# Patient Record
Sex: FEMALE | Race: WHITE | Employment: PART TIME | URBAN - NONMETROPOLITAN AREA
[De-identification: names, ages, dates, MRNs, and addresses within clinical notes are randomized per-mention and may not be internally consistent; named-entity substitution may affect disease eponyms.]

---

## 2017-01-17 ENCOUNTER — OFFICE VISIT (OUTPATIENT)
Dept: ONCOLOGY | Age: 62
End: 2017-01-17

## 2017-01-17 VITALS
BODY MASS INDEX: 32.32 KG/M2 | DIASTOLIC BLOOD PRESSURE: 93 MMHG | TEMPERATURE: 96.7 F | RESPIRATION RATE: 18 BRPM | HEART RATE: 74 BPM | OXYGEN SATURATION: 97 % | WEIGHT: 194 LBS | HEIGHT: 65 IN | SYSTOLIC BLOOD PRESSURE: 178 MMHG

## 2017-01-17 DIAGNOSIS — D72.828 OTHER ELEVATED WHITE BLOOD CELL (WBC) COUNT: Primary | ICD-10-CM

## 2017-01-17 PROCEDURE — 99214 OFFICE O/P EST MOD 30 MIN: CPT | Performed by: INTERNAL MEDICINE

## 2017-03-28 ENCOUNTER — OFFICE VISIT (OUTPATIENT)
Dept: FAMILY MEDICINE CLINIC | Age: 62
End: 2017-03-28

## 2017-03-28 VITALS
BODY MASS INDEX: 31.86 KG/M2 | SYSTOLIC BLOOD PRESSURE: 148 MMHG | DIASTOLIC BLOOD PRESSURE: 110 MMHG | HEIGHT: 65 IN | HEART RATE: 64 BPM | WEIGHT: 191.2 LBS | RESPIRATION RATE: 12 BRPM

## 2017-03-28 DIAGNOSIS — I10 BENIGN ESSENTIAL HTN: Primary | ICD-10-CM

## 2017-03-28 DIAGNOSIS — M79.89 LEFT LEG SWELLING: ICD-10-CM

## 2017-03-28 PROCEDURE — 99213 OFFICE O/P EST LOW 20 MIN: CPT | Performed by: FAMILY MEDICINE

## 2017-03-28 RX ORDER — CLONIDINE HYDROCHLORIDE 0.1 MG/1
0.1 TABLET ORAL 2 TIMES DAILY
Qty: 60 TABLET | Refills: 3 | Status: SHIPPED | OUTPATIENT
Start: 2017-03-28 | End: 2017-06-05 | Stop reason: SDUPTHER

## 2017-03-28 ASSESSMENT — ENCOUNTER SYMPTOMS
SORE THROAT: 0
CHEST TIGHTNESS: 0
COUGH: 0
VOMITING: 0
DIARRHEA: 0
CONSTIPATION: 0
WHEEZING: 0
RHINORRHEA: 0
BLOOD IN STOOL: 0
BACK PAIN: 0
NAUSEA: 0
EYE PAIN: 0
ABDOMINAL PAIN: 0
SHORTNESS OF BREATH: 0

## 2017-04-04 ENCOUNTER — OFFICE VISIT (OUTPATIENT)
Dept: ONCOLOGY | Age: 62
End: 2017-04-04

## 2017-04-04 VITALS
TEMPERATURE: 97.5 F | BODY MASS INDEX: 32.12 KG/M2 | OXYGEN SATURATION: 99 % | HEIGHT: 65 IN | RESPIRATION RATE: 18 BRPM | DIASTOLIC BLOOD PRESSURE: 108 MMHG | SYSTOLIC BLOOD PRESSURE: 168 MMHG | WEIGHT: 192.8 LBS | HEART RATE: 62 BPM

## 2017-04-04 DIAGNOSIS — D75.1 POLYCYTHEMIA: Primary | ICD-10-CM

## 2017-04-04 PROCEDURE — 99215 OFFICE O/P EST HI 40 MIN: CPT | Performed by: INTERNAL MEDICINE

## 2017-04-24 ENCOUNTER — TELEPHONE (OUTPATIENT)
Dept: FAMILY MEDICINE CLINIC | Age: 62
End: 2017-04-24

## 2017-05-12 DIAGNOSIS — I10 ESSENTIAL HYPERTENSION: ICD-10-CM

## 2017-05-12 RX ORDER — ENALAPRIL MALEATE 20 MG/1
TABLET ORAL
Qty: 180 TABLET | Refills: 0 | Status: SHIPPED | OUTPATIENT
Start: 2017-05-12 | End: 2017-06-05 | Stop reason: SDUPTHER

## 2017-05-23 ENCOUNTER — OFFICE VISIT (OUTPATIENT)
Dept: ONCOLOGY | Age: 62
End: 2017-05-23

## 2017-05-23 VITALS
HEART RATE: 71 BPM | HEIGHT: 64 IN | TEMPERATURE: 95.6 F | WEIGHT: 189.6 LBS | DIASTOLIC BLOOD PRESSURE: 88 MMHG | OXYGEN SATURATION: 99 % | BODY MASS INDEX: 32.37 KG/M2 | RESPIRATION RATE: 18 BRPM | SYSTOLIC BLOOD PRESSURE: 167 MMHG

## 2017-05-23 DIAGNOSIS — D75.1 POLYCYTHEMIA: Primary | ICD-10-CM

## 2017-05-23 PROCEDURE — 99215 OFFICE O/P EST HI 40 MIN: CPT | Performed by: INTERNAL MEDICINE

## 2017-05-30 DIAGNOSIS — R79.83 HOMOCYSTEINEMIA: ICD-10-CM

## 2017-05-31 RX ORDER — RIVAROXABAN 20 MG/1
TABLET, FILM COATED ORAL
Qty: 30 TABLET | Refills: 10 | OUTPATIENT
Start: 2017-05-31

## 2017-06-05 ENCOUNTER — OFFICE VISIT (OUTPATIENT)
Dept: FAMILY MEDICINE CLINIC | Age: 62
End: 2017-06-05

## 2017-06-05 VITALS
HEART RATE: 72 BPM | SYSTOLIC BLOOD PRESSURE: 136 MMHG | DIASTOLIC BLOOD PRESSURE: 86 MMHG | WEIGHT: 188.6 LBS | RESPIRATION RATE: 12 BRPM | HEIGHT: 65 IN | BODY MASS INDEX: 31.42 KG/M2

## 2017-06-05 DIAGNOSIS — I10 ESSENTIAL HYPERTENSION: ICD-10-CM

## 2017-06-05 DIAGNOSIS — I10 BENIGN ESSENTIAL HTN: ICD-10-CM

## 2017-06-05 DIAGNOSIS — R79.83 HOMOCYSTEINEMIA: ICD-10-CM

## 2017-06-05 DIAGNOSIS — Z00.00 WELL ADULT EXAM: Primary | ICD-10-CM

## 2017-06-05 PROCEDURE — 99396 PREV VISIT EST AGE 40-64: CPT | Performed by: FAMILY MEDICINE

## 2017-06-05 RX ORDER — CLONIDINE HYDROCHLORIDE 0.1 MG/1
0.1 TABLET ORAL 2 TIMES DAILY
Qty: 180 TABLET | Refills: 3 | Status: SHIPPED | OUTPATIENT
Start: 2017-06-05 | End: 2018-06-18 | Stop reason: DRUGHIGH

## 2017-06-05 RX ORDER — ENALAPRIL MALEATE 20 MG/1
TABLET ORAL
Qty: 180 TABLET | Refills: 3 | Status: SHIPPED | OUTPATIENT
Start: 2017-06-05 | End: 2018-06-18 | Stop reason: SDUPTHER

## 2017-06-05 ASSESSMENT — ENCOUNTER SYMPTOMS
BLOOD IN STOOL: 0
CHEST TIGHTNESS: 0
EYE PAIN: 0
VOMITING: 0
DIARRHEA: 0
SHORTNESS OF BREATH: 0
ABDOMINAL PAIN: 0
CONSTIPATION: 0
NAUSEA: 0
SORE THROAT: 0
COUGH: 0
BACK PAIN: 0
RHINORRHEA: 0
WHEEZING: 0

## 2017-06-05 ASSESSMENT — PATIENT HEALTH QUESTIONNAIRE - PHQ9
1. LITTLE INTEREST OR PLEASURE IN DOING THINGS: 0
SUM OF ALL RESPONSES TO PHQ9 QUESTIONS 1 & 2: 0
2. FEELING DOWN, DEPRESSED OR HOPELESS: 0
SUM OF ALL RESPONSES TO PHQ QUESTIONS 1-9: 0

## 2017-06-12 RX ORDER — FOLIC ACID 1 MG/1
TABLET ORAL
Qty: 90 TABLET | Refills: 1 | Status: SHIPPED | OUTPATIENT
Start: 2017-06-12 | End: 2017-12-13 | Stop reason: SDUPTHER

## 2017-07-18 ENCOUNTER — OFFICE VISIT (OUTPATIENT)
Dept: ONCOLOGY | Age: 62
End: 2017-07-18
Payer: COMMERCIAL

## 2017-07-18 ENCOUNTER — HOSPITAL ENCOUNTER (OUTPATIENT)
Dept: INFUSION THERAPY | Age: 62
Discharge: HOME | End: 2017-07-18
Payer: COMMERCIAL

## 2017-07-18 VITALS
HEIGHT: 65 IN | BODY MASS INDEX: 31.02 KG/M2 | SYSTOLIC BLOOD PRESSURE: 168 MMHG | HEART RATE: 72 BPM | OXYGEN SATURATION: 99 % | WEIGHT: 186.2 LBS | DIASTOLIC BLOOD PRESSURE: 98 MMHG | RESPIRATION RATE: 18 BRPM | TEMPERATURE: 97.3 F

## 2017-07-18 DIAGNOSIS — D75.1 POLYCYTHEMIA: Primary | ICD-10-CM

## 2017-07-18 DIAGNOSIS — D75.1 POLYCYTHEMIA: ICD-10-CM

## 2017-07-18 LAB
ANISOCYTOSIS: ABNORMAL
BASOPHILIA: SLIGHT
BASOPHILS # BLD: 2 %
BASOPHILS ABSOLUTE: 0.3 THOU/MM3 (ref 0–0.1)
DIFFERENTIAL, MANUAL: NORMAL
EOSINOPHIL # BLD: 6 %
EOSINOPHILS ABSOLUTE: 1 THOU/MM3 (ref 0–0.4)
HCT VFR BLD CALC: 52.4 % (ref 37–47)
HEMOGLOBIN: 15.4 GM/DL (ref 12–16)
HYPOCHROMIA: ABNORMAL
LYMPHOCYTES # BLD: 6 %
LYMPHOCYTES ABSOLUTE: 1 THOU/MM3 (ref 1–4.8)
MCH RBC QN AUTO: 18.8 PG (ref 27–31)
MCHC RBC AUTO-ENTMCNC: 29.4 GM/DL (ref 33–37)
MCV RBC AUTO: 63.9 FL (ref 81–99)
MICROCYTES: ABNORMAL
MONOCYTES # BLD: 2 %
MONOCYTES ABSOLUTE: 0.3 THOU/MM3 (ref 0.4–1.3)
NUCLEATED RED BLOOD CELLS: 0 /100 WBC
PDW BLD-RTO: 22 % (ref 11.5–14.5)
PLATELET # BLD: 283 THOU/MM3 (ref 130–400)
PLATELET ESTIMATE: ADEQUATE
PMV BLD AUTO: 8.8 MCM (ref 7.4–10.4)
RBC # BLD: 8.2 MILL/MM3 (ref 4.2–5.4)
RBC # BLD: ABNORMAL 10*6/UL
SCAN OF BLOOD SMEAR: NORMAL
SEG NEUTROPHILS: 84 %
SEGMENTED NEUTROPHILS ABSOLUTE COUNT: 14.5 THOU/MM3 (ref 1.8–7.7)
SPHEROCYTES: ABNORMAL
TARGET CELLS: ABNORMAL
WBC # BLD: 17.3 THOU/MM3 (ref 4.8–10.8)

## 2017-07-18 PROCEDURE — 36415 COLL VENOUS BLD VENIPUNCTURE: CPT

## 2017-07-18 PROCEDURE — 85025 COMPLETE CBC W/AUTO DIFF WBC: CPT

## 2017-07-18 PROCEDURE — 99215 OFFICE O/P EST HI 40 MIN: CPT | Performed by: INTERNAL MEDICINE

## 2017-07-18 PROCEDURE — 99211 OFF/OP EST MAY X REQ PHY/QHP: CPT

## 2017-07-27 ENCOUNTER — HOSPITAL ENCOUNTER (OUTPATIENT)
Dept: INFUSION THERAPY | Age: 62
End: 2017-07-27
Payer: COMMERCIAL

## 2017-08-02 RX ORDER — 0.9 % SODIUM CHLORIDE 0.9 %
250 INTRAVENOUS SOLUTION INTRAVENOUS ONCE
Status: CANCELLED | OUTPATIENT
Start: 2017-08-03 | End: 2017-08-03

## 2017-08-02 RX ORDER — 0.9 % SODIUM CHLORIDE 0.9 %
500 INTRAVENOUS SOLUTION INTRAVENOUS ONCE
Status: CANCELLED | OUTPATIENT
Start: 2017-08-03 | End: 2017-08-03

## 2017-08-03 ENCOUNTER — HOSPITAL ENCOUNTER (OUTPATIENT)
Dept: INFUSION THERAPY | Age: 62
Discharge: HOME | End: 2017-08-03
Payer: COMMERCIAL

## 2017-08-03 VITALS
OXYGEN SATURATION: 98 % | SYSTOLIC BLOOD PRESSURE: 149 MMHG | HEIGHT: 65 IN | HEART RATE: 62 BPM | WEIGHT: 186 LBS | BODY MASS INDEX: 30.99 KG/M2 | TEMPERATURE: 98 F | RESPIRATION RATE: 18 BRPM | DIASTOLIC BLOOD PRESSURE: 77 MMHG

## 2017-08-03 DIAGNOSIS — D75.1 POLYCYTHEMIA: ICD-10-CM

## 2017-08-03 LAB — HEMOGLOBIN: 15.3 GM/DL (ref 12–16)

## 2017-08-03 PROCEDURE — 99195 PHLEBOTOMY: CPT

## 2017-08-03 RX ORDER — 0.9 % SODIUM CHLORIDE 0.9 %
250 INTRAVENOUS SOLUTION INTRAVENOUS ONCE
Status: CANCELLED | OUTPATIENT
Start: 2017-08-03 | End: 2017-08-03

## 2017-08-03 RX ORDER — 0.9 % SODIUM CHLORIDE 0.9 %
500 INTRAVENOUS SOLUTION INTRAVENOUS ONCE
Status: CANCELLED | OUTPATIENT
Start: 2017-08-03 | End: 2017-08-03

## 2017-08-03 NOTE — IP AVS SNAPSHOT
After Visit Summary  (Discharge Instructions)    Medication List for Home    Based on the information you provided to us as well as any changes during this visit, the following is your updated medication list.  Compare this with your prescription bottles at home. If you have any questions or concerns, contact your primary care physician's office. Daily Medication List (This medication list can be shared with any healthcare provider who is helping you manage your medications)      ASK your doctor about these medications if you have questions        Last Dose    Next Dose Due AM NOON PM NIGHT    B-12 500 MCG Tabs   Take 500 mg by mouth daily. cloNIDine 0.1 MG tablet   Commonly known as:  CATAPRES   Take 1 tablet by mouth 2 times daily                                         enalapril 20 MG tablet   Commonly known as:  VASOTEC   TAKE 1 TABLET TWICE A DAY                                         folic acid 1 MG tablet   Commonly known as:  FOLVITE   TAKE 1 TABLET DAILY                                         metroNIDAZOLE 0.75 % cream   Commonly known as:  METROCREAM   Apply topically 2 times daily Apply topically 2 times daily to face. rivaroxaban 20 MG Tabs tablet   Commonly known as:  XARELTO   TAKE 1 TABLET BY MOUTH ONE TIME A DAY                                         triamcinolone 0.1 % ointment   Commonly known as:  KENALOG   Apply topically 2 times daily Apply topically 2 times daily for bilateral legs. Allergies as of 8/3/2017        Reactions    Pcn [Penicillins] Anaphylaxis    Keflex [Cephalexin] Itching, Rash      Immunizations as of 8/3/2017     No immunizations on file. Last Vitals          Most Recent Value    Temp  98 °F (36.7 °C)    Pulse  65    Resp  18    BP  (!)  141/82         After Visit Summary    This summary was created for you. Thank you for entrusting your care to us. The following information includes details about your hospital/visit stay along with steps you should take to help with your recovery once you leave the hospital.  In this packet, you will find information about the topics listed below:    · Instructions about your medications including a list of your home medications  · A summary of your hospital visit  · Follow-up appointments once you have left the hospital  · Your care plan at home      You may receive a survey regarding the care you received during your stay. Your input is valuable to us. We encourage you to complete and return your survey in the envelope provided. We hope you will choose us in the future for your healthcare needs. Patient Information     Patient Name NASIM Nix 1955      Care Provided at:     Name Address Phone       [address] [phone]            Your Visit    Here you will find information about your visit, including the reason for your visit. Please take this sheet with you when you visit your doctor or other health care provider in the future. It will help determine the best possible medical care for you at that time. If you have any questions once you leave the hospital, please call the department phone number listed below. Why you were here     Your primary diagnosis was:  Not on File      Visit Information     Date & Time Department Dept. Phone    8/3/2017 [facility] [phone]       Follow-up Appointments    Below is a list of your follow-up and future appointments. This may not be a complete list as you may have made appointments directly with providers that we are not aware of or your providers may have made some for you. Please call your providers to confirm appointments. It is important to keep your appointments.  Please bring your current insurance card, photo ID, co-pay, and all medication bottles to your appointment. If self-pay, payment is expected at the time of service. Future Appointments     8/10/2017 8:00 AM     Appointment with [facility]J RM 05 at [facility] ([phone])   [address] [facility], [address]  [facility]       8/17/2017 8:00 AM     Appointment with STR OUT PT ONC INJ RM 02 at [address] [facility] ([phone])   [address] [place] [address], [address]  [facility]       8/24/2017 8:30 AM     Appointment with Jimmy Kirby MD at [facility] [address] [place] [address]. [facility] ([phone])   Please arrive 15 minutes prior to appointment, bring photo ID and insurance card. Please arrive 15 minutes prior to appointment, bring photo ID and insurance card. [address] [facility]         Preventive Care        Date Due    Hepatitis C screening is recommended for all adults regardless of risk factors born between [facility] at least once (lifetime) who have never been tested. 1955    HIV screening is recommended for all people regardless of risk factors  aged 15-65 years at least once (lifetime) who have never been HIV tested. 12/17/1968    Tetanus Combination Vaccine (1 - Tdap) 12/17/1972    Diabetes Screening 12/17/1993    Yearly Flu Vaccine (1) 8/1/2017    Mammograms are recommended every 2 years for low/average risk patients aged 48 - 69, and every year for high risk patients per updated national guidelines. However these guidelines can be individualized by your provider. 10/28/2017    Pap Smear 1/1/2018    Cholesterol Screening 8/11/2020    Colonoscopy 4/11/2024                 Care Plan Once You Return Home    This section includes instructions you will need to follow once you leave the hospital.  Your care team will discuss these with you, so you and those caring for you know how to best care for your health needs at home. This section may also include educational information about certain health topics that may be of help to you. Discharge Instructions       THERAPEUTIC PHLEBOTOMY DISCHARGE INSTRUCTION SHEET    DIET:  Diet as tolerated-drink plenty of fluids    ACTIVITY: Up as tolerated, be cautious-may be unsteady or dizzy. No heavy lifting   with arm blood was drawn from. CARE OF PHLEBOTOMY SITE: Watch puncture site for redness, swelling or drainage. OTHER: Don't smoke for several hours. Return to doctor for follow-up instructions. If any problems, return to ER or to your doctor's office. Your next appointment is on: 8/10  At: 8:00 am     Call [phone] if you need to change your appointment. Important information for a smoker       SMOKING: QUIT SMOKING. THIS IS THE MOST IMPORTANT ACTION YOU CAN TAKE TO IMPROVE YOUR CURRENT AND FUTURE HEALTH. Call the [address] [place] [address] at [facility] ([phone])    Smoking harms nonsmokers. When nonsmokers are around people who smoke, they absorb nicotine, carbon monoxide, and other ingredients of tobacco smoke. DO NOT SMOKE AROUND CHILDREN     Children exposed to secondhand smoke are at an increased risk of:  Sudden Infant Death Syndrome (SIDS), acute respiratory infections, inflammation of the middle ear, and severe asthma. Over a longer time, it causes heart disease and lung cancer. There is no safe level of exposure to secondhand smoke. [facility] Signup     [facility] allows you to send messages to your doctor, view your test results, renew your prescriptions, schedule appointments, view visit notes, and more. How Do I Sign Up? 1. In your Internet browser, go to [email or web address]  2. Click on the Sign Up Now link in the Sign In box. You will see the New Member Sign Up page. 3. Enter your [facility] Access Code exactly as it appears below.  You will not need to use this code after youve completed the sign-up process. If you do not sign up before the expiration date, you must request a new code. [employer] Access Code: [email or web address]  Expires: 8/4/2017 10:11 AM    4. Enter your Social Security Number ([id number]) and Date of Birth (mm/dd/yyyy) as indicated and click Submit. You will be taken to the next sign-up page. 5. Create a [employer] ID. This will be your [employer] login ID and cannot be changed, so think of one that is secure and easy to remember. 6. Create a [employer] password. You can change your password at any time. 7. Enter your Password Reset Question and Answer. This can be used at a later time if you forget your password. 8. Enter your e-mail address. You will receive e-mail notification when new information is available in [address] 9. Click Sign Up. You can now view your medical record. Additional Information  If you have questions, please contact the physician practice where you receive care. Remember, [employer] is NOT to be used for urgent needs. For medical emergencies, dial 911. For questions regarding your [employer] account call [phone]. If you have a clinical question, please call your doctor's office. View your information online  ? Review your current list of  medications, immunization, and allergies. ? Review your future test results online . ? Review your discharge instructions provided by your caregivers at discharge    Certain functionality such as prescription refills, scheduling appointments or sending messages to your provider are not activated if your provider does not use [employer] in his/her office    For questions regarding your [employer] account call [phone]. If you have a clinical question, please call your doctor's office.          The information on all pages of the After Visit Summary has been reviewed with me, the patient and/or responsible adult, by my health care provider(s). I had the opportunity to ask questions regarding this information. I understand I should dispose of my armband safely at home to protect my health information. A complete copy of the After Visit Summary has been given to me, the patient and/or responsible adult.            Patient Signature/Responsible Adult:____________________    Clinician Signature:_____________________    Date:_____________________    Time:_____________________

## 2017-08-03 NOTE — IP AVS SNAPSHOT
Patient Information     Patient Name NASIM Tomlinson 1955         This is your updated medication list to keep with you all times      ASK your doctor about these medications     B-12 500 MCG Tabs       cloNIDine 0.1 MG tablet   Commonly known as:  CATAPRES   Take 1 tablet by mouth 2 times daily       enalapril 20 MG tablet   Commonly known as:  VASOTEC   TAKE 1 TABLET TWICE A DAY       folic acid 1 MG tablet   Commonly known as:  FOLVITE   TAKE 1 TABLET DAILY       metroNIDAZOLE 0.75 % cream   Commonly known as:  METROCREAM       rivaroxaban 20 MG Tabs tablet   Commonly known as:  XARELTO   TAKE 1 TABLET BY MOUTH ONE TIME A DAY       triamcinolone 0.1 % ointment   Commonly known as:  KENALOG

## 2017-08-03 NOTE — PROGRESS NOTES
THERAPEUTIC PHLEBOTOMY    Most recent Hemoglobin result: 15.3Gm/dl. Date obtained:   08 /03 /2017    Pre-phlebotomy vital signs:see Doc flow sheet    Start [id number]    Empty donor bag placed on TARE scale. Tourniquet placed on patient's arm and arm palpated for venous access. Area of venous stick cleansed with alcohol. Hemostat applied to tubing of donor bag. Sheath removed from the needle and needle inserted into the antecubital Vein in the  left Arm. Hemostat released. Blood flowing well down the tubing into the bag. No adjustment of needle needed to maintain adequate blood flow. Scale monitoring amount of blood in bag. Stop Time:0902  Needle removed from patient's arm. Pressure applied to patient's arm until bleeding stopped. Dry sterile dressing applied over puncture site and taped down well and secured with coban wrap. Final amount of blood removed:500ml  Post Vital Signs:see Doc flow sheet  Patient drank water and observed for 20 minutes. Patient tolerated procedure well. Discharged ambulatory with belongings.

## 2017-08-03 NOTE — PLAN OF CARE
Problem: Discharge Planning  Intervention: Interaction with patient/family and care team  Review discharge instructions    Goal: Knowledge of discharge instructions  Knowledge of discharge instructions   Outcome: Met This Shift  Verbalized understanding of discharge instructions, follow-up appointments, and when to call the physician. Problem: Intellectual/Education/Knowledge Deficit  Intervention: Verbal/written education provided  Patient instructed on therapeutic phlebotomy procedure and potential complications. Consent signed. Aware to call MD if complications occur. Goal: Teaching initiated upon admission  Patient instructed on therapeutic phlebotomy procedure and potential complications. Consent signed. Aware to call MD if complications occur. Outcome: Met This Shift  Patient verbalized understanding to therapeutic phlebotomy procedure and possible complications. Comments:   Care plan reviewed with patient . Patient  verbalize understanding of the plan of care and contribute to goal setting.

## 2017-08-10 ENCOUNTER — HOSPITAL ENCOUNTER (OUTPATIENT)
Dept: INFUSION THERAPY | Age: 62
Discharge: HOME | End: 2017-08-10
Payer: COMMERCIAL

## 2017-08-10 VITALS
TEMPERATURE: 97.8 F | BODY MASS INDEX: 31.09 KG/M2 | DIASTOLIC BLOOD PRESSURE: 71 MMHG | WEIGHT: 186.6 LBS | RESPIRATION RATE: 16 BRPM | HEIGHT: 65 IN | SYSTOLIC BLOOD PRESSURE: 138 MMHG | HEART RATE: 67 BPM | OXYGEN SATURATION: 98 %

## 2017-08-10 DIAGNOSIS — D75.1 POLYCYTHEMIA: ICD-10-CM

## 2017-08-10 LAB — HEMOGLOBIN: 14.5 GM/DL (ref 12–16)

## 2017-08-10 PROCEDURE — 99195 PHLEBOTOMY: CPT

## 2017-08-10 RX ORDER — 0.9 % SODIUM CHLORIDE 0.9 %
500 INTRAVENOUS SOLUTION INTRAVENOUS ONCE
Status: CANCELLED | OUTPATIENT
Start: 2017-08-10 | End: 2017-08-10

## 2017-08-10 RX ORDER — 0.9 % SODIUM CHLORIDE 0.9 %
250 INTRAVENOUS SOLUTION INTRAVENOUS ONCE
Status: CANCELLED | OUTPATIENT
Start: 2017-08-10 | End: 2017-08-10

## 2017-08-10 NOTE — IP AVS SNAPSHOT
Patient Information     Patient Name NASIM Ames 1955         This is your updated medication list to keep with you all times      ASK your doctor about these medications     B-12 500 MCG Tabs       cloNIDine 0.1 MG tablet   Commonly known as:  CATAPRES   Take 1 tablet by mouth 2 times daily       enalapril 20 MG tablet   Commonly known as:  VASOTEC   TAKE 1 TABLET TWICE A DAY       folic acid 1 MG tablet   Commonly known as:  FOLVITE   TAKE 1 TABLET DAILY       metroNIDAZOLE 0.75 % cream   Commonly known as:  METROCREAM       rivaroxaban 20 MG Tabs tablet   Commonly known as:  XARELTO   TAKE 1 TABLET BY MOUTH ONE TIME A DAY       triamcinolone 0.1 % ointment   Commonly known as:  KENALOG

## 2017-08-10 NOTE — PLAN OF CARE
Problem: Discharge Planning  Intervention: Interaction with patient/family and care team  Review home discharge instructions     Goal: Knowledge of discharge instructions  Knowledge of discharge instructions   Outcome: Met This Shift  Patient understands home discharge instructions sheet. Problem: Intellectual/Education/Knowledge Deficit  Intervention: Verbal/written education provided  Patient instructed on therapeutic phlebotomy procedure and potential complications. Consent signed. Aware to call MD if complications occur. Goal: Teaching initiated upon admission  Patient instructed on therapeutic phlebotomy procedure and potential complications. Consent signed. Aware to call MD if complications occur. Outcome: Met This Shift  Patient verbalized understanding to therapeutic phlebotomy procedure and possible complications. Comments:   Care plan reviewed with patient . Patient  verbalize understanding of the plan of care and contribute to goal setting.

## 2017-08-10 NOTE — IP AVS SNAPSHOT
After Visit Summary  (Discharge Instructions)    Medication List for Home    Based on the information you provided to us as well as any changes during this visit, the following is your updated medication list.  Compare this with your prescription bottles at home. If you have any questions or concerns, contact your primary care physician's office. Daily Medication List (This medication list can be shared with any healthcare provider who is helping you manage your medications)      ASK your doctor about these medications if you have questions        Last Dose    Next Dose Due AM NOON PM NIGHT    B-12 500 MCG Tabs   Take 500 mg by mouth daily. cloNIDine 0.1 MG tablet   Commonly known as:  CATAPRES   Take 1 tablet by mouth 2 times daily                                         enalapril 20 MG tablet   Commonly known as:  VASOTEC   TAKE 1 TABLET TWICE A DAY                                         folic acid 1 MG tablet   Commonly known as:  FOLVITE   TAKE 1 TABLET DAILY                                         metroNIDAZOLE 0.75 % cream   Commonly known as:  METROCREAM   Apply topically 2 times daily Apply topically 2 times daily to face. rivaroxaban 20 MG Tabs tablet   Commonly known as:  XARELTO   TAKE 1 TABLET BY MOUTH ONE TIME A DAY                                         triamcinolone 0.1 % ointment   Commonly known as:  KENALOG   Apply topically 2 times daily Apply topically 2 times daily for bilateral legs. Allergies as of 8/10/2017        Reactions    Pcn [Penicillins] Anaphylaxis    Keflex [Cephalexin] Itching, Rash      Immunizations as of 8/10/2017     No immunizations on file. Last Vitals          Most Recent Value    Temp  97.9 °F (36.6 °C)    Pulse  68    Resp  18    BP  (!)  149/77         After Visit Summary    This summary was created for you. Thank you for entrusting your care to us. The following information includes details about your hospital/visit stay along with steps you should take to help with your recovery once you leave the hospital.  In this packet, you will find information about the topics listed below:    · Instructions about your medications including a list of your home medications  · A summary of your hospital visit  · Follow-up appointments once you have left the hospital  · Your care plan at home      You may receive a survey regarding the care you received during your stay. Your input is valuable to us. We encourage you to complete and return your survey in the envelope provided. We hope you will choose us in the future for your healthcare needs. Patient Information     Patient Name NASIM Stewart 1955      Care Provided at:     Name Address Phone       [address] [phone]            Your Visit    Here you will find information about your visit, including the reason for your visit. Please take this sheet with you when you visit your doctor or other health care provider in the future. It will help determine the best possible medical care for you at that time. If you have any questions once you leave the hospital, please call the department phone number listed below. Why you were here     Your primary diagnosis was:  Not on File      Visit Information     Date & Time Department Dept. Phone    8/10/2017 [facility] [phone]       Follow-up Appointments    Below is a list of your follow-up and future appointments. This may not be a complete list as you may have made appointments directly with providers that we are not aware of or your providers may have made some for you. Please call your providers to confirm appointments. It is important to keep your appointments.  Please bring your current insurance card, photo ID, co-pay, and all medication bottles to your appointment. If self-pay, payment is expected at the time of service. Future Appointments     8/17/2017  8:00 AM     Appointment with STR OUT PT ONC INJ RM 02 at [facility] ([phone])   [address] [facility], [address]  [facility]       8/24/2017 8:30 AM     Appointment with Shaun Tristan MD at [facility] [address] [place] [address]. [facility] ([phone])   Please arrive 15 minutes prior to appointment, bring photo ID and insurance card. Please arrive 15 minutes prior to appointment, bring photo ID and insurance card. [facility]         Preventive Care        Date Due    Hepatitis C screening is recommended for all adults regardless of risk factors born between [facility] at least once (lifetime) who have never been tested. 1955    HIV screening is recommended for all people regardless of risk factors  aged 15-65 years at least once (lifetime) who have never been HIV tested. 12/17/1968    Tetanus Combination Vaccine (1 - Tdap) 12/17/1972    Diabetes Screening 12/17/1993    Yearly Flu Vaccine (1) 8/1/2017    Mammograms are recommended every 2 years for low/average risk patients aged 48 - 69, and every year for high risk patients per updated national guidelines. However these guidelines can be individualized by your provider. 10/28/2017    Pap Smear 1/1/2018    Cholesterol Screening 8/11/2020    Colonoscopy 4/11/2024                 Care Plan Once You Return Home    This section includes instructions you will need to follow once you leave the hospital.  Your care team will discuss these with you, so you and those caring for you know how to best care for your health needs at home. This section may also include educational information about certain health topics that may be of help to you.           Discharge Instructions       THERAPEUTIC PHLEBOTOMY DISCHARGE INSTRUCTION SHEET DIET:  Diet as tolerated-drink plenty of fluids    ACTIVITY: Up as tolerated, be cautious-may be unsteady or dizzy. No heavy lifting   with arm blood was drawn from. CARE OF PHLEBOTOMY SITE: Watch puncture site for redness, swelling or drainage. OTHER: Don't smoke for several hours. Return to doctor for follow-up instructions. If any problems, return to ER or to your doctor's office. Your next appointment is on:  8/17 At: 8:00 am    Call [phone] if you need to change your appointment. Important information for a smoker       SMOKING: QUIT SMOKING. THIS IS THE MOST IMPORTANT ACTION YOU CAN TAKE TO IMPROVE YOUR CURRENT AND FUTURE HEALTH. Call the [address] [place] [address] at Telephone NOW ([phone])    Smoking harms nonsmokers. When nonsmokers are around people who smoke, they absorb nicotine, carbon monoxide, and other ingredients of tobacco smoke. DO NOT SMOKE AROUND CHILDREN     Children exposed to secondhand smoke are at an increased risk of:  Sudden Infant Death Syndrome (SIDS), acute respiratory infections, inflammation of the middle ear, and severe asthma. Over a longer time, it causes heart disease and lung cancer. There is no safe level of exposure to secondhand smoke. [email or web address] â€™til you Shop Signup     SocMetrics allows you to send messages to your doctor, view your test results, renew your prescriptions, schedule appointments, view visit notes, and more. How Do I Sign Up? 1. In your Internet browser, go to [email or web address]  2. Click on the Sign Up Now link in the Sign In box. You will see the New Member Sign Up page. 3. Enter your SocMetrics Access Code exactly as it appears below. You will not need to use this code after youve completed the sign-up process. If you do not sign up before the expiration date, you must request a new code.   Soc[employer] Access Code: [email or web address]  Expires: 10/9/2017  8:52 AM 4. Enter your Social Security Number ([id number]) and Date of Birth (mm/dd/yyyy) as indicated and click Submit. You will be taken to the next sign-up page. 5. Create a Filmakat ID. This will be your Filmakat login ID and cannot be changed, so think of one that is secure and easy to remember. 6. Create a Filmakat password. You can change your password at any time. 7. Enter your Password Reset Question and Answer. This can be used at a later time if you forget your password. 8. Enter your e-mail address. You will receive e-mail notification when new information is available in [address] 9. Click Sign Up. You can now view your medical record. Additional Information  If you have questions, please contact the physician practice where you receive care. Remember, Filmakat is NOT to be used for urgent needs. For medical emergencies, dial 911. For questions regarding your Filmakat account call [phone]. If you have a clinical question, please call your doctor's office. View your information online  ? Review your current list of  medications, immunization, and allergies. ? Review your future test results online . ? Review your discharge instructions provided by your caregivers at discharge    Certain functionality such as prescription refills, scheduling appointments or sending messages to your provider are not activated if your provider does not use MemberPlanet in his/her office    For questions regarding your FireStar Softwarehart account call [phone]. If you have a clinical question, please call your doctor's office. The information on all pages of the After Visit Summary has been reviewed with me, the patient and/or responsible adult, by my health care provider(s). I had the opportunity to ask questions regarding this information. I understand I should dispose of my armband safely at home to protect my health information.  A complete copy of the After Visit Summary has been given to me, the patient and/or responsible adult.            Patient Signature/Responsible Adult:____________________    Clinician Signature:_____________________    Date:_____________________    Time:_____________________

## 2017-08-10 NOTE — PROGRESS NOTES
THERAPEUTIC PHLEBOTOMY    Most recent Hemoglobin result: 14.5Gm/dl. Date obtained:   08 /10 /2017    Pre-phlebotomy vital signs:see Doc flow sheet    Start EVFJ:8660    Empty donor bag placed on TARE scale. Tourniquet placed on patient's arm and arm palpated for venous access. Area of venous stick cleansed with alcohol. Hemostat applied to tubing of donor bag. Sheath removed from the needle and needle inserted into the antecubital Vein in the  right Arm. Hemostat released. Blood flowing well down the tubing into the bag. No adjustment of needle needed to maintain adequate blood flow. Scale monitoring amount of blood in bag. Stop UPKI:3471  Needle removed from patient's arm. Pressure applied to patient's arm until bleeding stopped. Dry sterile dressing applied over puncture site and taped down well and secured with coban wrap. Final amount of blood removed:500ml  Post Vital Signs:see Doc flow sheet  Patient drank water and observed for 20 minutes. Patient tolerated procedure well. Discharged ambulatory with belongings.

## 2017-08-17 ENCOUNTER — HOSPITAL ENCOUNTER (OUTPATIENT)
Dept: INFUSION THERAPY | Age: 62
Discharge: HOME | End: 2017-08-17
Payer: COMMERCIAL

## 2017-08-17 VITALS
HEART RATE: 65 BPM | DIASTOLIC BLOOD PRESSURE: 76 MMHG | SYSTOLIC BLOOD PRESSURE: 142 MMHG | OXYGEN SATURATION: 95 % | TEMPERATURE: 98.5 F | RESPIRATION RATE: 16 BRPM

## 2017-08-17 DIAGNOSIS — D75.1 POLYCYTHEMIA: ICD-10-CM

## 2017-08-17 LAB — HEMOGLOBIN: 13.6 GM/DL (ref 12–16)

## 2017-08-17 PROCEDURE — 36415 COLL VENOUS BLD VENIPUNCTURE: CPT

## 2017-08-17 PROCEDURE — 99195 PHLEBOTOMY: CPT

## 2017-08-17 PROCEDURE — 85018 HEMOGLOBIN: CPT

## 2017-08-17 RX ORDER — 0.9 % SODIUM CHLORIDE 0.9 %
250 INTRAVENOUS SOLUTION INTRAVENOUS ONCE
Status: CANCELLED | OUTPATIENT
Start: 2017-08-17 | End: 2017-08-17

## 2017-08-17 RX ORDER — 0.9 % SODIUM CHLORIDE 0.9 %
500 INTRAVENOUS SOLUTION INTRAVENOUS ONCE
Status: CANCELLED | OUTPATIENT
Start: 2017-08-17 | End: 2017-08-17

## 2017-08-17 NOTE — PROGRESS NOTES
THERAPEUTIC PHLEBOTOMY    Most recent Hemoglobin result: 13.6Gm/dl. Date obtained:   08/17 /2017    Pre-phlebotomy vital signs:see Doc flow sheet    Start time:0850    Empty donor bag placed on TARE scale. Tourniquet placed on patient's arm and arm palpated for venous access. Area of venous stick cleansed with alcohol. Hemostat applied to tubing of donor bag. Sheath removed from the needle and needle inserted into the antecubital Vein in the  left Arm. Hemostat released. Blood flowing well down the tubing into the bag. No adjustment of needle needed to maintain adequate blood flow. Scale monitoring amount of blood in bag. Stop Time:0901  Needle removed from patient's arm. Pressure applied to patient's arm until bleeding stopped. Dry sterile dressing applied over puncture site and taped down well and secured with coban wrap. Final amount of blood removed:500ml  Post Vital Signs:see Doc flow sheet  Patient drank water and observed for 20 minutes. Patient tolerated procedure well. Discharged ambulatory with belongings.

## 2017-08-17 NOTE — IP AVS SNAPSHOT
After Visit Summary  (Discharge Instructions)    Medication List for Home    Based on the information you provided to us as well as any changes during this visit, the following is your updated medication list.  Compare this with your prescription bottles at home. If you have any questions or concerns, contact your primary care physician's office. Daily Medication List (This medication list can be shared with any healthcare provider who is helping you manage your medications)      ASK your doctor about these medications if you have questions        Last Dose    Next Dose Due AM NOON PM NIGHT    B-12 500 MCG Tabs   Take 500 mg by mouth daily. cloNIDine 0.1 MG tablet   Commonly known as:  CATAPRES   Take 1 tablet by mouth 2 times daily                                         enalapril 20 MG tablet   Commonly known as:  VASOTEC   TAKE 1 TABLET TWICE A DAY                                         folic acid 1 MG tablet   Commonly known as:  FOLVITE   TAKE 1 TABLET DAILY                                         metroNIDAZOLE 0.75 % cream   Commonly known as:  METROCREAM   Apply topically 2 times daily Apply topically 2 times daily to face. rivaroxaban 20 MG Tabs tablet   Commonly known as:  XARELTO   TAKE 1 TABLET BY MOUTH ONE TIME A DAY                                         triamcinolone 0.1 % ointment   Commonly known as:  KENALOG   Apply topically 2 times daily Apply topically 2 times daily for bilateral legs. Allergies as of 8/17/2017        Reactions    Pcn [Penicillins] Anaphylaxis    Keflex [Cephalexin] Itching, Rash      Immunizations as of 8/17/2017     No immunizations on file. Last Vitals          Most Recent Value    Temp  98.9 °F (37.2 °C)    Pulse  65    Resp  16    BP  126/69         After Visit Summary    This summary was created for you. Thank you for entrusting your care to us. The following information includes details about your hospital/visit stay along with steps you should take to help with your recovery once you leave the hospital.  In this packet, you will find information about the topics listed below:    · Instructions about your medications including a list of your home medications  · A summary of your hospital visit  · Follow-up appointments once you have left the hospital  · Your care plan at home      You may receive a survey regarding the care you received during your stay. Your input is valuable to us. We encourage you to complete and return your survey in the envelope provided. We hope you will choose us in the future for your healthcare needs. Patient Information     Patient Name NASIM Singh 1955      Care Provided at:     Name Address Phone       [address] [phone]            Your Visit    Here you will find information about your visit, including the reason for your visit. Please take this sheet with you when you visit your doctor or other health care provider in the future. It will help determine the best possible medical care for you at that time. If you have any questions once you leave the hospital, please call the department phone number listed below. Why you were here     Your primary diagnosis was:  Not on File      Visit Information     Date & Time Department Dept. Phone    2017 [facility] [phone]       Follow-up Appointments    Below is a list of your follow-up and future appointments. This may not be a complete list as you may have made appointments directly with providers that we are not aware of or your providers may have made some for you. Please call your providers to confirm appointments. It is important to keep your appointments.  Please bring your current insurance card, photo ID, co-pay, and all medication bottles to your appointment. If self-pay, payment is expected at the time of service. Future Appointments     8/24/2017 8:30 AM     Appointment with Divya Everett MD at [facility]. [facility] ([phone])   Please arrive 15 minutes prior to appointment, bring photo ID and insurance card. Please arrive 15 minutes prior to appointment, bring photo ID and insurance card. [facility]         Preventive Care        Date Due    Hepatitis C screening is recommended for all adults regardless of risk factors born between [facility] at least once (lifetime) who have never been tested. 1955    HIV screening is recommended for all people regardless of risk factors  aged 15-65 years at least once (lifetime) who have never been HIV tested. 12/17/1968    Tetanus Combination Vaccine (1 - Tdap) 12/17/1972    Diabetes Screening 12/17/1993    Yearly Flu Vaccine (1) 8/1/2017    Mammograms are recommended every 2 years for low/average risk patients aged 48 - 69, and every year for high risk patients per updated national guidelines. However these guidelines can be individualized by your provider. 10/28/2017    Pap Smear 1/1/2018    Cholesterol Screening 8/11/2020    Colonoscopy 4/11/2024                 Care Plan Once You Return Home    This section includes instructions you will need to follow once you leave the hospital.  Your care team will discuss these with you, so you and those caring for you know how to best care for your health needs at home. This section may also include educational information about certain health topics that may be of help to you. Discharge Instructions       THERAPEUTIC PHLEBOTOMY DISCHARGE INSTRUCTION SHEET    DIET:  Diet as tolerated-drink plenty of fluids    ACTIVITY: Up as tolerated, be cautious-may be unsteady or dizzy. No heavy lifting   with arm blood was drawn from. CARE OF PHLEBOTOMY SITE: Watch puncture site for redness, swelling or drainage. OTHER: Don't smoke for several hours. Return to doctor for follow-up instructions. If any problems, return to ER or to your doctor's office. Your next appointment is on: At: keep follow up Dr. Ann Corrales  Call [phone] if you need to change your appointment. Important information for a smoker       SMOKING: QUIT SMOKING. THIS IS THE MOST IMPORTANT ACTION YOU CAN TAKE TO IMPROVE YOUR CURRENT AND FUTURE HEALTH. Call the [facility] ([phone])    Smoking harms nonsmokers. When nonsmokers are around people who smoke, they absorb nicotine, carbon monoxide, and other ingredients of tobacco smoke. DO NOT SMOKE AROUND CHILDREN     Children exposed to secondhand smoke are at an increased risk of:  Sudden Infant Death Syndrome (SIDS), acute respiratory infections, inflammation of the middle ear, and severe asthma. Over a longer time, it causes heart disease and lung cancer. There is no safe level of exposure to secondhand smoke. SourceTour Signup     SourceTour allows you to send messages to your doctor, view your test results, renew your prescriptions, schedule appointments, view visit notes, and more. How Do I Sign Up? 1. In your Internet browser, go to [email or web address]  2. Click on the Sign Up Now link in the Sign In box. You will see the New Member Sign Up page. 3. Enter your SourceTour Access Code exactly as it appears below. You will not need to use this code after youve completed the sign-up process. If you do not sign up before the expiration date, you must request a new code. SourceTour Access Code: [email or web address]  Expires: 10/9/2017  8:52 AM    4. Enter your Social Security Number ([id number]) and Date of Birth (mm/dd/yyyy) as indicated and click Submit. You will be taken to the next sign-up page. 5. Create a Exam18t ID. This will be your Nopsec login ID and cannot be changed, so think of one that is secure and easy to remember. 6. Create a Exam18t password. You can change your password at any time. 7. Enter your Password Reset Question and Answer. This can be used at a later time if you forget your password. 8. Enter your e-mail address. You will receive e-mail notification when new information is available in [address] 9. Click Sign Up. You can now view your medical record. Additional Information  If you have questions, please contact the physician practice where you receive care. Remember, Nopsec is NOT to be used for urgent needs. For medical emergencies, dial 911. For questions regarding your Exam18t account call [phone]. If you have a clinical question, please call your doctor's office. View your information online  ? Review your current list of  medications, immunization, and allergies. ? Review your future test results online . ? Review your discharge instructions provided by your caregivers at discharge    Certain functionality such as prescription refills, scheduling appointments or sending messages to your provider are not activated if your provider does not use PicksPal in his/her office    For questions regarding your Exam18t account call [phone]. If you have a clinical question, please call your doctor's office. The information on all pages of the After Visit Summary has been reviewed with me, the patient and/or responsible adult, by my health care provider(s). I had the opportunity to ask questions regarding this information. I understand I should dispose of my armband safely at home to protect my health information. A complete copy of the After Visit Summary has been given to me, the patient and/or responsible adult.            Patient Signature/Responsible Adult:____________________    Clinician Signature:_____________________ Date:_____________________    Time:_____________________

## 2017-08-17 NOTE — IP AVS SNAPSHOT
Patient Information     Patient Name NASIM Diana 1955         This is your updated medication list to keep with you all times      ASK your doctor about these medications     B-12 500 MCG Tabs       cloNIDine 0.1 MG tablet   Commonly known as:  CATAPRES   Take 1 tablet by mouth 2 times daily       enalapril 20 MG tablet   Commonly known as:  VASOTEC   TAKE 1 TABLET TWICE A DAY       folic acid 1 MG tablet   Commonly known as:  FOLVITE   TAKE 1 TABLET DAILY       metroNIDAZOLE 0.75 % cream   Commonly known as:  METROCREAM       rivaroxaban 20 MG Tabs tablet   Commonly known as:  XARELTO   TAKE 1 TABLET BY MOUTH ONE TIME A DAY       triamcinolone 0.1 % ointment   Commonly known as:  KENALOG

## 2017-08-24 ENCOUNTER — OFFICE VISIT (OUTPATIENT)
Dept: ONCOLOGY | Age: 62
End: 2017-08-24
Payer: COMMERCIAL

## 2017-08-24 ENCOUNTER — HOSPITAL ENCOUNTER (OUTPATIENT)
Dept: INFUSION THERAPY | Age: 62
Discharge: HOME | End: 2017-08-24
Payer: COMMERCIAL

## 2017-08-24 VITALS
HEART RATE: 72 BPM | BODY MASS INDEX: 30.99 KG/M2 | SYSTOLIC BLOOD PRESSURE: 126 MMHG | OXYGEN SATURATION: 100 % | WEIGHT: 186 LBS | TEMPERATURE: 97 F | DIASTOLIC BLOOD PRESSURE: 80 MMHG | HEIGHT: 65 IN | RESPIRATION RATE: 18 BRPM

## 2017-08-24 DIAGNOSIS — D75.1 POLYCYTHEMIA: Primary | ICD-10-CM

## 2017-08-24 DIAGNOSIS — D75.1 POLYCYTHEMIA: ICD-10-CM

## 2017-08-24 LAB
ALBUMIN SERPL-MCNC: 4.1 G/DL (ref 3.5–5.1)
ALP BLD-CCNC: 130 U/L (ref 38–126)
ALT SERPL-CCNC: 47 U/L (ref 11–66)
ANISOCYTOSIS: ABNORMAL
AST SERPL-CCNC: 46 U/L (ref 5–40)
BANDED NEUTROPHILS ABSOLUTE COUNT: 0.4 THOU/MM3
BANDS PRESENT: 2 %
BASOPHILIA: SLIGHT
BASOPHILS # BLD: 3 %
BASOPHILS ABSOLUTE: 0.5 THOU/MM3 (ref 0–0.1)
BILIRUB SERPL-MCNC: 0.3 MG/DL (ref 0.3–1.2)
BILIRUBIN DIRECT: < 0.2 MG/DL (ref 0–0.3)
BUN, WHOLE BLOOD: 24 MG/DL (ref 8–26)
CHLORIDE, WHOLE BLOOD: 107 MEQ/L (ref 98–109)
CREATININE, WHOLE BLOOD: 1.2 MG/DL (ref 0.5–1.2)
DIFFERENTIAL, MANUAL: NORMAL
EOSINOPHIL # BLD: 7 %
EOSINOPHILS ABSOLUTE: 1.3 THOU/MM3 (ref 0–0.4)
GFR, ESTIMATED: 48 ML/MIN/1.73M2
GLUCOSE, WHOLE BLOOD: 89 MG/DL (ref 70–108)
HCT VFR BLD CALC: 41.9 % (ref 37–47)
HEMOGLOBIN: 12.4 GM/DL (ref 12–16)
HYPOCHROMIA: ABNORMAL
IONIZED CALCIUM, WHOLE BLOOD: 1.34 MMOL/L (ref 1.12–1.32)
LD: 281 U/L (ref 100–190)
LYMPHOCYTES # BLD: 6 %
LYMPHOCYTES ABSOLUTE: 1.1 THOU/MM3 (ref 1–4.8)
MCH RBC QN AUTO: 18.9 PG (ref 27–31)
MCHC RBC AUTO-ENTMCNC: 29.5 GM/DL (ref 33–37)
MCV RBC AUTO: 64 FL (ref 81–99)
MICROCYTES: ABNORMAL
MONOCYTES # BLD: 4 %
MONOCYTES ABSOLUTE: 0.7 THOU/MM3 (ref 0.4–1.3)
NUCLEATED RED BLOOD CELLS: 0 /100 WBC
OVALOCYTES: ABNORMAL
PATHOLOGIST REVIEW: ABNORMAL
PDW BLD-RTO: 22 % (ref 11.5–14.5)
PLATELET # BLD: 560 THOU/MM3 (ref 130–400)
PLATELET ESTIMATE: ABNORMAL
PMV BLD AUTO: 9.7 MCM (ref 7.4–10.4)
POIKILOCYTES: ABNORMAL
POTASSIUM, WHOLE BLOOD: 4.7 MEQ/L (ref 3.5–4.9)
RBC # BLD: 6.54 MILL/MM3 (ref 4.2–5.4)
RBC # BLD: ABNORMAL 10*6/UL
SEG NEUTROPHILS: 78 %
SEGMENTED NEUTROPHILS ABSOLUTE COUNT: 14 THOU/MM3 (ref 1.8–7.7)
SODIUM, WHOLE BLOOD: 142 MEQ/L (ref 138–146)
TARGET CELLS: ABNORMAL
TOTAL CO2, WHOLE BLOOD: 26 MEQ/L (ref 23–33)
TOTAL PROTEIN: 6.8 G/DL (ref 6.1–8)
WBC # BLD: 17.9 THOU/MM3 (ref 4.8–10.8)

## 2017-08-24 PROCEDURE — 36415 COLL VENOUS BLD VENIPUNCTURE: CPT

## 2017-08-24 PROCEDURE — 80076 HEPATIC FUNCTION PANEL: CPT

## 2017-08-24 PROCEDURE — 99213 OFFICE O/P EST LOW 20 MIN: CPT | Performed by: INTERNAL MEDICINE

## 2017-08-24 PROCEDURE — 83615 LACTATE (LD) (LDH) ENZYME: CPT

## 2017-08-24 PROCEDURE — 99211 OFF/OP EST MAY X REQ PHY/QHP: CPT

## 2017-08-24 PROCEDURE — 80047 BASIC METABLC PNL IONIZED CA: CPT

## 2017-08-24 PROCEDURE — 85025 COMPLETE CBC W/AUTO DIFF WBC: CPT

## 2017-10-31 ENCOUNTER — OFFICE VISIT (OUTPATIENT)
Dept: ONCOLOGY | Age: 62
End: 2017-10-31
Payer: COMMERCIAL

## 2017-10-31 ENCOUNTER — HOSPITAL ENCOUNTER (OUTPATIENT)
Dept: INFUSION THERAPY | Age: 62
Discharge: HOME | End: 2017-10-31
Payer: COMMERCIAL

## 2017-10-31 VITALS
SYSTOLIC BLOOD PRESSURE: 169 MMHG | DIASTOLIC BLOOD PRESSURE: 84 MMHG | BODY MASS INDEX: 30.49 KG/M2 | HEIGHT: 65 IN | HEART RATE: 65 BPM | RESPIRATION RATE: 18 BRPM | WEIGHT: 183 LBS | OXYGEN SATURATION: 99 % | TEMPERATURE: 97.1 F

## 2017-10-31 DIAGNOSIS — D75.1 POLYCYTHEMIA: ICD-10-CM

## 2017-10-31 DIAGNOSIS — D75.1 POLYCYTHEMIA: Primary | ICD-10-CM

## 2017-10-31 LAB
ANISOCYTOSIS: ABNORMAL
BASOPHILIA: SLIGHT
BASOPHILS # BLD: 0 %
BASOPHILS ABSOLUTE: 0 THOU/MM3 (ref 0–0.1)
DIFFERENTIAL, MANUAL: NORMAL
ELLIPTOCYTES: ABNORMAL
EOSINOPHIL # BLD: 3 %
EOSINOPHILS ABSOLUTE: 0.6 THOU/MM3 (ref 0–0.4)
HCT VFR BLD CALC: 45.6 % (ref 37–47)
HEMOGLOBIN: 13.7 GM/DL (ref 12–16)
HYPOCHROMIA: ABNORMAL
LYMPHOCYTES # BLD: 11 %
LYMPHOCYTES ABSOLUTE: 2.1 THOU/MM3 (ref 1–4.8)
MCH RBC QN AUTO: 18.4 PG (ref 27–31)
MCHC RBC AUTO-ENTMCNC: 30 GM/DL (ref 33–37)
MCV RBC AUTO: 61.6 FL (ref 81–99)
MICROCYTES: ABNORMAL
MONOCYTES # BLD: 3 %
MONOCYTES ABSOLUTE: 0.6 THOU/MM3 (ref 0.4–1.3)
NUCLEATED RED BLOOD CELLS: 1 /100 WBC
PDW BLD-RTO: 21.2 % (ref 11.5–14.5)
PLATELET # BLD: 343 THOU/MM3 (ref 130–400)
PLATELET ESTIMATE: ADEQUATE
PMV BLD AUTO: 9.2 MCM (ref 7.4–10.4)
RBC # BLD: 7.4 MILL/MM3 (ref 4.2–5.4)
SEG NEUTROPHILS: 83 %
SEGMENTED NEUTROPHILS ABSOLUTE COUNT: 16 THOU/MM3 (ref 1.8–7.7)
TARGET CELLS: ABNORMAL
WBC # BLD: 19.3 THOU/MM3 (ref 4.8–10.8)

## 2017-10-31 PROCEDURE — 99214 OFFICE O/P EST MOD 30 MIN: CPT | Performed by: INTERNAL MEDICINE

## 2017-10-31 PROCEDURE — 85025 COMPLETE CBC W/AUTO DIFF WBC: CPT

## 2017-10-31 PROCEDURE — 36415 COLL VENOUS BLD VENIPUNCTURE: CPT

## 2017-10-31 PROCEDURE — 99211 OFF/OP EST MAY X REQ PHY/QHP: CPT

## 2017-12-13 RX ORDER — FOLIC ACID 1 MG/1
TABLET ORAL
Qty: 90 TABLET | Refills: 1 | Status: SHIPPED | OUTPATIENT
Start: 2017-12-13 | End: 2018-06-11 | Stop reason: SDUPTHER

## 2017-12-13 NOTE — TELEPHONE ENCOUNTER
Flo Dorsey is requesting a refill of the following medication(s);   Requested Prescriptions     Pending Prescriptions Disp Refills    folic acid (FOLVITE) 1 MG tablet 90 tablet 1       Last filled;  6/12/17

## 2018-01-29 ENCOUNTER — TELEPHONE (OUTPATIENT)
Dept: FAMILY MEDICINE CLINIC | Age: 63
End: 2018-01-29

## 2018-01-29 NOTE — LETTER
[facility]  [address] [address]  Phone: [phone]  Fax: [phone]    Jorge Lin MD        2018    Keyanna Townsend  : 1955    To whom it may concern:    Our office recently received a denial on GMI. The patient does have a diagnosis of pulmonary emboli so please reconsider covering this medication for the patient.       Sincerely,        Jorge Lin MD

## 2018-01-30 ENCOUNTER — OFFICE VISIT (OUTPATIENT)
Dept: ONCOLOGY | Age: 63
End: 2018-01-30
Payer: COMMERCIAL

## 2018-01-30 ENCOUNTER — HOSPITAL ENCOUNTER (OUTPATIENT)
Dept: INFUSION THERAPY | Age: 63
Discharge: HOME | End: 2018-01-30
Payer: COMMERCIAL

## 2018-01-30 VITALS
SYSTOLIC BLOOD PRESSURE: 154 MMHG | BODY MASS INDEX: 30.69 KG/M2 | RESPIRATION RATE: 16 BRPM | HEART RATE: 72 BPM | HEIGHT: 65 IN | WEIGHT: 184.2 LBS | OXYGEN SATURATION: 97 % | TEMPERATURE: 96.8 F | DIASTOLIC BLOOD PRESSURE: 82 MMHG

## 2018-01-30 DIAGNOSIS — D72.828 OTHER ELEVATED WHITE BLOOD CELL (WBC) COUNT: ICD-10-CM

## 2018-01-30 DIAGNOSIS — I82.4Y9 DEEP VEIN THROMBOSIS (DVT) OF PROXIMAL LOWER EXTREMITY, UNSPECIFIED CHRONICITY, UNSPECIFIED LATERALITY (HCC): Primary | ICD-10-CM

## 2018-01-30 DIAGNOSIS — Z79.01 CHRONIC ANTICOAGULATION: ICD-10-CM

## 2018-01-30 DIAGNOSIS — D75.1 POLYCYTHEMIA: ICD-10-CM

## 2018-01-30 LAB
ALBUMIN SERPL-MCNC: 4 G/DL (ref 3.5–5.1)
ALP BLD-CCNC: 102 U/L (ref 38–126)
ALT SERPL-CCNC: 22 U/L (ref 11–66)
AST SERPL-CCNC: 26 U/L (ref 5–40)
BILIRUB SERPL-MCNC: 0.4 MG/DL (ref 0.3–1.2)
BILIRUBIN DIRECT: < 0.2 MG/DL (ref 0–0.3)
BUN, WHOLE BLOOD: 26 MG/DL (ref 8–26)
CHLORIDE, WHOLE BLOOD: 105 MEQ/L (ref 98–109)
CREATININE, WHOLE BLOOD: 1.1 MG/DL (ref 0.5–1.2)
GFR, ESTIMATED: 53 ML/MIN/1.73M2
GLUCOSE, WHOLE BLOOD: 134 MG/DL (ref 70–108)
IONIZED CALCIUM, WHOLE BLOOD: 1.31 MMOL/L (ref 1.12–1.32)
POTASSIUM, WHOLE BLOOD: 4.2 MEQ/L (ref 3.5–4.9)
SCAN OF BLOOD SMEAR: NORMAL
SODIUM, WHOLE BLOOD: 141 MEQ/L (ref 138–146)
TOTAL CO2, WHOLE BLOOD: 25 MEQ/L (ref 23–33)
TOTAL PROTEIN: 6.8 G/DL (ref 6.1–8)

## 2018-01-30 PROCEDURE — 80076 HEPATIC FUNCTION PANEL: CPT

## 2018-01-30 PROCEDURE — 85025 COMPLETE CBC W/AUTO DIFF WBC: CPT

## 2018-01-30 PROCEDURE — 99211 OFF/OP EST MAY X REQ PHY/QHP: CPT

## 2018-01-30 PROCEDURE — 80047 BASIC METABLC PNL IONIZED CA: CPT

## 2018-01-30 PROCEDURE — 99214 OFFICE O/P EST MOD 30 MIN: CPT | Performed by: INTERNAL MEDICINE

## 2018-01-30 PROCEDURE — 36415 COLL VENOUS BLD VENIPUNCTURE: CPT

## 2018-01-31 LAB
ANISOCYTOSIS: ABNORMAL
ATYPICAL LYMPHOCYTES: ABNORMAL %
BASOPHILIA: ABNORMAL
BASOPHILS # BLD: 0.5 %
BASOPHILS ABSOLUTE: 0.1 THOU/MM3 (ref 0–0.1)
ELLIPTOCYTES: ABNORMAL
EOSINOPHIL # BLD: 5 %
EOSINOPHILS ABSOLUTE: 1 THOU/MM3 (ref 0–0.4)
HCT VFR BLD CALC: 48.2 % (ref 37–47)
HEMOGLOBIN: 14.2 GM/DL (ref 12–16)
HYPOCHROMIA: ABNORMAL
LYMPHOCYTES # BLD: 9.5 %
LYMPHOCYTES ABSOLUTE: 2 THOU/MM3 (ref 1–4.8)
MCH RBC QN AUTO: 18.2 PG (ref 27–31)
MCHC RBC AUTO-ENTMCNC: 29.5 GM/DL (ref 33–37)
MCV RBC AUTO: 61.9 FL (ref 81–99)
MICROCYTES: ABNORMAL
MONOCYTES # BLD: 2.9 %
MONOCYTES ABSOLUTE: 0.6 THOU/MM3 (ref 0.4–1.3)
NUCLEATED RED BLOOD CELLS: 0 /100 WBC
PATHOLOGIST REVIEW: ABNORMAL
PDW BLD-RTO: 23 % (ref 11.5–14.5)
PLATELET # BLD: 256 THOU/MM3 (ref 130–400)
PLATELET ESTIMATE: ADEQUATE
PMV BLD AUTO: 9.5 MCM (ref 7.4–10.4)
RBC # BLD: 7.78 MILL/MM3 (ref 4.2–5.4)
SEG NEUTROPHILS: 82.1 %
SEGMENTED NEUTROPHILS ABSOLUTE COUNT: 16.9 THOU/MM3 (ref 1.8–7.7)
SPHEROCYTES: ABNORMAL
TARGET CELLS: ABNORMAL
WBC # BLD: 20.6 THOU/MM3 (ref 4.8–10.8)

## 2018-02-02 ENCOUNTER — TELEPHONE (OUTPATIENT)
Dept: FAMILY MEDICINE CLINIC | Age: 63
End: 2018-02-02

## 2018-02-02 DIAGNOSIS — R79.83 HOMOCYSTEINEMIA: ICD-10-CM

## 2018-02-02 NOTE — TELEPHONE ENCOUNTER
Patient is not able to get her Xarelto due to insurance requiring prior auth and now it is under appeal.  She is down to her last pill and is not sure what she needs to do. Is there something else that she can take instead or what do you recommend?   Please call her back at [phone]

## 2018-02-02 NOTE — TELEPHONE ENCOUNTER
Spoke with Xarelto savings card program and state to have the pharmacy change the occ code to a 3 and see that happens. [phone]. Spoke with Leo and states they changed the occ and the pt already had a free 30 day trial the last time she got a p/a so they are unable to use that code again. 7 tablets will cost [phone], 30 tablets will cost 481.50. Pt notified of this information - she will pay out of pocket for the 7 tablets. RAR send message back to me so I can continue to work on next week. Rx pending.

## 2018-02-06 ENCOUNTER — TELEPHONE (OUTPATIENT)
Dept: FAMILY MEDICINE CLINIC | Age: 63
End: 2018-02-06

## 2018-02-09 NOTE — TELEPHONE ENCOUNTER
Pt calling stating she hasn't heard anything from insurance and neither have we -- will need rx until Tues when the appeals dept should have an outcome. Pt was informed that she will need to start coumadin if the xarelto is denied. Rx pending #5/0.

## 2018-02-14 DIAGNOSIS — R79.83 HOMOCYSTEINEMIA: ICD-10-CM

## 2018-02-15 ENCOUNTER — TELEPHONE (OUTPATIENT)
Dept: ONCOLOGY | Age: 63
End: 2018-02-15

## 2018-02-15 NOTE — TELEPHONE ENCOUNTER
Patient is calling in because Dr. Charmayne Bannister has patient on Xarelto and now the patient is having issues with her insurance not covering it, Archie Farris is wondering if there is anything else she can take. Please advise.

## 2018-03-22 ENCOUNTER — HOSPITAL ENCOUNTER (OUTPATIENT)
Age: 63
Setting detail: OBSERVATION
Discharge: HOME | End: 2018-03-25
Attending: INTERNAL MEDICINE | Admitting: INTERNAL MEDICINE
Payer: COMMERCIAL

## 2018-03-22 ENCOUNTER — APPOINTMENT (OUTPATIENT)
Dept: CT IMAGING | Age: 63
End: 2018-03-22
Payer: COMMERCIAL

## 2018-03-22 DIAGNOSIS — R10.9 ABDOMINAL PAIN, UNSPECIFIED ABDOMINAL LOCATION: ICD-10-CM

## 2018-03-22 DIAGNOSIS — R07.9 CHEST PAIN, UNSPECIFIED TYPE: Primary | ICD-10-CM

## 2018-03-22 PROBLEM — D72.829 LEUCOCYTOSIS: Status: ACTIVE | Noted: 2018-03-22

## 2018-03-22 PROBLEM — I24.9 ACS (ACUTE CORONARY SYNDROME) (HCC): Status: ACTIVE | Noted: 2018-03-22

## 2018-03-22 PROBLEM — D75.1 POLYCYTHEMIA: Status: ACTIVE | Noted: 2018-03-22

## 2018-03-22 LAB
ALBUMIN SERPL-MCNC: 4 G/DL (ref 3.5–5.1)
ALP BLD-CCNC: 108 U/L (ref 38–126)
ALT SERPL-CCNC: 21 U/L (ref 11–66)
ANION GAP SERPL CALCULATED.3IONS-SCNC: 12 MEQ/L (ref 8–16)
ANISOCYTOSIS: ABNORMAL
AST SERPL-CCNC: 28 U/L (ref 5–40)
BACTERIA: ABNORMAL /HPF
BASOPHILIA: ABNORMAL
BASOPHILS # BLD: 1.7 %
BASOPHILS ABSOLUTE: 0.4 THOU/MM3 (ref 0–0.1)
BILIRUB SERPL-MCNC: 0.4 MG/DL (ref 0.3–1.2)
BILIRUBIN DIRECT: < 0.2 MG/DL (ref 0–0.3)
BILIRUBIN URINE: NEGATIVE
BLOOD, URINE: NEGATIVE
BUN BLDV-MCNC: 24 MG/DL (ref 7–22)
CALCIUM SERPL-MCNC: 10.3 MG/DL (ref 8.5–10.5)
CASTS 2: ABNORMAL /LPF
CASTS UA: ABNORMAL /LPF
CHARACTER, URINE: CLEAR
CHLORIDE BLD-SCNC: 103 MEQ/L (ref 98–111)
CO2: 24 MEQ/L (ref 23–33)
COLOR: YELLOW
CREAT SERPL-MCNC: 0.9 MG/DL (ref 0.4–1.2)
CRYSTALS, UA: ABNORMAL
EKG ATRIAL RATE: 61 BPM
EKG P AXIS: 41 DEGREES
EKG P-R INTERVAL: 160 MS
EKG Q-T INTERVAL: 428 MS
EKG QRS DURATION: 86 MS
EKG QTC CALCULATION (BAZETT): 430 MS
EKG R AXIS: -23 DEGREES
EKG T AXIS: 67 DEGREES
EKG VENTRICULAR RATE: 61 BPM
EOSINOPHIL # BLD: 8.2 %
EOSINOPHILS ABSOLUTE: 1.7 THOU/MM3 (ref 0–0.4)
EPITHELIAL CELLS, UA: ABNORMAL /HPF
GFR SERPL CREATININE-BSD FRML MDRD: 63 ML/MIN/1.73M2
GLUCOSE BLD-MCNC: 116 MG/DL (ref 70–108)
GLUCOSE URINE: NEGATIVE MG/DL
HCT VFR BLD CALC: 49.2 % (ref 37–47)
HEMOGLOBIN: 14.8 GM/DL (ref 12–16)
HYPOCHROMIA: ABNORMAL
KETONES, URINE: NEGATIVE
LEUKOCYTE ESTERASE, URINE: NEGATIVE
LIPASE: 41.3 U/L (ref 5.6–51.3)
LV EF: 58 %
LVEF MODALITY: NORMAL
LYMPHOCYTES # BLD: 9.2 %
LYMPHOCYTES ABSOLUTE: 1.9 THOU/MM3 (ref 1–4.8)
MCH RBC QN AUTO: 18.7 PG (ref 27–31)
MCHC RBC AUTO-ENTMCNC: 30.1 GM/DL (ref 33–37)
MCV RBC AUTO: 62 FL (ref 81–99)
MICROCYTES: ABNORMAL
MISCELLANEOUS 2: ABNORMAL
MONOCYTES # BLD: 3.8 %
MONOCYTES ABSOLUTE: 0.8 THOU/MM3 (ref 0.4–1.3)
NITRITE, URINE: NEGATIVE
NUCLEATED RED BLOOD CELLS: 0 /100 WBC
OSMOLALITY CALCULATION: 282.6 MOSMOL/KG (ref 275–300)
PDW BLD-RTO: 22.5 % (ref 11.5–14.5)
PH UA: 5.5
PLATELET # BLD: 308 THOU/MM3 (ref 130–400)
PLATELET ESTIMATE: ADEQUATE
PMV BLD AUTO: 8.8 FL (ref 7.4–10.4)
POIKILOCYTES: ABNORMAL
POTASSIUM SERPL-SCNC: 4.9 MEQ/L (ref 3.5–5.2)
PROTEIN UA: ABNORMAL
RBC # BLD: 7.94 MILL/MM3 (ref 4.2–5.4)
RBC URINE: ABNORMAL /HPF
RENAL EPITHELIAL, UA: ABNORMAL
SCAN OF BLOOD SMEAR: NORMAL
SEG NEUTROPHILS: 77.1 %
SEGMENTED NEUTROPHILS ABSOLUTE COUNT: 16.1 THOU/MM3 (ref 1.8–7.7)
SODIUM BLD-SCNC: 139 MEQ/L (ref 135–145)
SPECIFIC GRAVITY, URINE: 1.01 (ref 1–1.03)
SPHEROCYTES: ABNORMAL
TARGET CELLS: ABNORMAL
TOTAL PROTEIN: 6.8 G/DL (ref 6.1–8)
TROPONIN T: 0.02 NG/ML
TSH SERPL DL<=0.05 MIU/L-ACNC: 2.4 UIU/ML (ref 0.4–4.2)
UROBILINOGEN, URINE: 0.2 EU/DL
WBC # BLD: 20.9 THOU/MM3 (ref 4.8–10.8)
WBC UA: ABNORMAL /HPF
YEAST: ABNORMAL

## 2018-03-22 PROCEDURE — 2580000003 HC RX 258: Performed by: INTERNAL MEDICINE

## 2018-03-22 PROCEDURE — 96365 THER/PROPH/DIAG IV INF INIT: CPT

## 2018-03-22 PROCEDURE — 81001 URINALYSIS AUTO W/SCOPE: CPT

## 2018-03-22 PROCEDURE — 6370000000 HC RX 637 (ALT 250 FOR IP): Performed by: INTERNAL MEDICINE

## 2018-03-22 PROCEDURE — 93010 ELECTROCARDIOGRAM REPORT: CPT | Performed by: NUCLEAR MEDICINE

## 2018-03-22 PROCEDURE — G0378 HOSPITAL OBSERVATION PER HR: HCPCS

## 2018-03-22 PROCEDURE — 83690 ASSAY OF LIPASE: CPT

## 2018-03-22 PROCEDURE — 82248 BILIRUBIN DIRECT: CPT

## 2018-03-22 PROCEDURE — 99285 EMERGENCY DEPT VISIT HI MDM: CPT

## 2018-03-22 PROCEDURE — 96366 THER/PROPH/DIAG IV INF ADDON: CPT

## 2018-03-22 PROCEDURE — 93306 TTE W/DOPPLER COMPLETE: CPT

## 2018-03-22 PROCEDURE — 36415 COLL VENOUS BLD VENIPUNCTURE: CPT

## 2018-03-22 PROCEDURE — 80053 COMPREHEN METABOLIC PANEL: CPT

## 2018-03-22 PROCEDURE — 93005 ELECTROCARDIOGRAM TRACING: CPT | Performed by: PHYSICIAN ASSISTANT

## 2018-03-22 PROCEDURE — 74174 CTA ABD&PLVS W/CONTRAST: CPT

## 2018-03-22 PROCEDURE — 6360000004 HC RX CONTRAST MEDICATION: Performed by: PHYSICIAN ASSISTANT

## 2018-03-22 PROCEDURE — 84443 ASSAY THYROID STIM HORMONE: CPT

## 2018-03-22 PROCEDURE — 71275 CT ANGIOGRAPHY CHEST: CPT

## 2018-03-22 PROCEDURE — 6370000000 HC RX 637 (ALT 250 FOR IP): Performed by: PHYSICIAN ASSISTANT

## 2018-03-22 PROCEDURE — 83090 ASSAY OF HOMOCYSTEINE: CPT

## 2018-03-22 PROCEDURE — 85025 COMPLETE CBC W/AUTO DIFF WBC: CPT

## 2018-03-22 PROCEDURE — 2500000003 HC RX 250 WO HCPCS: Performed by: PHYSICIAN ASSISTANT

## 2018-03-22 PROCEDURE — 84484 ASSAY OF TROPONIN QUANT: CPT

## 2018-03-22 RX ORDER — ASPIRIN 81 MG/1
324 TABLET, CHEWABLE ORAL ONCE
Status: COMPLETED | OUTPATIENT
Start: 2018-03-22 | End: 2018-03-22

## 2018-03-22 RX ORDER — ACETAMINOPHEN 325 MG/1
650 TABLET ORAL EVERY 4 HOURS PRN
Status: DISCONTINUED | OUTPATIENT
Start: 2018-03-22 | End: 2018-03-25 | Stop reason: HOSPADM

## 2018-03-22 RX ORDER — CLONIDINE HYDROCHLORIDE 0.1 MG/1
0.1 TABLET ORAL 2 TIMES DAILY
Status: DISCONTINUED | OUTPATIENT
Start: 2018-03-22 | End: 2018-03-25 | Stop reason: HOSPADM

## 2018-03-22 RX ORDER — SODIUM CHLORIDE 0.9 % (FLUSH) 0.9 %
10 SYRINGE (ML) INJECTION EVERY 12 HOURS SCHEDULED
Status: DISCONTINUED | OUTPATIENT
Start: 2018-03-22 | End: 2018-03-25 | Stop reason: HOSPADM

## 2018-03-22 RX ORDER — FOLIC ACID 1 MG/1
1 TABLET ORAL DAILY
Status: DISCONTINUED | OUTPATIENT
Start: 2018-03-23 | End: 2018-03-25 | Stop reason: HOSPADM

## 2018-03-22 RX ORDER — MORPHINE SULFATE 2 MG/ML
2 INJECTION, SOLUTION INTRAMUSCULAR; INTRAVENOUS
Status: ACTIVE | OUTPATIENT
Start: 2018-03-22 | End: 2018-03-23

## 2018-03-22 RX ORDER — ONDANSETRON 2 MG/ML
4 INJECTION INTRAMUSCULAR; INTRAVENOUS EVERY 6 HOURS PRN
Status: DISCONTINUED | OUTPATIENT
Start: 2018-03-22 | End: 2018-03-22 | Stop reason: RX

## 2018-03-22 RX ORDER — NITROGLYCERIN 20 MG/100ML
5 INJECTION INTRAVENOUS CONTINUOUS
Status: DISCONTINUED | OUTPATIENT
Start: 2018-03-22 | End: 2018-03-22

## 2018-03-22 RX ORDER — ENALAPRIL MALEATE 10 MG/1
20 TABLET ORAL 2 TIMES DAILY
Status: DISCONTINUED | OUTPATIENT
Start: 2018-03-22 | End: 2018-03-25 | Stop reason: HOSPADM

## 2018-03-22 RX ORDER — SODIUM CHLORIDE 0.9 % (FLUSH) 0.9 %
10 SYRINGE (ML) INJECTION PRN
Status: DISCONTINUED | OUTPATIENT
Start: 2018-03-22 | End: 2018-03-25 | Stop reason: HOSPADM

## 2018-03-22 RX ORDER — ASPIRIN 325 MG
325 TABLET ORAL DAILY
Status: DISCONTINUED | OUTPATIENT
Start: 2018-03-23 | End: 2018-03-25 | Stop reason: HOSPADM

## 2018-03-22 RX ADMIN — IOPAMIDOL 80 ML: 755 INJECTION, SOLUTION INTRAVENOUS at 11:40

## 2018-03-22 RX ADMIN — METOPROLOL TARTRATE 25 MG: 25 TABLET ORAL at 21:15

## 2018-03-22 RX ADMIN — RIVAROXABAN 20 MG: 20 TABLET, FILM COATED ORAL at 18:35

## 2018-03-22 RX ADMIN — NITROGLYCERIN 5 MCG/MIN: 20 INJECTION INTRAVENOUS at 14:06

## 2018-03-22 RX ADMIN — ENALAPRIL MALEATE 20 MG: 10 TABLET ORAL at 21:08

## 2018-03-22 RX ADMIN — NITROGLYCERIN 1 INCH: 20 OINTMENT TOPICAL at 18:16

## 2018-03-22 RX ADMIN — Medication 10 ML: at 21:10

## 2018-03-22 RX ADMIN — ASPIRIN 81 MG 324 MG: 81 TABLET ORAL at 13:57

## 2018-03-22 RX ADMIN — CLONIDINE HYDROCHLORIDE 0.1 MG: 0.1 TABLET ORAL at 21:07

## 2018-03-22 ASSESSMENT — ENCOUNTER SYMPTOMS
BACK PAIN: 0
SHORTNESS OF BREATH: 0
RHINORRHEA: 0
HEMOPTYSIS: 0
ABDOMINAL PAIN: 0
SORE THROAT: 0
EYE PAIN: 0
WHEEZING: 0
DIARRHEA: 0
EYE DISCHARGE: 0
NAUSEA: 0
COUGH: 1
VOMITING: 0
COUGH: 0
SHORTNESS OF BREATH: 1

## 2018-03-22 ASSESSMENT — PAIN SCALES - GENERAL: PAINLEVEL_OUTOF10: 0

## 2018-03-22 NOTE — ED PROVIDER NOTES
[facility]  eMERGENCY dEPARTMENT eNCOUnter          [address] [facility]       Chief Complaint   Patient presents with    Flank Pain       Nurses Notes reviewed and I agree except as noted in the HPI. HISTORY OF PRESENT ILLNESS    Carlos Arnold is a 57 y.o. female with a past medical history of DVT and HTN who presents to the ED via EMS for evaluation of flank pain. The patient states that approximately 44 minutes prior to arrival in the ED she developed an abrupt onset of right flank pain. She reports that the flank pain was an intense achy sensation that radiates up to her chest. The pain had gradually intensified over 20 minutes and has started to subside. She reports being mildly nauseated, lightheaded, and short of breath with onset of the pain, however, states that these symptoms are resolved. She denies any emesis, diarrhea, constipation, fevers, or chills. No cough or cold like symptom. The patient denies a past history of cardiac or lung disease. She does have a history of DVTs. No known leg trauma or recent long travel. REVIEW OF SYSTEMS     Review of Systems   Constitutional: Negative for appetite change, chills, fatigue and fever. HENT: Negative for congestion, ear pain, rhinorrhea and sore throat. Eyes: Negative for pain, discharge and visual disturbance. Respiratory: Negative for cough, shortness of breath and wheezing. Cardiovascular: Positive for chest pain. Negative for palpitations and leg swelling. Gastrointestinal: Negative for abdominal pain, diarrhea, nausea and vomiting. Genitourinary: Positive for flank pain. Negative for difficulty urinating, dysuria and vaginal discharge. Musculoskeletal: Negative for arthralgias, back pain, joint swelling and neck pain. Skin: Negative for pallor and rash. Neurological: Negative for dizziness, syncope, weakness, light-headedness and headaches. Hematological: Negative for adenopathy. radiologistAnahi Aguirre   Final Result   1. No aneurysm or dissection   2. No PE   3. Hepatosplenomegaly. 4. Moderate bilateral perinephric stranding without other evidence of pyelonephritis   5. Uncomplicated diverticulosis. No acute process identified. **This report has been created using voice recognition software. It may contain minor errors which are inherent in voice recognition technology. **      Final report electronically signed by Dr. Abbi Navas on 3/22/2018 12:07 PM      CTA ABDOMEN PELVIS W WO CONTRAST   Final Result   1. No aneurysm or dissection   2. No PE   3. Hepatosplenomegaly. 4. Moderate bilateral perinephric stranding without other evidence of pyelonephritis   5. Uncomplicated diverticulosis. No acute process identified. **This report has been created using voice recognition software. It may contain minor errors which are inherent in voice recognition technology. **      Final report electronically signed by Dr. Abbi Navas on 3/22/2018 12:07 PM           LABS:     Labs Reviewed   CBC WITH AUTO DIFFERENTIAL - Abnormal; Notable for the following:        Result Value    WBC 20.9 (*)     RBC 7.94 (*)     Hematocrit 49.2 (*)     MCV 62.0 (*)     MCH 18.7 (*)     MCHC 30.1 (*)     RDW 22.5 (*)     Segs Absolute 16.1 (*)     Eosinophils # 1.7 (*)     Basophils # 0.4 (*)     All other components within normal limits   BASIC METABOLIC PANEL - Abnormal; Notable for the following:     Glucose 116 (*)     BUN 24 (*)     All other components within normal limits   TROPONIN - Abnormal; Notable for the following:     Troponin T 0.018 (*)     All other components within normal limits   GLOMERULAR FILTRATION RATE, ESTIMATED - Abnormal; Notable for the following:     Est, Glom Filt Rate 63 (*)     All other components within normal limits   URINE WITH REFLEXED MICRO - Abnormal; Notable for the following:     Protein, UA TRACE (*)     All other components within normal

## 2018-03-22 NOTE — ED PROVIDER NOTES
DAILY, Disp-90 tablet, R-1Normal      enalapril (VASOTEC) 20 MG tablet TAKE 1 TABLET TWICE A DAY, Disp-180 tablet, R-3Normal      cloNIDine (CATAPRES) 0.1 MG tablet Take 1 tablet by mouth 2 times daily, Disp-180 tablet, R-3Normal      metroNIDAZOLE (METROCREAM) 0.75 % cream Apply topically daily as needed Apply topically 2 times daily to face. , Topical, DAILY PRN, Historical Med      triamcinolone (KENALOG) 0.1 % ointment Apply topically 2 times daily Apply topically 2 times daily for bilateral legs. , Topical, 2 TIMES DAILY, Until Discontinued, Historical Med      Cyanocobalamin (B-12) 500 MCG TABS Take 500 mg by mouth daily. ALLERGIES     is allergic to pcn [penicillins] and keflex [cephalexin]. FAMILY HISTORY     indicated that her mother is . She indicated that her father is . She indicated that her sister is . family history includes Arthritis in her mother; Breast Cancer in her mother and sister; Heart Disease in her mother and sister; High Blood Pressure in her mother; Liver Cancer in her father. SOCIAL HISTORY      reports that she quit smoking about 9 years ago. She has never used smokeless tobacco. She reports that she drinks about 2.4 oz of alcohol per week . She reports that she does not use drugs. PHYSICAL EXAM     INITIAL VITALS:  height is 5' 5\" (1.651 m) and weight is 175 lb 3.2 oz (79.5 kg). Her oral temperature is 97.8 °F (36.6 °C). Her blood pressure is 141/76 (abnormal) and her pulse is 60. Her respiration is 16 and oxygen saturation is 96%. Physical Exam   Constitutional: She is oriented to person, place, and time. Appears ill but nontoxic   HENT:   Head: Normocephalic and atraumatic. Eyes: Pupils are equal, round, and reactive to light. Neck: Normal range of motion. Neck supple. Cardiovascular:   Tachycardic rate normal rhythm without murmurs gallops or rubs   Pulmonary/Chest: Effort normal. No respiratory distress.    +3 edema in

## 2018-03-22 NOTE — H&P
MPV 8.8 7.4 - 10.4 fL    Seg Neutrophils 77.1 %    Lymphocytes 9.2 %    Monocytes 3.8 %    Eosinophils 8.2 %    Basophils 1.7 %    nRBC 0 /100 wbc    Platelet Estimate ADEQUATE Adequate    Microcytes 3+ Absent    Anisocytosis 2+ Absent    Poikilocytes 1+ Absent    Hypochromia 3+ Absent    BASOPHILIA 1+ Absent    Target Cells OCC. Absent    Spherocytes OCC.  Absent    Segs Absolute 16.1 (H) 1.8 - 7.7 thou/mm3    Lymphocytes # 1.9 1.0 - 4.8 thou/mm3    Monocytes # 0.8 0.4 - 1.3 thou/mm3    Eosinophils # 1.7 (H) 0.0 - 0.4 thou/mm3    Basophils # 0.4 (H) 0.0 - 0.1 thou/mm3   Basic Metabolic Panel    Collection Time: 03/22/18 10:01 AM   Result Value Ref Range    Sodium 139 135 - 145 meq/L    Potassium 4.9 3.5 - 5.2 meq/L    Chloride 103 98 - 111 meq/L    CO2 24 23 - 33 meq/L    Glucose 116 (H) 70 - 108 mg/dL    BUN 24 (H) 7 - 22 mg/dL    CREATININE 0.9 0.4 - 1.2 mg/dL    Calcium 10.3 8.5 - 10.5 mg/dL   Hepatic function panel    Collection Time: 03/22/18 10:01 AM   Result Value Ref Range    Alb 4.0 3.5 - 5.1 g/dL    Total Bilirubin 0.4 0.3 - 1.2 mg/dL    Bilirubin, Direct <0.2 0.0 - 0.3 mg/dL    Alkaline Phosphatase 108 38 - 126 U/L    AST 28 5 - 40 U/L    ALT 21 11 - 66 U/L    Total Protein 6.8 6.1 - 8.0 g/dL   Lipase    Collection Time: 03/22/18 10:01 AM   Result Value Ref Range    Lipase 41.3 5.6 - 51.3 U/L   Troponin    Collection Time: 03/22/18 10:01 AM   Result Value Ref Range    Troponin T 0.018 (A) ng/ml   TSH with Reflex    Collection Time: 03/22/18 10:01 AM   Result Value Ref Range    TSH 2.400 0.400 - 4.20 uIU/mL   Anion Gap    Collection Time: 03/22/18 10:01 AM   Result Value Ref Range    Anion Gap 12.0 8.0 - 16.0 meq/L   Glomerular Filtration Rate, Estimated    Collection Time: 03/22/18 10:01 AM   Result Value Ref Range    Est, Glom Filt Rate 63 (A) ml/min/1.73m2   Osmolality    Collection Time: 03/22/18 10:01 AM   Result Value Ref Range    Osmolality Calc 282.6 275.0 - 300 mOsmol/kg   Scan of Blood Smear pulmonary embolism are identified. No mediastinal or hilar adenopathy. No infiltrates effusions or lung masses. Minimal dependent atelectasis. Abdomen pelvis The spleen is enlarged. Liver is mildly enlarged. Gallbladder is unremarkable Adrenals are normal. Kidneys enhance symmetrically. Again noted marked perinephric stranding bilaterally somewhat greater on the right but no enhancement abnormality of the kidneys. Colonic diverticulosis without evidence of diverticulitis. Normal appendix. Uterus is present. Bladder is unremarkable. No abnormal fluid collections. No suspicious bone lesions. No pericardial effusion. 1. No aneurysm or dissection 2. No PE 3. Hepatosplenomegaly. 4. Moderate bilateral perinephric stranding without other evidence of pyelonephritis 5. Uncomplicated diverticulosis. No acute process identified. **This report has been created using voice recognition software. It may contain minor errors which are inherent in voice recognition technology. ** Final report electronically signed by Dr. Laura Andrade on 3/22/2018 12:07 PM    Cta Abdomen Pelvis W Wo Contrast    Result Date: 3/22/2018  PROCEDURE: CTA CHEST W WO CONTRAST, CTA ABDOMEN PELVIS W WO CONTRAST CLINICAL INFORMATION: Right side chest pain . COMPARISON: 11/28/2016 and 1/6/2011 TECHNIQUE: Gated post Isovue-370 IV contrast images of the chest abdomen and pelvis with 3-D multiplanar reconstructions. Noncontrast images from the upper chest to the lower abdomen. All CT scans at this facility use dose modulation, iterative reconstruction, and/or weight-based dosing when appropriate to reduce radiation dose to as low as reasonably achievable. FINDINGS: Noncontrast images demonstrate no renal calculi. No gallstones no pancreatic calcifications. Small focal plaque in the aortic arch. Otherwise no coronary arterial after sclerotic calcifications or vascular calcifications Postcontrast gated images show no evidence of aneurysm or dissection.  There is

## 2018-03-22 NOTE — PROGRESS NOTES
Pt arrived in 4K 4 via cart/stretcher. Complaints: None.     IV infusing niroglicerin at 5 mcg/min to left [facility]

## 2018-03-22 NOTE — ED NOTES
Patient is resting in bed. Updated on plan of care. Denies any needs. Call light in reach.       Markell Mcknight RN  03/22/18 7613

## 2018-03-23 PROBLEM — Z86.711 PERSONAL HISTORY OF PULMONARY EMBOLISM: Status: ACTIVE | Noted: 2018-03-23

## 2018-03-23 LAB
ALBUMIN SERPL-MCNC: 3.5 G/DL (ref 3.5–5.1)
ALP BLD-CCNC: 97 U/L (ref 38–126)
ALT SERPL-CCNC: 17 U/L (ref 11–66)
ANION GAP SERPL CALCULATED.3IONS-SCNC: 14 MEQ/L (ref 8–16)
AST SERPL-CCNC: 20 U/L (ref 5–40)
BILIRUB SERPL-MCNC: 0.5 MG/DL (ref 0.3–1.2)
BUN BLDV-MCNC: 22 MG/DL (ref 7–22)
CALCIUM SERPL-MCNC: 10 MG/DL (ref 8.5–10.5)
CHLORIDE BLD-SCNC: 103 MEQ/L (ref 98–111)
CHOLESTEROL, TOTAL: 154 MG/DL (ref 100–199)
CO2: 23 MEQ/L (ref 23–33)
CREAT SERPL-MCNC: 1 MG/DL (ref 0.4–1.2)
GFR SERPL CREATININE-BSD FRML MDRD: 56 ML/MIN/1.73M2
GLUCOSE BLD-MCNC: 113 MG/DL (ref 70–108)
HDLC SERPL-MCNC: 49 MG/DL
LDL CHOLESTEROL CALCULATED: 83 MG/DL
POTASSIUM REFLEX MAGNESIUM: 4.3 MEQ/L (ref 3.5–5.2)
SODIUM BLD-SCNC: 140 MEQ/L (ref 135–145)
TOTAL PROTEIN: 6.2 G/DL (ref 6.1–8)
TRIGL SERPL-MCNC: 111 MG/DL (ref 0–199)

## 2018-03-23 PROCEDURE — G0378 HOSPITAL OBSERVATION PER HR: HCPCS

## 2018-03-23 PROCEDURE — 2580000003 HC RX 258: Performed by: INTERNAL MEDICINE

## 2018-03-23 PROCEDURE — 36415 COLL VENOUS BLD VENIPUNCTURE: CPT

## 2018-03-23 PROCEDURE — 80061 LIPID PANEL: CPT

## 2018-03-23 PROCEDURE — 6360000002 HC RX W HCPCS: Performed by: INTERNAL MEDICINE

## 2018-03-23 PROCEDURE — 80053 COMPREHEN METABOLIC PANEL: CPT

## 2018-03-23 PROCEDURE — 99204 OFFICE O/P NEW MOD 45 MIN: CPT | Performed by: INTERNAL MEDICINE

## 2018-03-23 PROCEDURE — 96372 THER/PROPH/DIAG INJ SC/IM: CPT

## 2018-03-23 PROCEDURE — 6370000000 HC RX 637 (ALT 250 FOR IP): Performed by: INTERNAL MEDICINE

## 2018-03-23 RX ADMIN — CLONIDINE HYDROCHLORIDE 0.1 MG: 0.1 TABLET ORAL at 20:31

## 2018-03-23 RX ADMIN — METOPROLOL TARTRATE 25 MG: 25 TABLET ORAL at 20:30

## 2018-03-23 RX ADMIN — ASPIRIN 325 MG: 325 TABLET, COATED ORAL at 09:51

## 2018-03-23 RX ADMIN — Medication 10 ML: at 20:32

## 2018-03-23 RX ADMIN — CLONIDINE HYDROCHLORIDE 0.1 MG: 0.1 TABLET ORAL at 08:00

## 2018-03-23 RX ADMIN — NITROGLYCERIN 1 INCH: 20 OINTMENT TOPICAL at 17:38

## 2018-03-23 RX ADMIN — NITROGLYCERIN 1 INCH: 20 OINTMENT TOPICAL at 07:58

## 2018-03-23 RX ADMIN — ENOXAPARIN SODIUM 80 MG: 80 INJECTION SUBCUTANEOUS at 17:36

## 2018-03-23 RX ADMIN — ACETAMINOPHEN 650 MG: 325 TABLET ORAL at 00:51

## 2018-03-23 RX ADMIN — NITROGLYCERIN 1 INCH: 20 OINTMENT TOPICAL at 00:52

## 2018-03-23 RX ADMIN — ENALAPRIL MALEATE 20 MG: 10 TABLET ORAL at 20:31

## 2018-03-23 RX ADMIN — METOPROLOL TARTRATE 25 MG: 25 TABLET ORAL at 07:57

## 2018-03-23 RX ADMIN — NITROGLYCERIN 1 INCH: 20 OINTMENT TOPICAL at 11:16

## 2018-03-23 RX ADMIN — ACETAMINOPHEN 650 MG: 325 TABLET ORAL at 15:10

## 2018-03-23 RX ADMIN — Medication 10 ML: at 08:03

## 2018-03-23 RX ADMIN — FOLIC ACID 1 MG: 1 TABLET ORAL at 08:01

## 2018-03-23 RX ADMIN — ENALAPRIL MALEATE 20 MG: 10 TABLET ORAL at 08:00

## 2018-03-23 RX ADMIN — ACETAMINOPHEN 650 MG: 325 TABLET ORAL at 07:56

## 2018-03-23 ASSESSMENT — PAIN DESCRIPTION - LOCATION
LOCATION: HEAD
LOCATION: HEAD

## 2018-03-23 ASSESSMENT — PAIN SCALES - GENERAL
PAINLEVEL_OUTOF10: 4
PAINLEVEL_OUTOF10: 0
PAINLEVEL_OUTOF10: 3
PAINLEVEL_OUTOF10: 1
PAINLEVEL_OUTOF10: 0
PAINLEVEL_OUTOF10: 3

## 2018-03-23 ASSESSMENT — PAIN DESCRIPTION - DESCRIPTORS
DESCRIPTORS: HEADACHE
DESCRIPTORS: HEADACHE

## 2018-03-23 ASSESSMENT — PAIN DESCRIPTION - ONSET: ONSET: GRADUAL

## 2018-03-23 ASSESSMENT — PAIN DESCRIPTION - PAIN TYPE
TYPE: ACUTE PAIN
TYPE: ACUTE PAIN

## 2018-03-23 ASSESSMENT — PAIN DESCRIPTION - FREQUENCY: FREQUENCY: INTERMITTENT

## 2018-03-23 NOTE — PROGRESS NOTES
atraumatic without obvious deformity. Pupils equal, round, and reactive to light. Extra ocular muscles intact. Conjunctivae/corneas clear. Beefy red cheeks  Neck: Supple, with full range of motion. No jugular venous distention. Trachea midline. Respiratory:  Normal respiratory effort. Clear to auscultation, bilaterally without Rales/Wheezes/Rhonchi. Cardiovascular:  Regular rhythm with normal S1/S2 without murmurs, rubs or gallops. Abdomen: Soft, non-tender, non-distended with normal bowel sounds. Musculoskeletal:  No clubbing, cyanosis or edema bilaterally. Full range of motion without deformity. Skin: Skin color, texture, turgor normal.  No rashes or lesions. Neurologic: Alert and oriented, Neurovascularly intact without any focal motor deficits. Psychiatric:   Thought content appropriate, normal insight     Review of Labs and Diagnostic Testing:    Recent Results (from the past 24 hour(s))   EKG Abd Pain    Collection Time: 03/22/18  9:36 AM   Result Value Ref Range    Ventricular Rate 61 BPM    Atrial Rate 61 BPM    P-R Interval 160 ms    QRS Duration 86 ms    Q-T Interval 428 ms    QTc Calculation (Bazett) 430 ms    P Axis 41 degrees    R Axis -23 degrees    T Axis 67 degrees   CBC auto differential    Collection Time: 03/22/18 10:01 AM   Result Value Ref Range    WBC 20.9 (H) 4.8 - 10.8 thou/mm3    RBC 7.94 (H) 4.20 - 5.40 mill/mm3    Hemoglobin 14.8 12.0 - 16.0 gm/dl    Hematocrit 49.2 (H) 37.0 - 47.0 %    MCV 62.0 (L) 81.0 - 99.0 fL    MCH 18.7 (L) 27.0 - 31.0 pg    MCHC 30.1 (L) 33.0 - 37.0 gm/dl    RDW 22.5 (H) 11.5 - 14.5 %    Platelets 428 929 - 833 thou/mm3    MPV 8.8 7.4 - 10.4 fL    Seg Neutrophils 77.1 %    Lymphocytes 9.2 %    Monocytes 3.8 %    Eosinophils 8.2 %    Basophils 1.7 %    nRBC 0 /100 wbc    Platelet Estimate ADEQUATE Adequate    Microcytes 3+ Absent    Anisocytosis 2+ Absent    Poikilocytes 1+ Absent    Hypochromia 3+ Absent    BASOPHILIA 1+ Absent    Target Cells OCC.  Absent Spherocytes OCC.  Absent    Segs Absolute 16.1 (H) 1.8 - 7.7 thou/mm3    Lymphocytes # 1.9 1.0 - 4.8 thou/mm3    Monocytes # 0.8 0.4 - 1.3 thou/mm3    Eosinophils # 1.7 (H) 0.0 - 0.4 thou/mm3    Basophils # 0.4 (H) 0.0 - 0.1 thou/mm3   Basic Metabolic Panel    Collection Time: 03/22/18 10:01 AM   Result Value Ref Range    Sodium 139 135 - 145 meq/L    Potassium 4.9 3.5 - 5.2 meq/L    Chloride 103 98 - 111 meq/L    CO2 24 23 - 33 meq/L    Glucose 116 (H) 70 - 108 mg/dL    BUN 24 (H) 7 - 22 mg/dL    CREATININE 0.9 0.4 - 1.2 mg/dL    Calcium 10.3 8.5 - 10.5 mg/dL   Hepatic function panel    Collection Time: 03/22/18 10:01 AM   Result Value Ref Range    Alb 4.0 3.5 - 5.1 g/dL    Total Bilirubin 0.4 0.3 - 1.2 mg/dL    Bilirubin, Direct <0.2 0.0 - 0.3 mg/dL    Alkaline Phosphatase 108 38 - 126 U/L    AST 28 5 - 40 U/L    ALT 21 11 - 66 U/L    Total Protein 6.8 6.1 - 8.0 g/dL   Lipase    Collection Time: 03/22/18 10:01 AM   Result Value Ref Range    Lipase 41.3 5.6 - 51.3 U/L   Troponin    Collection Time: 03/22/18 10:01 AM   Result Value Ref Range    Troponin T 0.018 (A) ng/ml   TSH with Reflex    Collection Time: 03/22/18 10:01 AM   Result Value Ref Range    TSH 2.400 0.400 - 4.20 uIU/mL   Anion Gap    Collection Time: 03/22/18 10:01 AM   Result Value Ref Range    Anion Gap 12.0 8.0 - 16.0 meq/L   Glomerular Filtration Rate, Estimated    Collection Time: 03/22/18 10:01 AM   Result Value Ref Range    Est, Glom Filt Rate 63 (A) ml/min/1.73m2   Osmolality    Collection Time: 03/22/18 10:01 AM   Result Value Ref Range    Osmolality Calc 282.6 275.0 - 300 mOsmol/kg   Scan of Blood Smear    Collection Time: 03/22/18 10:01 AM   Result Value Ref Range    SCAN OF BLOOD SMEAR see below    Urine with Reflexed Micro    Collection Time: 03/22/18 10:40 AM   Result Value Ref Range    Glucose, Ur NEGATIVE NEGATIVE mg/dl    Bilirubin Urine NEGATIVE NEGATIVE    Ketones, Urine NEGATIVE NEGATIVE    Specific Gravity, Urine 1.014 1.002 - WO CONTRAST, CTA ABDOMEN PELVIS W WO CONTRAST CLINICAL INFORMATION: Right side chest pain . COMPARISON: 11/28/2016 and 1/6/2011 TECHNIQUE: Gated post Isovue-370 IV contrast images of the chest abdomen and pelvis with 3-D multiplanar reconstructions. Noncontrast images from the upper chest to the lower abdomen. All CT scans at this facility use dose modulation, iterative reconstruction, and/or weight-based dosing when appropriate to reduce radiation dose to as low as reasonably achievable. FINDINGS: Noncontrast images demonstrate no renal calculi. No gallstones no pancreatic calcifications. Small focal plaque in the aortic arch. Otherwise no coronary arterial after sclerotic calcifications or vascular calcifications Postcontrast gated images show no evidence of aneurysm or dissection. There is good opacification of the pulmonary arteries and no filling defects to suggest pulmonary embolism are identified. No mediastinal or hilar adenopathy. No infiltrates effusions or lung masses. Minimal dependent atelectasis. Abdomen pelvis The spleen is enlarged. Liver is mildly enlarged. Gallbladder is unremarkable Adrenals are normal. Kidneys enhance symmetrically. Again noted marked perinephric stranding bilaterally somewhat greater on the right but no enhancement abnormality of the kidneys. Colonic diverticulosis without evidence of diverticulitis. Normal appendix. Uterus is present. Bladder is unremarkable. No abnormal fluid collections. No suspicious bone lesions. No pericardial effusion. 1. No aneurysm or dissection 2. No PE 3. Hepatosplenomegaly. 4. Moderate bilateral perinephric stranding without other evidence of pyelonephritis 5. Uncomplicated diverticulosis. No acute process identified. **This report has been created using voice recognition software. It may contain minor errors which are inherent in voice recognition technology. ** Final report electronically signed by Dr. Jono Rayo on 3/22/2018 12:07 PM    Cta

## 2018-03-23 NOTE — CARE COORDINATION
Walking Rounds    Day #:1    Expected LOS:1  Commitment:Ernestina RN will check with Cardiology re: plan of care    Plan for D/C:plans home with spouse    Evaluation of commitment: plans stress test in am (patient had diet)      Primary nurse discussed plan of care and needs to be addressed in regards to discharge plan with patient.

## 2018-03-24 LAB — HOMOCYSTEINE, TOTAL: 10 UMOL/L

## 2018-03-24 PROCEDURE — 96372 THER/PROPH/DIAG INJ SC/IM: CPT

## 2018-03-24 PROCEDURE — G0378 HOSPITAL OBSERVATION PER HR: HCPCS

## 2018-03-24 PROCEDURE — 2580000003 HC RX 258: Performed by: INTERNAL MEDICINE

## 2018-03-24 PROCEDURE — 6370000000 HC RX 637 (ALT 250 FOR IP): Performed by: INTERNAL MEDICINE

## 2018-03-24 PROCEDURE — 99212 OFFICE O/P EST SF 10 MIN: CPT | Performed by: NURSE PRACTITIONER

## 2018-03-24 PROCEDURE — 96375 TX/PRO/DX INJ NEW DRUG ADDON: CPT

## 2018-03-24 PROCEDURE — 3430000000 HC RX DIAGNOSTIC RADIOPHARMACEUTICAL: Performed by: INTERNAL MEDICINE

## 2018-03-24 PROCEDURE — 93017 CV STRESS TEST TRACING ONLY: CPT

## 2018-03-24 PROCEDURE — 78452 HT MUSCLE IMAGE SPECT MULT: CPT

## 2018-03-24 PROCEDURE — A9500 TC99M SESTAMIBI: HCPCS | Performed by: INTERNAL MEDICINE

## 2018-03-24 PROCEDURE — 6360000002 HC RX W HCPCS

## 2018-03-24 PROCEDURE — 6360000002 HC RX W HCPCS: Performed by: INTERNAL MEDICINE

## 2018-03-24 PROCEDURE — 6370000000 HC RX 637 (ALT 250 FOR IP): Performed by: NURSE PRACTITIONER

## 2018-03-24 RX ORDER — HYDRALAZINE HYDROCHLORIDE 20 MG/ML
20 INJECTION INTRAMUSCULAR; INTRAVENOUS ONCE
Status: COMPLETED | OUTPATIENT
Start: 2018-03-24 | End: 2018-03-24

## 2018-03-24 RX ORDER — ONDANSETRON 2 MG/ML
4 INJECTION INTRAMUSCULAR; INTRAVENOUS EVERY 6 HOURS PRN
Status: DISCONTINUED | OUTPATIENT
Start: 2018-03-24 | End: 2018-03-24 | Stop reason: CLARIF

## 2018-03-24 RX ORDER — AMLODIPINE BESYLATE 5 MG/1
5 TABLET ORAL DAILY
Status: DISCONTINUED | OUTPATIENT
Start: 2018-03-24 | End: 2018-03-25 | Stop reason: HOSPADM

## 2018-03-24 RX ORDER — ONDANSETRON 4 MG/1
4 TABLET, ORALLY DISINTEGRATING ORAL EVERY 6 HOURS PRN
Status: DISCONTINUED | OUTPATIENT
Start: 2018-03-24 | End: 2018-03-25 | Stop reason: HOSPADM

## 2018-03-24 RX ADMIN — METOPROLOL TARTRATE 25 MG: 25 TABLET ORAL at 07:24

## 2018-03-24 RX ADMIN — METOPROLOL TARTRATE 25 MG: 25 TABLET ORAL at 14:17

## 2018-03-24 RX ADMIN — METOPROLOL TARTRATE 25 MG: 25 TABLET ORAL at 20:03

## 2018-03-24 RX ADMIN — HYDRALAZINE HYDROCHLORIDE 20 MG: 20 INJECTION INTRAMUSCULAR; INTRAVENOUS at 05:06

## 2018-03-24 RX ADMIN — NITROGLYCERIN 1 INCH: 20 OINTMENT TOPICAL at 05:25

## 2018-03-24 RX ADMIN — ENOXAPARIN SODIUM 80 MG: 80 INJECTION SUBCUTANEOUS at 20:02

## 2018-03-24 RX ADMIN — CLONIDINE HYDROCHLORIDE 0.1 MG: 0.1 TABLET ORAL at 20:04

## 2018-03-24 RX ADMIN — Medication 10 ML: at 20:03

## 2018-03-24 RX ADMIN — CLONIDINE HYDROCHLORIDE 0.1 MG: 0.1 TABLET ORAL at 07:26

## 2018-03-24 RX ADMIN — Medication 10 ML: at 07:25

## 2018-03-24 RX ADMIN — ACETAMINOPHEN 650 MG: 325 TABLET ORAL at 15:44

## 2018-03-24 RX ADMIN — AMLODIPINE BESYLATE 5 MG: 5 TABLET ORAL at 15:15

## 2018-03-24 RX ADMIN — ASPIRIN 325 MG: 325 TABLET, COATED ORAL at 07:24

## 2018-03-24 RX ADMIN — Medication 34 MILLICURIE: at 08:55

## 2018-03-24 RX ADMIN — NITROGLYCERIN 1 INCH: 20 OINTMENT TOPICAL at 00:56

## 2018-03-24 RX ADMIN — FOLIC ACID 1 MG: 1 TABLET ORAL at 07:27

## 2018-03-24 RX ADMIN — ENALAPRIL MALEATE 20 MG: 10 TABLET ORAL at 20:04

## 2018-03-24 RX ADMIN — ACETAMINOPHEN 650 MG: 325 TABLET ORAL at 01:47

## 2018-03-24 RX ADMIN — ONDANSETRON 4 MG: 2 INJECTION INTRAMUSCULAR; INTRAVENOUS at 08:33

## 2018-03-24 RX ADMIN — ENALAPRIL MALEATE 20 MG: 10 TABLET ORAL at 07:27

## 2018-03-24 RX ADMIN — ACETAMINOPHEN 650 MG: 325 TABLET ORAL at 11:20

## 2018-03-24 RX ADMIN — ENOXAPARIN SODIUM 80 MG: 80 INJECTION SUBCUTANEOUS at 07:29

## 2018-03-24 RX ADMIN — Medication 10.7 MILLICURIE: at 07:55

## 2018-03-24 RX ADMIN — ACETAMINOPHEN 650 MG: 325 TABLET ORAL at 20:06

## 2018-03-24 ASSESSMENT — PAIN DESCRIPTION - PAIN TYPE
TYPE: ACUTE PAIN

## 2018-03-24 ASSESSMENT — PAIN SCALES - GENERAL
PAINLEVEL_OUTOF10: 4
PAINLEVEL_OUTOF10: 3
PAINLEVEL_OUTOF10: 0
PAINLEVEL_OUTOF10: 2
PAINLEVEL_OUTOF10: 3
PAINLEVEL_OUTOF10: 3
PAINLEVEL_OUTOF10: 0

## 2018-03-24 ASSESSMENT — PAIN DESCRIPTION - DESCRIPTORS
DESCRIPTORS: HEADACHE

## 2018-03-24 ASSESSMENT — PAIN DESCRIPTION - LOCATION
LOCATION: HEAD

## 2018-03-24 ASSESSMENT — PAIN DESCRIPTION - FREQUENCY
FREQUENCY: INTERMITTENT
FREQUENCY: INTERMITTENT

## 2018-03-24 NOTE — FLOWSHEET NOTE
03/24/18 1345   Provider Notification   Reason for Communication Evaluate; Review case   Provider Name [facility]   Provider Notification Nurse Practitioner   Method of Communication Secure Message   Response See orders   Notification Time [phone] with [facility] regarding high blood pressure, trigeminy, nausea, and headache. Feels that this is probably from blood pressure. See new orders for medication changes.

## 2018-03-24 NOTE — PLAN OF CARE
Problem: Pain Control  Goal: Maintain pain level at or below patient's acceptable level (or 5 if patient is unable to determine acceptable level)  Outcome: Ongoing  Patient has denied pain this shift and will continue to monitor. Problem: Cardiovascular  Goal: Hemodynamic stability  Outcome: Ongoing  Patient's BP running high and given medication per mar. Problem: Skin Integrity/Risk  Goal: No skin breakdown during hospitalization  Outcome: Ongoing  No new skin breakdown this shift. Patient is up at [facility]. Problem: Pain:  Goal: Pain level will decrease  Pain level will decrease   Outcome: Ongoing  Patient denies pain this shift. Problem: Discharge Planning:  Goal: Participates in care planning  Participates in care planning   Outcome: Ongoing  Patient is able to participate in care planning. Problem: Mobility - Impaired:  Goal: Mobility will improve to maximum level  Mobility will improve to maximum level   Outcome: Ongoing  Patient up as tolerated tis shift. Problem: Tissue Perfusion - Cardiopulmonary, Altered:  Goal: Absence of angina  Absence of angina   Outcome: Ongoing  Patient has denied angina this shift. Comments: Care plan reviewed with patient and . Patient and  verbalize understanding of the plan of care and contribute to goal setting.

## 2018-03-25 ENCOUNTER — TELEPHONE (OUTPATIENT)
Dept: FAMILY MEDICINE CLINIC | Age: 63
End: 2018-03-25

## 2018-03-25 PROCEDURE — 96372 THER/PROPH/DIAG INJ SC/IM: CPT

## 2018-03-25 PROCEDURE — G0378 HOSPITAL OBSERVATION PER HR: HCPCS

## 2018-03-25 PROCEDURE — 6370000000 HC RX 637 (ALT 250 FOR IP): Performed by: NURSE PRACTITIONER

## 2018-03-25 RX ORDER — ASPIRIN 325 MG
325 TABLET ORAL DAILY
Qty: 30 TABLET | Refills: 3 | Status: SHIPPED | OUTPATIENT
Start: 2018-03-25 | End: 2018-03-28 | Stop reason: DRUGHIGH

## 2018-03-25 RX ORDER — AMLODIPINE BESYLATE 5 MG/1
5 TABLET ORAL DAILY
Qty: 30 TABLET | Refills: 3 | Status: SHIPPED | OUTPATIENT
Start: 2018-03-25 | End: 2018-05-10

## 2018-03-25 RX ADMIN — ENALAPRIL MALEATE 20 MG: 10 TABLET ORAL at 07:53

## 2018-03-25 RX ADMIN — METOPROLOL TARTRATE 25 MG: 25 TABLET ORAL at 07:55

## 2018-03-25 RX ADMIN — METOPROLOL TARTRATE 25 MG: 25 TABLET ORAL at 02:48

## 2018-03-25 RX ADMIN — CLONIDINE HYDROCHLORIDE 0.1 MG: 0.1 TABLET ORAL at 07:53

## 2018-03-25 RX ADMIN — FOLIC ACID 1 MG: 1 TABLET ORAL at 07:54

## 2018-03-25 ASSESSMENT — PAIN SCALES - GENERAL: PAINLEVEL_OUTOF10: 0

## 2018-03-25 NOTE — DISCHARGE SUMMARY
Chest pain  Active Problems:    Leucocytosis    Polycythemia    ACS (acute coronary syndrome) (HCC)    Personal history of pulmonary embolism  Resolved Problems:    * No resolved hospital problems.  *        Consultants:  Cardiology    Discharge Medications:       Medication List      START taking these medications    amLODIPine 5 MG tablet  Commonly known as:  NORVASC  Take 1 tablet by mouth daily     aspirin 325 MG tablet  Take 1 tablet by mouth daily     metoprolol tartrate 25 MG tablet  Commonly known as:  LOPRESSOR  Take 1 tablet by mouth every 6 hours        CONTINUE taking these medications    B-12 500 MCG Tabs     cloNIDine 0.1 MG tablet  Commonly known as:  CATAPRES  Take 1 tablet by mouth 2 times daily     enalapril 20 MG tablet  Commonly known as:  VASOTEC  TAKE 1 TABLET TWICE A DAY     folic acid 1 MG tablet  Commonly known as:  FOLVITE  TAKE 1 TABLET DAILY     metroNIDAZOLE 0.75 % cream  Commonly known as:  METROCREAM     rivaroxaban 20 MG Tabs tablet  Commonly known as:  XARELTO  TAKE 1 TABLET BY MOUTH ONE TIME A DAY     triamcinolone 0.1 % ointment  Commonly known as:  KENALOG           Where to Get Your Medications      These medications were sent to [id number] - [place], [place] - [address] [phone] [address], [facility] [address]    Phone:  [phone]   · amLODIPine 5 MG tablet  · aspirin 325 MG tablet  · metoprolol tartrate 25 MG tablet           Physical Exam:    Vitals:  Vitals:    03/25/18 0230 03/25/18 0750 03/25/18 1139 03/25/18 1139   BP: 136/71 128/61 (!) 141/76    Pulse: 61 58 60 60   Resp: 16 18 16    Temp: 98.4 °F (36.9 °C) 98.6 °F (37 °C) 97.8 °F (36.6 °C)    TempSrc: Oral Oral Oral    SpO2:  96% 97% 96%   Weight: 175 lb 3.2 oz (79.5 kg)      Height:         Weight: Weight: 175 lb 3.2 oz (79.5 kg)     24 hour intake/output:    Intake/Output Summary (Last 24 hours) at 03/25/18 1240  Last data filed at 03/25/18 0925   Gross per 24 hour   Intake The spleen is enlarged. Liver is mildly enlarged. Gallbladder is unremarkable Adrenals are normal. Kidneys enhance symmetrically. Again noted marked perinephric stranding bilaterally somewhat greater on the right but no enhancement abnormality of the kidneys. Colonic diverticulosis without evidence of diverticulitis. Normal appendix. Uterus is present. Bladder is unremarkable. No abnormal fluid collections. No suspicious bone lesions. No pericardial effusion. 1. No aneurysm or dissection 2. No PE 3. Hepatosplenomegaly. 4. Moderate bilateral perinephric stranding without other evidence of pyelonephritis 5. Uncomplicated diverticulosis. No acute process identified. **This report has been created using voice recognition software. It may contain minor errors which are inherent in voice recognition technology. ** Final report electronically signed by Dr. Katheryn Snyder on 3/22/2018 12:07 PM       Results for orders placed or performed during the hospital encounter of 03/22/18   CBC auto differential   Result Value Ref Range    WBC 20.9 (H) 4.8 - 10.8 thou/mm3    RBC 7.94 (H) 4.20 - 5.40 mill/mm3    Hemoglobin 14.8 12.0 - 16.0 gm/dl    Hematocrit 49.2 (H) 37.0 - 47.0 %    MCV 62.0 (L) 81.0 - 99.0 fL    MCH 18.7 (L) 27.0 - 31.0 pg    MCHC 30.1 (L) 33.0 - 37.0 gm/dl    RDW 22.5 (H) 11.5 - 14.5 %    Platelets 698 839 - 087 thou/mm3    MPV 8.8 7.4 - 10.4 fL    Seg Neutrophils 77.1 %    Lymphocytes 9.2 %    Monocytes 3.8 %    Eosinophils 8.2 %    Basophils 1.7 %    nRBC 0 /100 wbc    Platelet Estimate ADEQUATE Adequate    Microcytes 3+ Absent    Anisocytosis 2+ Absent    Poikilocytes 1+ Absent    Hypochromia 3+ Absent    BASOPHILIA 1+ Absent    Target Cells OCC. Absent    Spherocytes OCC.  Absent    Segs Absolute 16.1 (H) 1.8 - 7.7 thou/mm3    Lymphocytes # 1.9 1.0 - 4.8 thou/mm3    Monocytes # 0.8 0.4 - 1.3 thou/mm3    Eosinophils # 1.7 (H) 0.0 - 0.4 thou/mm3    Basophils # 0.4 (H) 0.0 - 0.1 thou/mm3   Basic Metabolic Panel

## 2018-03-25 NOTE — PROGRESS NOTES
Spoke with Pedro Christiansen CNP. Patient reports feeling better. OK for discharge per cardiology. Follow up in office with Dr Kasia Ellison.

## 2018-03-26 NOTE — TELEPHONE ENCOUNTER
Have the medications prescribed at discharge been filled? yes  Does the patient understand what the medications are for? yes  Has the patient experienced any new symptoms or have previous symptoms worsened? no  Is the patient experiencing any pain? no If yes, is the pain well controlled? We want to be sure the patient has the best recovery possible. Does the patient understand all the discharge instructions? yes        [facility] Transitions Initial Follow Up Call    Call within 2 business days of discharge: Yes    Patient: Curt Clark Patient : 1955   MRN: [id number]  Reason for Admission: There are no discharge diagnoses documented for the most recent discharge. Discharge Date: 3/25/18 RARS: [facility] Risk Score: 3.25     Spoke with: Pe - [address]: [unfilled]    Non-face-to-face services provided:  Obtained and reviewed discharge summary and/or continuity of care documents    Follow Up  Future Appointments  Date Time Provider Shahana Jacinto   3/28/2018 9:45 AM Evelyn Galvin MD [facility] [place]   2018 11:15 AM Monica Chaudhry MD Oncology [facility] [place]   5/10/2018 8:45 AM Magan Alcala MD [facility] - Alvaro Ferguson LPN       Nitro patches gave her h/a's.

## 2018-03-26 NOTE — CARE COORDINATION
Late Entry  3/26/18, 7:07 AM    Discharge plan discussed by  and . Discharge plan reviewed with patient/ family. Patient/ family verbalize understanding of discharge plan and are in agreement with plan. Understanding was demonstrated using the teach back method.

## 2018-03-28 ENCOUNTER — OFFICE VISIT (OUTPATIENT)
Dept: FAMILY MEDICINE CLINIC | Age: 63
End: 2018-03-28
Payer: COMMERCIAL

## 2018-03-28 VITALS
RESPIRATION RATE: 12 BRPM | SYSTOLIC BLOOD PRESSURE: 100 MMHG | WEIGHT: 176.8 LBS | DIASTOLIC BLOOD PRESSURE: 54 MMHG | BODY MASS INDEX: 29.42 KG/M2 | HEART RATE: 56 BPM

## 2018-03-28 DIAGNOSIS — I10 ESSENTIAL HYPERTENSION: ICD-10-CM

## 2018-03-28 DIAGNOSIS — R79.83 HOMOCYSTEINEMIA: ICD-10-CM

## 2018-03-28 DIAGNOSIS — R07.9 CHEST PAIN, UNSPECIFIED TYPE: Primary | ICD-10-CM

## 2018-03-28 PROCEDURE — 99214 OFFICE O/P EST MOD 30 MIN: CPT | Performed by: FAMILY MEDICINE

## 2018-03-28 PROCEDURE — 1111F DSCHRG MED/CURRENT MED MERGE: CPT | Performed by: FAMILY MEDICINE

## 2018-03-28 RX ORDER — ASPIRIN 81 MG/1
81 TABLET, CHEWABLE ORAL DAILY
Qty: 30 TABLET | Refills: 3 | COMMUNITY
Start: 2018-03-28 | End: 2018-05-10

## 2018-03-28 ASSESSMENT — ENCOUNTER SYMPTOMS
SHORTNESS OF BREATH: 0
BLOOD IN STOOL: 0
DIARRHEA: 0
COUGH: 0
CONSTIPATION: 0
VOMITING: 0
CHEST TIGHTNESS: 0
BACK PAIN: 0
SORE THROAT: 0
ABDOMINAL PAIN: 0
NAUSEA: 0
RHINORRHEA: 0
WHEEZING: 0
EYE PAIN: 0

## 2018-03-28 NOTE — PATIENT INSTRUCTIONS
Patient Education        High Blood Pressure: Care Instructions  Your Care Instructions    If your blood pressure is usually above 140/90, you have high blood pressure, or hypertension. That means the top number is 140 or higher or the bottom number is 90 or higher, or both. Despite what a lot of people think, high blood pressure usually doesn't cause headaches or make you feel dizzy or lightheaded. It usually has no symptoms. But it does increase your risk for heart attack, stroke, and kidney or eye damage. The higher your blood pressure, the more your risk increases. Your doctor will give you a goal for your blood pressure. Your goal will be based on your health and your age. An example of a goal is to keep your blood pressure below 140/90. Lifestyle changes, such as eating healthy and being active, are always important to help lower blood pressure. You might also take medicine to reach your blood pressure goal.  Follow-up care is a key part of your treatment and safety. Be sure to make and go to all appointments, and call your doctor if you are having problems. It's also a good idea to know your test results and keep a list of the medicines you take. How can you care for yourself at home? Medical treatment  · If you stop taking your medicine, your blood pressure will go back up. You may take one or more types of medicine to lower your blood pressure. Be safe with medicines. Take your medicine exactly as prescribed. Call your doctor if you think you are having a problem with your medicine. · Talk to your doctor before you start taking aspirin every day. Aspirin can help certain people lower their risk of a heart attack or stroke. But taking aspirin isn't right for everyone, because it can cause serious bleeding. · See your doctor regularly. You may need to see the doctor more often at first or until your blood pressure comes down.   · If you are taking blood pressure medicine, talk to your doctor before you take decongestants or anti-inflammatory medicine, such as ibuprofen. Some of these medicines can raise blood pressure. · Learn how to check your blood pressure at home. Lifestyle changes  · Stay at a healthy weight. This is especially important if you put on weight around the waist. Losing even 10 pounds can help you lower your blood pressure. · If your doctor recommends it, get more exercise. Walking is a good choice. Bit by bit, increase the amount you walk every day. Try for at least 30 minutes on most days of the week. You also may want to swim, bike, or do other activities. · Avoid or limit alcohol. Talk to your doctor about whether you can drink any alcohol. · Try to limit how much sodium you eat to less than 2,300 milligrams (mg) a day. Your doctor may ask you to try to eat less than 1,500 mg a day. · Eat plenty of fruits (such as bananas and oranges), vegetables, legumes, whole grains, and low-fat dairy products. · Lower the amount of saturated fat in your diet. Saturated fat is found in animal products such as milk, cheese, and meat. Limiting these foods may help you lose weight and also lower your risk for heart disease. · Do not smoke. Smoking increases your risk for heart attack and stroke. If you need help quitting, talk to your doctor about stop-smoking programs and medicines. These can increase your chances of quitting for good. When should you call for help? Call 911 anytime you think you may need emergency care. This may mean having symptoms that suggest that your blood pressure is causing a serious heart or blood vessel problem. Your blood pressure may be over 180/110. ? For example, call 911 if:  ? · You have symptoms of a heart attack. These may include:  ¨ Chest pain or pressure, or a strange feeling in the chest.  ¨ Sweating. ¨ Shortness of breath. ¨ Nausea or vomiting.   ¨ Pain, pressure, or a strange feeling in the back, neck, jaw, or upper belly or in one or both shoulders or arms.  ¨ Lightheadedness or sudden weakness. ¨ A fast or irregular heartbeat. ? · You have symptoms of a stroke. These may include:  ¨ Sudden numbness, tingling, weakness, or loss of movement in your face, arm, or leg, especially on only one side of your body. ¨ Sudden vision changes. ¨ Sudden trouble speaking. ¨ Sudden confusion or trouble understanding simple statements. ¨ Sudden problems with walking or balance. ¨ A sudden, severe headache that is different from past headaches. ? · You have severe back or belly pain. ?Do not wait until your blood pressure comes down on its own. Get help right away. ?Call your doctor now or seek immediate care if:  ? · Your blood pressure is much higher than normal (such as 180/110 or higher), but you don't have symptoms. ? · You think high blood pressure is causing symptoms, such as:  ¨ Severe headache. ¨ Blurry vision. ? Watch closely for changes in your health, and be sure to contact your doctor if:  ? · Your blood pressure measures 140/90 or higher at least 2 times. That means the top number is 140 or higher or the bottom number is 90 or higher, or both. ? · You think you may be having side effects from your blood pressure medicine. ? · Your blood pressure is usually normal, but it goes above normal at least 2 times. Where can you learn more? Go to https://NationBuilderpeDaixe.AirPair. org and sign in to your [employer] account. Enter X257 in the [facility] box to learn more about \"High Blood Pressure: Care Instructions. \"     If you do not have an account, please click on the \"Sign Up Now\" link. Current as of: Nydia 10, 2017  Content Version: 11.5  © 7252-4937 [employer]. Care instructions adapted under license by [facility] ([facility]).  If you have questions about a medical condition or this instruction, always ask your healthcare professional. Darshana Thompson any warranty or liability for your use of this to chew 1 adult-strength or 2 to 4 low-dose aspirin. Wait for an ambulance. Do not try to drive yourself. ?Call your doctor today if:  ? · You have any trouble breathing. ? · Your chest pain gets worse. ? · You are dizzy or lightheaded, or you feel like you may faint. ? · You are not getting better as expected. ? · You are having new or different chest pain. Where can you learn more? Go to https://sifonr.Acquaintable. org and sign in to your Adpeps account. Enter A120 in the [employer] box to learn more about \"Chest Pain: Care Instructions. \"     If you do not have an account, please click on the \"Sign Up Now\" link. Current as of: March 20, 2017  Content Version: 11.5  © 9733-4536 [employer]. Care instructions adapted under license by [facility] ([facility]). If you have questions about a medical condition or this instruction, always ask your healthcare professional. [place] [address] any warranty or liability for your use of this information. Patient Education        DASH Diet: Care Instructions  Your Care Instructions    The DASH diet is an eating plan that can help lower your blood pressure. DASH stands for Dietary Approaches to Stop Hypertension. Hypertension is high blood pressure. The DASH diet focuses on eating foods that are high in calcium, potassium, and magnesium. These nutrients can lower blood pressure. The foods that are highest in these nutrients are fruits, vegetables, low-fat dairy products, nuts, seeds, and legumes. But taking calcium, potassium, and magnesium supplements instead of eating foods that are high in those nutrients does not have the same effect. The DASH diet also includes whole grains, fish, and poultry. The DASH diet is one of several lifestyle changes your doctor may recommend to lower your high blood pressure. Your doctor may also want you to decrease the amount of sodium in your diet.  Lowering sodium while following the DASH diet can lower blood pressure even further than just the DASH diet alone. Follow-up care is a key part of your treatment and safety. Be sure to make and go to all appointments, and call your doctor if you are having problems. It's also a good idea to know your test results and keep a list of the medicines you take. How can you care for yourself at home? Following the DASH diet  · Eat 4 to 5 servings of fruit each day. A serving is 1 medium-sized piece of fruit, ½ cup chopped or canned fruit, 1/4 cup dried fruit, or 4 ounces (½ cup) of fruit juice. Choose fruit more often than fruit juice. · Eat 4 to 5 servings of vegetables each day. A serving is 1 cup of lettuce or raw leafy vegetables, ½ cup of chopped or cooked vegetables, or 4 ounces (½ cup) of vegetable juice. Choose vegetables more often than vegetable juice. · Get 2 to 3 servings of low-fat and fat-free dairy each day. A serving is 8 ounces of milk, 1 cup of yogurt, or 1 ½ ounces of cheese. · Eat 6 to 8 servings of grains each day. A serving is 1 slice of bread, 1 ounce of dry cereal, or ½ cup of cooked rice, pasta, or cooked cereal. Try to choose whole-grain products as much as possible. · Limit lean meat, poultry, and fish to 2 servings each day. A serving is 3 ounces, about the size of a deck of cards. · Eat 4 to 5 servings of nuts, seeds, and legumes (cooked dried beans, lentils, and split peas) each week. A serving is 1/3 cup of nuts, 2 tablespoons of seeds, or ½ cup of cooked beans or peas. · Limit fats and oils to 2 to 3 servings each day. A serving is 1 teaspoon of vegetable oil or 2 tablespoons of salad dressing. · Limit sweets and added sugars to 5 servings or less a week. A serving is 1 tablespoon jelly or jam, ½ cup sorbet, or 1 cup of lemonade. · Eat less than 2,300 milligrams (mg) of sodium a day. If you limit your sodium to 1,500 mg a day, you can lower your blood pressure even more. Tips for success  · Start small.  Do not try to make dramatic changes to your diet all at once. You might feel that you are missing out on your favorite foods and then be more likely to not follow the plan. Make small changes, and stick with them. Once those changes become habit, add a few more changes. · Try some of the following:  ¨ Make it a goal to eat a fruit or vegetable at every meal and at snacks. This will make it easy to get the recommended amount of fruits and vegetables each day. ¨ Try yogurt topped with fruit and nuts for a snack or healthy dessert. ¨ Add lettuce, tomato, cucumber, and onion to sandwiches. ¨ Combine a ready-made pizza crust with low-fat mozzarella cheese and lots of vegetable toppings. Try using tomatoes, squash, spinach, broccoli, carrots, cauliflower, and onions. ¨ Have a variety of cut-up vegetables with a low-fat dip as an appetizer instead of chips and dip. ¨ Sprinkle sunflower seeds or chopped almonds over salads. Or try adding chopped walnuts or almonds to cooked vegetables. ¨ Try some vegetarian meals using beans and peas. Add garbanzo or kidney beans to salads. Make burritos and tacos with mashed lawton beans or black beans. Where can you learn more? Go to https://American BioCaredestiny.healthGeoLearning. org and sign in to your OpenQ account. Enter O325 in the [employer] box to learn more about \"DASH Diet: Care Instructions. \"     If you do not have an account, please click on the \"Sign Up Now\" link. Current as of: September 21, 2016  Content Version: 11.5  © 5737-1334 [employer]. Care instructions adapted under license by [facility] ([facility]). If you have questions about a medical condition or this instruction, always ask your healthcare professional. [address] any warranty or liability for your use of this information.

## 2018-03-28 NOTE — PROGRESS NOTES
Post-Discharge Transitional Care Management Services      Viona Moritz   YOB: 1955    Date of Office Visit:  3/28/2018  Date of Hospital Admission: 3/22/18  Date of Hospital Discharge: 3/25/18  [facility] Risk Score [risk of hospital readmission >=10  medium risk (chance of readmission ~ 12%) >14  high risk (chance of readmission ~18%)]:Risk Score: 3.25    Care management risk score Rising risk (score 2-5) and Complex Care (Scores >=6): 4       Patient Active Problem List   Diagnosis    HTN (hypertension)    Obesity (BMI 30.0-34. 9)    Pulmonary emboli (HCC)    Polycythemia    Chronic anticoagulation    DVT (deep venous thrombosis) (HCC)    Leukocytosis    Chest pain    Leucocytosis    Polycythemia    ACS (acute coronary syndrome) (HCC)    Personal history of pulmonary embolism       Allergies   Allergen Reactions    Pcn [Penicillins] Anaphylaxis    Keflex [Cephalexin] Itching and Rash       Medications listed as ordered at the time of discharge from hospital   [facility]   Home Medication Instructions GLENNA:    Printed on:03/28/18 1015   Medication Information                      amLODIPine (NORVASC) 5 MG tablet  Take 1 tablet by mouth daily             aspirin (ASPIRIN CHILDRENS) 81 MG chewable tablet  Take 1 tablet by mouth daily             cloNIDine (CATAPRES) 0.1 MG tablet  Take 1 tablet by mouth 2 times daily             Cyanocobalamin (B-12) 500 MCG TABS  Take 500 mg by mouth daily. enalapril (VASOTEC) 20 MG tablet  TAKE 1 TABLET TWICE A DAY             folic acid (FOLVITE) 1 MG tablet  TAKE 1 TABLET DAILY             metoprolol tartrate (LOPRESSOR) 25 MG tablet  Take 1 tablet by mouth 2 times daily             metroNIDAZOLE (METROCREAM) 0.75 % cream  Apply topically daily as needed Apply topically 2 times daily to face.               rivaroxaban (XARELTO) 20 MG TABS tablet  TAKE 1 TABLET BY MOUTH ONE TIME A DAY             triamcinolone (KENALOG)

## 2018-03-30 VITALS
DIASTOLIC BLOOD PRESSURE: 76 MMHG | TEMPERATURE: 97.8 F | OXYGEN SATURATION: 96 % | WEIGHT: 175.2 LBS | SYSTOLIC BLOOD PRESSURE: 141 MMHG | RESPIRATION RATE: 16 BRPM | HEART RATE: 60 BPM | BODY MASS INDEX: 29.19 KG/M2 | HEIGHT: 65 IN

## 2018-05-07 DIAGNOSIS — D75.1 POLYCYTHEMIA: Primary | ICD-10-CM

## 2018-05-08 ENCOUNTER — HOSPITAL ENCOUNTER (OUTPATIENT)
Dept: INFUSION THERAPY | Age: 63
Discharge: HOME | End: 2018-05-08
Payer: COMMERCIAL

## 2018-05-08 ENCOUNTER — OFFICE VISIT (OUTPATIENT)
Dept: ONCOLOGY | Age: 63
End: 2018-05-08
Payer: COMMERCIAL

## 2018-05-08 VITALS
TEMPERATURE: 98 F | RESPIRATION RATE: 18 BRPM | OXYGEN SATURATION: 96 % | SYSTOLIC BLOOD PRESSURE: 151 MMHG | BODY MASS INDEX: 30.26 KG/M2 | HEART RATE: 64 BPM | WEIGHT: 181.6 LBS | DIASTOLIC BLOOD PRESSURE: 92 MMHG | HEIGHT: 65 IN

## 2018-05-08 DIAGNOSIS — D75.1 POLYCYTHEMIA: ICD-10-CM

## 2018-05-08 DIAGNOSIS — D75.1 POLYCYTHEMIA: Primary | ICD-10-CM

## 2018-05-08 LAB
ALBUMIN SERPL-MCNC: 4.2 G/DL (ref 3.5–5.1)
ALP BLD-CCNC: 118 U/L (ref 38–126)
ALT SERPL-CCNC: 24 U/L (ref 11–66)
ANISOCYTOSIS: ABNORMAL
AST SERPL-CCNC: 24 U/L (ref 5–40)
BASOPHILIA: ABNORMAL
BASOPHILIC STIPPLING: ABNORMAL
BASOPHILS # BLD: 0.7 %
BASOPHILS ABSOLUTE: 0.1 THOU/MM3 (ref 0–0.1)
BILIRUB SERPL-MCNC: 0.5 MG/DL (ref 0.3–1.2)
BILIRUBIN DIRECT: < 0.2 MG/DL (ref 0–0.3)
BUN, WHOLE BLOOD: 27 MG/DL (ref 8–26)
CHLORIDE, WHOLE BLOOD: 103 MEQ/L (ref 98–109)
CREATININE, WHOLE BLOOD: 1.1 MG/DL (ref 0.5–1.2)
CRENATED RBC'S: ABNORMAL
EOSINOPHIL # BLD: 5.9 %
EOSINOPHILS ABSOLUTE: 1.1 THOU/MM3 (ref 0–0.4)
GFR, ESTIMATED: 53 ML/MIN/1.73M2
GLUCOSE, WHOLE BLOOD: 130 MG/DL (ref 70–108)
HCT VFR BLD CALC: 49.8 % (ref 37–47)
HEMOGLOBIN: 14.8 GM/DL (ref 12–16)
HYPOCHROMIA: ABNORMAL
IONIZED CALCIUM, WHOLE BLOOD: 1.37 MMOL/L (ref 1.12–1.32)
LYMPHOCYTES # BLD: 9.3 %
LYMPHOCYTES ABSOLUTE: 1.7 THOU/MM3 (ref 1–4.8)
MCH RBC QN AUTO: 18.7 PG (ref 27–31)
MCHC RBC AUTO-ENTMCNC: 29.7 GM/DL (ref 33–37)
MCV RBC AUTO: 62.9 FL (ref 81–99)
MICROCYTES: ABNORMAL
MONOCYTES # BLD: 3.1 %
MONOCYTES ABSOLUTE: 0.6 THOU/MM3 (ref 0.4–1.3)
NUCLEATED RED BLOOD CELLS: 0 /100 WBC
OVALOCYTES: ABNORMAL
PATHOLOGIST REVIEW: ABNORMAL
PDW BLD-RTO: 22.5 % (ref 11.5–14.5)
PLATELET # BLD: 408 THOU/MM3 (ref 130–400)
PLATELET ESTIMATE: ABNORMAL
PMV BLD AUTO: 9.1 FL (ref 7.4–10.4)
POIKILOCYTES: ABNORMAL
POTASSIUM, WHOLE BLOOD: 4.3 MEQ/L (ref 3.5–4.9)
RBC # BLD: 7.92 MILL/MM3 (ref 4.2–5.4)
ROULEAUX: SLIGHT
SCAN OF BLOOD SMEAR: NORMAL
SEG NEUTROPHILS: 81 %
SEGMENTED NEUTROPHILS ABSOLUTE COUNT: 14.9 THOU/MM3 (ref 1.8–7.7)
SODIUM, WHOLE BLOOD: 142 MEQ/L (ref 138–146)
STOMATOCYTES: ABNORMAL
TARGET CELLS: ABNORMAL
TOTAL CO2, WHOLE BLOOD: 26 MEQ/L (ref 23–33)
TOTAL PROTEIN: 7 G/DL (ref 6.1–8)
WBC # BLD: 18.4 THOU/MM3 (ref 4.8–10.8)

## 2018-05-08 PROCEDURE — 85025 COMPLETE CBC W/AUTO DIFF WBC: CPT

## 2018-05-08 PROCEDURE — 99214 OFFICE O/P EST MOD 30 MIN: CPT | Performed by: INTERNAL MEDICINE

## 2018-05-08 PROCEDURE — 99211 OFF/OP EST MAY X REQ PHY/QHP: CPT

## 2018-05-08 PROCEDURE — 80076 HEPATIC FUNCTION PANEL: CPT

## 2018-05-08 PROCEDURE — 80047 BASIC METABLC PNL IONIZED CA: CPT

## 2018-05-08 PROCEDURE — 36415 COLL VENOUS BLD VENIPUNCTURE: CPT

## 2018-05-10 ENCOUNTER — OFFICE VISIT (OUTPATIENT)
Dept: CARDIOLOGY CLINIC | Age: 63
End: 2018-05-10
Payer: COMMERCIAL

## 2018-05-10 VITALS
BODY MASS INDEX: 28.82 KG/M2 | HEART RATE: 80 BPM | WEIGHT: 173 LBS | HEIGHT: 65 IN | SYSTOLIC BLOOD PRESSURE: 152 MMHG | DIASTOLIC BLOOD PRESSURE: 94 MMHG

## 2018-05-10 DIAGNOSIS — I25.10 ASCVD (ARTERIOSCLEROTIC CARDIOVASCULAR DISEASE): Primary | ICD-10-CM

## 2018-05-10 PROCEDURE — 99213 OFFICE O/P EST LOW 20 MIN: CPT | Performed by: INTERNAL MEDICINE

## 2018-05-10 ASSESSMENT — ENCOUNTER SYMPTOMS
BLOATING: 0
DOUBLE VISION: 0
DIARRHEA: 0
SHORTNESS OF BREATH: 0
COUGH: 0
HEMOPTYSIS: 0
ORTHOPNEA: 0
BLURRED VISION: 0
ABDOMINAL PAIN: 0
BACK PAIN: 0
CONSTIPATION: 0
HOARSE VOICE: 0
EYE PAIN: 0
EYE DISCHARGE: 0
BOWEL INCONTINENCE: 0
CHANGE IN BOWEL HABIT: 0
SPUTUM PRODUCTION: 0

## 2018-05-15 RX ORDER — 0.9 % SODIUM CHLORIDE 0.9 %
500 INTRAVENOUS SOLUTION INTRAVENOUS ONCE
Status: CANCELLED | OUTPATIENT
Start: 2018-05-16 | End: 2018-05-16

## 2018-05-15 RX ORDER — 0.9 % SODIUM CHLORIDE 0.9 %
250 INTRAVENOUS SOLUTION INTRAVENOUS ONCE
Status: CANCELLED | OUTPATIENT
Start: 2018-05-16 | End: 2018-05-16

## 2018-05-16 ENCOUNTER — HOSPITAL ENCOUNTER (OUTPATIENT)
Dept: INFUSION THERAPY | Age: 63
Discharge: HOME | End: 2018-05-16
Payer: COMMERCIAL

## 2018-05-16 VITALS
HEART RATE: 62 BPM | SYSTOLIC BLOOD PRESSURE: 157 MMHG | HEIGHT: 65 IN | RESPIRATION RATE: 18 BRPM | OXYGEN SATURATION: 97 % | WEIGHT: 180.6 LBS | TEMPERATURE: 98 F | DIASTOLIC BLOOD PRESSURE: 82 MMHG | BODY MASS INDEX: 30.09 KG/M2

## 2018-05-16 DIAGNOSIS — D75.1 POLYCYTHEMIA: ICD-10-CM

## 2018-05-16 LAB — HEMOGLOBIN: 14.9 GM/DL (ref 12–16)

## 2018-05-16 PROCEDURE — 36415 COLL VENOUS BLD VENIPUNCTURE: CPT

## 2018-05-16 PROCEDURE — 99195 PHLEBOTOMY: CPT

## 2018-05-16 PROCEDURE — 85018 HEMOGLOBIN: CPT

## 2018-05-16 RX ORDER — 0.9 % SODIUM CHLORIDE 0.9 %
250 INTRAVENOUS SOLUTION INTRAVENOUS ONCE
Status: CANCELLED | OUTPATIENT
Start: 2018-05-16 | End: 2018-05-16

## 2018-05-16 RX ORDER — 0.9 % SODIUM CHLORIDE 0.9 %
500 INTRAVENOUS SOLUTION INTRAVENOUS ONCE
Status: CANCELLED | OUTPATIENT
Start: 2018-05-16 | End: 2018-05-16

## 2018-05-16 NOTE — PROGRESS NOTES
THERAPEUTIC PHLEBOTOMY    Most recent Hemoglobin result: 14.9Gm/dl. Date obtained:   05 /16 /2018    Pre-phlebotomy vital signs:see Doc flow sheet    Start [facility]:2821    Empty donor bag placed on TARE scale. Tourniquet placed on patient's arm and arm palpated for venous access. Area of venous stick cleansed with alcohol. Hemostat applied to tubing of donor bag. Sheath removed from the needle and needle inserted into the antecubital Vein in the  left Arm. Hemostat released. Blood flowing well down the tubing into the bag. No adjustment of needle needed to maintain adequate blood flow. Scale monitoring amount of blood in bag. Stop CYIN:2506  Needle removed from patient's arm. Pressure applied to patient's arm until bleeding stopped. Dry sterile dressing applied over puncture site and taped down well and secured with coban wrap. Final amount of blood removed:500ml  Post Vital Signs:see Doc flow sheet  Patient drank water and observed for 20 minutes. Patient tolerated procedure well. Discharged ambulatory with belongings.

## 2018-05-16 NOTE — PLAN OF CARE
Problem: Discharge Planning  Intervention: Interaction with patient/family and care team  Review home discharge instructions    Goal: Knowledge of discharge instructions  Knowledge of discharge instructions     Outcome: Met This Shift  Patient understands home discharge instructions sheet. Problem: Intellectual/Education/Knowledge Deficit  Intervention: Verbal/written education provided  Patient instructed on therapeutic phlebotomy procedure and potential complications. Consent signed. Aware to call MD if complications occur. Goal: Teaching initiated upon admission  Patient instructed on therapeutic phlebotomy procedure and potential complications. Consent signed. Aware to call MD if complications occur. Outcome: Met This Shift  Patient verbalized understanding to therapeutic phlebotomy procedure and possible complications. Problem: Musculor/Skeletal Functional Status  Intervention: Fall precautions  Verbalized understanding of fall prevention to ask for assistance with ambulation. Call light within reach. Goal: Absence of falls  Outcome: Met This Shift  No falls occurred during this visit    Comments: Care plan reviewed with patient . Patient  verbalize understanding of the plan of care and contribute to goal setting.

## 2018-05-21 ENCOUNTER — OFFICE VISIT (OUTPATIENT)
Dept: FAMILY MEDICINE CLINIC | Age: 63
End: 2018-05-21
Payer: COMMERCIAL

## 2018-05-21 VITALS
HEART RATE: 72 BPM | DIASTOLIC BLOOD PRESSURE: 84 MMHG | WEIGHT: 181 LBS | BODY MASS INDEX: 30.12 KG/M2 | SYSTOLIC BLOOD PRESSURE: 136 MMHG | RESPIRATION RATE: 14 BRPM

## 2018-05-21 DIAGNOSIS — D17.1 LIPOMA OF BACK: Primary | ICD-10-CM

## 2018-05-21 DIAGNOSIS — R79.83 HOMOCYSTEINEMIA: ICD-10-CM

## 2018-05-21 PROCEDURE — 99213 OFFICE O/P EST LOW 20 MIN: CPT | Performed by: FAMILY MEDICINE

## 2018-05-21 ASSESSMENT — ENCOUNTER SYMPTOMS
SHORTNESS OF BREATH: 0
BACK PAIN: 0
SORE THROAT: 0
NAUSEA: 0
RHINORRHEA: 0
COUGH: 0
EYE PAIN: 0
CHEST TIGHTNESS: 0
CONSTIPATION: 0
ABDOMINAL PAIN: 0
WHEEZING: 0
DIARRHEA: 0
BLOOD IN STOOL: 0
VOMITING: 0

## 2018-05-23 ENCOUNTER — HOSPITAL ENCOUNTER (OUTPATIENT)
Dept: INFUSION THERAPY | Age: 63
Discharge: HOME | End: 2018-05-23
Payer: COMMERCIAL

## 2018-05-23 VITALS
BODY MASS INDEX: 29.52 KG/M2 | RESPIRATION RATE: 16 BRPM | WEIGHT: 177.2 LBS | SYSTOLIC BLOOD PRESSURE: 170 MMHG | DIASTOLIC BLOOD PRESSURE: 79 MMHG | OXYGEN SATURATION: 99 % | HEART RATE: 70 BPM | HEIGHT: 65 IN | TEMPERATURE: 97.5 F

## 2018-05-23 DIAGNOSIS — D75.1 POLYCYTHEMIA: ICD-10-CM

## 2018-05-23 LAB — HEMOGLOBIN: 14.7 GM/DL (ref 12–16)

## 2018-05-23 PROCEDURE — 99195 PHLEBOTOMY: CPT

## 2018-05-23 RX ORDER — 0.9 % SODIUM CHLORIDE 0.9 %
500 INTRAVENOUS SOLUTION INTRAVENOUS ONCE
Status: CANCELLED | OUTPATIENT
Start: 2018-05-23 | End: 2018-05-23

## 2018-05-23 RX ORDER — 0.9 % SODIUM CHLORIDE 0.9 %
250 INTRAVENOUS SOLUTION INTRAVENOUS ONCE
Status: CANCELLED | OUTPATIENT
Start: 2018-05-23 | End: 2018-05-23

## 2018-05-23 NOTE — PLAN OF CARE
Problem: Intellectual/Education/Knowledge Deficit  Intervention: Verbal/written education provided  Patient instructed on therapeutic phlebotomy procedure and potential complications. Consent signed. Aware to call MD if complications occur. Goal: Teaching initiated upon admission  Patient instructed on therapeutic phlebotomy procedure and potential complications. Consent signed. Aware to call MD if complications occur. Outcome: Met This Shift  Patient verbalized understanding to therapeutic phlebotomy procedure and possible complications. Problem: Discharge Planning  Intervention: Interaction with patient/family and care team  Review home discharge instructions    Goal: Knowledge of discharge instructions  Knowledge of discharge instructions     Outcome: Met This Shift  Patient understands home discharge instructions sheet. Problem: Musculor/Skeletal Functional Status  Intervention: Fall precautions  Verbalized understanding of fall prevention to ask for assistance with ambulation. Call light within reach. Goal: Absence of falls  Outcome: Met This Shift  No falls occurred during this visit    Comments: Care plan reviewed with patient . Patient  verbalize understanding of the plan of care and contribute to goal setting.

## 2018-05-23 NOTE — PROGRESS NOTES
THERAPEUTIC PHLEBOTOMY    Most recent Hemoglobin result: 14.7Gm/dl. Date obtained:   05 /23 /2018    Pre-phlebotomy vital signs:see Doc flow sheet    Start SZBN:9096    Empty donor bag placed on TARE scale. Tourniquet placed on patient's arm and arm palpated for venous access. Area of venous stick cleansed with alcohol. Hemostat applied to tubing of donor bag. Sheath removed from the needle and needle inserted into the antecubital Vein in the  left Arm. Hemostat released. Blood flowing well down the tubing into the bag. No adjustment of needle needed to maintain adequate blood flow. Scale monitoring amount of blood in bag. Stop FQHI:7097  Needle removed from patient's arm. Pressure applied to patient's arm until bleeding stopped. Dry sterile dressing applied over puncture site and taped down well and secured with coban wrap. Final amount of blood removed:500ml  Post Vital Signs:see Doc flow sheet  Patient drank water and observed for 20 minutes. Patient tolerated procedure well. Discharged ambulatory with belongings.

## 2018-05-30 ENCOUNTER — HOSPITAL ENCOUNTER (OUTPATIENT)
Dept: INFUSION THERAPY | Age: 63
Discharge: HOME | End: 2018-05-30
Payer: COMMERCIAL

## 2018-05-30 VITALS
TEMPERATURE: 97.7 F | DIASTOLIC BLOOD PRESSURE: 73 MMHG | SYSTOLIC BLOOD PRESSURE: 144 MMHG | RESPIRATION RATE: 16 BRPM | OXYGEN SATURATION: 98 % | HEART RATE: 66 BPM

## 2018-05-30 DIAGNOSIS — I10 BENIGN ESSENTIAL HTN: ICD-10-CM

## 2018-05-30 LAB — HEMOGLOBIN: 14 GM/DL (ref 12–16)

## 2018-05-30 PROCEDURE — 99211 OFF/OP EST MAY X REQ PHY/QHP: CPT

## 2018-05-30 RX ORDER — CLONIDINE HYDROCHLORIDE 0.1 MG/1
TABLET ORAL
Qty: 180 TABLET | Refills: 3 | OUTPATIENT
Start: 2018-05-30

## 2018-05-30 NOTE — PROGRESS NOTES
Patient informed therapeutic procedure on hold due to blood counts. Discharge instructions given to patient-verbalizes understanding. Ambulated off unit per self with belongings.

## 2018-06-11 RX ORDER — FOLIC ACID 1 MG/1
TABLET ORAL
Qty: 90 TABLET | Refills: 1 | Status: SHIPPED | OUTPATIENT
Start: 2018-06-11 | End: 2018-12-08 | Stop reason: SDUPTHER

## 2018-06-18 ENCOUNTER — OFFICE VISIT (OUTPATIENT)
Dept: FAMILY MEDICINE CLINIC | Age: 63
End: 2018-06-18
Payer: COMMERCIAL

## 2018-06-18 VITALS
BODY MASS INDEX: 28.99 KG/M2 | SYSTOLIC BLOOD PRESSURE: 148 MMHG | WEIGHT: 180.4 LBS | RESPIRATION RATE: 14 BRPM | HEIGHT: 66 IN | DIASTOLIC BLOOD PRESSURE: 82 MMHG | HEART RATE: 64 BPM

## 2018-06-18 DIAGNOSIS — Z00.00 WELL ADULT EXAM: Primary | ICD-10-CM

## 2018-06-18 DIAGNOSIS — I10 BENIGN ESSENTIAL HTN: ICD-10-CM

## 2018-06-18 DIAGNOSIS — I10 ESSENTIAL HYPERTENSION: ICD-10-CM

## 2018-06-18 DIAGNOSIS — R79.83 HOMOCYSTEINEMIA: ICD-10-CM

## 2018-06-18 PROCEDURE — 99396 PREV VISIT EST AGE 40-64: CPT | Performed by: FAMILY MEDICINE

## 2018-06-18 RX ORDER — ENALAPRIL MALEATE 20 MG/1
TABLET ORAL
Qty: 180 TABLET | Refills: 3 | Status: SHIPPED | OUTPATIENT
Start: 2018-06-18 | End: 2019-06-04 | Stop reason: SDUPTHER

## 2018-06-18 RX ORDER — CLONIDINE HYDROCHLORIDE 0.2 MG/1
0.2 TABLET ORAL 2 TIMES DAILY
Qty: 180 TABLET | Refills: 3 | Status: SHIPPED | OUTPATIENT
Start: 2018-06-18 | End: 2019-06-04 | Stop reason: SDUPTHER

## 2018-06-18 RX ORDER — CLONIDINE HYDROCHLORIDE 0.1 MG/1
0.1 TABLET ORAL 2 TIMES DAILY
Qty: 180 TABLET | Refills: 3 | Status: CANCELLED | OUTPATIENT
Start: 2018-06-18

## 2018-06-18 ASSESSMENT — ENCOUNTER SYMPTOMS
CONSTIPATION: 0
ABDOMINAL PAIN: 0
NAUSEA: 0
SORE THROAT: 0
VOMITING: 0
RHINORRHEA: 0
WHEEZING: 0
SHORTNESS OF BREATH: 0
BACK PAIN: 0
CHEST TIGHTNESS: 0
EYE PAIN: 0
DIARRHEA: 0
COUGH: 0
BLOOD IN STOOL: 0

## 2018-06-18 ASSESSMENT — PATIENT HEALTH QUESTIONNAIRE - PHQ9
SUM OF ALL RESPONSES TO PHQ QUESTIONS 1-9: 0
2. FEELING DOWN, DEPRESSED OR HOPELESS: 0
1. LITTLE INTEREST OR PLEASURE IN DOING THINGS: 0
SUM OF ALL RESPONSES TO PHQ9 QUESTIONS 1 & 2: 0

## 2018-08-21 ENCOUNTER — HOSPITAL ENCOUNTER (OUTPATIENT)
Dept: INFUSION THERAPY | Age: 63
Discharge: HOME | End: 2018-08-21
Payer: COMMERCIAL

## 2018-08-21 ENCOUNTER — OFFICE VISIT (OUTPATIENT)
Dept: ONCOLOGY | Age: 63
End: 2018-08-21
Payer: COMMERCIAL

## 2018-08-21 VITALS
HEIGHT: 66 IN | SYSTOLIC BLOOD PRESSURE: 156 MMHG | OXYGEN SATURATION: 99 % | DIASTOLIC BLOOD PRESSURE: 70 MMHG | BODY MASS INDEX: 29.35 KG/M2 | WEIGHT: 182.6 LBS | TEMPERATURE: 97.5 F | HEART RATE: 62 BPM | RESPIRATION RATE: 18 BRPM

## 2018-08-21 DIAGNOSIS — D72.829 LEUKOCYTOSIS, UNSPECIFIED TYPE: ICD-10-CM

## 2018-08-21 DIAGNOSIS — D75.1 POLYCYTHEMIA: ICD-10-CM

## 2018-08-21 DIAGNOSIS — D75.1 POLYCYTHEMIA: Primary | ICD-10-CM

## 2018-08-21 LAB
ALBUMIN SERPL-MCNC: 4 G/DL (ref 3.5–5.1)
ALP BLD-CCNC: 131 U/L (ref 38–126)
ALT SERPL-CCNC: 30 U/L (ref 11–66)
ANISOCYTOSIS: PRESENT
AST SERPL-CCNC: 32 U/L (ref 5–40)
BASOPHILS # BLD: 1.9 %
BASOPHILS ABSOLUTE: 0.4 THOU/MM3 (ref 0–0.1)
BILIRUB SERPL-MCNC: 0.4 MG/DL (ref 0.3–1.2)
BILIRUBIN DIRECT: < 0.2 MG/DL (ref 0–0.3)
BUN, WHOLE BLOOD: 23 MG/DL (ref 8–26)
CHLORIDE, WHOLE BLOOD: 105 MEQ/L (ref 98–109)
CREATININE, WHOLE BLOOD: 1.1 MG/DL (ref 0.5–1.2)
EOSINOPHIL # BLD: 6.6 %
EOSINOPHILS ABSOLUTE: 1.4 THOU/MM3 (ref 0–0.4)
ERYTHROCYTE [DISTWIDTH] IN BLOOD BY AUTOMATED COUNT: 24.1 % (ref 11.5–14.5)
ERYTHROCYTE [DISTWIDTH] IN BLOOD BY AUTOMATED COUNT: 49 FL (ref 35–45)
GFR, ESTIMATED: 53 ML/MIN/1.73M2
GLUCOSE, WHOLE BLOOD: 87 MG/DL (ref 70–108)
HCT VFR BLD CALC: 53.6 % (ref 37–47)
HEMOGLOBIN: 14.8 GM/DL (ref 12–16)
HYPOCHROMIA: PRESENT
IMMATURE GRANS (ABS): 0.3 THOU/MM3 (ref 0–0.07)
IMMATURE GRANULOCYTES: 1.5 %
IONIZED CALCIUM, WHOLE BLOOD: 1.3 MMOL/L (ref 1.12–1.32)
LYMPHOCYTES # BLD: 10.5 %
LYMPHOCYTES ABSOLUTE: 2.2 THOU/MM3 (ref 1–4.8)
MCH RBC QN AUTO: 18.1 PG (ref 26–33)
MCHC RBC AUTO-ENTMCNC: 27.6 GM/DL (ref 32.2–35.5)
MCV RBC AUTO: 65.7 FL (ref 81–99)
MICROCYTES: PRESENT
MONOCYTES # BLD: 3.8 %
MONOCYTES ABSOLUTE: 0.8 THOU/MM3 (ref 0.4–1.3)
NUCLEATED RED BLOOD CELLS: 0 /100 WBC
PLATELET # BLD: 386 THOU/MM3 (ref 130–400)
PLATELET ESTIMATE: ADEQUATE
POIKILOCYTES: ABNORMAL
POTASSIUM, WHOLE BLOOD: 4.6 MEQ/L (ref 3.5–4.9)
RBC # BLD: 8.16 MILL/MM3 (ref 4.2–5.4)
SCAN OF BLOOD SMEAR: NORMAL
SEG NEUTROPHILS: 75.7 %
SEGMENTED NEUTROPHILS ABSOLUTE COUNT: 15.5 THOU/MM3 (ref 1.8–7.7)
SODIUM, WHOLE BLOOD: 141 MEQ/L (ref 138–146)
SPHEROCYTES: ABNORMAL
TARGET CELLS: ABNORMAL
TOTAL CO2, WHOLE BLOOD: 26 MEQ/L (ref 23–33)
TOTAL PROTEIN: 7.1 G/DL (ref 6.1–8)
WBC # BLD: 20.5 THOU/MM3 (ref 4.8–10.8)

## 2018-08-21 PROCEDURE — 36415 COLL VENOUS BLD VENIPUNCTURE: CPT

## 2018-08-21 PROCEDURE — 80047 BASIC METABLC PNL IONIZED CA: CPT

## 2018-08-21 PROCEDURE — 99213 OFFICE O/P EST LOW 20 MIN: CPT | Performed by: INTERNAL MEDICINE

## 2018-08-21 PROCEDURE — 99211 OFF/OP EST MAY X REQ PHY/QHP: CPT

## 2018-08-21 PROCEDURE — 85025 COMPLETE CBC W/AUTO DIFF WBC: CPT

## 2018-08-21 PROCEDURE — 80076 HEPATIC FUNCTION PANEL: CPT

## 2018-08-28 NOTE — PROGRESS NOTES
[facility] PROFESSIONAL SERVICES  [facility]  [address] [facility] [address] [facility] [address]  [facility]  Dept: [phone]  Dept Fax: [phone]  Loc: [phone]    Subjective:     Chief Complaint:  Zhen Graves is a 56year old female with hematological disease. HPI:  The patient is here today for follow up evaluation. Her general condition remained stable. The patient does have a myeloproliferative disorder and has remained on phlebotomy therapy as needed to maintain adequate hemoglobin and hematocrit levels. Her white blood count is mildly elevated platelet count is normal today. The patient remains on anticoagulation without any severe complications. The patient denies shortness of breath, chest pain, a change in bowel habits or a change in bladder habits. ECOG performance status is level 0. PMH, SH, and FH:  I reviewed the PMH, SH and FH as noted on the electronic medical record. There have been no changes as noted in the previous documentation. Review of Systems   Constitutional: Negative. HENT: Negative. Eyes: Negative. Respiratory: Negative. Cardiovascular: Negative. Gastrointestinal: Negative. Genitourinary: Negative. Musculoskeletal: Negative. Skin: Negative. Neurological: Negative. Hematological: Negative. Psychiatric/Behavioral: Negative. Objective:   Physical Exam   Vitals:    01/30/18 1312   BP: (!) 154/82   Pulse: 72   Resp: 16   Temp: 96.8 °F (36 °C)   SpO2: 97%   Vitals reviewed and are stable. Constitutional: Well-developed and well-nourished. No acute distress. HENT: Normocephalic and atraumatic. Eyes: Pupils are equal and reactive. No scleral icterus. Neck: Overall appearance is symmetrical. No identifiable masses. Chest: Inspection and palpation of chest is normal.  Pulmonary: Effort normal. No respiratory distress. Cardiovascular: RRR. No edema in any of the four extremities. Abdominal: Soft. Mild splenomegaly. Musculoskeletal: Gait is normal. Muscle strength and tone good. Neurological: Alert and oriented to person, place, and time. Judgment and thought content normal.  Skin: Skin is warm and dry. No rash. Psychiatric: Mood and affect appropriate for the clinical situation. Behavior is normal.          Data Analysis:    Hematology 8/21/2018   WBC 20.5 (H)   RBC 8.16 (H)   HGB 14.8   HCT 53.6 (H)   MCV 65.7 (L)   RDW         Assessment:   1. Myeloproliferative disorder. 2.  Leukocytosis. 3.  Deep vein thrombosis. 4.  Chronic anticoagulation. 5.  Hypertension. Plan:   1. Continue phlebotomy therapy as needed. 2.  Monitor hemoglobin/hematocrit and for any signs of blood loss. 3.  Monitor total WBC count and for signs of infection/fever. 4.  Continue anticoagulation with Xarelto. 5.  Monitor blood pressure and follow up with primary care provider for further surveillance and management. Antonio Griffin M.D.   [facility] [address]  [address] FELECIAAnahi Rosalino [facility], [address] [place] [address], [address] [place] [address]

## 2018-10-23 ENCOUNTER — OFFICE VISIT (OUTPATIENT)
Dept: ONCOLOGY | Age: 63
End: 2018-10-23
Payer: COMMERCIAL

## 2018-10-23 ENCOUNTER — HOSPITAL ENCOUNTER (OUTPATIENT)
Dept: INFUSION THERAPY | Age: 63
Discharge: HOME | End: 2018-10-23
Payer: COMMERCIAL

## 2018-10-23 VITALS
RESPIRATION RATE: 16 BRPM | TEMPERATURE: 97.3 F | WEIGHT: 181.2 LBS | SYSTOLIC BLOOD PRESSURE: 138 MMHG | HEIGHT: 66 IN | OXYGEN SATURATION: 97 % | BODY MASS INDEX: 29.12 KG/M2 | HEART RATE: 65 BPM | DIASTOLIC BLOOD PRESSURE: 84 MMHG

## 2018-10-23 DIAGNOSIS — D75.1 POLYCYTHEMIA: ICD-10-CM

## 2018-10-23 DIAGNOSIS — Z86.711 PERSONAL HISTORY OF PULMONARY EMBOLISM: Primary | ICD-10-CM

## 2018-10-23 LAB
ALBUMIN SERPL-MCNC: 3.9 G/DL (ref 3.5–5.1)
ALP BLD-CCNC: 134 U/L (ref 38–126)
ALT SERPL-CCNC: 30 U/L (ref 11–66)
ANISOCYTOSIS: PRESENT
AST SERPL-CCNC: 45 U/L (ref 5–40)
BASOPHILS # BLD: 1.8 %
BASOPHILS ABSOLUTE: 0.4 THOU/MM3 (ref 0–0.1)
BILIRUB SERPL-MCNC: 0.5 MG/DL (ref 0.3–1.2)
BILIRUBIN DIRECT: < 0.2 MG/DL (ref 0–0.3)
BUN, WHOLE BLOOD: 28 MG/DL (ref 8–26)
CHLORIDE, WHOLE BLOOD: 105 MEQ/L (ref 98–109)
CREATININE, WHOLE BLOOD: 1.1 MG/DL (ref 0.5–1.2)
EOSINOPHIL # BLD: 7.6 %
EOSINOPHILS ABSOLUTE: 1.7 THOU/MM3 (ref 0–0.4)
ERYTHROCYTE [DISTWIDTH] IN BLOOD BY AUTOMATED COUNT: 25.3 % (ref 11.5–14.5)
ERYTHROCYTE [DISTWIDTH] IN BLOOD BY AUTOMATED COUNT: 52 FL (ref 35–45)
GFR, ESTIMATED: 53 ML/MIN/1.73M2
GLUCOSE, WHOLE BLOOD: 103 MG/DL (ref 70–108)
HCT VFR BLD CALC: 55.4 % (ref 37–47)
HEMOGLOBIN: 15.2 GM/DL (ref 12–16)
HYPOCHROMIA: PRESENT
IMMATURE GRANS (ABS): 0.26 THOU/MM3 (ref 0–0.07)
IMMATURE GRANULOCYTES: 1.2 %
IONIZED CALCIUM, WHOLE BLOOD: 1.33 MMOL/L (ref 1.12–1.32)
LYMPHOCYTES # BLD: 11 %
LYMPHOCYTES ABSOLUTE: 2.5 THOU/MM3 (ref 1–4.8)
MCH RBC QN AUTO: 18.3 PG (ref 26–33)
MCHC RBC AUTO-ENTMCNC: 27.4 GM/DL (ref 32.2–35.5)
MCV RBC AUTO: 66.7 FL (ref 81–99)
MICROCYTES: PRESENT
MONOCYTES # BLD: 4 %
MONOCYTES ABSOLUTE: 0.9 THOU/MM3 (ref 0.4–1.3)
NUCLEATED RED BLOOD CELLS: 0 /100 WBC
PLATELET # BLD: 465 THOU/MM3 (ref 130–400)
POIKILOCYTES: ABNORMAL
POTASSIUM, WHOLE BLOOD: 5 MEQ/L (ref 3.5–4.9)
RBC # BLD: 8.31 MILL/MM3 (ref 4.2–5.4)
SCAN OF BLOOD SMEAR: NORMAL
SEG NEUTROPHILS: 74.4 %
SEGMENTED NEUTROPHILS ABSOLUTE COUNT: 16.7 THOU/MM3 (ref 1.8–7.7)
SODIUM, WHOLE BLOOD: 141 MEQ/L (ref 138–146)
TARGET CELLS: ABNORMAL
TOTAL CO2, WHOLE BLOOD: 27 MEQ/L (ref 23–33)
TOTAL PROTEIN: 7 G/DL (ref 6.1–8)
WBC # BLD: 22.5 THOU/MM3 (ref 4.8–10.8)

## 2018-10-23 PROCEDURE — 85025 COMPLETE CBC W/AUTO DIFF WBC: CPT

## 2018-10-23 PROCEDURE — 36415 COLL VENOUS BLD VENIPUNCTURE: CPT

## 2018-10-23 PROCEDURE — 80076 HEPATIC FUNCTION PANEL: CPT

## 2018-10-23 PROCEDURE — 99213 OFFICE O/P EST LOW 20 MIN: CPT | Performed by: INTERNAL MEDICINE

## 2018-10-23 PROCEDURE — 99211 OFF/OP EST MAY X REQ PHY/QHP: CPT

## 2018-10-23 PROCEDURE — 80047 BASIC METABLC PNL IONIZED CA: CPT

## 2018-10-24 ENCOUNTER — HOSPITAL ENCOUNTER (OUTPATIENT)
Dept: INFUSION THERAPY | Age: 63
Discharge: HOME | End: 2018-10-24
Payer: COMMERCIAL

## 2018-10-24 VITALS
HEIGHT: 65 IN | RESPIRATION RATE: 16 BRPM | DIASTOLIC BLOOD PRESSURE: 71 MMHG | TEMPERATURE: 98.6 F | BODY MASS INDEX: 30.15 KG/M2 | SYSTOLIC BLOOD PRESSURE: 145 MMHG | OXYGEN SATURATION: 98 % | HEART RATE: 61 BPM

## 2018-10-24 DIAGNOSIS — D75.1 POLYCYTHEMIA: ICD-10-CM

## 2018-10-24 PROCEDURE — 2709999900 HC NON-CHARGEABLE SUPPLY

## 2018-10-24 PROCEDURE — 99195 PHLEBOTOMY: CPT

## 2018-10-24 RX ORDER — 0.9 % SODIUM CHLORIDE 0.9 %
250 INTRAVENOUS SOLUTION INTRAVENOUS ONCE
Status: CANCELLED | OUTPATIENT
Start: 2018-10-24 | End: 2018-10-24

## 2018-10-24 RX ORDER — 0.9 % SODIUM CHLORIDE 0.9 %
500 INTRAVENOUS SOLUTION INTRAVENOUS ONCE
Status: CANCELLED | OUTPATIENT
Start: 2018-10-24 | End: 2018-10-24

## 2018-10-24 ASSESSMENT — PAIN SCALES - GENERAL: PAINLEVEL_OUTOF10: 0

## 2018-10-24 NOTE — PLAN OF CARE
Problem: Intellectual/Education/Knowledge Deficit  Intervention: Verbal/written education provided  Patient instructed on therapeutic phlebotomy procedure and potential complications. Consent signed. Aware to call MD if complications occur. Goal: Teaching initiated upon admission  Patient instructed on therapeutic phlebotomy procedure and potential complications. Consent signed. Aware to call MD if complications occur. Outcome: Met This Shift  Patient verbalized understanding to therapeutic phlebotomy procedure and possible complications. Problem: Discharge Planning  Intervention: Interaction with patient/family and care team  Discuss understanding of discharge instructions,follow-up appointments, and when to call the physician. Goal: Knowledge of discharge instructions  Knowledge of discharge instructions     Outcome: Met This Shift  Verbalized understanding of discharge instructions, follow-up appointments, and when to call the physician. Problem: Musculor/Skeletal Functional Status  Intervention: Fall precautions  Verbalized understanding of fall prevention to ask for assistance with ambulation. Call light within reach. Goal: Absence of falls  Outcome: Met This Shift  No falls occurred with visit today. Comments: Care plan reviewed with patient . Patient  verbalize understanding of the plan of care and contribute to goal setting.

## 2018-10-24 NOTE — PROGRESS NOTES
THERAPEUTIC PHLEBOTOMY    Most recent Hemoglobin result: 15.2Gm/dl. Hematocrit result  55.4%  Date obtained:   10 /23 /2018    Pre-phlebotomy vital signs:see Doc flow sheet    Start [id number]    Empty donor bag placed on TARE scale. Tourniquet placed on patient's arm and arm palpated for venous access. Area of venous stick cleansed with alcohol. Hemostat applied to tubing of donor bag. Sheath removed from the needle and needle inserted into the antecubital Vein in the  left Arm. Hemostat released. Blood flowing well down the tubing into the bag. No adjustment of needle needed to maintain adequate blood flow. Scale monitoring amount of blood in bag. Stop [id number]  Needle removed from patient's arm. Pressure applied to patient's arm until bleeding stopped. Dry sterile dressing applied over puncture site and taped down well and secured with coban wrap. Final amount of blood removed:500ml  Post Vital Signs:see Doc flow sheet  Patient drank water and observed for 20 minutes. Patient tolerated procedure well. Discharged ambulatory with belongings.

## 2018-10-31 ENCOUNTER — HOSPITAL ENCOUNTER (OUTPATIENT)
Dept: INFUSION THERAPY | Age: 63
Discharge: HOME | End: 2018-10-31
Payer: COMMERCIAL

## 2018-10-31 VITALS
OXYGEN SATURATION: 97 % | HEART RATE: 66 BPM | HEIGHT: 65 IN | BODY MASS INDEX: 30.16 KG/M2 | DIASTOLIC BLOOD PRESSURE: 67 MMHG | WEIGHT: 181 LBS | SYSTOLIC BLOOD PRESSURE: 140 MMHG | RESPIRATION RATE: 16 BRPM | TEMPERATURE: 98 F

## 2018-10-31 DIAGNOSIS — D75.1 POLYCYTHEMIA: ICD-10-CM

## 2018-10-31 LAB — HEMOGLOBIN: 14.5 GM/DL (ref 12–16)

## 2018-10-31 PROCEDURE — 99195 PHLEBOTOMY: CPT

## 2018-10-31 PROCEDURE — 2709999900 HC NON-CHARGEABLE SUPPLY

## 2018-10-31 RX ORDER — 0.9 % SODIUM CHLORIDE 0.9 %
500 INTRAVENOUS SOLUTION INTRAVENOUS ONCE
Status: CANCELLED | OUTPATIENT
Start: 2018-10-31 | End: 2018-10-31

## 2018-10-31 RX ORDER — 0.9 % SODIUM CHLORIDE 0.9 %
250 INTRAVENOUS SOLUTION INTRAVENOUS ONCE
Status: CANCELLED | OUTPATIENT
Start: 2018-10-31 | End: 2018-10-31

## 2018-10-31 NOTE — PLAN OF CARE
Problem: Musculor/Skeletal Functional Status  Intervention: Fall precautions  Verbalized understanding of fall prevention to ask for assistance with ambulation. Call light within reach. Goal: Absence of falls  Outcome: Met This Shift  No falls occurred with visit today. Problem: Discharge Planning  Intervention: Interaction with patient/family and care team  Review home discharge instructions    Goal: Knowledge of discharge instructions  Knowledge of discharge instructions     Outcome: Met This Shift  Patient understands home discharge instructions sheet. Comments: Care plan reviewed with patient . Patient  verbalize understanding of the plan of care and contribute to goal setting.

## 2018-11-07 ENCOUNTER — HOSPITAL ENCOUNTER (OUTPATIENT)
Dept: INFUSION THERAPY | Age: 63
Discharge: HOME | End: 2018-11-07
Payer: COMMERCIAL

## 2018-11-07 VITALS
SYSTOLIC BLOOD PRESSURE: 152 MMHG | RESPIRATION RATE: 16 BRPM | TEMPERATURE: 97.8 F | OXYGEN SATURATION: 95 % | DIASTOLIC BLOOD PRESSURE: 70 MMHG

## 2018-11-07 DIAGNOSIS — D75.1 POLYCYTHEMIA: Primary | ICD-10-CM

## 2018-11-07 LAB — HEMOGLOBIN: 13.7 GM/DL (ref 12–16)

## 2018-11-07 PROCEDURE — 99211 OFF/OP EST MAY X REQ PHY/QHP: CPT

## 2018-11-07 NOTE — PLAN OF CARE
Problem: Discharge Planning  Intervention: Interaction with patient/family and care team  Discuss understanding of discharge instructions,follow-up appointments, and when to call the physician. Goal: Knowledge of discharge instructions  Knowledge of discharge instructions     Outcome: Met This Shift  Verbalized understanding of discharge instructions, follow-up appointments, and when to call the physician. Problem: Intellectual/Education/Knowledge Deficit  Intervention: Verbal/written education provided  Review lab results - informed no therapeutic phlebotomy procedure today. Goal: Teaching initiated upon admission  Patient instructed on therapeutic phlebotomy procedure and potential complications. Consent signed. Aware to call MD if complications occur. Outcome: Met This Shift  Understands her blood counts are good- not requiring procedure today    Comments: Care plan reviewed with patient . Patient  verbalize understanding of the plan of care and contribute to goal setting.

## 2018-11-07 NOTE — PROGRESS NOTES
Patient informed blood counts are not good- no requiring therapeutic phlebotomy today. Discharge instructions given to patient-verbalizes understanding. Ambulated off unit per self with belongings.

## 2018-12-10 RX ORDER — FOLIC ACID 1 MG/1
TABLET ORAL
Qty: 90 TABLET | Refills: 1 | Status: SHIPPED | OUTPATIENT
Start: 2018-12-10 | End: 2018-12-18 | Stop reason: SDUPTHER

## 2018-12-12 DIAGNOSIS — R79.83 HOMOCYSTEINEMIA: ICD-10-CM

## 2018-12-12 NOTE — TELEPHONE ENCOUNTER
T/C Moses Velazquez - is going on Medicare and can no longer get [facility]. Requesting 90 d supply for Xarelto 20 mg #90 to [facility]. Yearly appt was 6/18/18.

## 2018-12-18 ENCOUNTER — HOSPITAL ENCOUNTER (OUTPATIENT)
Dept: INFUSION THERAPY | Age: 63
Discharge: HOME | End: 2018-12-18
Payer: MEDICARE

## 2018-12-18 ENCOUNTER — OFFICE VISIT (OUTPATIENT)
Dept: ONCOLOGY | Age: 63
End: 2018-12-18
Payer: MEDICARE

## 2018-12-18 VITALS
OXYGEN SATURATION: 99 % | HEART RATE: 67 BPM | TEMPERATURE: 97.3 F | HEIGHT: 65 IN | BODY MASS INDEX: 30.69 KG/M2 | RESPIRATION RATE: 16 BRPM | SYSTOLIC BLOOD PRESSURE: 174 MMHG | WEIGHT: 184.2 LBS | DIASTOLIC BLOOD PRESSURE: 87 MMHG

## 2018-12-18 DIAGNOSIS — D75.1 POLYCYTHEMIA: Primary | ICD-10-CM

## 2018-12-18 DIAGNOSIS — Z86.711 PERSONAL HISTORY OF PULMONARY EMBOLISM: ICD-10-CM

## 2018-12-18 DIAGNOSIS — D75.1 POLYCYTHEMIA: ICD-10-CM

## 2018-12-18 LAB
ALBUMIN SERPL-MCNC: 4 G/DL (ref 3.5–5.1)
ALP BLD-CCNC: 152 U/L (ref 38–126)
ALT SERPL-CCNC: 35 U/L (ref 11–66)
ANISOCYTOSIS: PRESENT
AST SERPL-CCNC: 37 U/L (ref 5–40)
BASOPHILS # BLD: 2 %
BASOPHILS ABSOLUTE: 0.5 THOU/MM3 (ref 0–0.1)
BILIRUB SERPL-MCNC: 0.4 MG/DL (ref 0.3–1.2)
BILIRUBIN DIRECT: < 0.2 MG/DL (ref 0–0.3)
BUN, WHOLE BLOOD: 27 MG/DL (ref 8–26)
CHLORIDE, WHOLE BLOOD: 104 MEQ/L (ref 98–109)
CREATININE, WHOLE BLOOD: 1.1 MG/DL (ref 0.5–1.2)
ELLIPTOCYTES: ABNORMAL
EOSINOPHIL # BLD: 7.5 %
EOSINOPHILS ABSOLUTE: 1.9 THOU/MM3 (ref 0–0.4)
ERYTHROCYTE [DISTWIDTH] IN BLOOD BY AUTOMATED COUNT: 23.9 % (ref 11.5–14.5)
ERYTHROCYTE [DISTWIDTH] IN BLOOD BY AUTOMATED COUNT: 48 FL (ref 35–45)
GFR, ESTIMATED: 53 ML/MIN/1.73M2
GLUCOSE, WHOLE BLOOD: 128 MG/DL (ref 70–108)
HCT VFR BLD CALC: 50.7 % (ref 37–47)
HEMOGLOBIN: 13.8 GM/DL (ref 12–16)
HYPOCHROMIA: PRESENT
IMMATURE GRANS (ABS): 0.38 THOU/MM3 (ref 0–0.07)
IMMATURE GRANULOCYTES: 1.5 %
IONIZED CALCIUM, WHOLE BLOOD: 1.3 MMOL/L (ref 1.12–1.32)
LYMPHOCYTES # BLD: 11.1 %
LYMPHOCYTES ABSOLUTE: 2.8 THOU/MM3 (ref 1–4.8)
MCH RBC QN AUTO: 17.9 PG (ref 26–33)
MCHC RBC AUTO-ENTMCNC: 27.2 GM/DL (ref 32.2–35.5)
MCV RBC AUTO: 65.8 FL (ref 81–99)
MICROCYTES: PRESENT
MONOCYTES # BLD: 3.9 %
MONOCYTES ABSOLUTE: 1 THOU/MM3 (ref 0.4–1.3)
NUCLEATED RED BLOOD CELLS: 0 /100 WBC
PLATELET # BLD: 612 THOU/MM3 (ref 130–400)
PLATELET ESTIMATE: ABNORMAL
PMV BLD AUTO: ABNORMAL FL (ref 9.4–12.4)
POIKILOCYTES: ABNORMAL
POTASSIUM, WHOLE BLOOD: 4.3 MEQ/L (ref 3.5–4.9)
RBC # BLD: 7.71 MILL/MM3 (ref 4.2–5.4)
SCAN OF BLOOD SMEAR: NORMAL
SEG NEUTROPHILS: 74 %
SEGMENTED NEUTROPHILS ABSOLUTE COUNT: 18.6 THOU/MM3 (ref 1.8–7.7)
SODIUM, WHOLE BLOOD: 139 MEQ/L (ref 138–146)
TARGET CELLS: ABNORMAL
TOTAL CO2, WHOLE BLOOD: 24 MEQ/L (ref 23–33)
TOTAL PROTEIN: 7.1 G/DL (ref 6.1–8)
WBC # BLD: 25.2 THOU/MM3 (ref 4.8–10.8)

## 2018-12-18 PROCEDURE — G8417 CALC BMI ABV UP PARAM F/U: HCPCS | Performed by: INTERNAL MEDICINE

## 2018-12-18 PROCEDURE — 99214 OFFICE O/P EST MOD 30 MIN: CPT | Performed by: INTERNAL MEDICINE

## 2018-12-18 PROCEDURE — 80076 HEPATIC FUNCTION PANEL: CPT

## 2018-12-18 PROCEDURE — 85025 COMPLETE CBC W/AUTO DIFF WBC: CPT

## 2018-12-18 PROCEDURE — G8427 DOCREV CUR MEDS BY ELIG CLIN: HCPCS | Performed by: INTERNAL MEDICINE

## 2018-12-18 PROCEDURE — G8484 FLU IMMUNIZE NO ADMIN: HCPCS | Performed by: INTERNAL MEDICINE

## 2018-12-18 PROCEDURE — 1123F ACP DISCUSS/DSCN MKR DOCD: CPT | Performed by: INTERNAL MEDICINE

## 2018-12-18 PROCEDURE — 4040F PNEUMOC VAC/ADMIN/RCVD: CPT | Performed by: INTERNAL MEDICINE

## 2018-12-18 PROCEDURE — G8400 PT W/DXA NO RESULTS DOC: HCPCS | Performed by: INTERNAL MEDICINE

## 2018-12-18 PROCEDURE — 1090F PRES/ABSN URINE INCON ASSESS: CPT | Performed by: INTERNAL MEDICINE

## 2018-12-18 PROCEDURE — 3014F SCREEN MAMMO DOC REV: CPT | Performed by: INTERNAL MEDICINE

## 2018-12-18 PROCEDURE — G8598 ASA/ANTIPLAT THER USED: HCPCS | Performed by: INTERNAL MEDICINE

## 2018-12-18 PROCEDURE — 3017F COLORECTAL CA SCREEN DOC REV: CPT | Performed by: INTERNAL MEDICINE

## 2018-12-18 PROCEDURE — 36415 COLL VENOUS BLD VENIPUNCTURE: CPT

## 2018-12-18 PROCEDURE — 1036F TOBACCO NON-USER: CPT | Performed by: INTERNAL MEDICINE

## 2018-12-18 PROCEDURE — 1101F PT FALLS ASSESS-DOCD LE1/YR: CPT | Performed by: INTERNAL MEDICINE

## 2018-12-18 PROCEDURE — 80047 BASIC METABLC PNL IONIZED CA: CPT

## 2018-12-18 PROCEDURE — 99211 OFF/OP EST MAY X REQ PHY/QHP: CPT

## 2018-12-18 RX ORDER — FOLIC ACID 1 MG/1
TABLET ORAL
Qty: 90 TABLET | Refills: 1 | Status: SHIPPED | OUTPATIENT
Start: 2018-12-18 | End: 2019-08-02 | Stop reason: SDUPTHER

## 2018-12-28 NOTE — PROGRESS NOTES
[facility] PROFESSIONAL SERVICES  [facility] OF [facility]  [address]  [facility] [place]  Dept: [phone]  Dept Fax: [phone]  Loc: [phone]    Subjective:     Chief Complaint:  Eder Vinson is a 56year old female with hematological disease. HPI:  The patient is here today for follow up evaluation. She is here today for follow-up of her history of a myeloproliferative disorder. She also has a history of thrombosis and is on chronic anticoagulation. On today's evaluation her general condition has been stable. Her white blood cell count and white count remained elevated but are not significant changed. She denies fever or other signs of infection. The patient does not have abnormal bleeding or bruising. She has underwent previous phlebotomy therapy and on today's white wish in her hemoglobin and hematocrit have decreased. We will monitor her at this time without phlebotomy treatment. The patient states that she generally feels well. She remains active and has good performance status. The patient denies shortness of breath, chest pain, a change in bowel habits or a change in bladder habits. ECOG performance status is level 0. PMH, SH, and FH:  I reviewed the PMH, SH and FH as noted on the electronic medical record. There have been no changes as noted in the previous documentation. Review of Systems   Constitutional: Negative. HENT: Negative. Eyes: Negative. Respiratory: Negative. Cardiovascular: Negative. Gastrointestinal: Negative. Genitourinary: Negative. Musculoskeletal: Negative. Skin: Negative. Neurological: Negative. Hematological: Negative. Psychiatric/Behavioral: Negative. Objective:   Physical Exam   Vitals:    12/18/18 1203   BP: (!) 174/87   Pulse: 67   Resp: 16   Temp: 97.3 °F (36.3 °C)   SpO2: 99%   Vitals reviewed and are stable. Constitutional: Well-developed and well-nourished. No acute distress.

## 2019-02-18 ENCOUNTER — OFFICE VISIT (OUTPATIENT)
Dept: CARDIOLOGY CLINIC | Age: 64
End: 2019-02-18
Payer: MEDICARE

## 2019-02-18 VITALS
DIASTOLIC BLOOD PRESSURE: 95 MMHG | HEART RATE: 70 BPM | WEIGHT: 184 LBS | HEIGHT: 65 IN | SYSTOLIC BLOOD PRESSURE: 156 MMHG | BODY MASS INDEX: 30.66 KG/M2

## 2019-02-18 DIAGNOSIS — I15.9 SECONDARY HYPERTENSION: Primary | ICD-10-CM

## 2019-02-18 DIAGNOSIS — R06.02 SOB (SHORTNESS OF BREATH) ON EXERTION: ICD-10-CM

## 2019-02-18 PROCEDURE — G8427 DOCREV CUR MEDS BY ELIG CLIN: HCPCS | Performed by: INTERNAL MEDICINE

## 2019-02-18 PROCEDURE — 3017F COLORECTAL CA SCREEN DOC REV: CPT | Performed by: INTERNAL MEDICINE

## 2019-02-18 PROCEDURE — 93000 ELECTROCARDIOGRAM COMPLETE: CPT | Performed by: INTERNAL MEDICINE

## 2019-02-18 PROCEDURE — G8484 FLU IMMUNIZE NO ADMIN: HCPCS | Performed by: INTERNAL MEDICINE

## 2019-02-18 PROCEDURE — G8400 PT W/DXA NO RESULTS DOC: HCPCS | Performed by: INTERNAL MEDICINE

## 2019-02-18 PROCEDURE — G8598 ASA/ANTIPLAT THER USED: HCPCS | Performed by: INTERNAL MEDICINE

## 2019-02-18 PROCEDURE — 99214 OFFICE O/P EST MOD 30 MIN: CPT | Performed by: INTERNAL MEDICINE

## 2019-02-18 PROCEDURE — 1036F TOBACCO NON-USER: CPT | Performed by: INTERNAL MEDICINE

## 2019-02-18 PROCEDURE — 1123F ACP DISCUSS/DSCN MKR DOCD: CPT | Performed by: INTERNAL MEDICINE

## 2019-02-18 PROCEDURE — 4040F PNEUMOC VAC/ADMIN/RCVD: CPT | Performed by: INTERNAL MEDICINE

## 2019-02-18 PROCEDURE — G8417 CALC BMI ABV UP PARAM F/U: HCPCS | Performed by: INTERNAL MEDICINE

## 2019-02-18 PROCEDURE — 3014F SCREEN MAMMO DOC REV: CPT | Performed by: INTERNAL MEDICINE

## 2019-02-18 PROCEDURE — 1101F PT FALLS ASSESS-DOCD LE1/YR: CPT | Performed by: INTERNAL MEDICINE

## 2019-02-18 PROCEDURE — 1090F PRES/ABSN URINE INCON ASSESS: CPT | Performed by: INTERNAL MEDICINE

## 2019-02-18 RX ORDER — AMLODIPINE BESYLATE 5 MG/1
5 TABLET ORAL DAILY
Qty: 30 TABLET | Refills: 5 | Status: SHIPPED | OUTPATIENT
Start: 2019-02-18 | End: 2019-08-02 | Stop reason: SDUPTHER

## 2019-02-28 ENCOUNTER — HOSPITAL ENCOUNTER (OUTPATIENT)
Dept: INFUSION THERAPY | Age: 64
Discharge: HOME | End: 2019-02-28
Payer: MEDICARE

## 2019-02-28 ENCOUNTER — OFFICE VISIT (OUTPATIENT)
Dept: ONCOLOGY | Age: 64
End: 2019-02-28
Payer: MEDICARE

## 2019-02-28 VITALS
SYSTOLIC BLOOD PRESSURE: 163 MMHG | OXYGEN SATURATION: 99 % | HEART RATE: 68 BPM | RESPIRATION RATE: 18 BRPM | BODY MASS INDEX: 29.92 KG/M2 | HEIGHT: 65 IN | DIASTOLIC BLOOD PRESSURE: 80 MMHG | WEIGHT: 179.6 LBS | TEMPERATURE: 98 F

## 2019-02-28 DIAGNOSIS — D75.1 POLYCYTHEMIA: ICD-10-CM

## 2019-02-28 DIAGNOSIS — D75.839 THROMBOCYTOSIS: ICD-10-CM

## 2019-02-28 DIAGNOSIS — I82.90 THROMBOSIS: ICD-10-CM

## 2019-02-28 DIAGNOSIS — Z79.01 CHRONIC ANTICOAGULATION: Primary | ICD-10-CM

## 2019-02-28 DIAGNOSIS — D72.828 OTHER ELEVATED WHITE BLOOD CELL (WBC) COUNT: ICD-10-CM

## 2019-02-28 LAB
ALBUMIN SERPL-MCNC: 4.3 G/DL (ref 3.5–5.1)
ALP BLD-CCNC: 134 U/L (ref 38–126)
ALT SERPL-CCNC: 33 U/L (ref 11–66)
AST SERPL-CCNC: 37 U/L (ref 5–40)
BILIRUB SERPL-MCNC: 0.4 MG/DL (ref 0.3–1.2)
BILIRUBIN DIRECT: < 0.2 MG/DL (ref 0–0.3)
BUN, WHOLE BLOOD: 24 MG/DL (ref 8–26)
CHLORIDE, WHOLE BLOOD: 106 MEQ/L (ref 98–109)
CREATININE, WHOLE BLOOD: 1.1 MG/DL (ref 0.5–1.2)
ERYTHROCYTE [DISTWIDTH] IN BLOOD BY AUTOMATED COUNT: 25.6 % (ref 11.5–14.5)
ERYTHROCYTE [DISTWIDTH] IN BLOOD BY AUTOMATED COUNT: 52.2 FL (ref 35–45)
GFR, ESTIMATED: 53 ML/MIN/1.73M2
GLUCOSE, WHOLE BLOOD: 108 MG/DL (ref 70–108)
HCT VFR BLD CALC: 54.2 % (ref 37–47)
HEMOGLOBIN: 14.6 GM/DL (ref 12–16)
IONIZED CALCIUM, WHOLE BLOOD: 1.32 MMOL/L (ref 1.12–1.32)
MCH RBC QN AUTO: 17.7 PG (ref 26–33)
MCHC RBC AUTO-ENTMCNC: 26.9 GM/DL (ref 32.2–35.5)
MCV RBC AUTO: 65.7 FL (ref 81–99)
PLATELET # BLD: 534 THOU/MM3 (ref 130–400)
POTASSIUM, WHOLE BLOOD: 4.1 MEQ/L (ref 3.5–4.9)
RBC # BLD: 8.25 MILL/MM3 (ref 4.2–5.4)
SEGMENTED NEUTROPHILS ABSOLUTE COUNT: 19.2 THOU/MM3 (ref 1.8–7.7)
SODIUM, WHOLE BLOOD: 144 MEQ/L (ref 138–146)
TOTAL CO2, WHOLE BLOOD: 26 MEQ/L (ref 23–33)
TOTAL PROTEIN: 6.9 G/DL (ref 6.1–8)
WBC # BLD: 25.5 THOU/MM3 (ref 4.8–10.8)

## 2019-02-28 PROCEDURE — 99215 OFFICE O/P EST HI 40 MIN: CPT | Performed by: INTERNAL MEDICINE

## 2019-02-28 PROCEDURE — 3017F COLORECTAL CA SCREEN DOC REV: CPT | Performed by: INTERNAL MEDICINE

## 2019-02-28 PROCEDURE — 1090F PRES/ABSN URINE INCON ASSESS: CPT | Performed by: INTERNAL MEDICINE

## 2019-02-28 PROCEDURE — 1101F PT FALLS ASSESS-DOCD LE1/YR: CPT | Performed by: INTERNAL MEDICINE

## 2019-02-28 PROCEDURE — 3014F SCREEN MAMMO DOC REV: CPT | Performed by: INTERNAL MEDICINE

## 2019-02-28 PROCEDURE — 80047 BASIC METABLC PNL IONIZED CA: CPT

## 2019-02-28 PROCEDURE — 85027 COMPLETE CBC AUTOMATED: CPT

## 2019-02-28 PROCEDURE — 1123F ACP DISCUSS/DSCN MKR DOCD: CPT | Performed by: INTERNAL MEDICINE

## 2019-02-28 PROCEDURE — 99211 OFF/OP EST MAY X REQ PHY/QHP: CPT

## 2019-02-28 PROCEDURE — G8427 DOCREV CUR MEDS BY ELIG CLIN: HCPCS | Performed by: INTERNAL MEDICINE

## 2019-02-28 PROCEDURE — G8484 FLU IMMUNIZE NO ADMIN: HCPCS | Performed by: INTERNAL MEDICINE

## 2019-02-28 PROCEDURE — G8400 PT W/DXA NO RESULTS DOC: HCPCS | Performed by: INTERNAL MEDICINE

## 2019-02-28 PROCEDURE — 80076 HEPATIC FUNCTION PANEL: CPT

## 2019-02-28 PROCEDURE — G8417 CALC BMI ABV UP PARAM F/U: HCPCS | Performed by: INTERNAL MEDICINE

## 2019-02-28 PROCEDURE — G8598 ASA/ANTIPLAT THER USED: HCPCS | Performed by: INTERNAL MEDICINE

## 2019-02-28 PROCEDURE — 4040F PNEUMOC VAC/ADMIN/RCVD: CPT | Performed by: INTERNAL MEDICINE

## 2019-02-28 PROCEDURE — 1036F TOBACCO NON-USER: CPT | Performed by: INTERNAL MEDICINE

## 2019-02-28 PROCEDURE — 36415 COLL VENOUS BLD VENIPUNCTURE: CPT

## 2019-03-01 ENCOUNTER — HOSPITAL ENCOUNTER (OUTPATIENT)
Dept: INFUSION THERAPY | Age: 64
Discharge: HOME | End: 2019-03-01
Payer: MEDICARE

## 2019-03-01 VITALS
WEIGHT: 181 LBS | TEMPERATURE: 97.6 F | RESPIRATION RATE: 18 BRPM | SYSTOLIC BLOOD PRESSURE: 149 MMHG | HEIGHT: 65 IN | HEART RATE: 67 BPM | BODY MASS INDEX: 30.16 KG/M2 | OXYGEN SATURATION: 97 % | DIASTOLIC BLOOD PRESSURE: 81 MMHG

## 2019-03-01 DIAGNOSIS — D75.1 POLYCYTHEMIA: ICD-10-CM

## 2019-03-01 LAB — HEMOGLOBIN: 14.3 GM/DL (ref 12–16)

## 2019-03-01 PROCEDURE — 36415 COLL VENOUS BLD VENIPUNCTURE: CPT

## 2019-03-01 PROCEDURE — 99195 PHLEBOTOMY: CPT

## 2019-03-01 PROCEDURE — 85018 HEMOGLOBIN: CPT

## 2019-03-01 PROCEDURE — G0463 HOSPITAL OUTPT CLINIC VISIT: HCPCS

## 2019-03-01 PROCEDURE — 2709999900 HC NON-CHARGEABLE SUPPLY

## 2019-03-01 RX ORDER — 0.9 % SODIUM CHLORIDE 0.9 %
250 INTRAVENOUS SOLUTION INTRAVENOUS ONCE
Status: CANCELLED | OUTPATIENT
Start: 2019-03-01 | End: 2019-03-01

## 2019-03-01 RX ORDER — CLINDAMYCIN HYDROCHLORIDE 300 MG/1
300 CAPSULE ORAL 3 TIMES DAILY
Qty: 30 CAPSULE | Refills: 0 | Status: SHIPPED | OUTPATIENT
Start: 2019-03-01 | End: 2019-03-11

## 2019-03-01 RX ORDER — 0.9 % SODIUM CHLORIDE 0.9 %
500 INTRAVENOUS SOLUTION INTRAVENOUS ONCE
Status: CANCELLED | OUTPATIENT
Start: 2019-03-01 | End: 2019-03-01

## 2019-03-01 NOTE — PROGRESS NOTES
THERAPEUTIC PHLEBOTOMY    Most recent Hemoglobin result: 14.3Gm/dl. Date obtained:   03 /01 /2019    Pre-phlebotomy vital signs:see Doc flow sheet    Start [id number]    Empty donor bag placed on TARE scale. Tourniquet placed on patient's arm and arm palpated for venous access. Area of venous stick cleansed with alcohol. Hemostat applied to tubing of donor bag. Sheath removed from the needle and needle inserted into the antecubital Vein in the  right Arm. Hemostat released. Blood flowing well down the tubing into the bag. No adjustment of needle needed to maintain adequate blood flow. Scale monitoring amount of blood in bag. Stop [id number]  Needle removed from patient's arm. Pressure applied to patient's arm until bleeding stopped. Dry sterile dressing applied over puncture site and taped down well and secured with coban wrap. Final amount of blood removed:500ml  Post Vital Signs:see Doc flow sheet  Patient drank water and opted not to stay to be  observed for 20 minutes. Patient tolerated procedure well. Discharged ambulatory with belongings.

## 2019-03-01 NOTE — PLAN OF CARE
Problem: Musculor/Skeletal Functional Status  Intervention: Fall precautions  Verbalized understanding of fall prevention to ask for assistance with ambulation. Call light within reach. Goal: Absence of falls  Outcome: Met This Shift  No falls occurred with visit today. Problem: Discharge Planning  Intervention: Interaction with patient/family and care team  Reviewed home going instructions with Patient. Goal: Knowledge of discharge instructions  Knowledge of discharge instructions     Outcome: Met This Shift  Patient understands home discharge instructions sheet. Problem: Intellectual/Education/Knowledge Deficit  Intervention: Verbal/written education provided  Patient instructed on therapeutic phlebotomy procedure and potential complications. Consent signed. Aware to call MD if complications occur. Goal: Teaching initiated upon admission  Patient instructed on therapeutic phlebotomy procedure and potential complications. Consent signed. Aware to call MD if complications occur. Outcome: Met This Shift  Patient verbalized understanding to therapeutic phlebotomy procedure and possible complications. Comments: Care plan reviewed with patient . Patient  verbalize understanding of the plan of care and contribute to goal setting.

## 2019-03-08 ENCOUNTER — HOSPITAL ENCOUNTER (OUTPATIENT)
Dept: INFUSION THERAPY | Age: 64
Discharge: HOME | End: 2019-03-08
Payer: MEDICARE

## 2019-03-08 VITALS
WEIGHT: 182.4 LBS | BODY MASS INDEX: 30.39 KG/M2 | OXYGEN SATURATION: 98 % | TEMPERATURE: 97.6 F | SYSTOLIC BLOOD PRESSURE: 145 MMHG | HEIGHT: 65 IN | RESPIRATION RATE: 18 BRPM | DIASTOLIC BLOOD PRESSURE: 70 MMHG | HEART RATE: 65 BPM

## 2019-03-08 DIAGNOSIS — D75.1 POLYCYTHEMIA: Primary | ICD-10-CM

## 2019-03-08 LAB — HEMOGLOBIN: 13.3 GM/DL (ref 12–16)

## 2019-03-08 PROCEDURE — G0463 HOSPITAL OUTPT CLINIC VISIT: HCPCS

## 2019-03-08 PROCEDURE — 99195 PHLEBOTOMY: CPT

## 2019-03-08 PROCEDURE — 2709999900 HC NON-CHARGEABLE SUPPLY

## 2019-03-08 RX ORDER — 0.9 % SODIUM CHLORIDE 0.9 %
250 INTRAVENOUS SOLUTION INTRAVENOUS ONCE
Status: CANCELLED | OUTPATIENT
Start: 2019-03-08 | End: 2019-03-08

## 2019-03-08 RX ORDER — 0.9 % SODIUM CHLORIDE 0.9 %
500 INTRAVENOUS SOLUTION INTRAVENOUS ONCE
Status: CANCELLED | OUTPATIENT
Start: 2019-03-08 | End: 2019-03-08

## 2019-03-08 NOTE — PLAN OF CARE
Problem: Intellectual/Education/Knowledge Deficit  Goal: Teaching initiated upon admission  Description  Patient instructed on therapeutic phlebotomy procedure and potential complications. Consent signed. Aware to call MD if complications occur. Outcome: Met This Shift  Note:   Patient verbalized understanding to therapeutic phlebotomy procedure and possible complications. Intervention: Verbal/written education provided  Note:   Patient instructed on therapeutic phlebotomy procedure and potential complications. Consent signed. Aware to call MD if complications occur. Problem: Musculor/Skeletal Functional Status  Goal: Absence of falls  Outcome: Met This Shift  Note:   No falls occurred with visit today. Intervention: Fall precautions  Note:   Verbalized understanding of fall prevention to ask for assistance with ambulation. Call light within reach. Problem: Discharge Planning  Goal: Knowledge of discharge instructions  Description  Knowledge of discharge instructions     Outcome: Met This Shift  Note:   Patient understands home discharge instructions sheet. Intervention: Interaction with patient/family and care team  Note:   Reviewed home going instructions with Patient. Care plan reviewed with patient . Patient  verbalize understanding of the plan of care and contribute to goal setting.

## 2019-03-08 NOTE — PROGRESS NOTES
THERAPEUTIC PHLEBOTOMY    Most recent Hemoglobin result: 13.3Gm/dl. Date obtained:   03 /08 /2019    Pre-phlebotomy vital signs:see Doc flow sheet    Start [id number]    Empty donor bag placed on TARE scale. Tourniquet placed on patient's arm and arm palpated for venous access. Area of venous stick cleansed with alcohol. Hemostat applied to tubing of donor bag. Sheath removed from the needle and needle inserted into the antecubital Vein in the  left Arm. Hemostat released. Blood flowing well down the tubing into the bag. No adjustment of needle needed to maintain adequate blood flow. Scale monitoring amount of blood in bag. Stop [id number]:0586  Needle removed from patient's arm. Pressure applied to patient's arm until bleeding stopped. Dry sterile dressing applied over puncture site and taped down well and secured with coban wrap. Final amount of blood removed:500ml  Post Vital Signs:see Doc flow sheet  Patient drank water and opted to not stay  observed for 20 minutes. Patient tolerated procedure well. Discharged ambulatory with belongings.

## 2019-03-15 ENCOUNTER — HOSPITAL ENCOUNTER (OUTPATIENT)
Dept: INFUSION THERAPY | Age: 64
Discharge: HOME | End: 2019-03-15
Payer: MEDICARE

## 2019-03-15 VITALS
DIASTOLIC BLOOD PRESSURE: 64 MMHG | HEART RATE: 69 BPM | TEMPERATURE: 97.5 F | SYSTOLIC BLOOD PRESSURE: 148 MMHG | RESPIRATION RATE: 18 BRPM | OXYGEN SATURATION: 98 %

## 2019-03-15 LAB
HCT VFR BLD CALC: 41.6 % (ref 37–47)
HEMOGLOBIN: 12.5 GM/DL (ref 12–16)

## 2019-03-15 PROCEDURE — 99211 OFF/OP EST MAY X REQ PHY/QHP: CPT

## 2019-03-15 NOTE — PLAN OF CARE
Problem: Intellectual/Education/Knowledge Deficit  Goal: Teaching initiated upon admission  Description  Patient instructed on therapeutic phlebotomy procedure and potential complications. Consent signed. Aware to call MD if complications occur. Outcome: Met This Shift  Note:   Understands no procedure required due to good blood counts. Intervention: Verbal/written education provided  Note:   Review lab results and informed no procedure required today     Problem: Discharge Planning  Goal: Knowledge of discharge instructions  Description  Knowledge of discharge instructions     Outcome: Met This Shift  Note:   Verbalized understanding of discharge instructions, follow-up appointments, and when to call the physician. Intervention: Interaction with patient/family and care team  Note:   Discuss understanding of discharge instructions,follow-up appointments, and when to call the physician. Care plan reviewed with patient . Patient  verbalize understanding of the plan of care and contribute to goal setting.

## 2019-03-15 NOTE — PROGRESS NOTES
Patient informed procedure on hold due to good blood counts . Discharge instructions given to patient-verbalizes understanding. Ambulated off unit per self with belongings.

## 2019-04-04 ENCOUNTER — HOSPITAL ENCOUNTER (OUTPATIENT)
Dept: INFUSION THERAPY | Age: 64
Discharge: HOME | End: 2019-04-04
Payer: MEDICARE

## 2019-04-04 ENCOUNTER — OFFICE VISIT (OUTPATIENT)
Dept: ONCOLOGY | Age: 64
End: 2019-04-04
Payer: MEDICARE

## 2019-04-04 VITALS
WEIGHT: 182.2 LBS | HEART RATE: 75 BPM | DIASTOLIC BLOOD PRESSURE: 68 MMHG | OXYGEN SATURATION: 99 % | BODY MASS INDEX: 30.35 KG/M2 | TEMPERATURE: 98.6 F | SYSTOLIC BLOOD PRESSURE: 126 MMHG | RESPIRATION RATE: 18 BRPM | HEIGHT: 65 IN

## 2019-04-04 DIAGNOSIS — Z79.01 CHRONIC ANTICOAGULATION: ICD-10-CM

## 2019-04-04 DIAGNOSIS — D75.1 POLYCYTHEMIA: Primary | ICD-10-CM

## 2019-04-04 DIAGNOSIS — D75.1 POLYCYTHEMIA: ICD-10-CM

## 2019-04-04 LAB
ALBUMIN SERPL-MCNC: 3.8 G/DL (ref 3.5–5.1)
ALP BLD-CCNC: 151 U/L (ref 38–126)
ALT SERPL-CCNC: 23 U/L (ref 11–66)
AST SERPL-CCNC: 27 U/L (ref 5–40)
BILIRUB SERPL-MCNC: 0.4 MG/DL (ref 0.3–1.2)
BILIRUBIN DIRECT: < 0.2 MG/DL (ref 0–0.3)
BUN, WHOLE BLOOD: 24 MG/DL (ref 8–26)
CHLORIDE, WHOLE BLOOD: 107 MEQ/L (ref 98–109)
CREATININE, WHOLE BLOOD: 1 MG/DL (ref 0.5–1.2)
ERYTHROCYTE [DISTWIDTH] IN BLOOD BY AUTOMATED COUNT: 23.8 % (ref 11.5–14.5)
ERYTHROCYTE [DISTWIDTH] IN BLOOD BY AUTOMATED COUNT: 47.8 FL (ref 35–45)
GFR, ESTIMATED: 59 ML/MIN/1.73M2
GLUCOSE, WHOLE BLOOD: 110 MG/DL (ref 70–108)
HCT VFR BLD CALC: 47.5 % (ref 37–47)
HEMOGLOBIN: 12.6 GM/DL (ref 12–16)
IONIZED CALCIUM, WHOLE BLOOD: 1.36 MMOL/L (ref 1.12–1.32)
MCH RBC QN AUTO: 17.2 PG (ref 26–33)
MCHC RBC AUTO-ENTMCNC: 26.5 GM/DL (ref 32.2–35.5)
MCV RBC AUTO: 64.8 FL (ref 81–99)
PATHOLOGIST REVIEW: ABNORMAL
PLATELET # BLD: 495 THOU/MM3 (ref 130–400)
POTASSIUM, WHOLE BLOOD: 4.2 MEQ/L (ref 3.5–4.9)
RBC # BLD: 7.33 MILL/MM3 (ref 4.2–5.4)
SCAN OF BLOOD SMEAR: NORMAL
SEGMENTED NEUTROPHILS ABSOLUTE COUNT: 21.4 THOU/MM3 (ref 1.8–7.7)
SODIUM, WHOLE BLOOD: 141 MEQ/L (ref 138–146)
TOTAL CO2, WHOLE BLOOD: 25 MEQ/L (ref 23–33)
TOTAL PROTEIN: 6.6 G/DL (ref 6.1–8)
WBC # BLD: 27.7 THOU/MM3 (ref 4.8–10.8)

## 2019-04-04 PROCEDURE — G8427 DOCREV CUR MEDS BY ELIG CLIN: HCPCS | Performed by: INTERNAL MEDICINE

## 2019-04-04 PROCEDURE — 3014F SCREEN MAMMO DOC REV: CPT | Performed by: INTERNAL MEDICINE

## 2019-04-04 PROCEDURE — 80047 BASIC METABLC PNL IONIZED CA: CPT

## 2019-04-04 PROCEDURE — 1090F PRES/ABSN URINE INCON ASSESS: CPT | Performed by: INTERNAL MEDICINE

## 2019-04-04 PROCEDURE — 99215 OFFICE O/P EST HI 40 MIN: CPT | Performed by: INTERNAL MEDICINE

## 2019-04-04 PROCEDURE — 3017F COLORECTAL CA SCREEN DOC REV: CPT | Performed by: INTERNAL MEDICINE

## 2019-04-04 PROCEDURE — G8598 ASA/ANTIPLAT THER USED: HCPCS | Performed by: INTERNAL MEDICINE

## 2019-04-04 PROCEDURE — G8400 PT W/DXA NO RESULTS DOC: HCPCS | Performed by: INTERNAL MEDICINE

## 2019-04-04 PROCEDURE — 85027 COMPLETE CBC AUTOMATED: CPT

## 2019-04-04 PROCEDURE — 99211 OFF/OP EST MAY X REQ PHY/QHP: CPT

## 2019-04-04 PROCEDURE — 4040F PNEUMOC VAC/ADMIN/RCVD: CPT | Performed by: INTERNAL MEDICINE

## 2019-04-04 PROCEDURE — 80076 HEPATIC FUNCTION PANEL: CPT

## 2019-04-04 PROCEDURE — 1123F ACP DISCUSS/DSCN MKR DOCD: CPT | Performed by: INTERNAL MEDICINE

## 2019-04-04 PROCEDURE — 36415 COLL VENOUS BLD VENIPUNCTURE: CPT

## 2019-04-04 PROCEDURE — 1036F TOBACCO NON-USER: CPT | Performed by: INTERNAL MEDICINE

## 2019-04-04 PROCEDURE — G8417 CALC BMI ABV UP PARAM F/U: HCPCS | Performed by: INTERNAL MEDICINE

## 2019-04-10 ENCOUNTER — HOSPITAL ENCOUNTER (OUTPATIENT)
Dept: INFUSION THERAPY | Age: 64
Discharge: HOME | End: 2019-04-10
Payer: MEDICARE

## 2019-04-10 VITALS
BODY MASS INDEX: 30.12 KG/M2 | HEIGHT: 65 IN | DIASTOLIC BLOOD PRESSURE: 72 MMHG | WEIGHT: 180.8 LBS | OXYGEN SATURATION: 98 % | RESPIRATION RATE: 16 BRPM | TEMPERATURE: 98.2 F | SYSTOLIC BLOOD PRESSURE: 137 MMHG | HEART RATE: 68 BPM

## 2019-04-10 LAB
HCT VFR BLD CALC: 43.2 % (ref 37–47)
HEMOGLOBIN: 12.8 GM/DL (ref 12–16)

## 2019-04-10 PROCEDURE — 99211 OFF/OP EST MAY X REQ PHY/QHP: CPT

## 2019-04-10 PROCEDURE — 2709999900 HC NON-CHARGEABLE SUPPLY

## 2019-04-10 NOTE — PLAN OF CARE
Problem: Intellectual/Education/Knowledge Deficit  Goal: Teaching initiated upon admission  Description  Patient instructed on therapeutic phlebotomy procedure and potential complications. Consent signed. Aware to call MD if complications occur. Outcome: Met This Shift  Note:   Understands no procedure is needed today since blood counts are good. Intervention: Verbal/written education provided  Note:   Review lab results- no procedure required. Problem: Discharge Planning  Goal: Knowledge of discharge instructions  Description  Knowledge of discharge instructions     Outcome: Met This Shift  Note:   Verbalized understanding of discharge instructions, follow-up appointments, and when to call the physician. Intervention: Interaction with patient/family and care team  Note:   Discuss understanding of discharge instructions,follow-up appointments, and when to call the physician. Care plan reviewed with patient . Patient  verbalize understanding of the plan of care and contribute to goal setting.

## 2019-04-10 NOTE — PROGRESS NOTES
Patient understands procedure on hold today since blood counts are good. Meryle Sandman Discharge instructions given to patient-verbalizes understanding. Ambulated off unit per self with belongings.

## 2019-04-27 NOTE — PROGRESS NOTES
[facility] PROFESSIONAL SERVICES  [facility] SPECIAL[facility] OF [facility]  [address]  [address]  Dept: [phone]  Dept Fax: [phone]  Loc: [phone]    Subjective:     Chief Complaint:  Mohan James is a 56year old female with hematological disease. HPI:  The patient is here today for follow-up regarding her history of a myeloproliferative disorder. The patient has polycythemia and also has a history of pulmonary emboli. She currently is on anticoagulation therapy which she tolerates well. The patient also receives intermittent phlebotomy to maintain adequate hemoglobin and hematocrit levels. Her white blood cell count and platelet count remain elevated but are stable. She has currently been on no Hydrea therapy but has been on Hydrea therapy in the past.  Today's evaluation she states she feels well and has no specific complaints. We'll continue to monitor her hemoglobin hematocrit monthly and performing phlebotomy to maintain hematocrit less than 45%. The patient denies shortness of breath, chest pain, a change in bowel habits or a change in bladder habits. ECOG performance status is level 0. PMH, SH, and FH:  I reviewed the PMH, SH and FH as noted on the electronic medical record. There have been no changes as noted in the previous documentation. Review of Systems   Constitutional: Negative. HENT: Negative. Eyes: Negative. Respiratory: Negative. Cardiovascular: Negative. Gastrointestinal: Negative. Genitourinary: Negative. Musculoskeletal: Negative. Skin: Negative. Neurological: Negative. Hematological: Negative. Psychiatric/Behavioral: Negative. Objective:   Physical Exam   Vitals:    04/04/19 0827   BP: 126/68   Pulse: 75   Resp: 18   Temp: 98.6 °F (37 °C)   SpO2: 99%   Vitals reviewed and are stable. Constitutional: Well-developed and well-nourished. No acute distress. HENT: Normocephalic and atraumatic.    Eyes: Pupils are equal and reactive. No scleral icterus. Neck: Overall appearance is symmetrical. No identifiable masses. Chest: Inspection and palpation of chest is normal.  Pulmonary: Effort normal. No respiratory distress. Cardiovascular: RRR. No edema in any of the four extremities. Abdominal: Soft. No hepatomegaly or splenomegaly. Musculoskeletal: Gait is normal. Muscle strength and tone good. Neurological: Alert and oriented to person, place, and time. Judgment and thought content normal.  Skin: Skin is warm and dry. No rash. Psychiatric: Mood and affect appropriate for the clinical situation. Behavior is normal.      Data Analysis:    Hematology 4/4/2019 3/15/2019 3/8/2019   WBC 27.7 (H)     RBC 7.33 (H)     HGB 12.6 12.5 13.3   HCT 47.5 (H) 41.6    MCV 64.8 (L)     RDW       (H)       Assessment:   1. Myeloproliferative disorder. 2.  Leukocytosis. 3.  Thrombosis. 4.  Chronic anticoagulation. 5.  Thrombocytosis. Plan:   1.  Schedule phlebotomy monthly as needed to maintain Hematocrit below 45%  2. Monitor hemoglobin/hematocrit and for any signs of blood loss. 3.  Monitor total WBC count and for signs of infection/fever. 4.  Continue anticoagulation with Xarelto. 5.  Monitor platelet count and for any signs of abnormal bleeding/bruising. Darol Osgood M.D.                                                                          Medical Director: [address] [place] [address] Oncology  Oncology Specialist [place] [id number]  [facility]  [address] [facility], [address] [place] [address], [address] [place] [address], [address] [place] [address], [address] [place] [address] of [facility] at [facility] 1.83

## 2019-05-08 ENCOUNTER — HOSPITAL ENCOUNTER (OUTPATIENT)
Dept: INFUSION THERAPY | Age: 64
Discharge: HOME | End: 2019-05-08
Payer: MEDICARE

## 2019-05-08 VITALS
TEMPERATURE: 97.9 F | WEIGHT: 182.4 LBS | DIASTOLIC BLOOD PRESSURE: 66 MMHG | SYSTOLIC BLOOD PRESSURE: 137 MMHG | HEART RATE: 68 BPM | BODY MASS INDEX: 30.39 KG/M2 | OXYGEN SATURATION: 97 % | RESPIRATION RATE: 16 BRPM | HEIGHT: 65 IN

## 2019-05-08 LAB
HCT VFR BLD CALC: 44.3 % (ref 37–47)
HEMOGLOBIN: 13 GM/DL (ref 12–16)

## 2019-05-08 PROCEDURE — 99211 OFF/OP EST MAY X REQ PHY/QHP: CPT

## 2019-05-08 NOTE — PLAN OF CARE
Problem: Intellectual/Education/Knowledge Deficit  Goal: Teaching initiated upon admission  Description  Patient instructed on therapeutic phlebotomy procedure and potential complications. Consent signed. Aware to call MD if complications occur. Outcome: Met This Shift  Note:   Patient understands her blood counts are not requiring phlebotomy procedure today  Intervention: Verbal/written education provided  Note:   Review lab results     Problem: Discharge Planning  Goal: Knowledge of discharge instructions  Description  Knowledge of discharge instructions     Outcome: Met This Shift  Note:   Verbalized understanding of discharge instructions, follow-up appointments, and when to call the physician. Intervention: Interaction with patient/family and care team  Note:   Discuss understanding of discharge instructions,follow-up appointments, and when to call the physician. Care plan reviewed with patient . Patient  verbalize understanding of the plan of care and contribute to goal setting.

## 2019-05-28 RX ORDER — CLONIDINE HYDROCHLORIDE 0.2 MG/1
TABLET ORAL
Qty: 180 TABLET | Refills: 3 | OUTPATIENT
Start: 2019-05-28

## 2019-06-03 ASSESSMENT — LIFESTYLE VARIABLES
HOW OFTEN DURING THE LAST YEAR HAVE YOU FOUND THAT YOU WERE NOT ABLE TO STOP DRINKING ONCE YOU HAD STARTED: 0
AUDIT-C TOTAL SCORE: 3
HOW OFTEN DURING THE LAST YEAR HAVE YOU HAD A FEELING OF GUILT OR REMORSE AFTER DRINKING: 0
HAVE YOU OR SOMEONE ELSE BEEN INJURED AS A RESULT OF YOUR DRINKING: 0
HOW OFTEN DURING THE LAST YEAR HAVE YOU NEEDED AN ALCOHOLIC DRINK FIRST THING IN THE MORNING TO GET YOURSELF GOING AFTER A NIGHT OF HEAVY DRINKING: 0
HOW OFTEN DO YOU HAVE A DRINK CONTAINING ALCOHOL: 3
HOW OFTEN DURING THE LAST YEAR HAVE YOU BEEN UNABLE TO REMEMBER WHAT HAPPENED THE NIGHT BEFORE BECAUSE YOU HAD BEEN DRINKING: 0
HOW MANY STANDARD DRINKS CONTAINING ALCOHOL DO YOU HAVE ON A TYPICAL DAY: 0
HOW OFTEN DURING THE LAST YEAR HAVE YOU FAILED TO DO WHAT WAS NORMALLY EXPECTED FROM YOU BECAUSE OF DRINKING: 0
HOW OFTEN DO YOU HAVE SIX OR MORE DRINKS ON ONE OCCASION: 0

## 2019-06-04 ENCOUNTER — OFFICE VISIT (OUTPATIENT)
Dept: FAMILY MEDICINE CLINIC | Age: 64
End: 2019-06-04
Payer: MEDICARE

## 2019-06-04 VITALS
HEART RATE: 72 BPM | RESPIRATION RATE: 12 BRPM | WEIGHT: 182.6 LBS | SYSTOLIC BLOOD PRESSURE: 136 MMHG | BODY MASS INDEX: 30.42 KG/M2 | DIASTOLIC BLOOD PRESSURE: 84 MMHG | HEIGHT: 65 IN

## 2019-06-04 DIAGNOSIS — N18.30 CHRONIC KIDNEY DISEASE, STAGE III (MODERATE) (HCC): ICD-10-CM

## 2019-06-04 DIAGNOSIS — Z00.00 ROUTINE GENERAL MEDICAL EXAMINATION AT A HEALTH CARE FACILITY: Primary | ICD-10-CM

## 2019-06-04 DIAGNOSIS — Z12.39 SCREENING FOR BREAST CANCER: ICD-10-CM

## 2019-06-04 DIAGNOSIS — R79.83 HOMOCYSTEINEMIA: ICD-10-CM

## 2019-06-04 DIAGNOSIS — D45 POLYCYTHEMIA VERA (HCC): ICD-10-CM

## 2019-06-04 DIAGNOSIS — I10 ESSENTIAL HYPERTENSION: ICD-10-CM

## 2019-06-04 LAB
ALBUMIN SERPL-MCNC: 4.3 G/DL (ref 3.5–5.1)
ALP BLD-CCNC: 158 U/L (ref 38–126)
ALT SERPL-CCNC: 26 U/L (ref 11–66)
ANION GAP SERPL CALCULATED.3IONS-SCNC: 14 MEQ/L (ref 8–16)
ANISOCYTOSIS: PRESENT
AST SERPL-CCNC: 33 U/L (ref 5–40)
BASOPHILS # BLD: 2 %
BASOPHILS ABSOLUTE: 0.6 THOU/MM3 (ref 0–0.1)
BILIRUB SERPL-MCNC: 0.5 MG/DL (ref 0.3–1.2)
BUN BLDV-MCNC: 21 MG/DL (ref 7–22)
CALCIUM SERPL-MCNC: 10.8 MG/DL (ref 8.5–10.5)
CHLORIDE BLD-SCNC: 106 MEQ/L (ref 98–111)
CHOLESTEROL, TOTAL: 183 MG/DL (ref 100–199)
CO2: 24 MEQ/L (ref 23–33)
CREAT SERPL-MCNC: 1 MG/DL (ref 0.4–1.2)
EOSINOPHIL # BLD: 7.2 %
EOSINOPHILS ABSOLUTE: 2.1 THOU/MM3 (ref 0–0.4)
ERYTHROCYTE [DISTWIDTH] IN BLOOD BY AUTOMATED COUNT: 25.5 % (ref 11.5–14.5)
ERYTHROCYTE [DISTWIDTH] IN BLOOD BY AUTOMATED COUNT: 48.9 FL (ref 35–45)
GFR SERPL CREATININE-BSD FRML MDRD: 56 ML/MIN/1.73M2
GLUCOSE BLD-MCNC: 100 MG/DL (ref 70–108)
HCT VFR BLD CALC: 50.6 % (ref 37–47)
HDLC SERPL-MCNC: 50 MG/DL
HEMOGLOBIN: 13.7 GM/DL (ref 12–16)
HYPOCHROMIA: PRESENT
IMMATURE GRANS (ABS): 0.59 THOU/MM3 (ref 0–0.07)
IMMATURE GRANULOCYTES: 2 %
LDL CHOLESTEROL CALCULATED: 108 MG/DL
LYMPHOCYTES # BLD: 8.9 %
LYMPHOCYTES ABSOLUTE: 2.6 THOU/MM3 (ref 1–4.8)
MCH RBC QN AUTO: 17.1 PG (ref 26–33)
MCHC RBC AUTO-ENTMCNC: 27.1 GM/DL (ref 32.2–35.5)
MCV RBC AUTO: 63.3 FL (ref 81–99)
MICROCYTES: PRESENT
MONOCYTES # BLD: 4 %
MONOCYTES ABSOLUTE: 1.2 THOU/MM3 (ref 0.4–1.3)
NUCLEATED RED BLOOD CELLS: 0 /100 WBC
PATHOLOGIST REVIEW: ABNORMAL
PLATELET # BLD: 667 THOU/MM3 (ref 130–400)
PLATELET ESTIMATE: ABNORMAL
POIKILOCYTES: ABNORMAL
POTASSIUM SERPL-SCNC: 4.7 MEQ/L (ref 3.5–5.2)
RBC # BLD: 8 MILL/MM3 (ref 4.2–5.4)
SCAN OF BLOOD SMEAR: NORMAL
SEG NEUTROPHILS: 75.9 %
SEGMENTED NEUTROPHILS ABSOLUTE COUNT: 22.2 THOU/MM3 (ref 1.8–7.7)
SODIUM BLD-SCNC: 144 MEQ/L (ref 135–145)
SPHEROCYTES: ABNORMAL
TARGET CELLS: ABNORMAL
TOTAL PROTEIN: 7.4 G/DL (ref 6.1–8)
TRIGL SERPL-MCNC: 125 MG/DL (ref 0–199)
WBC # BLD: 29.2 THOU/MM3 (ref 4.8–10.8)

## 2019-06-04 PROCEDURE — 3017F COLORECTAL CA SCREEN DOC REV: CPT | Performed by: FAMILY MEDICINE

## 2019-06-04 PROCEDURE — 1036F TOBACCO NON-USER: CPT | Performed by: FAMILY MEDICINE

## 2019-06-04 PROCEDURE — 1123F ACP DISCUSS/DSCN MKR DOCD: CPT | Performed by: FAMILY MEDICINE

## 2019-06-04 PROCEDURE — G8598 ASA/ANTIPLAT THER USED: HCPCS | Performed by: FAMILY MEDICINE

## 2019-06-04 PROCEDURE — 4040F PNEUMOC VAC/ADMIN/RCVD: CPT | Performed by: FAMILY MEDICINE

## 2019-06-04 PROCEDURE — G8427 DOCREV CUR MEDS BY ELIG CLIN: HCPCS | Performed by: FAMILY MEDICINE

## 2019-06-04 PROCEDURE — 99214 OFFICE O/P EST MOD 30 MIN: CPT | Performed by: FAMILY MEDICINE

## 2019-06-04 PROCEDURE — G8417 CALC BMI ABV UP PARAM F/U: HCPCS | Performed by: FAMILY MEDICINE

## 2019-06-04 PROCEDURE — 36415 COLL VENOUS BLD VENIPUNCTURE: CPT | Performed by: FAMILY MEDICINE

## 2019-06-04 PROCEDURE — G8400 PT W/DXA NO RESULTS DOC: HCPCS | Performed by: FAMILY MEDICINE

## 2019-06-04 PROCEDURE — 3014F SCREEN MAMMO DOC REV: CPT | Performed by: FAMILY MEDICINE

## 2019-06-04 PROCEDURE — 1090F PRES/ABSN URINE INCON ASSESS: CPT | Performed by: FAMILY MEDICINE

## 2019-06-04 PROCEDURE — G0438 PPPS, INITIAL VISIT: HCPCS | Performed by: FAMILY MEDICINE

## 2019-06-04 RX ORDER — CLONIDINE HYDROCHLORIDE 0.2 MG/1
0.2 TABLET ORAL 2 TIMES DAILY
Qty: 180 TABLET | Refills: 3 | Status: SHIPPED | OUTPATIENT
Start: 2019-06-04 | End: 2020-06-05 | Stop reason: SDUPTHER

## 2019-06-04 RX ORDER — ENALAPRIL MALEATE 20 MG/1
TABLET ORAL
Qty: 180 TABLET | Refills: 3 | Status: SHIPPED | OUTPATIENT
Start: 2019-06-04 | End: 2020-06-05 | Stop reason: SDUPTHER

## 2019-06-04 RX ORDER — TRIAMCINOLONE ACETONIDE 1 MG/G
CREAM TOPICAL
Refills: 1 | COMMUNITY
Start: 2019-04-22

## 2019-06-04 ASSESSMENT — ENCOUNTER SYMPTOMS
SORE THROAT: 0
NAUSEA: 0
DIARRHEA: 0
ABDOMINAL PAIN: 0
SHORTNESS OF BREATH: 0
BACK PAIN: 0
CHEST TIGHTNESS: 0
WHEEZING: 0
BLOOD IN STOOL: 0
RHINORRHEA: 0
EYE PAIN: 0
VOMITING: 0
COUGH: 0
CONSTIPATION: 0

## 2019-06-04 ASSESSMENT — PATIENT HEALTH QUESTIONNAIRE - PHQ9
SUM OF ALL RESPONSES TO PHQ QUESTIONS 1-9: 0
SUM OF ALL RESPONSES TO PHQ QUESTIONS 1-9: 0

## 2019-06-04 NOTE — PATIENT INSTRUCTIONS
stay healthy. Your doctor has checked your overall health and may have suggested ways to take good care of yourself. He or she also may have recommended tests. At home, you can help prevent illness with healthy eating, regular exercise, and other steps. Follow-up care is a key part of your treatment and safety. Be sure to make and go to all appointments, and call your doctor if you are having problems. It's also a good idea to know your test results and keep a list of the medicines you take. How can you care for yourself at home? Reach and stay at a healthy weight. This will lower your risk for many problems, such as obesity, diabetes, heart disease, and high blood pressure. Get at least 30 minutes of exercise on most days of the week. Walking is a good choice. You also may want to do other activities, such as running, swimming, cycling, or playing tennis or team sports. Do not smoke. Smoking can make health problems worse. If you need help quitting, talk to your doctor about stop-smoking programs and medicines. These can increase your chances of quitting for good. Protect your skin from too much sun. When you're outdoors from 10 a.m. to 4 p.m., stay in the shade or cover up with clothing and a hat with a wide brim. Wear sunglasses that block UV rays. Even when it's cloudy, put broad-spectrum sunscreen (SPF 30 or higher) on any exposed skin. See a dentist one or two times a year for checkups and to have your teeth cleaned. Wear a seat belt in the car. Limit alcohol to 2 drinks a day for men and 1 drink a day for women. Too much alcohol can cause health problems. Follow your doctor's advice about when to have certain tests. These tests can spot problems early. For men and women  Cholesterol. Your doctor will tell you how often to have this done based on your overall health and other things that can increase your risk for heart attack and stroke. Blood pressure.  Have your blood pressure checked during a testing. Abdominal aortic aneurysm. Ask your doctor whether you should have a test to check for an aneurysm. You may need a test if you ever smoked or if your parent, brother, sister, or child has had an aneurysm. When should you call for help? Watch closely for changes in your health, and be sure to contact your doctor if you have any problems or symptoms that concern you. Where can you learn more? Go to https://PlanetTranpePulsity.Studio. org and sign in to your Artimi account. Enter T962 in the Beintoo box to learn more about \"Well Visit, Over 65: Care Instructions. \"     If you do not have an account, please click on the \"Sign Up Now\" link. Current as of: December 13, 2018  Content Version: 12.0  © 5715-5224 Healthwise, [employer]. Care instructions adapted under license by [facility] ([facility]). If you have questions about a medical condition or this instruction, always ask your healthcare professional. [place] [address] any warranty or liability for your use of this information. Patient Education        Polycythemia: Care Instructions  Your Care Instructions    Polycythemia (say \"paw-sahil-sy-THEE-alexa-uh) is an abnormal increase in red blood cells. It happens when the tissue inside your bones (bone marrow) makes too much blood. It also can occur if your blood does not have enough liquid, or plasma. This can make the number of red blood cells seem higher than normal. The extra red blood cells make your blood thicker than normal. This may raise your risk for blood clots that can cause heart attacks or strokes. Clots can form in the deep veins of the body, a condition called deep vein thrombosis. Or, a clot can travel through the blood to a lung (a pulmonary embolism). Your doctor may treat you by taking out some of your blood (phlebotomy). The process is like donating blood. Your doctor may even recommend that you donate blood.  You may take pills to stop your body from making red blood cells. You also will get treatment for any other conditions that may cause your body to make too many red blood cells. Follow-up care is a key part of your treatment and safety. Be sure to make and go to all appointments, and call your doctor if you are having problems. It's also a good idea to know your test results and keep a list of the medicines you take. How can you care for yourself at home? Be safe with medicines. Take your medicines exactly as prescribed. Call your doctor if you think you are having a problem with your medicine. Drink plenty of fluids, enough so that your urine is light yellow or clear like water, before and after you have blood removed. If you have kidney, heart, or liver disease and have to limit fluids, talk with your doctor before you increase the amount of fluids you drink. Take it easy after you have had blood removed. Do not do vigorous exercise. If your doctor recommends aspirin, take it exactly as prescribed. Call your doctor if you think you are having a problem with your medicine. Do not smoke. Smoking increases the risk of blood clots and may reduce the amount of oxygen in your blood. If you need help quitting, talk to your doctor about stop-smoking programs and medicines. These can increase your chances of quitting for good. Take an antihistamine, such as a nondrowsy one like loratadine (Claritin) or one that might make you sleepy like diphenhydramine (Benadryl), if your skin is itchy. Some people who have this condition have itching. Wear medical alert jewelry that lists your clotting problem. You can buy this at most drugstores. When should you call for help? Call 911 anytime you think you may need emergency care. For example, call if:    You have sudden chest pain and shortness of breath, or you cough up blood.     You have symptoms of a stroke.  These may include:  Sudden numbness, tingling, weakness, or loss of movement in your face, arm, or leg, especially on only one side of your body. Sudden vision changes. Sudden trouble speaking. Sudden confusion or trouble understanding simple statements. Sudden problems with walking or balance. A sudden, severe headache that is different from past headaches.     You have symptoms of a heart attack. These may include:  Chest pain or pressure, or a strange feeling in the chest.  Sweating. Shortness of breath. Nausea or vomiting. Pain, pressure, or a strange feeling in the back, neck, jaw, or upper belly or in one or both shoulders or arms. Lightheadedness or sudden weakness. A fast or irregular heartbeat. After you call 911, the  may tell you to chew 1 adult-strength or 2 to 4 low-dose aspirin. Wait for an ambulance. Do not try to drive yourself.    Call your doctor now or seek immediate medical care if:    You have signs of a blood clot, such as:  Pain in your calf, back of knee, thigh, or groin. Redness and swelling in your leg or groin.    Watch closely for changes in your health, and be sure to contact your doctor if you have any problems. Where can you learn more? Go to https://Picotek INCpeWhirlpool.ShopClues.com. org and sign in to your Sabre account. Enter U553 in the Keldelice box to learn more about \"Polycythemia: Care Instructions. \"     If you do not have an account, please click on the \"Sign Up Now\" link. Current as of: May 6, 2018  Content Version: 12.0  © 7781-9246 Healthwise, Incorporated. Care instructions adapted under license by [facility] [employer] [facility] ([facility]). If you have questions about a medical condition or this instruction, always ask your healthcare professional. [place] [address] any warranty or liability for your use of this information. Patient Education        High Blood Pressure: Care Instructions  Overview    It's normal for blood pressure to go up and down throughout the day. But if it stays up, you have high blood pressure.  Another name for high blood pressure is hypertension. Despite what a lot of people think, high blood pressure usually doesn't cause headaches or make you feel dizzy or lightheaded. It usually has no symptoms. But it does increase your risk of stroke, heart attack, and other problems. You and your doctor will talk about your risks of these problems based on your blood pressure. Your doctor will give you a goal for your blood pressure. Your goal will be based on your health and your age. Lifestyle changes, such as eating healthy and being active, are always important to help lower blood pressure. You might also take medicine to reach your blood pressure goal.  Follow-up care is a key part of your treatment and safety. Be sure to make and go to all appointments, and call your doctor if you are having problems. It's also a good idea to know your test results and keep a list of the medicines you take. How can you care for yourself at home? Medical treatment  If you stop taking your medicine, your blood pressure will go back up. You may take one or more types of medicine to lower your blood pressure. Be safe with medicines. Take your medicine exactly as prescribed. Call your doctor if you think you are having a problem with your medicine. Talk to your doctor before you start taking aspirin every day. Aspirin can help certain people lower their risk of a heart attack or stroke. But taking aspirin isn't right for everyone, because it can cause serious bleeding. See your doctor regularly. You may need to see the doctor more often at first or until your blood pressure comes down. If you are taking blood pressure medicine, talk to your doctor before you take decongestants or anti-inflammatory medicine, such as ibuprofen. Some of these medicines can raise blood pressure. Learn how to check your blood pressure at home. Lifestyle changes  Stay at a healthy weight.  This is especially important if you put on weight around the waist. Losing even 10 pounds can help you lower your blood pressure. If your doctor recommends it, get more exercise. Walking is a good choice. Bit by bit, increase the amount you walk every day. Try for at least 30 minutes on most days of the week. You also may want to swim, bike, or do other activities. Avoid or limit alcohol. Talk to your doctor about whether you can drink any alcohol. Try to limit how much sodium you eat to less than 2,300 milligrams (mg) a day. Your doctor may ask you to try to eat less than 1,500 mg a day. Eat plenty of fruits (such as bananas and oranges), vegetables, legumes, whole grains, and low-fat dairy products. Lower the amount of saturated fat in your diet. Saturated fat is found in animal products such as milk, cheese, and meat. Limiting these foods may help you lose weight and also lower your risk for heart disease. Do not smoke. Smoking increases your risk for heart attack and stroke. If you need help quitting, talk to your doctor about stop-smoking programs and medicines. These can increase your chances of quitting for good. When should you call for help? Call 911 anytime you think you may need emergency care. This may mean having symptoms that suggest that your blood pressure is causing a serious heart or blood vessel problem. Your blood pressure may be over 180/120.   For example, call 911 if:    You have symptoms of a heart attack. These may include:  Chest pain or pressure, or a strange feeling in the chest.  Sweating. Shortness of breath. Nausea or vomiting. Pain, pressure, or a strange feeling in the back, neck, jaw, or upper belly or in one or both shoulders or arms. Lightheadedness or sudden weakness. A fast or irregular heartbeat.     You have symptoms of a stroke. These may include:  Sudden numbness, tingling, weakness, or loss of movement in your face, arm, or leg, especially on only one side of your body. Sudden vision changes. Sudden trouble speaking.   Sudden confusion or trouble understanding simple statements. Sudden problems with walking or balance. A sudden, severe headache that is different from past headaches.     You have severe back or belly pain.    Do not wait until your blood pressure comes down on its own. Get help right away.   Call your doctor now or seek immediate care if:    Your blood pressure is much higher than normal (such as 180/120 or higher), but you don't have symptoms.     You think high blood pressure is causing symptoms, such as:  Severe headache. Blurry vision.    Watch closely for changes in your health, and be sure to contact your doctor if:    Your blood pressure measures higher than your doctor recommends at least 2 times. That means the top number is higher or the bottom number is higher, or both.     You think you may be having side effects from your blood pressure medicine. Where can you learn more? Go to https://Surefire Medicalrichardeb.EPIS. org and sign in to your ERN account. Enter J132 in the [employer] box to learn more about \"High Blood Pressure: Care Instructions. \"     If you do not have an account, please click on the \"Sign Up Now\" link. Current as of: July 22, 2018  Content Version: 12.0  © 5863-8084 Healthwise, [employer]. Care instructions adapted under license by [facility] ([facility]). If you have questions about a medical condition or this instruction, always ask your healthcare professional. [address] any warranty or liability for your use of this information. Patient Education        DASH Diet: Care Instructions  Your Care Instructions    The DASH diet is an eating plan that can help lower your blood pressure. DASH stands for Dietary Approaches to Stop Hypertension. Hypertension is high blood pressure. The DASH diet focuses on eating foods that are high in calcium, potassium, and magnesium. These nutrients can lower blood pressure.  The foods that are highest in these nutrients are fruits, vegetables, low-fat dairy products, nuts, seeds, and legumes. But taking calcium, potassium, and magnesium supplements instead of eating foods that are high in those nutrients does not have the same effect. The DASH diet also includes whole grains, fish, and poultry. The DASH diet is one of several lifestyle changes your doctor may recommend to lower your high blood pressure. Your doctor may also want you to decrease the amount of sodium in your diet. Lowering sodium while following the DASH diet can lower blood pressure even further than just the DASH diet alone. Follow-up care is a key part of your treatment and safety. Be sure to make and go to all appointments, and call your doctor if you are having problems. It's also a good idea to know your test results and keep a list of the medicines you take. How can you care for yourself at home? Following the DASH diet  Eat 4 to 5 servings of fruit each day. A serving is 1 medium-sized piece of fruit, ½ cup chopped or canned fruit, 1/4 cup dried fruit, or 4 ounces (½ cup) of fruit juice. Choose fruit more often than fruit juice. Eat 4 to 5 servings of vegetables each day. A serving is 1 cup of lettuce or raw leafy vegetables, ½ cup of chopped or cooked vegetables, or 4 ounces (½ cup) of vegetable juice. Choose vegetables more often than vegetable juice. Get 2 to 3 servings of low-fat and fat-free dairy each day. A serving is 8 ounces of milk, 1 cup of yogurt, or 1 ½ ounces of cheese. Eat 6 to 8 servings of grains each day. A serving is 1 slice of bread, 1 ounce of dry cereal, or ½ cup of cooked rice, pasta, or cooked cereal. Try to choose whole-grain products as much as possible. Limit lean meat, poultry, and fish to 2 servings each day. A serving is 3 ounces, about the size of a deck of cards. Eat 4 to 5 servings of nuts, seeds, and legumes (cooked dried beans, lentils, and split peas) each week.  A serving is 1/3 cup of nuts, 2 tablespoons of seeds, or ½ cup of cooked beans or peas. Limit fats and oils to 2 to 3 servings each day. A serving is 1 teaspoon of vegetable oil or 2 tablespoons of salad dressing. Limit sweets and added sugars to 5 servings or less a week. A serving is 1 tablespoon jelly or jam, ½ cup sorbet, or 1 cup of lemonade. Eat less than 2,300 milligrams (mg) of sodium a day. If you limit your sodium to 1,500 mg a day, you can lower your blood pressure even more. Tips for success  Start small. Do not try to make dramatic changes to your diet all at once. You might feel that you are missing out on your favorite foods and then be more likely to not follow the plan. Make small changes, and stick with them. Once those changes become habit, add a few more changes. Try some of the following:  Make it a goal to eat a fruit or vegetable at every meal and at snacks. This will make it easy to get the recommended amount of fruits and vegetables each day. Try yogurt topped with fruit and nuts for a snack or healthy dessert. Add lettuce, tomato, cucumber, and onion to sandwiches. Combine a ready-made pizza crust with low-fat mozzarella cheese and lots of vegetable toppings. Try using tomatoes, squash, spinach, broccoli, carrots, cauliflower, and onions. Have a variety of cut-up vegetables with a low-fat dip as an appetizer instead of chips and dip. Sprinkle sunflower seeds or chopped almonds over salads. Or try adding chopped walnuts or almonds to cooked vegetables. Try some vegetarian meals using beans and peas. Add garbanzo or kidney beans to salads. Make burritos and tacos with mashed lawton beans or black beans. Where can you learn more? Go to https://marcie.Simple Star. org and sign in to your [employer] account. Enter J378 in the [facility] box to learn more about \"DASH Diet: Care Instructions. \"     If you do not have an account, please click on the \"Sign Up Now\" link.   Current as of: July 22, 2018  Content Version: 12.0  © 0404-4123 Healthwise, Incorporated. Care instructions adapted under license by [facility] ([facility]). If you have questions about a medical condition or this instruction, always ask your healthcare professional. [address] any warranty or liability for your use of this information.

## 2019-06-04 NOTE — PROGRESS NOTES
Subjective:      Patient ID: Frederic Franco is a 70 y.o. female. HPI  Pt here for annual exam and med refills. Reviewed BMI of 30. Encouraged diet, exercise and weight loss. Reviewed health maintenance, needs mammogram, discussed pneumonia shots, declines. Denies any current problems, is feeling well. Sees Nallu and Nu. Gets phelbotomy for polycythemia vera. , former smoker, pmh reviewed. Review of Systems   Constitutional: Negative for chills, fatigue, fever and unexpected weight change. HENT: Negative for congestion, ear pain, rhinorrhea and sore throat. Eyes: Negative for pain and visual disturbance. Respiratory: Negative for cough, chest tightness, shortness of breath and wheezing. Cardiovascular: Negative for chest pain and palpitations. Gastrointestinal: Negative for abdominal pain, blood in stool, constipation, diarrhea, nausea and vomiting. Genitourinary: Negative for difficulty urinating, frequency, hematuria and urgency. Musculoskeletal: Negative for back pain, joint swelling, myalgias and neck pain. Skin: Negative for rash. Neurological: Negative for dizziness and headaches. Hematological: Negative for adenopathy. Does not bruise/bleed easily. Psychiatric/Behavioral: Negative for behavioral problems and sleep disturbance. The patient is not nervous/anxious. Objective:   Physical Exam   Constitutional: She is oriented to person, place, and time. She appears well-developed and well-nourished. HENT:   Head: Normocephalic and atraumatic. Right Ear: External ear normal.   Left Ear: External ear normal.   Nose: Nose normal.   Mouth/Throat: Oropharynx is clear and moist.   Eyes: Pupils are equal, round, and reactive to light. EOM are normal.   Neck: Neck supple. Carotid bruit is not present. No thyromegaly present. Cardiovascular: Normal rate, regular rhythm and normal heart sounds. Pulmonary/Chest: Breath sounds normal. She has no wheezes. She has no rales. Abdominal: Soft. Bowel sounds are normal. There is no tenderness. There is no rebound and no guarding. Musculoskeletal: Normal range of motion. She exhibits no edema. Lymphadenopathy:     She has no cervical adenopathy. Neurological: She is alert and oriented to person, place, and time. She has normal reflexes. No cranial nerve deficit. Skin: Skin is warm and dry. No rash noted. Psychiatric: She has a normal mood and affect. Nursing note and vitals reviewed.     .  Health Maintenance   Topic Date Due    Hepatitis C screen  1955    HIV screen  12/17/1968    DTaP/Tdap/Td vaccine (1 - Tdap) 12/17/1972    Diabetes screen  12/17/1993    Shingles Vaccine (1 of 2) 12/17/2003    Cervical cancer screen  01/01/2018    Breast cancer screen  12/05/2018    DEXA (modify frequency per FRAX score)  12/17/2018    Pneumococcal 65+ years Vaccine (1 of 2 - PCV13) 12/17/2018    Potassium monitoring  03/23/2019    Creatinine monitoring  03/23/2019    Flu vaccine (Season Ended) 09/01/2019    Lipid screen  03/23/2023    Colon cancer screen colonoscopy  04/11/2024     Lab Results   Component Value Date    CHOL 154 03/23/2018    CHOL 210 (H) 08/11/2015     Lab Results   Component Value Date    TRIG 111 03/23/2018    TRIG 148 08/11/2015     Lab Results   Component Value Date    HDL 49 03/23/2018    HDL 64 08/11/2015     Lab Results   Component Value Date    LDLCALC 83 03/23/2018     Lab Results   Component Value Date    VLDL 29 08/11/2015     No results found for: [facility]  Lab Results   Component Value Date     04/04/2019    K 4.2 04/04/2019     03/23/2018    CO2 23 03/23/2018    BUN 22 03/23/2018    CREATININE 1.0 04/04/2019    GLUCOSE 113 (H) 03/23/2018    CALCIUM 10.0 03/23/2018    PROT 6.6 04/04/2019    LABALBU 3.8 04/04/2019    BILITOT 0.4 04/04/2019    ALKPHOS 151 (H) 04/04/2019    AST 27 04/04/2019    ALT 23 04/04/2019    LABGLOM 56 (A) 03/23/2018       Lab Results Component Value Date    WBC 27.7 (H) 2019    HGB 13.0 2019    HCT 44.3 2019    MCV 64.8 (L) 2019     (H) 2019       Assessment:      Essential HTN  Polycythemia vera  homocysteinemia        Plan:      Refill meds  Fasting blood work  Encouraged diet, exercise and weight loss. Dash diet  Reviewed health maintenance  Mammogram    Shree Reno received counseling on the following healthy behaviors: nutrition, exercise and medication adherence    Patient given educational materials on Nutrition, Exercise and Hypertension    I have instructed Shree Reno to complete a self tracking handout on Blood Pressures  and Weights and instructed them to bring it with them to her next appointment. Discussed use, benefit, and side effects of prescribed medications. Barriers to medication compliance addressed. All patient questions answered. Pt voiced understanding. Delcie Moritz, MD   Medicare Annual Wellness Visit  Name: Pilar Oconnor Date: 2019   MRN: [id number] Sex: Female   Age: 70 y.o. Ethnicity: Non-/Non    : 1955 Race: Francine Sal is here for Medicare AWV (medication refills, fasting BW)    Screenings for behavioral, psychosocial and functional/safety risks, and cognitive dysfunction are all negative except as indicated below. These results, as well as other patient data from the [address] [facility] [place] by [facility] [address] form, are documented in Flowsheets linked to this Encounter. Allergies   Allergen Reactions    Pcn [Penicillins] Anaphylaxis    Nitrates, Organic      Got bad headaches and stomachache    Keflex [Cephalexin] Itching and Rash     Prior to Visit Medications    Medication Sig Taking?  Authorizing Provider   triamcinolone (KENALOG) 0.1 % cream APPLY TO LEGS TWICE DAILY AS NEEDED Yes Historical Provider, MD   amLODIPine (NORVASC) 5 MG tablet Take 1 tablet by mouth daily Yes Nash Almonte MD   folic acid (FOLVITE) 1 MG have been reviewed and are found in [address] [facility]. The following problems were reviewed today and where indicated follow up appointments were made and/or referrals ordered. Positive Risk Factor Screenings with Interventions:     General Health:  General  In general, how would you say your health is?: Good  In the past 7 days, have you experienced any of the following? New or Increased Pain, New or Increased Fatigue, Loneliness, Social Isolation, Stress or Anger?: (!) New or Increased Pain  Do you get the social and emotional support that you need?: Yes  Do you have a Living Will?: Yes  General Health Risk Interventions:  · Pain issues: most likely due to polycythemia, more pain as needs phlebotomy    Health Habits/Nutrition:  Health Habits/Nutrition  Do you exercise for at least 20 minutes 2-3 times per week?: Yes  Have you lost any weight without trying in the past 3 months?: No  Do you eat fewer than 2 meals per day?: No  Have you seen a dentist within the past year?: Yes  Body mass index is 30.29 kg/m². Health Habits/Nutrition Interventions:  · Nutritional issues:  educational materials for healthy, well-balanced diet provided    Safety:  Safety  Do you have working smoke detectors?: Yes  Have all throw rugs been removed or fastened?: (!) No  Do you have non-slip mats in all bathtubs?: Yes  Do all of your stairways have a railing or banister?: Yes  Are your doorways, halls and stairs free of clutter?: Yes  Do you always fasten your seatbelt when you are in a car?: Yes  Safety Interventions:  · Home safety tips provided    Personalized Preventive Plan   Current Health Maintenance Status    There is no immunization history on file for this patient.      Health Maintenance   Topic Date Due    Hepatitis C screen  1955    HIV screen  12/17/1968    DTaP/Tdap/Td vaccine (1 - Tdap) 12/17/1972    Diabetes screen  12/17/1993    Shingles Vaccine (1 of 2) 12/17/2003    Cervical cancer screen  01/01/2018   [facility] Breast cancer screen  12/05/2018    DEXA (modify frequency per FRAX score)  12/17/2018    Pneumococcal 65+ years Vaccine (1 of 2 - PCV13) 12/17/2018    Potassium monitoring  03/23/2019    Creatinine monitoring  03/23/2019    Flu vaccine (Season Ended) 09/01/2019    Lipid screen  03/23/2023    Colon cancer screen colonoscopy  04/11/2024     Recommendations for Preventive Services Due: see orders and patient instructions/AVS.  . Recommended screening schedule for the next 5-10 years is provided to the patient in written form: see Patient Instructions/AVS.        Quality & Risk Score Accuracy    Visit Dx:  N18.3 - Chronic kidney disease, stage III (moderate) (HCC)  Assessment and plan:  Stable based upon symptoms and exam. Continue current treatment plan and follow up at least yearly.   Last edited 06/04/19 09:11 EDT by Jm Arce MD

## 2019-06-05 ENCOUNTER — TELEPHONE (OUTPATIENT)
Dept: FAMILY MEDICINE CLINIC | Age: 64
End: 2019-06-05

## 2019-06-05 NOTE — TELEPHONE ENCOUNTER
----- Message from Alia Dukes MD sent at 6/5/2019  6:57 AM EDT -----  Cbc is abnormal but stable. Follows with Dr. Puneet Rodriguez for her cbc. Cholesterol at 183 with normal CRR. CMP is stable. Labs are stable, continue present.

## 2019-06-20 ENCOUNTER — TELEPHONE (OUTPATIENT)
Dept: FAMILY MEDICINE CLINIC | Age: 64
End: 2019-06-20

## 2019-06-20 DIAGNOSIS — Z13.820 SCREENING FOR OSTEOPOROSIS: Primary | ICD-10-CM

## 2019-06-21 ENCOUNTER — HOSPITAL ENCOUNTER (OUTPATIENT)
Age: 64
Discharge: HOME | End: 2019-06-21
Payer: MEDICARE

## 2019-06-21 ENCOUNTER — TELEPHONE (OUTPATIENT)
Dept: ONCOLOGY | Age: 64
End: 2019-06-21

## 2019-06-21 DIAGNOSIS — D75.1 POLYCYTHEMIA: ICD-10-CM

## 2019-06-21 DIAGNOSIS — D75.1 POLYCYTHEMIA: Primary | ICD-10-CM

## 2019-06-21 LAB
ANISOCYTOSIS: PRESENT
BASOPHILS # BLD: 1.7 %
BASOPHILS ABSOLUTE: 0.5 THOU/MM3 (ref 0–0.1)
EOSINOPHIL # BLD: 7.4 %
EOSINOPHILS ABSOLUTE: 2.2 THOU/MM3 (ref 0–0.4)
ERYTHROCYTE [DISTWIDTH] IN BLOOD BY AUTOMATED COUNT: 25.9 % (ref 11.5–14.5)
ERYTHROCYTE [DISTWIDTH] IN BLOOD BY AUTOMATED COUNT: 51 FL (ref 35–45)
HCT VFR BLD CALC: 50.5 % (ref 37–47)
HEMOGLOBIN: 13.7 GM/DL (ref 12–16)
HYPOCHROMIA: PRESENT
IMMATURE GRANS (ABS): 0.64 THOU/MM3 (ref 0–0.07)
IMMATURE GRANULOCYTES: 2.3 %
LYMPHOCYTES # BLD: 9.7 %
LYMPHOCYTES ABSOLUTE: 2.9 THOU/MM3 (ref 1–4.8)
MCH RBC QN AUTO: 17.2 PG (ref 26–33)
MCHC RBC AUTO-ENTMCNC: 27.1 GM/DL (ref 32.2–35.5)
MCV RBC AUTO: 63.4 FL (ref 81–99)
MICROCYTES: PRESENT
MONOCYTES # BLD: 3.3 %
MONOCYTES ABSOLUTE: 1 THOU/MM3 (ref 0.4–1.3)
NUCLEATED RED BLOOD CELLS: 0 /100 WBC
PLATELET # BLD: 492 THOU/MM3 (ref 130–400)
POIKILOCYTES: ABNORMAL
RBC # BLD: 7.96 MILL/MM3 (ref 4.2–5.4)
SCAN OF BLOOD SMEAR: NORMAL
SEG NEUTROPHILS: 75.6 %
SEGMENTED NEUTROPHILS ABSOLUTE COUNT: 22.5 THOU/MM3 (ref 1.8–7.7)
SPHEROCYTES: ABNORMAL
TARGET CELLS: ABNORMAL
WBC # BLD: 29.8 THOU/MM3 (ref 4.8–10.8)

## 2019-06-21 PROCEDURE — 36415 COLL VENOUS BLD VENIPUNCTURE: CPT

## 2019-06-21 PROCEDURE — 85025 COMPLETE CBC W/AUTO DIFF WBC: CPT

## 2019-06-21 NOTE — TELEPHONE ENCOUNTER
Patient is wanting to know if she could come up for labs? She has been having bloody noses and feeling fatigue. She said not sure if she need a phlebotomy or not?     Please advise

## 2019-06-24 ENCOUNTER — HOSPITAL ENCOUNTER (OUTPATIENT)
Dept: INFUSION THERAPY | Age: 64
Discharge: HOME | End: 2019-06-24
Payer: MEDICARE

## 2019-06-24 ENCOUNTER — TELEPHONE (OUTPATIENT)
Dept: FAMILY MEDICINE CLINIC | Age: 64
End: 2019-06-24

## 2019-06-24 VITALS
OXYGEN SATURATION: 99 % | DIASTOLIC BLOOD PRESSURE: 68 MMHG | HEART RATE: 64 BPM | SYSTOLIC BLOOD PRESSURE: 144 MMHG | RESPIRATION RATE: 16 BRPM | TEMPERATURE: 98.4 F | HEIGHT: 65 IN | BODY MASS INDEX: 30.22 KG/M2 | WEIGHT: 181.4 LBS

## 2019-06-24 LAB — HEMOGLOBIN: 13.6 GM/DL (ref 12–16)

## 2019-06-24 PROCEDURE — 99211 OFF/OP EST MAY X REQ PHY/QHP: CPT

## 2019-07-02 ENCOUNTER — TELEPHONE (OUTPATIENT)
Dept: ONCOLOGY | Age: 64
End: 2019-07-02

## 2019-07-02 ENCOUNTER — HOSPITAL ENCOUNTER (OUTPATIENT)
Dept: INFUSION THERAPY | Age: 64
Discharge: HOME | End: 2019-07-02
Payer: MEDICARE

## 2019-07-02 ENCOUNTER — OFFICE VISIT (OUTPATIENT)
Dept: ONCOLOGY | Age: 64
End: 2019-07-02
Payer: MEDICARE

## 2019-07-02 VITALS
HEIGHT: 65 IN | WEIGHT: 183.4 LBS | HEART RATE: 87 BPM | BODY MASS INDEX: 30.56 KG/M2 | DIASTOLIC BLOOD PRESSURE: 86 MMHG | TEMPERATURE: 98.8 F | RESPIRATION RATE: 16 BRPM | OXYGEN SATURATION: 98 % | SYSTOLIC BLOOD PRESSURE: 175 MMHG

## 2019-07-02 DIAGNOSIS — D75.839 THROMBOCYTOSIS: ICD-10-CM

## 2019-07-02 DIAGNOSIS — D75.1 POLYCYTHEMIA: Primary | ICD-10-CM

## 2019-07-02 DIAGNOSIS — D75.1 POLYCYTHEMIA: ICD-10-CM

## 2019-07-02 LAB
ANISOCYTOSIS: PRESENT
BASOPHILS # BLD: 1.4 %
BASOPHILS ABSOLUTE: 0.4 THOU/MM3 (ref 0–0.1)
BUN, WHOLE BLOOD: 31 MG/DL (ref 8–26)
CHLORIDE, WHOLE BLOOD: 105 MEQ/L (ref 98–109)
CREATININE, WHOLE BLOOD: 1 MG/DL (ref 0.5–1.2)
EOSINOPHIL # BLD: 7 %
EOSINOPHILS ABSOLUTE: 2 THOU/MM3 (ref 0–0.4)
ERYTHROCYTE [DISTWIDTH] IN BLOOD BY AUTOMATED COUNT: 26 % (ref 11.5–14.5)
ERYTHROCYTE [DISTWIDTH] IN BLOOD BY AUTOMATED COUNT: 50.9 FL (ref 35–45)
GFR, ESTIMATED: 59 ML/MIN/1.73M2
GLUCOSE, WHOLE BLOOD: 128 MG/DL (ref 70–108)
HCT VFR BLD CALC: 47.3 % (ref 37–47)
HEMOGLOBIN: 12.9 GM/DL (ref 12–16)
HYPOCHROMIA: PRESENT
IMMATURE GRANS (ABS): 0.69 THOU/MM3 (ref 0–0.07)
IMMATURE GRANULOCYTES: 2.4 %
INR BLD: 1.4 (ref 0.85–1.13)
IONIZED CALCIUM, WHOLE BLOOD: 1.34 MMOL/L (ref 1.12–1.32)
LYMPHOCYTES # BLD: 10.8 %
LYMPHOCYTES ABSOLUTE: 3.1 THOU/MM3 (ref 1–4.8)
MCH RBC QN AUTO: 17.1 PG (ref 26–33)
MCHC RBC AUTO-ENTMCNC: 27.3 GM/DL (ref 32.2–35.5)
MCV RBC AUTO: 62.6 FL (ref 81–99)
MICROCYTES: PRESENT
MONOCYTES # BLD: 3.8 %
MONOCYTES ABSOLUTE: 1.1 THOU/MM3 (ref 0.4–1.3)
NUCLEATED RED BLOOD CELLS: 0 /100 WBC
PLATELET # BLD: 551 THOU/MM3 (ref 130–400)
PLATELET ESTIMATE: ABNORMAL
POIKILOCYTES: ABNORMAL
POTASSIUM, WHOLE BLOOD: 4.3 MEQ/L (ref 3.5–4.9)
RBC # BLD: 7.56 MILL/MM3 (ref 4.2–5.4)
SCAN OF BLOOD SMEAR: NORMAL
SEG NEUTROPHILS: 74.6 %
SEGMENTED NEUTROPHILS ABSOLUTE COUNT: 21.3 THOU/MM3 (ref 1.8–7.7)
SMUDGE CELLS: PRESENT
SODIUM, WHOLE BLOOD: 140 MEQ/L (ref 138–146)
TARGET CELLS: ABNORMAL
TOTAL CO2, WHOLE BLOOD: 24 MEQ/L (ref 23–33)
WBC # BLD: 28.5 THOU/MM3 (ref 4.8–10.8)

## 2019-07-02 PROCEDURE — 99211 OFF/OP EST MAY X REQ PHY/QHP: CPT

## 2019-07-02 PROCEDURE — 85610 PROTHROMBIN TIME: CPT

## 2019-07-02 PROCEDURE — 36415 COLL VENOUS BLD VENIPUNCTURE: CPT

## 2019-07-02 PROCEDURE — 80047 BASIC METABLC PNL IONIZED CA: CPT

## 2019-07-02 PROCEDURE — 99212 OFFICE O/P EST SF 10 MIN: CPT | Performed by: NURSE PRACTITIONER

## 2019-07-02 PROCEDURE — 85025 COMPLETE CBC W/AUTO DIFF WBC: CPT

## 2019-07-02 NOTE — PATIENT INSTRUCTIONS
Stop taking Xarelto starting now. If you continue to have bleeding after stopping Xarelto call the office. Please call the office with any concerns. You have an upcoming visit with Dr. Sanford Flores scheduled on August 15.

## 2019-08-02 DIAGNOSIS — D75.1 POLYCYTHEMIA: ICD-10-CM

## 2019-08-02 RX ORDER — FOLIC ACID 1 MG/1
TABLET ORAL
Qty: 90 TABLET | Refills: 1 | Status: SHIPPED | OUTPATIENT
Start: 2019-08-02 | End: 2020-01-09 | Stop reason: SDUPTHER

## 2019-08-02 RX ORDER — AMLODIPINE BESYLATE 5 MG/1
5 TABLET ORAL DAILY
Qty: 30 TABLET | Refills: 3 | Status: SHIPPED | OUTPATIENT
Start: 2019-08-02 | End: 2019-10-15

## 2019-08-15 ENCOUNTER — OFFICE VISIT (OUTPATIENT)
Dept: ONCOLOGY | Age: 64
End: 2019-08-15
Payer: MEDICARE

## 2019-08-15 ENCOUNTER — HOSPITAL ENCOUNTER (OUTPATIENT)
Dept: INFUSION THERAPY | Age: 64
Discharge: HOME | End: 2019-08-15
Payer: MEDICARE

## 2019-08-15 VITALS
TEMPERATURE: 98.3 F | DIASTOLIC BLOOD PRESSURE: 74 MMHG | SYSTOLIC BLOOD PRESSURE: 149 MMHG | HEART RATE: 68 BPM | WEIGHT: 181.8 LBS | BODY MASS INDEX: 30.29 KG/M2 | HEIGHT: 65 IN | RESPIRATION RATE: 18 BRPM | OXYGEN SATURATION: 97 %

## 2019-08-15 DIAGNOSIS — Z86.711 PERSONAL HISTORY OF PULMONARY EMBOLISM: ICD-10-CM

## 2019-08-15 DIAGNOSIS — I82.90 THROMBOSIS: ICD-10-CM

## 2019-08-15 DIAGNOSIS — D72.828 OTHER ELEVATED WHITE BLOOD CELL (WBC) COUNT: ICD-10-CM

## 2019-08-15 DIAGNOSIS — D75.1 POLYCYTHEMIA: ICD-10-CM

## 2019-08-15 DIAGNOSIS — Z79.01 CHRONIC ANTICOAGULATION: ICD-10-CM

## 2019-08-15 DIAGNOSIS — D45 POLYCYTHEMIA VERA (HCC): Primary | ICD-10-CM

## 2019-08-15 LAB
ANISOCYTOSIS: PRESENT
ATYPICAL LYMPHOCYTES: ABNORMAL %
BASOPHILS # BLD: 1.5 %
BASOPHILS ABSOLUTE: 0.4 THOU/MM3 (ref 0–0.1)
EOSINOPHIL # BLD: 6.1 %
EOSINOPHILS ABSOLUTE: 1.6 THOU/MM3 (ref 0–0.4)
ERYTHROCYTE [DISTWIDTH] IN BLOOD BY AUTOMATED COUNT: 25.1 % (ref 11.5–14.5)
ERYTHROCYTE [DISTWIDTH] IN BLOOD BY AUTOMATED COUNT: 50.6 FL (ref 35–45)
HCT VFR BLD CALC: 50.1 % (ref 37–47)
HEMOGLOBIN: 13.4 GM/DL (ref 12–16)
HYPOCHROMIA: PRESENT
IMMATURE GRANS (ABS): 0.51 THOU/MM3 (ref 0–0.07)
IMMATURE GRANULOCYTES: 2 %
LYMPHOCYTES # BLD: 11.1 %
LYMPHOCYTES ABSOLUTE: 3 THOU/MM3 (ref 1–4.8)
MCH RBC QN AUTO: 17.2 PG (ref 26–33)
MCHC RBC AUTO-ENTMCNC: 26.7 GM/DL (ref 32.2–35.5)
MCV RBC AUTO: 64.5 FL (ref 81–99)
MICROCYTES: PRESENT
MONOCYTES # BLD: 4.5 %
MONOCYTES ABSOLUTE: 1.2 THOU/MM3 (ref 0.4–1.3)
NUCLEATED RED BLOOD CELLS: 0 /100 WBC
PLATELET # BLD: 295 THOU/MM3 (ref 130–400)
RBC # BLD: 7.77 MILL/MM3 (ref 4.2–5.4)
SCAN OF BLOOD SMEAR: NORMAL
SEG NEUTROPHILS: 74.9 %
SEGMENTED NEUTROPHILS ABSOLUTE COUNT: 20.1 THOU/MM3 (ref 1.8–7.7)
TARGET CELLS: ABNORMAL
WBC # BLD: 26.9 THOU/MM3 (ref 4.8–10.8)

## 2019-08-15 PROCEDURE — G8417 CALC BMI ABV UP PARAM F/U: HCPCS | Performed by: INTERNAL MEDICINE

## 2019-08-15 PROCEDURE — 1090F PRES/ABSN URINE INCON ASSESS: CPT | Performed by: INTERNAL MEDICINE

## 2019-08-15 PROCEDURE — 85025 COMPLETE CBC W/AUTO DIFF WBC: CPT

## 2019-08-15 PROCEDURE — 99211 OFF/OP EST MAY X REQ PHY/QHP: CPT

## 2019-08-15 PROCEDURE — 4040F PNEUMOC VAC/ADMIN/RCVD: CPT | Performed by: INTERNAL MEDICINE

## 2019-08-15 PROCEDURE — 36415 COLL VENOUS BLD VENIPUNCTURE: CPT

## 2019-08-15 PROCEDURE — 1036F TOBACCO NON-USER: CPT | Performed by: INTERNAL MEDICINE

## 2019-08-15 PROCEDURE — 1123F ACP DISCUSS/DSCN MKR DOCD: CPT | Performed by: INTERNAL MEDICINE

## 2019-08-15 PROCEDURE — 3014F SCREEN MAMMO DOC REV: CPT | Performed by: INTERNAL MEDICINE

## 2019-08-15 PROCEDURE — G8427 DOCREV CUR MEDS BY ELIG CLIN: HCPCS | Performed by: INTERNAL MEDICINE

## 2019-08-15 PROCEDURE — G8400 PT W/DXA NO RESULTS DOC: HCPCS | Performed by: INTERNAL MEDICINE

## 2019-08-15 PROCEDURE — G8598 ASA/ANTIPLAT THER USED: HCPCS | Performed by: INTERNAL MEDICINE

## 2019-08-15 PROCEDURE — 99214 OFFICE O/P EST MOD 30 MIN: CPT | Performed by: INTERNAL MEDICINE

## 2019-08-15 PROCEDURE — 3017F COLORECTAL CA SCREEN DOC REV: CPT | Performed by: INTERNAL MEDICINE

## 2019-10-03 ENCOUNTER — OFFICE VISIT (OUTPATIENT)
Dept: ONCOLOGY | Age: 64
End: 2019-10-03
Payer: MEDICARE

## 2019-10-03 ENCOUNTER — HOSPITAL ENCOUNTER (OUTPATIENT)
Dept: INFUSION THERAPY | Age: 64
Discharge: HOME | End: 2019-10-03
Payer: MEDICARE

## 2019-10-03 VITALS
DIASTOLIC BLOOD PRESSURE: 70 MMHG | WEIGHT: 180.6 LBS | BODY MASS INDEX: 30.09 KG/M2 | OXYGEN SATURATION: 97 % | SYSTOLIC BLOOD PRESSURE: 124 MMHG | TEMPERATURE: 97.7 F | RESPIRATION RATE: 16 BRPM | HEIGHT: 65 IN | HEART RATE: 71 BPM

## 2019-10-03 DIAGNOSIS — D75.1 POLYCYTHEMIA: Primary | ICD-10-CM

## 2019-10-03 DIAGNOSIS — D45 POLYCYTHEMIA VERA (HCC): ICD-10-CM

## 2019-10-03 DIAGNOSIS — Z86.711 PERSONAL HISTORY OF PULMONARY EMBOLISM: ICD-10-CM

## 2019-10-03 DIAGNOSIS — I82.90 THROMBOSIS: ICD-10-CM

## 2019-10-03 DIAGNOSIS — D72.828 OTHER ELEVATED WHITE BLOOD CELL (WBC) COUNT: ICD-10-CM

## 2019-10-03 LAB
ALBUMIN SERPL-MCNC: 4 G/DL (ref 3.5–5.1)
ALP BLD-CCNC: 211 U/L (ref 38–126)
ALT SERPL-CCNC: 57 U/L (ref 11–66)
AST SERPL-CCNC: 56 U/L (ref 5–40)
BASOPHILIA: ABNORMAL
BASOPHILS # BLD: 2.2 %
BASOPHILS ABSOLUTE: 0.5 THOU/MM3 (ref 0–0.1)
BILIRUB SERPL-MCNC: 0.4 MG/DL (ref 0.3–1.2)
BILIRUBIN DIRECT: < 0.2 MG/DL (ref 0–0.3)
BUN, WHOLE BLOOD: 30 MG/DL (ref 8–26)
CHLORIDE, WHOLE BLOOD: 105 MEQ/L (ref 98–109)
CREATININE, WHOLE BLOOD: 1.3 MG/DL (ref 0.5–1.2)
DIFFERENTIAL TYPE: ABNORMAL
EOSINOPHIL # BLD: 8.1 %
EOSINOPHILS ABSOLUTE: 1.9 THOU/MM3 (ref 0–0.4)
GFR, ESTIMATED: 44 ML/MIN/1.73M2
GLUCOSE, WHOLE BLOOD: 197 MG/DL (ref 70–108)
HCT VFR BLD CALC: 48.1 % (ref 37–47)
HEMOGLOBIN: 14.2 GM/DL (ref 12–16)
IMMATURE GRANS (ABS): 0.34 THOU/MM3 (ref 0–0.07)
IMMATURE GRANULOCYTES: 1.5 %
IONIZED CALCIUM, WHOLE BLOOD: 1.39 MMOL/L (ref 1.12–1.32)
LD: 321 U/L (ref 100–190)
LYMPHOCYTES # BLD: 8.4 %
LYMPHOCYTES ABSOLUTE: 2 THOU/MM3 (ref 1–4.8)
MCH RBC QN AUTO: 18 PG (ref 27–31)
MCHC RBC AUTO-ENTMCNC: 29.6 GM/DL (ref 33–37)
MCV RBC AUTO: 61 FL (ref 81–99)
MONOCYTES # BLD: 3.5 %
MONOCYTES ABSOLUTE: 0.8 THOU/MM3 (ref 0.4–1.3)
NUCLEATED RED BLOOD CELLS: 0 /100 WBC
PATHOLOGIST REVIEW: ABNORMAL
PDW BLD-RTO: 18.1 % (ref 11.5–14.5)
PLATELET # BLD: 385 THOU/MM3 (ref 130–400)
PLATELET ESTIMATE: ADEQUATE
POTASSIUM, WHOLE BLOOD: 4.3 MEQ/L (ref 3.5–4.9)
RBC # BLD: 7.89 MILL/MM3 (ref 4.2–5.4)
SCAN OF BLOOD SMEAR: NORMAL
SEG NEUTROPHILS: 76.3 %
SEGMENTED NEUTROPHILS ABSOLUTE COUNT: 17.8 THOU/MM3 (ref 1.8–7.7)
SODIUM, WHOLE BLOOD: 140 MEQ/L (ref 138–146)
TOTAL CO2, WHOLE BLOOD: 24 MEQ/L (ref 23–33)
TOTAL PROTEIN: 7 G/DL (ref 6.1–8)
WBC # BLD: 24.5 THOU/MM3 (ref 4.8–10.8)

## 2019-10-03 PROCEDURE — 80076 HEPATIC FUNCTION PANEL: CPT

## 2019-10-03 PROCEDURE — 1036F TOBACCO NON-USER: CPT | Performed by: INTERNAL MEDICINE

## 2019-10-03 PROCEDURE — 4040F PNEUMOC VAC/ADMIN/RCVD: CPT | Performed by: INTERNAL MEDICINE

## 2019-10-03 PROCEDURE — 85025 COMPLETE CBC W/AUTO DIFF WBC: CPT

## 2019-10-03 PROCEDURE — 1123F ACP DISCUSS/DSCN MKR DOCD: CPT | Performed by: INTERNAL MEDICINE

## 2019-10-03 PROCEDURE — G8484 FLU IMMUNIZE NO ADMIN: HCPCS | Performed by: INTERNAL MEDICINE

## 2019-10-03 PROCEDURE — 36415 COLL VENOUS BLD VENIPUNCTURE: CPT

## 2019-10-03 PROCEDURE — 3014F SCREEN MAMMO DOC REV: CPT | Performed by: INTERNAL MEDICINE

## 2019-10-03 PROCEDURE — 99211 OFF/OP EST MAY X REQ PHY/QHP: CPT

## 2019-10-03 PROCEDURE — 1090F PRES/ABSN URINE INCON ASSESS: CPT | Performed by: INTERNAL MEDICINE

## 2019-10-03 PROCEDURE — G8400 PT W/DXA NO RESULTS DOC: HCPCS | Performed by: INTERNAL MEDICINE

## 2019-10-03 PROCEDURE — G8427 DOCREV CUR MEDS BY ELIG CLIN: HCPCS | Performed by: INTERNAL MEDICINE

## 2019-10-03 PROCEDURE — 83615 LACTATE (LD) (LDH) ENZYME: CPT

## 2019-10-03 PROCEDURE — G8417 CALC BMI ABV UP PARAM F/U: HCPCS | Performed by: INTERNAL MEDICINE

## 2019-10-03 PROCEDURE — G8598 ASA/ANTIPLAT THER USED: HCPCS | Performed by: INTERNAL MEDICINE

## 2019-10-03 PROCEDURE — 3017F COLORECTAL CA SCREEN DOC REV: CPT | Performed by: INTERNAL MEDICINE

## 2019-10-03 PROCEDURE — 99214 OFFICE O/P EST MOD 30 MIN: CPT | Performed by: INTERNAL MEDICINE

## 2019-10-03 PROCEDURE — 80047 BASIC METABLC PNL IONIZED CA: CPT

## 2019-10-15 ENCOUNTER — OFFICE VISIT (OUTPATIENT)
Dept: CARDIOLOGY CLINIC | Age: 64
End: 2019-10-15
Payer: MEDICARE

## 2019-10-15 VITALS
DIASTOLIC BLOOD PRESSURE: 86 MMHG | WEIGHT: 182 LBS | HEIGHT: 65 IN | BODY MASS INDEX: 30.32 KG/M2 | SYSTOLIC BLOOD PRESSURE: 138 MMHG | HEART RATE: 60 BPM

## 2019-10-15 DIAGNOSIS — I10 HYPERTENSION, UNSPECIFIED TYPE: Primary | ICD-10-CM

## 2019-10-15 PROCEDURE — G8598 ASA/ANTIPLAT THER USED: HCPCS | Performed by: INTERNAL MEDICINE

## 2019-10-15 PROCEDURE — G8417 CALC BMI ABV UP PARAM F/U: HCPCS | Performed by: INTERNAL MEDICINE

## 2019-10-15 PROCEDURE — 3014F SCREEN MAMMO DOC REV: CPT | Performed by: INTERNAL MEDICINE

## 2019-10-15 PROCEDURE — 99213 OFFICE O/P EST LOW 20 MIN: CPT | Performed by: INTERNAL MEDICINE

## 2019-10-15 PROCEDURE — G8400 PT W/DXA NO RESULTS DOC: HCPCS | Performed by: INTERNAL MEDICINE

## 2019-10-15 PROCEDURE — 1123F ACP DISCUSS/DSCN MKR DOCD: CPT | Performed by: INTERNAL MEDICINE

## 2019-10-15 PROCEDURE — G8427 DOCREV CUR MEDS BY ELIG CLIN: HCPCS | Performed by: INTERNAL MEDICINE

## 2019-10-15 PROCEDURE — 3017F COLORECTAL CA SCREEN DOC REV: CPT | Performed by: INTERNAL MEDICINE

## 2019-10-15 PROCEDURE — 4040F PNEUMOC VAC/ADMIN/RCVD: CPT | Performed by: INTERNAL MEDICINE

## 2019-10-15 PROCEDURE — 1036F TOBACCO NON-USER: CPT | Performed by: INTERNAL MEDICINE

## 2019-10-15 PROCEDURE — 1090F PRES/ABSN URINE INCON ASSESS: CPT | Performed by: INTERNAL MEDICINE

## 2019-10-15 PROCEDURE — G8484 FLU IMMUNIZE NO ADMIN: HCPCS | Performed by: INTERNAL MEDICINE

## 2019-10-15 RX ORDER — AMLODIPINE BESYLATE 10 MG/1
10 TABLET ORAL DAILY
Qty: 90 TABLET | Refills: 5 | Status: SHIPPED | OUTPATIENT
Start: 2019-10-15 | End: 2020-11-23 | Stop reason: SDUPTHER

## 2020-01-09 ENCOUNTER — HOSPITAL ENCOUNTER (OUTPATIENT)
Dept: INFUSION THERAPY | Age: 65
Discharge: HOME | End: 2020-01-09
Payer: MEDICARE

## 2020-01-09 ENCOUNTER — OFFICE VISIT (OUTPATIENT)
Dept: ONCOLOGY | Age: 65
End: 2020-01-09
Payer: MEDICARE

## 2020-01-09 VITALS
RESPIRATION RATE: 18 BRPM | OXYGEN SATURATION: 96 % | DIASTOLIC BLOOD PRESSURE: 76 MMHG | BODY MASS INDEX: 29.39 KG/M2 | WEIGHT: 176.4 LBS | TEMPERATURE: 97.7 F | HEART RATE: 71 BPM | HEIGHT: 65 IN | SYSTOLIC BLOOD PRESSURE: 143 MMHG

## 2020-01-09 DIAGNOSIS — D75.1 POLYCYTHEMIA: ICD-10-CM

## 2020-01-09 LAB
ALBUMIN SERPL-MCNC: 4.3 G/DL (ref 3.5–5.1)
ALP BLD-CCNC: 211 U/L (ref 38–126)
ALT SERPL-CCNC: 35 U/L (ref 11–66)
ANISOCYTOSIS: PRESENT
AST SERPL-CCNC: 43 U/L (ref 5–40)
BASOPHILIA: ABNORMAL
BASOPHILS # BLD: 2.2 %
BASOPHILS ABSOLUTE: 0.6 THOU/MM3 (ref 0–0.1)
BILIRUB SERPL-MCNC: 0.4 MG/DL (ref 0.3–1.2)
BILIRUBIN DIRECT: < 0.2 MG/DL (ref 0–0.3)
BUN, WHOLE BLOOD: 33 MG/DL (ref 8–26)
CHLORIDE, WHOLE BLOOD: 108 MEQ/L (ref 98–109)
CREATININE, WHOLE BLOOD: 1.1 MG/DL (ref 0.5–1.2)
DIFFERENTIAL TYPE: ABNORMAL
EOSINOPHIL # BLD: 7.7 %
EOSINOPHILS ABSOLUTE: 2.1 THOU/MM3 (ref 0–0.4)
ERYTHROCYTE [DISTWIDTH] IN BLOOD BY AUTOMATED COUNT: 25.5 % (ref 11.5–14.5)
ERYTHROCYTE [DISTWIDTH] IN BLOOD BY AUTOMATED COUNT: 51.6 FL (ref 35–45)
GFR, ESTIMATED: 53 ML/MIN/1.73M2
GLUCOSE, WHOLE BLOOD: 116 MG/DL (ref 70–108)
HCT VFR BLD CALC: 56.1 % (ref 37–47)
HEMOGLOBIN: 15.1 GM/DL (ref 12–16)
HYPOCHROMIA: PRESENT
IMMATURE GRANS (ABS): 0.51 THOU/MM3 (ref 0–0.07)
IMMATURE GRANULOCYTES: 1.9 %
IONIZED CALCIUM, WHOLE BLOOD: 1.41 MMOL/L (ref 1.12–1.32)
LYMPHOCYTES # BLD: 9 %
LYMPHOCYTES ABSOLUTE: 2.4 THOU/MM3 (ref 1–4.8)
MCH RBC QN AUTO: 17.7 PG (ref 26–33)
MCHC RBC AUTO-ENTMCNC: 26.9 GM/DL (ref 32.2–35.5)
MCV RBC AUTO: 65.8 FL (ref 81–99)
MICROCYTES: PRESENT
MONOCYTES # BLD: 4.7 %
MONOCYTES ABSOLUTE: 1.3 THOU/MM3 (ref 0.4–1.3)
NUCLEATED RED BLOOD CELLS: 0 /100 WBC
PATHOLOGIST REVIEW: ABNORMAL
PLATELET # BLD: 515 THOU/MM3 (ref 130–400)
PLATELET ESTIMATE: ABNORMAL
POTASSIUM, WHOLE BLOOD: 4.4 MEQ/L (ref 3.5–4.9)
RBC # BLD: 8.52 MILL/MM3 (ref 4.2–5.4)
SCAN OF BLOOD SMEAR: NORMAL
SEG NEUTROPHILS: 74.5 %
SEGMENTED NEUTROPHILS ABSOLUTE COUNT: 20.3 THOU/MM3 (ref 1.8–7.7)
SODIUM, WHOLE BLOOD: 140 MEQ/L (ref 138–146)
SPHEROCYTES: ABNORMAL
TARGET CELLS: ABNORMAL
TOTAL CO2, WHOLE BLOOD: 24 MEQ/L (ref 23–33)
TOTAL PROTEIN: 7.7 G/DL (ref 6.1–8)
WBC # BLD: 27.2 THOU/MM3 (ref 4.8–10.8)

## 2020-01-09 PROCEDURE — 1123F ACP DISCUSS/DSCN MKR DOCD: CPT | Performed by: INTERNAL MEDICINE

## 2020-01-09 PROCEDURE — 3017F COLORECTAL CA SCREEN DOC REV: CPT | Performed by: INTERNAL MEDICINE

## 2020-01-09 PROCEDURE — 1036F TOBACCO NON-USER: CPT | Performed by: INTERNAL MEDICINE

## 2020-01-09 PROCEDURE — 99215 OFFICE O/P EST HI 40 MIN: CPT | Performed by: INTERNAL MEDICINE

## 2020-01-09 PROCEDURE — G8400 PT W/DXA NO RESULTS DOC: HCPCS | Performed by: INTERNAL MEDICINE

## 2020-01-09 PROCEDURE — G8417 CALC BMI ABV UP PARAM F/U: HCPCS | Performed by: INTERNAL MEDICINE

## 2020-01-09 PROCEDURE — 80076 HEPATIC FUNCTION PANEL: CPT

## 2020-01-09 PROCEDURE — 36415 COLL VENOUS BLD VENIPUNCTURE: CPT

## 2020-01-09 PROCEDURE — 80047 BASIC METABLC PNL IONIZED CA: CPT

## 2020-01-09 PROCEDURE — G8427 DOCREV CUR MEDS BY ELIG CLIN: HCPCS | Performed by: INTERNAL MEDICINE

## 2020-01-09 PROCEDURE — 99211 OFF/OP EST MAY X REQ PHY/QHP: CPT

## 2020-01-09 PROCEDURE — G8484 FLU IMMUNIZE NO ADMIN: HCPCS | Performed by: INTERNAL MEDICINE

## 2020-01-09 PROCEDURE — 85025 COMPLETE CBC W/AUTO DIFF WBC: CPT

## 2020-01-09 PROCEDURE — 1090F PRES/ABSN URINE INCON ASSESS: CPT | Performed by: INTERNAL MEDICINE

## 2020-01-09 PROCEDURE — 4040F PNEUMOC VAC/ADMIN/RCVD: CPT | Performed by: INTERNAL MEDICINE

## 2020-01-09 RX ORDER — FOLIC ACID 1 MG/1
TABLET ORAL
Qty: 90 TABLET | Refills: 1 | Status: SHIPPED | OUTPATIENT
Start: 2020-01-09 | End: 2020-07-06 | Stop reason: SDUPTHER

## 2020-01-14 ENCOUNTER — HOSPITAL ENCOUNTER (OUTPATIENT)
Dept: INFUSION THERAPY | Age: 65
Discharge: HOME | End: 2020-01-14
Payer: MEDICARE

## 2020-01-14 VITALS
BODY MASS INDEX: 29.72 KG/M2 | HEIGHT: 65 IN | DIASTOLIC BLOOD PRESSURE: 73 MMHG | RESPIRATION RATE: 18 BRPM | OXYGEN SATURATION: 95 % | WEIGHT: 178.4 LBS | HEART RATE: 61 BPM | TEMPERATURE: 97.7 F | SYSTOLIC BLOOD PRESSURE: 137 MMHG

## 2020-01-14 LAB
HCT VFR BLD CALC: 49.3 % (ref 37–47)
HEMOGLOBIN: 14.5 GM/DL (ref 12–16)

## 2020-01-14 PROCEDURE — 99211 OFF/OP EST MAY X REQ PHY/QHP: CPT

## 2020-01-14 NOTE — PROGRESS NOTES
Patient informed holding procedure today due to blood counts. Discharge instructions given to patient- voiced understanding. Ambulated off unit per self with belongings.

## 2020-01-24 NOTE — CONSULTS
[facility]  Cardiology Consult        Patient:  Leslie Schmidt  YOB: 1955  MRN: [id number]     Acct: [de-identified]    PCP: Jessica Mitchell MD    Date of Admission: 3/22/2018      Chief Complaint:  Chest pain      History Of Present Illness:    57 y.o. pleasant female who presented to the hospital with complaints of chest pain. She has a history of two PEs in the past and is on chronic anticoagulation for that. She also has polycythemia vera and gets intermittent phlebotomies. She reports the chest pain  Occurred at rest, pressure like, right sided, non radiating, no aggravating or alleviating factors and associated with some Sob. Cardiology was consulted for chest pain. Past Medical History:          Diagnosis Date    Basal cell cancer     Migally/Gustavo    DVT (deep venous thrombosis) ([facility] 09/2010    History of shingles 06/06/2016    Homocysteinemia ([facility]     Hypertension     Polycythemia vera(238.4)     Pulmonary emboli ([facility] [address]     2010    Unspecified diseases of blood and blood-forming organs        Past Surgical History:          Procedure Laterality Date    DENTAL SURGERY  11/09/15    EYE SURGERY Left 03/06/2015    Exotrophia Dr Alesia Alfredo, [address] [facility] OTHER SURGICAL HISTORY  12/12/2017    [facility]x at [facility]         Medications Prior to Admission:      Prior to Admission medications    Medication Sig Start Date End Date Taking?  Authorizing Provider   rivaroxaban (XARELTO) 20 MG TABS tablet TAKE 1 TABLET BY MOUTH ONE TIME A DAY 2/14/18  Yes Jessica Mitchell MD   folic acid (FOLVITE) 1 MG tablet TAKE 1 TABLET DAILY 12/13/17  Yes Lisa Perdomo MD   enalapril (VASOTEC) 20 MG tablet TAKE 1 TABLET TWICE A DAY 6/5/17  Yes Jessica Mitchell MD   cloNIDine (CATAPRES) 0.1 MG tablet Take 1 tablet by mouth 2 times daily 6/5/17  Yes Jessica Mitchell MD   metroNIDAZOLE (METROCREAM) 0.75 % cream Apply topically daily as needed 03/22/2018    MCH 18.7 03/22/2018    MCHC 30.1 03/22/2018    RDW 22.5 03/22/2018     03/22/2018    MPV 8.8 03/22/2018     BMP:    Lab Results   Component Value Date     03/23/2018    K 4.3 03/23/2018     03/23/2018    CO2 23 03/23/2018    BUN 22 03/23/2018    LABALBU 3.5 03/23/2018    CREATININE 1.0 03/23/2018    CALCIUM 10.0 03/23/2018    LABGLOM 56 03/23/2018    GLUCOSE 113 03/23/2018    GLUCOSE 102 10/14/2014     Hepatic Function Panel:    Lab Results   Component Value Date    ALKPHOS 97 03/23/2018    ALT 17 03/23/2018    AST 20 03/23/2018    PROT 6.2 03/23/2018    BILITOT 0.5 03/23/2018    BILIDIR <0.2 03/22/2018    LABALBU 3.5 03/23/2018     Magnesium:  No results found for: MG  Warfarin PT/INR:  No components found for: PTPATWAR, PTINRWAR  HgBA1c:  No results found for: LABA1C  FLP:    Lab Results   Component Value Date    TRIG 111 03/23/2018    HDL 49 03/23/2018    LDLCALC 83 03/23/2018    LDLDIRECT 139 08/11/2015     TSH:    Lab Results   Component Value Date    TSH 2.400 03/22/2018     BNP: No results found for: BNP    EKG:Sinus rhythm    Echo:  3/22/18:   Normal left ventricle size and systolic function. Ejection fraction was   estimated at 55-60%. There were no regional left ventricular wall motion   abnormalities and wall thickness was within normal limits.   The right ventricular size was normal with normal systolic function and   wall thickness.   The aortic valve was trileaflet with normal thickness and cuspal   separation. DOPPLER: Transaortic velocity was within the normal range with   no evidence of aortic stenosis.   Trivial aortic regurgitation is noted.   Mild thickening of anterior leaflet of mitral valve. Mild Mitral valve   prolapse is present. DOPPLER: The transmitral velocity was within the   normal range with no evidence for mitral stenosis.   Mild to Moderate mitral regurgitation is present.       Assessment/Plan:  Chest pain  Elevated troponins  PE/DVT x 2  Polycythemia Spontaneous, unlabored and symmetrical

## 2020-01-25 NOTE — PROGRESS NOTES
distress. Cardiovascular: RRR. No edema in any of the four extremities. Abdominal: Soft. No hepatomegaly or splenomegaly. Musculoskeletal: Gait is normal. Muscle strength and tone good. Neurological: Alert and oriented to person, place, and time. Judgment and thought content normal.  Skin: Skin is warm and dry. No rash. Psychiatric: Mood and affect appropriate for the clinical situation. Behavior is normal.      Data Analysis:    Hematology 1/9/2020 10/3/2019 8/15/2019   WBC 27.2 (H) 24.5 (H) 26.9 (H)   RBC 8.52 (H) 7.89 (H) 7.77 (H)   HGB 15.1 14.2 13.4   HCT 56.1 (H) 48.1 (H) 50.1 (H)   MCV 65.8 (L) 61 (L) 64.5 (L)   RDW  18.1 (H)     (H) 385 295     Assessment:   1. Myeloproliferative disorder. 2.  Leukocytosis. 3.  Thrombosis. Plan:   1.  Schedule phlebotomy now and continue monthly. 2.  Monitor hematocrit with each phlebotomy. 3.  Monitor total WBC count and for signs of infection/fever. 4.  Monitor for recurrence of thrombosis. 5.  Monitor platelet count and for any signs of abnormal bleeding/bruising. Cindi Stovall M.D. Medical Director: [facility]  [facility]  [address] [facility], [address] [place] [address], [address] [place] [address], [address] [place] [address] of the [facility]      **This report has been created using voice recognition software. It may contain minor errors which are inherent in voice recognition technology. **

## 2020-02-10 RX ORDER — 0.9 % SODIUM CHLORIDE 0.9 %
250 INTRAVENOUS SOLUTION INTRAVENOUS ONCE
Status: CANCELLED | OUTPATIENT
Start: 2020-02-10

## 2020-02-10 RX ORDER — 0.9 % SODIUM CHLORIDE 0.9 %
500 INTRAVENOUS SOLUTION INTRAVENOUS ONCE
Status: CANCELLED | OUTPATIENT
Start: 2020-02-10

## 2020-02-11 ENCOUNTER — HOSPITAL ENCOUNTER (OUTPATIENT)
Dept: INFUSION THERAPY | Age: 65
Discharge: HOME | End: 2020-02-11
Payer: MEDICARE

## 2020-02-11 VITALS
DIASTOLIC BLOOD PRESSURE: 77 MMHG | TEMPERATURE: 98 F | SYSTOLIC BLOOD PRESSURE: 146 MMHG | OXYGEN SATURATION: 100 % | RESPIRATION RATE: 18 BRPM | HEART RATE: 65 BPM

## 2020-02-11 DIAGNOSIS — D75.1 POLYCYTHEMIA: ICD-10-CM

## 2020-02-11 DIAGNOSIS — N18.30 CHRONIC KIDNEY DISEASE, STAGE III (MODERATE) (HCC): ICD-10-CM

## 2020-02-11 DIAGNOSIS — R79.83 HOMOCYSTEINEMIA: ICD-10-CM

## 2020-02-11 DIAGNOSIS — E66.9 OBESITY (BMI 30.0-34.9): ICD-10-CM

## 2020-02-11 DIAGNOSIS — D45 POLYCYTHEMIA VERA (HCC): Primary | ICD-10-CM

## 2020-02-11 LAB
HCT VFR BLD CALC: 51.9 % (ref 37–47)
HEMOGLOBIN: 15.2 GM/DL (ref 12–16)

## 2020-02-11 PROCEDURE — 99195 PHLEBOTOMY: CPT

## 2020-02-11 RX ORDER — 0.9 % SODIUM CHLORIDE 0.9 %
500 INTRAVENOUS SOLUTION INTRAVENOUS ONCE
Status: CANCELLED | OUTPATIENT
Start: 2020-02-11

## 2020-02-11 RX ORDER — 0.9 % SODIUM CHLORIDE 0.9 %
250 INTRAVENOUS SOLUTION INTRAVENOUS ONCE
Status: CANCELLED | OUTPATIENT
Start: 2020-02-11

## 2020-02-11 NOTE — PROGRESS NOTES
THERAPEUTIC PHLEBOTOMY    Most recent Hemoglobin result: 15.2Gm/dl. Hematocrit result  51.9%  Date obtained:   02 /11 /2020    Pre-phlebotomy vital signs:see Doc flow sheet    Start [id number]    Empty donor bag placed on TARE scale. Tourniquet placed on patient's arm and arm palpated for venous access. Area of venous stick cleansed with alcohol. Hemostat applied to tubing of donor bag. Sheath removed from the needle and needle inserted into the antecubital Vein in the  left Arm. Hemostat released. Blood flowing well down the tubing into the bag. No adjustment of needle needed to maintain adequate blood flow. Scale monitoring amount of blood in bag. Stop [id number]  Needle removed from patient's arm. Pressure applied to patient's arm until bleeding stopped. Dry sterile dressing applied over puncture site and taped down well and secured with coban wrap. Final amount of blood removed:500ml  Post Vital Signs:see Doc flow sheet  Patient drank water and observed for 20 minutes. Patient tolerated procedure well. Discharged ambulatory with belongings.

## 2020-03-10 ENCOUNTER — HOSPITAL ENCOUNTER (OUTPATIENT)
Dept: INFUSION THERAPY | Age: 65
Discharge: HOME | End: 2020-03-10
Payer: MEDICARE

## 2020-03-10 VITALS
DIASTOLIC BLOOD PRESSURE: 72 MMHG | RESPIRATION RATE: 18 BRPM | HEART RATE: 69 BPM | OXYGEN SATURATION: 98 % | HEIGHT: 65 IN | BODY MASS INDEX: 29.32 KG/M2 | SYSTOLIC BLOOD PRESSURE: 135 MMHG | WEIGHT: 176 LBS | TEMPERATURE: 97.5 F

## 2020-03-10 DIAGNOSIS — E66.9 OBESITY (BMI 30.0-34.9): ICD-10-CM

## 2020-03-10 DIAGNOSIS — D75.1 POLYCYTHEMIA: ICD-10-CM

## 2020-03-10 DIAGNOSIS — R79.83 HOMOCYSTEINEMIA: ICD-10-CM

## 2020-03-10 DIAGNOSIS — N18.30 CHRONIC KIDNEY DISEASE, STAGE III (MODERATE) (HCC): ICD-10-CM

## 2020-03-10 DIAGNOSIS — D45 POLYCYTHEMIA VERA (HCC): Primary | ICD-10-CM

## 2020-03-10 LAB
HCT VFR BLD CALC: 48.5 % (ref 37–47)
HEMOGLOBIN: 14.1 GM/DL (ref 12–16)

## 2020-03-10 PROCEDURE — 99195 PHLEBOTOMY: CPT

## 2020-03-10 RX ORDER — 0.9 % SODIUM CHLORIDE 0.9 %
250 INTRAVENOUS SOLUTION INTRAVENOUS ONCE
Status: CANCELLED | OUTPATIENT
Start: 2020-03-10

## 2020-03-10 RX ORDER — 0.9 % SODIUM CHLORIDE 0.9 %
500 INTRAVENOUS SOLUTION INTRAVENOUS ONCE
Status: CANCELLED | OUTPATIENT
Start: 2020-03-10

## 2020-03-10 NOTE — PLAN OF CARE
Problem: Intellectual/Education/Knowledge Deficit  Goal: Teaching initiated upon admission  Description: Patient instructed on therapeutic phlebotomy procedure and potential complications. Consent signed. Aware to call MD if complications occur. Outcome: Met This Shift  Note: Patient verbalized understanding to therapeutic phlebotomy procedure and possible complications. Intervention: Verbal/written education provided  Note: Patient instructed on therapeutic phlebotomy procedure and potential complications. Consent signed. Aware to call MD if complications occur. Problem: Discharge Planning  Goal: Knowledge of discharge instructions  Description: Knowledge of discharge instructions     Outcome: Met This Shift  Note: Patient understands home discharge instructions sheet. Intervention: Interaction with patient/family and care team  Note: Reviewed home going instructions with Patient. Problem: Musculor/Skeletal Functional Status  Goal: Absence of falls  Outcome: Met This Shift  Note: No falls occurred with visit today. Intervention: Fall precautions  Note: Verbalized understanding of fall prevention to ask for assistance with ambulation. Call light within reach. Care plan reviewed with patient . Patient  verbalize understanding of the plan of care and contribute to goal setting.

## 2020-04-08 ENCOUNTER — HOSPITAL ENCOUNTER (OUTPATIENT)
Dept: INFUSION THERAPY | Age: 65
Discharge: HOME | End: 2020-04-08
Payer: MEDICARE

## 2020-04-08 VITALS
TEMPERATURE: 98 F | BODY MASS INDEX: 29.02 KG/M2 | WEIGHT: 174.2 LBS | DIASTOLIC BLOOD PRESSURE: 63 MMHG | HEIGHT: 65 IN | OXYGEN SATURATION: 97 % | SYSTOLIC BLOOD PRESSURE: 131 MMHG | RESPIRATION RATE: 18 BRPM | HEART RATE: 73 BPM

## 2020-04-08 LAB
HCT VFR BLD CALC: 45.5 % (ref 37–47)
HEMOGLOBIN: 13.4 GM/DL (ref 12–16)

## 2020-04-08 PROCEDURE — 99211 OFF/OP EST MAY X REQ PHY/QHP: CPT

## 2020-04-08 NOTE — PLAN OF CARE
Problem: Intellectual/Education/Knowledge Deficit  Goal: Teaching initiated upon admission  Description: Patient instructed on therapeutic phlebotomy procedure and potential complications. Consent signed. Aware to call MD if complications occur. Outcome: Met This Shift  Note: Understands procedure on hold due to blood counts. Intervention: Verbal/written education provided  Note: Instructed procedure on hold due to blood counts     Problem: Discharge Planning  Goal: Knowledge of discharge instructions  Description: Knowledge of discharge instructions     Outcome: Met This Shift  Note: Verbalized understanding of discharge instructions, follow-up appointments, and when to call the physician. Intervention: Interaction with patient/family and care team  Note: Discuss understanding of discharge instructions,follow-up appointments, and when to call the physician. Care plan reviewed with patient . Patient  verbalize understanding of the plan of care and contribute to goal setting.

## 2020-04-09 NOTE — PROGRESS NOTES
Patient informed holding procedure today due to blood counts. Discharge instructions given to patient-verbalizes understanding. Ambulated off unit per self with belongings.

## 2020-04-23 ENCOUNTER — HOSPITAL ENCOUNTER (OUTPATIENT)
Dept: INFUSION THERAPY | Age: 65
Discharge: HOME | End: 2020-04-23
Payer: MEDICARE

## 2020-04-23 ENCOUNTER — OFFICE VISIT (OUTPATIENT)
Dept: ONCOLOGY | Age: 65
End: 2020-04-23
Payer: MEDICARE

## 2020-04-23 VITALS
RESPIRATION RATE: 18 BRPM | OXYGEN SATURATION: 96 % | HEIGHT: 65 IN | DIASTOLIC BLOOD PRESSURE: 76 MMHG | HEART RATE: 79 BPM | SYSTOLIC BLOOD PRESSURE: 150 MMHG | WEIGHT: 177.2 LBS | TEMPERATURE: 97.7 F | BODY MASS INDEX: 29.52 KG/M2

## 2020-04-23 DIAGNOSIS — D75.1 POLYCYTHEMIA: ICD-10-CM

## 2020-04-23 LAB
ALBUMIN SERPL-MCNC: 4.1 G/DL (ref 3.5–5.1)
ALP BLD-CCNC: 165 U/L (ref 38–126)
ALT SERPL-CCNC: 30 U/L (ref 11–66)
AST SERPL-CCNC: 56 U/L (ref 5–40)
BILIRUB SERPL-MCNC: 0.4 MG/DL (ref 0.3–1.2)
BILIRUBIN DIRECT: < 0.2 MG/DL (ref 0–0.3)
BUN BLDV-MCNC: 26 MG/DL (ref 7–22)
CHLORIDE, WHOLE BLOOD: 106 MEQ/L (ref 98–109)
CO2: 22 MEQ/L (ref 23–33)
CREATININE, WHOLE BLOOD: 1.2 MG/DL (ref 0.5–1.2)
ERYTHROCYTE [DISTWIDTH] IN BLOOD BY AUTOMATED COUNT: 25.2 % (ref 11.5–14.5)
ERYTHROCYTE [DISTWIDTH] IN BLOOD BY AUTOMATED COUNT: 52.8 FL (ref 35–45)
GFR, ESTIMATED: 48 ML/MIN/1.73M2
GLUCOSE, WHOLE BLOOD: 173 MG/DL (ref 70–108)
HCT VFR BLD CALC: 49.2 % (ref 37–47)
HEMOGLOBIN: 12.9 GM/DL (ref 12–16)
IONIZED CALCIUM, WHOLE BLOOD: 1.38 MMOL/L (ref 1.12–1.32)
MCH RBC QN AUTO: 17.5 PG (ref 26–33)
MCHC RBC AUTO-ENTMCNC: 26.2 GM/DL (ref 32.2–35.5)
MCV RBC AUTO: 66.7 FL (ref 81–99)
PATHOLOGIST REVIEW: ABNORMAL
PLATELET # BLD: 643 THOU/MM3 (ref 130–400)
POTASSIUM, WHOLE BLOOD: 4.3 MEQ/L (ref 3.5–4.9)
RBC # BLD: 7.38 MILL/MM3 (ref 4.2–5.4)
SEGMENTED NEUTROPHILS ABSOLUTE COUNT: 26.1 THOU/MM3 (ref 1.8–7.7)
SODIUM, WHOLE BLOOD: 141 MEQ/L (ref 138–146)
TOTAL PROTEIN: 6.9 G/DL (ref 6.1–8)
WBC # BLD: 34.1 THOU/MM3 (ref 4.8–10.8)

## 2020-04-23 PROCEDURE — 4040F PNEUMOC VAC/ADMIN/RCVD: CPT | Performed by: INTERNAL MEDICINE

## 2020-04-23 PROCEDURE — 80047 BASIC METABLC PNL IONIZED CA: CPT

## 2020-04-23 PROCEDURE — G8427 DOCREV CUR MEDS BY ELIG CLIN: HCPCS | Performed by: INTERNAL MEDICINE

## 2020-04-23 PROCEDURE — 36415 COLL VENOUS BLD VENIPUNCTURE: CPT

## 2020-04-23 PROCEDURE — G8400 PT W/DXA NO RESULTS DOC: HCPCS | Performed by: INTERNAL MEDICINE

## 2020-04-23 PROCEDURE — 84520 ASSAY OF UREA NITROGEN: CPT

## 2020-04-23 PROCEDURE — 99211 OFF/OP EST MAY X REQ PHY/QHP: CPT

## 2020-04-23 PROCEDURE — 85027 COMPLETE CBC AUTOMATED: CPT

## 2020-04-23 PROCEDURE — 3017F COLORECTAL CA SCREEN DOC REV: CPT | Performed by: INTERNAL MEDICINE

## 2020-04-23 PROCEDURE — 1090F PRES/ABSN URINE INCON ASSESS: CPT | Performed by: INTERNAL MEDICINE

## 2020-04-23 PROCEDURE — 82374 ASSAY BLOOD CARBON DIOXIDE: CPT

## 2020-04-23 PROCEDURE — 1036F TOBACCO NON-USER: CPT | Performed by: INTERNAL MEDICINE

## 2020-04-23 PROCEDURE — 1123F ACP DISCUSS/DSCN MKR DOCD: CPT | Performed by: INTERNAL MEDICINE

## 2020-04-23 PROCEDURE — 80076 HEPATIC FUNCTION PANEL: CPT

## 2020-04-23 PROCEDURE — G8417 CALC BMI ABV UP PARAM F/U: HCPCS | Performed by: INTERNAL MEDICINE

## 2020-04-23 PROCEDURE — 99214 OFFICE O/P EST MOD 30 MIN: CPT | Performed by: INTERNAL MEDICINE

## 2020-05-05 ENCOUNTER — HOSPITAL ENCOUNTER (OUTPATIENT)
Dept: INFUSION THERAPY | Age: 65
Discharge: HOME | End: 2020-05-05
Payer: MEDICARE

## 2020-05-05 VITALS
HEART RATE: 68 BPM | HEIGHT: 65 IN | BODY MASS INDEX: 29.39 KG/M2 | RESPIRATION RATE: 18 BRPM | TEMPERATURE: 97.9 F | DIASTOLIC BLOOD PRESSURE: 68 MMHG | SYSTOLIC BLOOD PRESSURE: 134 MMHG | OXYGEN SATURATION: 97 % | WEIGHT: 176.4 LBS

## 2020-05-05 LAB
HCT VFR BLD CALC: 48.3 % (ref 37–47)
HEMOGLOBIN: 13.2 GM/DL (ref 12–16)

## 2020-05-05 PROCEDURE — 99211 OFF/OP EST MAY X REQ PHY/QHP: CPT

## 2020-05-05 NOTE — PLAN OF CARE
Problem: Discharge Planning  Goal: Knowledge of discharge instructions  Description: Knowledge of discharge instructions     Outcome: Met This Shift  Note: Verbalized understanding of discharge instructions, follow-up appointments, and when to call the physician. Intervention: Interaction with patient/family and care team  Note: Discuss understanding of discharge instructions,follow-up appointments, and when to call the physician. Problem: Intellectual/Education/Knowledge Deficit  Goal: Teaching initiated upon admission  Description: Patient instructed on therapeutic phlebotomy procedure and potential complications. Consent signed. Aware to call MD if complications occur. Outcome: Met This Shift  Note: Patient understands procedure on hold due to lab results. Intervention: Verbal/written education provided  Note: Review lab results    Care plan reviewed with patient . Patient  verbalize understanding of the plan of care and contribute to goal setting.

## 2020-05-05 NOTE — PROGRESS NOTES
Patientb informed procedure on hold due to lab counts. Discharge instructions given to patient-verbalizes understanding. Ambulated off unit per self with belongings.

## 2020-05-09 ENCOUNTER — APPOINTMENT (OUTPATIENT)
Dept: GENERAL RADIOLOGY | Age: 65
End: 2020-05-09
Payer: MEDICARE

## 2020-05-09 ENCOUNTER — HOSPITAL ENCOUNTER (EMERGENCY)
Age: 65
Discharge: HOME | End: 2020-05-09
Attending: EMERGENCY MEDICINE
Payer: MEDICARE

## 2020-05-09 ENCOUNTER — APPOINTMENT (OUTPATIENT)
Dept: CT IMAGING | Age: 65
End: 2020-05-09
Payer: MEDICARE

## 2020-05-09 VITALS
RESPIRATION RATE: 18 BRPM | TEMPERATURE: 98.2 F | DIASTOLIC BLOOD PRESSURE: 69 MMHG | HEART RATE: 88 BPM | BODY MASS INDEX: 28.32 KG/M2 | SYSTOLIC BLOOD PRESSURE: 144 MMHG | WEIGHT: 170 LBS | HEIGHT: 65 IN | OXYGEN SATURATION: 98 %

## 2020-05-09 LAB
ALBUMIN SERPL-MCNC: 4.3 G/DL (ref 3.5–5.1)
ALP BLD-CCNC: 187 U/L (ref 38–126)
ALT SERPL-CCNC: 33 U/L (ref 11–66)
ANION GAP SERPL CALCULATED.3IONS-SCNC: 12 MEQ/L (ref 8–16)
ANISOCYTOSIS: PRESENT
APTT: 37.8 SECONDS (ref 22–38)
AST SERPL-CCNC: 38 U/L (ref 5–40)
BACTERIA: ABNORMAL /HPF
BASOPHILIA: ABNORMAL
BASOPHILS # BLD: 1.9 %
BASOPHILS ABSOLUTE: 0.7 THOU/MM3 (ref 0–0.1)
BILIRUB SERPL-MCNC: 0.4 MG/DL (ref 0.3–1.2)
BILIRUBIN URINE: NEGATIVE
BLOOD, URINE: ABNORMAL
BUN BLDV-MCNC: 31 MG/DL (ref 7–22)
CALCIUM SERPL-MCNC: 10.4 MG/DL (ref 8.5–10.5)
CASTS 2: ABNORMAL /LPF
CASTS UA: ABNORMAL /LPF
CHARACTER, URINE: ABNORMAL
CHLORIDE BLD-SCNC: 103 MEQ/L (ref 98–111)
CO2: 20 MEQ/L (ref 23–33)
COLOR: YELLOW
CREAT SERPL-MCNC: 1.2 MG/DL (ref 0.4–1.2)
CRYSTALS, UA: ABNORMAL
EKG ATRIAL RATE: 90 BPM
EKG P AXIS: 73 DEGREES
EKG P-R INTERVAL: 186 MS
EKG Q-T INTERVAL: 382 MS
EKG QRS DURATION: 82 MS
EKG QTC CALCULATION (BAZETT): 467 MS
EKG R AXIS: -17 DEGREES
EKG T AXIS: 73 DEGREES
EKG VENTRICULAR RATE: 90 BPM
EOSINOPHIL # BLD: 7.8 %
EOSINOPHILS ABSOLUTE: 2.7 THOU/MM3 (ref 0–0.4)
EPITHELIAL CELLS, UA: ABNORMAL /HPF
ERYTHROCYTE [DISTWIDTH] IN BLOOD BY AUTOMATED COUNT: 24.8 % (ref 11.5–14.5)
ERYTHROCYTE [DISTWIDTH] IN BLOOD BY AUTOMATED COUNT: 49 FL (ref 35–45)
GFR SERPL CREATININE-BSD FRML MDRD: 45 ML/MIN/1.73M2
GLUCOSE BLD-MCNC: 134 MG/DL (ref 70–108)
GLUCOSE URINE: NEGATIVE MG/DL
HCT VFR BLD CALC: 49.4 % (ref 37–47)
HEMOGLOBIN: 13.6 GM/DL (ref 12–16)
HYPOCHROMIA: PRESENT
IMMATURE GRANS (ABS): 0.7 THOU/MM3 (ref 0–0.07)
IMMATURE GRANULOCYTES: 2 %
INR BLD: 1.08 (ref 0.85–1.13)
KETONES, URINE: NEGATIVE
LACTIC ACID, SEPSIS: 1 MMOL/L (ref 0.5–1.9)
LEUKOCYTE ESTERASE, URINE: ABNORMAL
LIPASE: 31.9 U/L (ref 5.6–51.3)
LYMPHOCYTES # BLD: 11.9 %
LYMPHOCYTES ABSOLUTE: 4.1 THOU/MM3 (ref 1–4.8)
MCH RBC QN AUTO: 17.7 PG (ref 26–33)
MCHC RBC AUTO-ENTMCNC: 27.5 GM/DL (ref 32.2–35.5)
MCV RBC AUTO: 64.4 FL (ref 81–99)
MICROCYTES: PRESENT
MISCELLANEOUS 2: ABNORMAL
MONOCYTES # BLD: 5.7 %
MONOCYTES ABSOLUTE: 2 THOU/MM3 (ref 0.4–1.3)
NITRITE, URINE: POSITIVE
NUCLEATED RED BLOOD CELLS: 0 /100 WBC
OSMOLALITY CALCULATION: 278.6 MOSMOL/KG (ref 275–300)
PH UA: 5 (ref 5–9)
PLATELET # BLD: 610 THOU/MM3 (ref 130–400)
POIKILOCYTES: ABNORMAL
POTASSIUM REFLEX MAGNESIUM: 4.1 MEQ/L (ref 3.5–5.2)
PRO-BNP: 279.4 PG/ML (ref 0–900)
PROTEIN UA: ABNORMAL
RBC # BLD: 7.67 MILL/MM3 (ref 4.2–5.4)
RBC URINE: ABNORMAL /HPF
RENAL EPITHELIAL, UA: ABNORMAL
SCAN OF BLOOD SMEAR: NORMAL
SEG NEUTROPHILS: 70.7 %
SEGMENTED NEUTROPHILS ABSOLUTE COUNT: 24.3 THOU/MM3 (ref 1.8–7.7)
SODIUM BLD-SCNC: 135 MEQ/L (ref 135–145)
SPECIFIC GRAVITY, URINE: 1.01 (ref 1–1.03)
SPHEROCYTES: ABNORMAL
TARGET CELLS: ABNORMAL
TOTAL PROTEIN: 7.2 G/DL (ref 6.1–8)
TOXIC GRANULATION: PRESENT
TROPONIN T: < 0.01 NG/ML
UROBILINOGEN, URINE: 0.2 EU/DL (ref 0–1)
WBC # BLD: 34.3 THOU/MM3 (ref 4.8–10.8)
WBC UA: > 200 /HPF
YEAST: ABNORMAL

## 2020-05-09 PROCEDURE — 85610 PROTHROMBIN TIME: CPT

## 2020-05-09 PROCEDURE — 2580000003 HC RX 258: Performed by: EMERGENCY MEDICINE

## 2020-05-09 PROCEDURE — 87077 CULTURE AEROBIC IDENTIFY: CPT

## 2020-05-09 PROCEDURE — 6360000004 HC RX CONTRAST MEDICATION: Performed by: EMERGENCY MEDICINE

## 2020-05-09 PROCEDURE — 81001 URINALYSIS AUTO W/SCOPE: CPT

## 2020-05-09 PROCEDURE — 87186 SC STD MICRODIL/AGAR DIL: CPT

## 2020-05-09 PROCEDURE — 85025 COMPLETE CBC W/AUTO DIFF WBC: CPT

## 2020-05-09 PROCEDURE — 83880 ASSAY OF NATRIURETIC PEPTIDE: CPT

## 2020-05-09 PROCEDURE — 83690 ASSAY OF LIPASE: CPT

## 2020-05-09 PROCEDURE — 74177 CT ABD & PELVIS W/CONTRAST: CPT

## 2020-05-09 PROCEDURE — 93010 ELECTROCARDIOGRAM REPORT: CPT | Performed by: NUCLEAR MEDICINE

## 2020-05-09 PROCEDURE — 87086 URINE CULTURE/COLONY COUNT: CPT

## 2020-05-09 PROCEDURE — 71045 X-RAY EXAM CHEST 1 VIEW: CPT

## 2020-05-09 PROCEDURE — 84484 ASSAY OF TROPONIN QUANT: CPT

## 2020-05-09 PROCEDURE — 87040 BLOOD CULTURE FOR BACTERIA: CPT

## 2020-05-09 PROCEDURE — 6370000000 HC RX 637 (ALT 250 FOR IP): Performed by: EMERGENCY MEDICINE

## 2020-05-09 PROCEDURE — 36415 COLL VENOUS BLD VENIPUNCTURE: CPT

## 2020-05-09 PROCEDURE — 93005 ELECTROCARDIOGRAM TRACING: CPT | Performed by: EMERGENCY MEDICINE

## 2020-05-09 PROCEDURE — 80053 COMPREHEN METABOLIC PANEL: CPT

## 2020-05-09 PROCEDURE — 99285 EMERGENCY DEPT VISIT HI MDM: CPT

## 2020-05-09 PROCEDURE — 83605 ASSAY OF LACTIC ACID: CPT

## 2020-05-09 PROCEDURE — 71275 CT ANGIOGRAPHY CHEST: CPT

## 2020-05-09 PROCEDURE — 85730 THROMBOPLASTIN TIME PARTIAL: CPT

## 2020-05-09 RX ORDER — SULFAMETHOXAZOLE AND TRIMETHOPRIM 800; 160 MG/1; MG/1
1 TABLET ORAL 2 TIMES DAILY
Qty: 14 TABLET | Refills: 0 | Status: SHIPPED | OUTPATIENT
Start: 2020-05-09 | End: 2020-05-16

## 2020-05-09 RX ORDER — SODIUM CHLORIDE 9 MG/ML
1000 INJECTION, SOLUTION INTRAVENOUS CONTINUOUS
Status: DISCONTINUED | OUTPATIENT
Start: 2020-05-09 | End: 2020-05-09 | Stop reason: HOSPADM

## 2020-05-09 RX ORDER — SULFAMETHOXAZOLE AND TRIMETHOPRIM 800; 160 MG/1; MG/1
1 TABLET ORAL ONCE
Status: COMPLETED | OUTPATIENT
Start: 2020-05-09 | End: 2020-05-09

## 2020-05-09 RX ADMIN — SULFAMETHOXAZOLE AND TRIMETHOPRIM 1 TABLET: 800; 160 TABLET ORAL at 05:55

## 2020-05-09 RX ADMIN — IOPAMIDOL 80 ML: 755 INJECTION, SOLUTION INTRAVENOUS at 05:32

## 2020-05-09 RX ADMIN — SODIUM CHLORIDE 1000 ML: 9 INJECTION, SOLUTION INTRAVENOUS at 04:11

## 2020-05-09 ASSESSMENT — ENCOUNTER SYMPTOMS
ABDOMINAL PAIN: 0
WHEEZING: 0
CHOKING: 0
RHINORRHEA: 0
VOMITING: 0
SHORTNESS OF BREATH: 0
NAUSEA: 0
BACK PAIN: 1
CONSTIPATION: 0
SORE THROAT: 0
DIARRHEA: 0
COUGH: 0

## 2020-05-09 NOTE — ED PROVIDER NOTES
[facility] COMPLAINT       Chief Complaint   Patient presents with    Chest Pain       Nurses Notes reviewed and I agree except as noted in the HPI. HISTORY OF PRESENT ILLNESS    Becca Castro is a 75 y.o. female who presents to the emergency department stating that she began experiencing right flank pain tonight. States she woke up early this morning around 3 AM with pain in the right flank that then moved up into her right upper back and then came over her shoulder and into her right anterior chest.  She states that she has now become completely pain-free. Pain resolved spontaneously prior to arrival in the emergency department. She reportedly took 2 baby aspirin at home before EMS arrived. She reports that she has had pain like this in the past and was seen in the emergency department however cannot recall her final diagnosis. She denies any urinary symptoms such as dysuria, hematuria, frequency, or urgency. She denies fever, cough, shortness of breath, lower extremity pain or swelling. She denies abdominal pain, nausea, vomiting, diarrhea, black or bloody stools. She does report a history in the past of blood clots and states that she was on Xarelto but had to be taken off of that medication due to bleeding. No other complaints or concerns at this time. REVIEW OF SYSTEMS     Review of Systems   Constitutional: Negative for chills and fever. HENT: Negative for congestion, rhinorrhea and sore throat. Eyes: Negative for visual disturbance. Respiratory: Negative for cough, choking, shortness of breath and wheezing. Cardiovascular: Positive for chest pain. Negative for palpitations and leg swelling. Gastrointestinal: Negative for abdominal pain, constipation, diarrhea, nausea and vomiting. Endocrine: Negative for polyuria. Genitourinary: Positive for flank pain. Negative for dysuria, frequency and hematuria. urticaria, or petechiae]  NEUROLOGIC: [Alert and oriented x 3, GCS 15, normal mentation for age. Moves all four extremities. No gross sensory deficit. Cerebellar function grossly normal.]  PSYCHIATRIC: [Normal mood and affect, thought process is clear and linear]     DIFFERENTIAL DIAGNOSIS:   Differential Dx Lists - I consider the following to be a partial list of the possible etiologies for the patient's symptoms and based on my clinical findings as well and are part of my medical decision making:    Chest Pain: ACS, myocardial infarction, pericarditis, costochondritis, pneumothorax, pneumonia, mediastinum infection, and others    DIAGNOSTIC RESULTS     EKG: All EKG's are interpreted by the Emergency Department Physician who either signs or Co-signsthis chart in the absence of a cardiologist.  EKG shows normal sinus rhythm at a rate of 90 bpm.  UT interval 186 ms, QRS duration 82 ms,  ms. No ST elevation or depression, my interpretation. RADIOLOGY: non-plain film images(s) such as CT,Ultrasound and MRI are read by the radiologist.    CTA Chest W WO Contrast   Final Result   . 1. Redemonstration of mild hyperinflation with central bronchial dilation compatible with underlying COPD. 2. Right apical subpleural nodule. 3. Suspected subpleural scarring with minimal adjacent atelectasis. **This report has been created using voice recognition software. It may contain minor errors which are inherent in voice recognition technology. **      Final report electronically signed by Dr. Andres Robbins on 5/9/2020 6:36 AM      XR CHEST PORTABLE   Final Result   . Negative exam for acute appearing pathology. **This report has been created using voice recognition software. It may contain minor errors which are inherent in voice recognition technology. **      Final report electronically signed by Dr. Andres Robbins on 5/9/2020 4:58 AM      CT ABDOMEN PELVIS W IV CONTRAST Additional Contrast? None

## 2020-05-09 NOTE — ED NOTES
ED nurse-to-nurse bedside report    Chief Complaint   Patient presents with    Chest Pain      LOC: alert and orientated to name, place, date  Vital signs   Vitals:    05/09/20 0351 05/09/20 0428 05/09/20 0525 05/09/20 0557   BP: (!) 147/93 (!) 150/72 (!) 153/80 (!) 154/72   Pulse: 86 76 97 94   Resp: 18 20 18 18   Temp: 98.2 °F (36.8 °C)      TempSrc: Oral      SpO2: 100% 99% 100% 98%   Weight: 170 lb (77.1 kg)      Height: 5' 5\" (1.651 m)         Pain:    Pain Interventions: N/A  Pain Goal: no pain  Oxygen: No    Current needs required N/A   Telemetry: Yes  LDAs:   Peripheral IV 05/09/20 Left Antecubital (Active)   Site Assessment Clean;Dry; Intact 5/9/2020  5:58 AM   Line Status Normal saline locked 5/9/2020  5:58 AM   Dressing Status Clean;Dry; Intact 5/9/2020  5:58 AM     Continuous Infusions:    sodium chloride 1,000 mL (05/09/20 0411)     Mobility: Independent  [place]an Fall Risk Score: Fall Risk 6/3/2019   2 or more falls in past year? no   Fall with injury in past year? no     Fall Interventions: siderails up, call light within reach.   Report given to: [facility], RN  05/09/20 7120

## 2020-05-09 NOTE — ED NOTES
Pt updated on POC. Pt respirations easy and unlabored. Pt laying on cot. Pt denies any further needs at this time.       Millie Martinez RN  05/09/20 9636

## 2020-05-09 NOTE — ED NOTES
PT updated on POC. PT denies any further needs at this time. Respirations easy and unlabored. Will monitor.       Nataly Rogel RN  05/09/20 7659

## 2020-05-11 ENCOUNTER — TELEPHONE (OUTPATIENT)
Dept: FAMILY MEDICINE CLINIC | Age: 65
End: 2020-05-11

## 2020-05-11 ENCOUNTER — OFFICE VISIT (OUTPATIENT)
Dept: CARDIOLOGY CLINIC | Age: 65
End: 2020-05-11
Payer: MEDICARE

## 2020-05-11 VITALS
SYSTOLIC BLOOD PRESSURE: 142 MMHG | DIASTOLIC BLOOD PRESSURE: 70 MMHG | HEIGHT: 66 IN | WEIGHT: 176 LBS | HEART RATE: 82 BPM | BODY MASS INDEX: 28.28 KG/M2

## 2020-05-11 LAB
ORGANISM: ABNORMAL
URINE CULTURE REFLEX: ABNORMAL

## 2020-05-11 PROCEDURE — 3017F COLORECTAL CA SCREEN DOC REV: CPT | Performed by: INTERNAL MEDICINE

## 2020-05-11 PROCEDURE — 1090F PRES/ABSN URINE INCON ASSESS: CPT | Performed by: INTERNAL MEDICINE

## 2020-05-11 PROCEDURE — 4040F PNEUMOC VAC/ADMIN/RCVD: CPT | Performed by: INTERNAL MEDICINE

## 2020-05-11 PROCEDURE — G8400 PT W/DXA NO RESULTS DOC: HCPCS | Performed by: INTERNAL MEDICINE

## 2020-05-11 PROCEDURE — 1123F ACP DISCUSS/DSCN MKR DOCD: CPT | Performed by: INTERNAL MEDICINE

## 2020-05-11 PROCEDURE — 99213 OFFICE O/P EST LOW 20 MIN: CPT | Performed by: INTERNAL MEDICINE

## 2020-05-11 PROCEDURE — G8417 CALC BMI ABV UP PARAM F/U: HCPCS | Performed by: INTERNAL MEDICINE

## 2020-05-11 PROCEDURE — G8427 DOCREV CUR MEDS BY ELIG CLIN: HCPCS | Performed by: INTERNAL MEDICINE

## 2020-05-11 PROCEDURE — 1036F TOBACCO NON-USER: CPT | Performed by: INTERNAL MEDICINE

## 2020-05-11 NOTE — PROGRESS NOTES
[address] [place] [address],[place] [address] [address]  [place] [address]  Dept: [phone]  Dept Fax: [phone]  Loc: [phone]    Visit Date: 5/11/2020    Ms. Rory Luis is a 75 y.o. female  who presented for:  Chief Complaint   Patient presents with    Follow-up       HPI:   74 yo F c hxo f Polycythemia Vera, PE/DVT comes for a follow up after recent hospitalization. She was recently in the ED for UTI. States she is doing ok now. She follows up with hematology. [facility] is scheduled to get phlebotomies, last one in May 2019.   Denies any chest pain, no radiation, no aggravating or alleviating factors, and not associated with sob, palpitations, lightheadedness, dizziness, orthopnea, PND or pedal edema. Current Outpatient Medications:     sulfamethoxazole-trimethoprim (BACTRIM DS) 800-160 MG per tablet, Take 1 tablet by mouth 2 times daily for 7 days, Disp: 14 tablet, Rfl: 0    folic acid (FOLVITE) 1 MG tablet, TAKE 1 TABLET DAILY, Disp: 90 tablet, Rfl: 1    amLODIPine (NORVASC) 10 MG tablet, Take 1 tablet by mouth daily, Disp: 90 tablet, Rfl: 5    triamcinolone (KENALOG) 0.1 % cream, APPLY TO LEGS TWICE DAILY AS NEEDED, Disp: , Rfl: 1    cloNIDine (CATAPRES) 0.2 MG tablet, Take 1 tablet by mouth 2 times daily, Disp: 180 tablet, Rfl: 3    enalapril (VASOTEC) 20 MG tablet, TAKE 1 TABLET TWICE A DAY, Disp: 180 tablet, Rfl: 3    Cyanocobalamin (B-12) 500 MCG TABS, Take 500 mg by mouth daily. , Disp: , Rfl:     Past Medical History  Mily John  has a past medical history of Basal cell cancer, Chronic kidney disease, stage III (moderate) ([address] DVT (deep venous thrombosis) ([address] History of shingles, Homocysteinemia ([facility] [address] Hypertension, Polycythemia vera(238.4), Pulmonary emboli ([address] Right-sided epistaxis, and Unspecified diseases of blood and blood-forming organs. Social History  Mily John  reports that she quit smoking about 11 years ago.  She has a 1.00 Echocardiogram complete 2D with doppler with color    Narrative Transthoracic Echocardiography Report (TTE)     Demographics      Patient Name   [facility] Gender              Female                  A      MR #           [id number]         Race                                                       Ethnicity      Account #      [de-identified]         Room Number         0004      Accession      [id number]         Date of Study       03/22/2018   Number      Date of Birth  1955        Referring Physician MD Susannah Germain MD      Age            57 year(s)        Nasir De La Cruz, CS                                       Interpreting        Reny Jernigan MD                                    Physician     Procedure    Type of Study      TTE procedure:ECHOCARDIOGRAM COMPLETE 2D W DOPPLER W COLOR. Procedure Date  Date: 03/22/2018 Start: 04:12 PM    Study Location: Bedside  Technical Quality: Adequate visualization    Indications:Acute coronary syndrome. Additional Medical History:Hypertension, Chest pain, Former smoker, Obesity,  PE, DVT, Shingles, Basal cell cancer    Patient Status: Routine    Height: 64.96 inches Weight: 178.58 pounds BSA: 1.88 m^2 BMI: 29.75 kg/m^2    BP: 139/88 mmHg     Conclusions      Summary   Normal left ventricle size and systolic function. Ejection fraction was   estimated at 55-60%. There were no regional left ventricular wall motion   abnormalities and wall thickness was within normal limits. The right ventricular size was normal with normal systolic function and   wall thickness. The aortic valve was trileaflet with normal thickness and cuspal   separation. DOPPLER: Transaortic velocity was within the normal range with   no evidence of aortic stenosis. Trivial aortic regurgitation is noted.    Mild thickening of A' Lateral Velocity:   15.1 cm/s   E/E' septal: 17.45   E/E' lateral: 11.92   MR Velocity: 741 cm/s     [email or web address] [id number]       STRESS:    CATH:    Assessment/Plan       Diagnosis Orders   1. Dyspnea, unspecified type  Echo 2D w doppler w color complete         DVT/PE  Polycythemia vera  Former smoking  HTN-uncontrolled        Reviewed EKG, and prior workup  Has mild shortness of breath  Will repeat Echo to evlauate RVSP/RV function due to prior PEs  BP on the higher side  Continue enalapril/catapress  On amlodipine to 10mg daily  Was on Xarelto in the past but had to stop due to bleeding issues. The patient is asked to make an attempt to improve diet and exercise patterns to aid in medical management of this problem. Advised more plant based nutrition/meditarrean diet   Advised patient to call office or seek immediate medical attention if there is any new onset of  any chest pain, sob, palpitations, lightheadedness, dizziness, orthopnea, PND or pedal edema. All medication side effects were discussed in details.     Thank youfor allowing me to participate in the care of this patient. Please do not hesitate to contact me for any further questions. No follow-ups on file.        Electronically signed by Ruiz Yates MD [facility]  5/11/2020 at 8:06 AM

## 2020-05-13 ENCOUNTER — TELEPHONE (OUTPATIENT)
Dept: CARDIOLOGY CLINIC | Age: 65
End: 2020-05-13

## 2020-05-14 LAB
BLOOD CULTURE, ROUTINE: NORMAL
BLOOD CULTURE, ROUTINE: NORMAL

## 2020-05-19 ENCOUNTER — HOSPITAL ENCOUNTER (OUTPATIENT)
Dept: NON INVASIVE DIAGNOSTICS | Age: 65
Discharge: HOME | End: 2020-05-19
Payer: MEDICARE

## 2020-05-19 LAB
LV EF: 58 %
LVEF MODALITY: NORMAL

## 2020-05-19 PROCEDURE — 93306 TTE W/DOPPLER COMPLETE: CPT

## 2020-06-02 ENCOUNTER — HOSPITAL ENCOUNTER (OUTPATIENT)
Dept: INFUSION THERAPY | Age: 65
Discharge: HOME | End: 2020-06-02
Payer: MEDICARE

## 2020-06-02 VITALS
SYSTOLIC BLOOD PRESSURE: 153 MMHG | RESPIRATION RATE: 18 BRPM | WEIGHT: 176.4 LBS | HEIGHT: 66 IN | TEMPERATURE: 97.8 F | HEART RATE: 74 BPM | OXYGEN SATURATION: 99 % | BODY MASS INDEX: 28.35 KG/M2 | DIASTOLIC BLOOD PRESSURE: 78 MMHG

## 2020-06-02 DIAGNOSIS — D75.1 POLYCYTHEMIA: ICD-10-CM

## 2020-06-02 DIAGNOSIS — E66.9 OBESITY (BMI 30.0-34.9): ICD-10-CM

## 2020-06-02 DIAGNOSIS — D45 POLYCYTHEMIA VERA (HCC): Primary | ICD-10-CM

## 2020-06-02 DIAGNOSIS — R79.83 HOMOCYSTEINEMIA: ICD-10-CM

## 2020-06-02 DIAGNOSIS — N18.30 CHRONIC KIDNEY DISEASE, STAGE III (MODERATE) (HCC): ICD-10-CM

## 2020-06-02 LAB
ERYTHROCYTE [DISTWIDTH] IN BLOOD BY AUTOMATED COUNT: 25.6 % (ref 11.5–14.5)
ERYTHROCYTE [DISTWIDTH] IN BLOOD BY AUTOMATED COUNT: 52.9 FL (ref 35–45)
HCT VFR BLD CALC: 50.2 % (ref 37–47)
HCT VFR BLD CALC: 50.2 % (ref 37–47)
HCT VFR BLD CALC: 52.4 % (ref 37–47)
HEMOGLOBIN: 13.9 GM/DL (ref 12–16)
HEMOGLOBIN: 13.9 GM/DL (ref 12–16)
HEMOGLOBIN: 14.1 GM/DL (ref 12–16)
MCH RBC QN AUTO: 17.9 PG (ref 26–33)
MCHC RBC AUTO-ENTMCNC: 26.9 GM/DL (ref 32.2–35.5)
MCV RBC AUTO: 66.5 FL (ref 81–99)
PLATELET # BLD: 784 THOU/MM3 (ref 130–400)
PMV BLD AUTO: ABNORMAL FL (ref 9.4–12.4)
RBC # BLD: 7.88 MILL/MM3 (ref 4.2–5.4)
WBC # BLD: 32.3 THOU/MM3 (ref 4.8–10.8)

## 2020-06-02 PROCEDURE — 99195 PHLEBOTOMY: CPT

## 2020-06-02 RX ORDER — 0.9 % SODIUM CHLORIDE 0.9 %
500 INTRAVENOUS SOLUTION INTRAVENOUS ONCE
Status: CANCELLED | OUTPATIENT
Start: 2020-06-02

## 2020-06-02 RX ORDER — 0.9 % SODIUM CHLORIDE 0.9 %
250 INTRAVENOUS SOLUTION INTRAVENOUS ONCE
Status: CANCELLED | OUTPATIENT
Start: 2020-06-02

## 2020-06-02 NOTE — PROGRESS NOTES
THERAPEUTIC PHLEBOTOMY    Most recent Hemoglobin result: 13.9Gm/dl. Hematocrit result  50.2%  Date obtained:   06 /02 /2020    Pre-phlebotomy vital signs:see Doc flow sheet    Start [id number]    Empty donor bag placed on TARE scale. Tourniquet placed on patient's arm and arm palpated for venous access. Area of venous stick cleansed with alcohol. Hemostat applied to tubing of donor bag. Sheath removed from the needle and needle inserted into the antecubital Vein in the  left Arm. Hemostat released. Blood flowing well down the tubing into the bag. No adjustment of needle needed to maintain adequate blood flow. Scale monitoring amount of blood in bag. Stop [id number]  Needle removed from patient's arm. Pressure applied to patient's arm until bleeding stopped. Dry sterile dressing applied over puncture site and taped down well and secured with coban wrap. Final amount of blood removed:500ml  Post Vital Signs:see Doc flow sheet  Patient drank water and snack and observed for 20 minutes. Patient tolerated procedure well. Discharged ambulatory with belongings.

## 2020-06-05 ENCOUNTER — OFFICE VISIT (OUTPATIENT)
Dept: FAMILY MEDICINE CLINIC | Age: 65
End: 2020-06-05
Payer: MEDICARE

## 2020-06-05 VITALS
SYSTOLIC BLOOD PRESSURE: 122 MMHG | BODY MASS INDEX: 29.16 KG/M2 | DIASTOLIC BLOOD PRESSURE: 60 MMHG | TEMPERATURE: 97.4 F | HEIGHT: 65 IN | RESPIRATION RATE: 18 BRPM | WEIGHT: 175 LBS | HEART RATE: 76 BPM

## 2020-06-05 PROCEDURE — G0439 PPPS, SUBSEQ VISIT: HCPCS | Performed by: FAMILY MEDICINE

## 2020-06-05 PROCEDURE — 3017F COLORECTAL CA SCREEN DOC REV: CPT | Performed by: FAMILY MEDICINE

## 2020-06-05 PROCEDURE — 1090F PRES/ABSN URINE INCON ASSESS: CPT | Performed by: FAMILY MEDICINE

## 2020-06-05 PROCEDURE — G8417 CALC BMI ABV UP PARAM F/U: HCPCS | Performed by: FAMILY MEDICINE

## 2020-06-05 PROCEDURE — 4040F PNEUMOC VAC/ADMIN/RCVD: CPT | Performed by: FAMILY MEDICINE

## 2020-06-05 PROCEDURE — 99213 OFFICE O/P EST LOW 20 MIN: CPT | Performed by: FAMILY MEDICINE

## 2020-06-05 PROCEDURE — 1123F ACP DISCUSS/DSCN MKR DOCD: CPT | Performed by: FAMILY MEDICINE

## 2020-06-05 PROCEDURE — 1036F TOBACCO NON-USER: CPT | Performed by: FAMILY MEDICINE

## 2020-06-05 PROCEDURE — G8400 PT W/DXA NO RESULTS DOC: HCPCS | Performed by: FAMILY MEDICINE

## 2020-06-05 PROCEDURE — G8427 DOCREV CUR MEDS BY ELIG CLIN: HCPCS | Performed by: FAMILY MEDICINE

## 2020-06-05 RX ORDER — ENALAPRIL MALEATE 20 MG/1
TABLET ORAL
Qty: 180 TABLET | Refills: 3 | Status: SHIPPED | OUTPATIENT
Start: 2020-06-05 | End: 2021-06-08 | Stop reason: SDUPTHER

## 2020-06-05 RX ORDER — CLONIDINE HYDROCHLORIDE 0.2 MG/1
0.2 TABLET ORAL 2 TIMES DAILY
Qty: 180 TABLET | Refills: 3 | Status: SHIPPED | OUTPATIENT
Start: 2020-06-05 | End: 2021-06-08 | Stop reason: SDUPTHER

## 2020-06-05 ASSESSMENT — ENCOUNTER SYMPTOMS
RHINORRHEA: 0
BLOOD IN STOOL: 0
VOMITING: 0
SHORTNESS OF BREATH: 0
COUGH: 0
SORE THROAT: 0
DIARRHEA: 0
EYE PAIN: 0
WHEEZING: 0
CHEST TIGHTNESS: 0
CONSTIPATION: 0
BACK PAIN: 0
ABDOMINAL PAIN: 0
NAUSEA: 0

## 2020-06-05 ASSESSMENT — LIFESTYLE VARIABLES
AUDIT TOTAL SCORE: 3
HOW MANY STANDARD DRINKS CONTAINING ALCOHOL DO YOU HAVE ON A TYPICAL DAY: 0
HOW OFTEN DURING THE LAST YEAR HAVE YOU HAD A FEELING OF GUILT OR REMORSE AFTER DRINKING: 0
HOW OFTEN DO YOU HAVE SIX OR MORE DRINKS ON ONE OCCASION: 0
HAS A RELATIVE, FRIEND, DOCTOR, OR ANOTHER HEALTH PROFESSIONAL EXPRESSED CONCERN ABOUT YOUR DRINKING OR SUGGESTED YOU CUT DOWN: 0
AUDIT-C TOTAL SCORE: 3
HOW OFTEN DURING THE LAST YEAR HAVE YOU FOUND THAT YOU WERE NOT ABLE TO STOP DRINKING ONCE YOU HAD STARTED: 0
HOW OFTEN DURING THE LAST YEAR HAVE YOU NEEDED AN ALCOHOLIC DRINK FIRST THING IN THE MORNING TO GET YOURSELF GOING AFTER A NIGHT OF HEAVY DRINKING: 0
HOW OFTEN DURING THE LAST YEAR HAVE YOU FAILED TO DO WHAT WAS NORMALLY EXPECTED FROM YOU BECAUSE OF DRINKING: 0
HOW OFTEN DO YOU HAVE A DRINK CONTAINING ALCOHOL: 3
HAVE YOU OR SOMEONE ELSE BEEN INJURED AS A RESULT OF YOUR DRINKING: 0
HOW OFTEN DURING THE LAST YEAR HAVE YOU BEEN UNABLE TO REMEMBER WHAT HAPPENED THE NIGHT BEFORE BECAUSE YOU HAD BEEN DRINKING: 0

## 2020-06-05 ASSESSMENT — PATIENT HEALTH QUESTIONNAIRE - PHQ9
SUM OF ALL RESPONSES TO PHQ QUESTIONS 1-9: 0
SUM OF ALL RESPONSES TO PHQ QUESTIONS 1-9: 0

## 2020-06-05 NOTE — PROGRESS NOTES
Subjective:      Patient ID: Cheryle Mcdaniel is a 75 y.o. female. HPI  Pt here for annual exam and med refills. Reviewed blood work. Reviewed BMI of 29. Encouraged diet, exercise and weight loss. Reviewed health maintenance, needs mammogram.  Intermittent extremity pains due to circulation. Sees cardiology and hem. onc. , former smoker, pmh reviewed. Review of Systems   Constitutional: Negative for chills, fatigue, fever and unexpected weight change. HENT: Negative for congestion, ear pain, rhinorrhea and sore throat. Eyes: Negative for pain and visual disturbance. Respiratory: Negative for cough, chest tightness, shortness of breath and wheezing. Cardiovascular: Negative for chest pain and palpitations. Gastrointestinal: Negative for abdominal pain, blood in stool, constipation, diarrhea, nausea and vomiting. Genitourinary: Negative for difficulty urinating, frequency, hematuria and urgency. Musculoskeletal: Negative for back pain, joint swelling, myalgias and neck pain. Leg pains   Skin: Negative for rash. Neurological: Negative for dizziness and headaches. Hematological: Negative for adenopathy. Does not bruise/bleed easily. Psychiatric/Behavioral: Negative for behavioral problems and sleep disturbance. The patient is not nervous/anxious. Objective:   Physical Exam  Vitals signs and nursing note reviewed. Constitutional:       Appearance: She is well-developed. HENT:      Head: Normocephalic and atraumatic. Right Ear: External ear normal.      Left Ear: External ear normal.      Nose: Nose normal.      Mouth/Throat:      Mouth: Mucous membranes are moist.   Eyes:      Pupils: Pupils are equal, round, and reactive to light. Neck:      Musculoskeletal: Neck supple. Thyroid: No thyromegaly. Vascular: No carotid bruit. Cardiovascular:      Rate and Rhythm: Normal rate and regular rhythm. Heart sounds: Normal heart sounds. K 4.1 2020     2020    CO2 20 (L) 2020    BUN 31 (H) 2020    CREATININE 1.2 2020    GLUCOSE 134 (H) 2020    CALCIUM 10.4 2020    PROT 7.2 2020    LABALBU 4.3 2020    BILITOT 0.4 2020    ALKPHOS 187 (H) 2020    AST 38 2020    ALT 33 2020    LABGLOM 45 (A) 2020       Lab Results   Component Value Date    WBC 32.3 (HH) 2020    HGB 14.1 2020    HCT 52.4 (H) 2020    MCV 66.5 (L) 2020     (H) 2020         Assessment:      Leg pains  Essential HTN  Polycythemia vera        Plan:      REfill meds  Reviewed blood work  Encouraged diet, exercise and weight loss. Dash diet  Reviewed health maintenance      Duanne Persons received counseling on the following healthy behaviors: nutrition, exercise and medication adherence    Patient given educational materials on Nutrition, Exercise and Hypertension    I have instructed Duanne Persons to complete a self tracking handout on Blood Pressures  and Weights and instructed them to bring it with them to her next appointment. Discussed use, benefit, and side effects of prescribed medications. Barriers to medication compliance addressed. All patient questions answered. Pt voiced understanding. Krunal Leong MD   Medicare Annual Wellness Visit  Name: Romayne Lais Date: 2020   MRN: [id number] Sex: Female   Age: 75 y.o. Ethnicity: Non-/Non    : 1955 Race: Kyle Flynn is here for Annual Exam    Screenings for behavioral, psychosocial and functional/safety risks, and cognitive dysfunction are all negative except as indicated below. These results, as well as other patient data from the [address] form, are documented in Flowsheets linked to this Encounter.     Allergies   Allergen Reactions    Pcn [Penicillins] Anaphylaxis    Nitrates, Organic      Got bad headaches and stomachache    Creatinine monitoring  05/09/2021    Colon cancer screen colonoscopy  04/11/2024    Lipid screen  06/04/2024    DEXA (modify frequency per FRAX score)  Completed    Hepatitis A vaccine  Aged Out    Hepatitis B vaccine  Aged Out    Hib vaccine  Aged Out    Meningococcal (ACWY) vaccine  Aged Out     Recommendations for [place] Due: see orders and patient instructions/AVS.  . Recommended screening schedule for the next 5-10 years is provided to the patient in written form: see Patient Instructions/AVS.    There are no diagnoses linked to this encounter. 1. Routine general medical examination at a health care facility      2. Chronic pulmonary embolism without acute cor pulmonale, unspecified pulmonary embolism type (HCC)  -off xarelto-using asa 81 mg strange    3.  Homocysteinemia ([address]  -on asa 81 mg daily

## 2020-06-05 NOTE — PATIENT INSTRUCTIONS
of chopped or cooked vegetables, or 4 ounces (½ cup) of vegetable juice. Choose vegetables more often than vegetable juice. Get 2 to 3 servings of low-fat and fat-free dairy each day. A serving is 8 ounces of milk, 1 cup of yogurt, or 1 ½ ounces of cheese. Eat 6 to 8 servings of grains each day. A serving is 1 slice of bread, 1 ounce of dry cereal, or ½ cup of cooked rice, pasta, or cooked cereal. Try to choose whole-grain products as much as possible. Limit lean meat, poultry, and fish to 2 servings each day. A serving is 3 ounces, about the size of a deck of cards. Eat 4 to 5 servings of nuts, seeds, and legumes (cooked dried beans, lentils, and split peas) each week. A serving is 1/3 cup of nuts, 2 tablespoons of seeds, or ½ cup of cooked beans or peas. Limit fats and oils to 2 to 3 servings each day. A serving is 1 teaspoon of vegetable oil or 2 tablespoons of salad dressing. Limit sweets and added sugars to 5 servings or less a week. A serving is 1 tablespoon jelly or jam, ½ cup sorbet, or 1 cup of lemonade. Eat less than 2,300 milligrams (mg) of sodium a day. If you limit your sodium to 1,500 mg a day, you can lower your blood pressure even more. Tips for success  Start small. Do not try to make dramatic changes to your diet all at once. You might feel that you are missing out on your favorite foods and then be more likely to not follow the plan. Make small changes, and stick with them. Once those changes become habit, add a few more changes. Try some of the following:  Make it a goal to eat a fruit or vegetable at every meal and at snacks. This will make it easy to get the recommended amount of fruits and vegetables each day. Try yogurt topped with fruit and nuts for a snack or healthy dessert. Add lettuce, tomato, cucumber, and onion to sandwiches. Combine a ready-made pizza crust with low-fat mozzarella cheese and lots of vegetable toppings.  Try using tomatoes, squash, spinach, broccoli, carrots, cauliflower, and onions. Have a variety of cut-up vegetables with a low-fat dip as an appetizer instead of chips and dip. Sprinkle sunflower seeds or chopped almonds over salads. Or try adding chopped walnuts or almonds to cooked vegetables. Try some vegetarian meals using beans and peas. Add garbanzo or kidney beans to salads. Make burritos and tacos with mashed lawton beans or black beans. Where can you learn more? Go to https://V[employer] Life Systemspepiceweb.Vaprema. org and sign in to your [employer] account. Enter X694 in the [employer] box to learn more about \"DASH Diet: Care Instructions. \"     If you do not have an account, please click on the \"Sign Up Now\" link. Current as of: December 16, 2019               Content Version: 12.5  © 1239-9761 [employer]. Care instructions adapted under license by [facility] ([facility]). If you have questions about a medical condition or this instruction, always ask your healthcare professional. [place] [address] any warranty or liability for your use of this information. Patient Education        Learning About How to Make a Home Safe  Making your home safe: Overview  You can help protect the person in your care by making the home safe. Here are some general tips for how to lower the chance of getting injured in the home. Pad sharp corners on furniture and counter tops. Keep objects that are used often within easy reach. Install handrails around the toilet and in the shower. Use a tub mat to prevent slipping. Use a shower chair or bath bench when the person bathes. Provide good lighting inside and outside the home. Put night-lights in bedrooms, hallways, and bathrooms. Have light at the top and bottom of stairways. Have a first aid kit. It is also important to be aware of safe temperatures in the home. When helping someone bathe, use the back of your hand to test the water to make sure it's not too hot.  Lower the temperature consider:  Don't move furniture around. The person may become confused. Use locks on doors and cupboards. Lock up knives, scissors, medicines, cleaning supplies, and other dangerous items. Use hidden switches or controls for the stove, thermostat, water heater, and other appliances. If your loved one is still cooking, think about whether that is safe. It may be okay with some help, depending on your loved one's condition. But for people who have memory or thinking problems, it's best to avoid any activities that might not be safe. If the person tends to wander or to try to leave the home, install motion-sensor lights on all doors and windows. Have emergency numbers in a central area near a phone. Include 911 and numbers for the doctor and family members. Get medical alert jewelry for the person so you can be contacted if he or she wanders away. If possible, provide a safe place for wandering, such as an enclosed yard or garden. Where can you learn more? Go to https://Predilytics.menschmaschine publishing. org and sign in to your [employer] account. Enter R917 in the IngagePatient box to learn more about \"Learning About How to Make a Home Safe. \"     If you do not have an account, please click on the \"Sign Up Now\" link. Current as of: December 9, 2019               Content Version: 12.5  © 9641-5697 [employer]orporated. Care instructions adapted under license by [facility] ([facility]). If you have questions about a medical condition or this instruction, always ask your healthcare professional. [address] any warranty or liability for your use of this information.

## 2020-07-06 ENCOUNTER — HOSPITAL ENCOUNTER (OUTPATIENT)
Dept: INFUSION THERAPY | Age: 65
Discharge: HOME | End: 2020-07-06
Payer: MEDICARE

## 2020-07-06 VITALS
HEIGHT: 66 IN | RESPIRATION RATE: 18 BRPM | DIASTOLIC BLOOD PRESSURE: 60 MMHG | BODY MASS INDEX: 27.74 KG/M2 | SYSTOLIC BLOOD PRESSURE: 126 MMHG | WEIGHT: 172.6 LBS | TEMPERATURE: 97.8 F | HEART RATE: 66 BPM

## 2020-07-06 DIAGNOSIS — D75.1 POLYCYTHEMIA: ICD-10-CM

## 2020-07-06 DIAGNOSIS — D45 POLYCYTHEMIA VERA (HCC): Primary | ICD-10-CM

## 2020-07-06 LAB
ERYTHROCYTE [DISTWIDTH] IN BLOOD BY AUTOMATED COUNT: 24.8 % (ref 11.5–14.5)
ERYTHROCYTE [DISTWIDTH] IN BLOOD BY AUTOMATED COUNT: 50.6 FL (ref 35–45)
HCT VFR BLD CALC: 47.3 % (ref 37–47)
HEMOGLOBIN: 12.6 GM/DL (ref 12–16)
MCH RBC QN AUTO: 17.5 PG (ref 26–33)
MCHC RBC AUTO-ENTMCNC: 26.6 GM/DL (ref 32.2–35.5)
MCV RBC AUTO: 65.6 FL (ref 81–99)
PLATELET # BLD: 901 THOU/MM3 (ref 130–400)
PMV BLD AUTO: ABNORMAL FL (ref 9.4–12.4)
RBC # BLD: 7.21 MILL/MM3 (ref 4.2–5.4)
WBC # BLD: 29.6 THOU/MM3 (ref 4.8–10.8)

## 2020-07-06 PROCEDURE — 99211 OFF/OP EST MAY X REQ PHY/QHP: CPT

## 2020-07-06 PROCEDURE — 36415 COLL VENOUS BLD VENIPUNCTURE: CPT

## 2020-07-06 PROCEDURE — 85027 COMPLETE CBC AUTOMATED: CPT

## 2020-07-06 RX ORDER — FOLIC ACID 1 MG/1
TABLET ORAL
Qty: 90 TABLET | Refills: 1 | Status: SHIPPED | OUTPATIENT
Start: 2020-07-06 | End: 2021-07-13

## 2020-07-06 RX ORDER — FOLIC ACID 1 MG/1
TABLET ORAL
Qty: 90 TABLET | Refills: 1 | Status: SHIPPED | OUTPATIENT
Start: 2020-07-06 | End: 2020-07-06 | Stop reason: SDUPTHER

## 2020-07-06 NOTE — PLAN OF CARE
Problem: Discharge Planning  Goal: Knowledge of discharge instructions  Description: Knowledge of discharge instructions     Outcome: Met This Shift  Note: Verbalized understanding of discharge instructions, follow-up appointments, and when to call the physician. Intervention: Interaction with patient/family and care team  Note: Discuss understanding of discharge instructions,follow-up appointments, and when to call the physician. Problem: Intellectual/Education/Knowledge Deficit  Goal: Teaching initiated upon admission  Description: Patient instructed on therapeutic phlebotomy procedure and potential complications. Consent signed. Aware to call MD if complications occur. Outcome: Met This Shift  Note: Understands no procedure is needed today due to lab results. Intervention: Verbal/written education provided  Note: Lab results reviewed. Care plan reviewed with patient . Patient  verbalize understanding of the plan of care and contribute to goal setting.

## 2020-07-06 NOTE — PROGRESS NOTES
Patient informed procedure not needed today due to lab results. Discharge instructions given to patient-verbalizes understanding. Ambulated off unit per self with belongings.

## 2020-07-13 ENCOUNTER — TELEPHONE (OUTPATIENT)
Dept: FAMILY MEDICINE CLINIC | Age: 65
End: 2020-07-13

## 2020-07-13 NOTE — TELEPHONE ENCOUNTER
Pt calling regarding office visit on 6/4/2019. She just received a bill for that date of service for $276.00. She had just gotten Medicare that year. She called Medicare to see why it twasnt covered and was told that it was coded wrong. After looking in pt's chart -- it was coded (annual wellness visit, initial)  not  (welcome to medicare visit). Will call billing company tomorrow to see what we need to do to change the dx code.

## 2020-07-15 NOTE — TELEPHONE ENCOUNTER
Coding issues corrected by [facility]. Resubmitted to [facility] provider services. Will call pt and let her know.

## 2020-08-03 ENCOUNTER — HOSPITAL ENCOUNTER (OUTPATIENT)
Dept: INFUSION THERAPY | Age: 65
Discharge: HOME | End: 2020-08-03
Payer: MEDICARE

## 2020-08-03 VITALS
SYSTOLIC BLOOD PRESSURE: 118 MMHG | OXYGEN SATURATION: 96 % | WEIGHT: 172 LBS | TEMPERATURE: 98.3 F | BODY MASS INDEX: 27.64 KG/M2 | DIASTOLIC BLOOD PRESSURE: 56 MMHG | RESPIRATION RATE: 18 BRPM | HEART RATE: 67 BPM | HEIGHT: 66 IN

## 2020-08-03 DIAGNOSIS — E66.9 OBESITY (BMI 30.0-34.9): ICD-10-CM

## 2020-08-03 DIAGNOSIS — D45 POLYCYTHEMIA VERA (HCC): ICD-10-CM

## 2020-08-03 DIAGNOSIS — N18.30 CHRONIC KIDNEY DISEASE, STAGE III (MODERATE) (HCC): ICD-10-CM

## 2020-08-03 DIAGNOSIS — D75.1 POLYCYTHEMIA: Primary | ICD-10-CM

## 2020-08-03 DIAGNOSIS — R79.83 HOMOCYSTEINEMIA: ICD-10-CM

## 2020-08-03 LAB
ERYTHROCYTE [DISTWIDTH] IN BLOOD BY AUTOMATED COUNT: 24.9 % (ref 11.5–14.5)
ERYTHROCYTE [DISTWIDTH] IN BLOOD BY AUTOMATED COUNT: 51.1 FL (ref 35–45)
HCT VFR BLD CALC: 49 % (ref 37–47)
HEMOGLOBIN: 13 GM/DL (ref 12–16)
MCH RBC QN AUTO: 17.4 PG (ref 26–33)
MCHC RBC AUTO-ENTMCNC: 26.5 GM/DL (ref 32.2–35.5)
MCV RBC AUTO: 65.8 FL (ref 81–99)
PLATELET # BLD: 857 THOU/MM3 (ref 130–400)
PMV BLD AUTO: ABNORMAL FL (ref 9.4–12.4)
RBC # BLD: 7.45 MILL/MM3 (ref 4.2–5.4)
WBC # BLD: 27.6 THOU/MM3 (ref 4.8–10.8)

## 2020-08-03 PROCEDURE — 85027 COMPLETE CBC AUTOMATED: CPT

## 2020-08-03 PROCEDURE — 99211 OFF/OP EST MAY X REQ PHY/QHP: CPT

## 2020-08-03 PROCEDURE — 36415 COLL VENOUS BLD VENIPUNCTURE: CPT

## 2020-08-03 RX ORDER — 0.9 % SODIUM CHLORIDE 0.9 %
250 INTRAVENOUS SOLUTION INTRAVENOUS ONCE
Status: CANCELLED | OUTPATIENT
Start: 2020-08-03

## 2020-08-03 RX ORDER — 0.9 % SODIUM CHLORIDE 0.9 %
500 INTRAVENOUS SOLUTION INTRAVENOUS ONCE
Status: CANCELLED | OUTPATIENT
Start: 2020-08-03

## 2020-08-03 NOTE — PLAN OF CARE
Problem: Intellectual/Education/Knowledge Deficit  Goal: Teaching initiated upon admission  Description: Patient instructed on therapeutic phlebotomy procedure and potential complications. Consent signed. Aware to call MD if complications occur. Outcome: Met This Shift  Note: Understands no procedure is required today due to lab results. Intervention: Verbal/written education provided  Note: Review lab results- informed Dr. Glenn Brooks holding procedure today. Problem: Discharge Planning  Goal: Knowledge of discharge instructions  Description: Knowledge of discharge instructions     Outcome: Met This Shift  Note: Verbalized understanding of discharge instructions, follow-up appointments, and when to call the physician. Intervention: Interaction with patient/family and care team  Note: Discuss understanding of discharge instructions,follow-up appointments, and when to call the physician. Care plan reviewed with patient . Patient  verbalize understanding of the plan of care and contribute to goal setting.

## 2020-08-03 NOTE — PROGRESS NOTES
Patient informed no procedure required today due to blood counts. Discharge instructions given to patient-verbalizes understanding. Ambulated off unit per self with belongings.

## 2020-08-17 ENCOUNTER — TELEPHONE (OUTPATIENT)
Dept: FAMILY MEDICINE CLINIC | Age: 65
End: 2020-08-17

## 2020-08-17 NOTE — TELEPHONE ENCOUNTER
Spoke to car to inform her that [id number] [facility] has wiped out her bill for DOS 6/2019. This was confirmed by neela garcia from [facility] .

## 2020-09-03 ENCOUNTER — HOSPITAL ENCOUNTER (OUTPATIENT)
Dept: INFUSION THERAPY | Age: 65
Discharge: HOME | End: 2020-09-03
Payer: MEDICARE

## 2020-09-03 ENCOUNTER — OFFICE VISIT (OUTPATIENT)
Dept: ONCOLOGY | Age: 65
End: 2020-09-03
Payer: MEDICARE

## 2020-09-03 VITALS
WEIGHT: 179.2 LBS | SYSTOLIC BLOOD PRESSURE: 144 MMHG | HEIGHT: 66 IN | HEART RATE: 69 BPM | TEMPERATURE: 98.7 F | BODY MASS INDEX: 28.8 KG/M2 | RESPIRATION RATE: 16 BRPM | OXYGEN SATURATION: 98 % | DIASTOLIC BLOOD PRESSURE: 78 MMHG

## 2020-09-03 DIAGNOSIS — D45 POLYCYTHEMIA VERA (HCC): ICD-10-CM

## 2020-09-03 LAB
ANISOCYTOSIS: PRESENT
ATYPICAL LYMPHOCYTES: ABNORMAL %
BASOPHILIA: ABNORMAL
BASOPHILS # BLD: 2.8 %
BASOPHILS ABSOLUTE: 1 THOU/MM3 (ref 0–0.1)
BUN BLDV-MCNC: 21 MG/DL (ref 7–22)
CHLORIDE, WHOLE BLOOD: 107 MEQ/L (ref 98–109)
CO2: 24 MEQ/L (ref 23–33)
CREATININE, WHOLE BLOOD: 1 MG/DL (ref 0.5–1.2)
EOSINOPHIL # BLD: 9.7 %
EOSINOPHILS ABSOLUTE: 3.5 THOU/MM3 (ref 0–0.4)
ERYTHROCYTE [DISTWIDTH] IN BLOOD BY AUTOMATED COUNT: 26.2 % (ref 11.5–14.5)
ERYTHROCYTE [DISTWIDTH] IN BLOOD BY AUTOMATED COUNT: 55.9 FL (ref 35–45)
GFR, ESTIMATED: 59 ML/MIN/1.73M2
GLUCOSE, WHOLE BLOOD: 108 MG/DL (ref 70–108)
HCT VFR BLD CALC: 52 % (ref 37–47)
HEMOGLOBIN: 13.9 GM/DL (ref 12–16)
HYPOCHROMIA: PRESENT
IMMATURE GRANS (ABS): 0.81 THOU/MM3 (ref 0–0.07)
IMMATURE GRANULOCYTES: 2.2 %
IONIZED CALCIUM, WHOLE BLOOD: 1.38 MMOL/L (ref 1.12–1.32)
LYMPHOCYTES # BLD: 11.1 %
LYMPHOCYTES ABSOLUTE: 4 THOU/MM3 (ref 1–4.8)
MCH RBC QN AUTO: 17.9 PG (ref 26–33)
MCHC RBC AUTO-ENTMCNC: 26.7 GM/DL (ref 32.2–35.5)
MCV RBC AUTO: 67.1 FL (ref 81–99)
MICROCYTES: PRESENT
MONOCYTES # BLD: 4.8 %
MONOCYTES ABSOLUTE: 1.7 THOU/MM3 (ref 0.4–1.3)
NUCLEATED RED BLOOD CELLS: 1 /100 WBC
PLATELET # BLD: 900 THOU/MM3 (ref 130–400)
PMV BLD AUTO: ABNORMAL FL (ref 9.4–12.4)
POIKILOCYTES: ABNORMAL
POTASSIUM, WHOLE BLOOD: 4.6 MEQ/L (ref 3.5–4.9)
RBC # BLD: 7.75 MILL/MM3 (ref 4.2–5.4)
SCAN OF BLOOD SMEAR: NORMAL
SEG NEUTROPHILS: 69.4 %
SEGMENTED NEUTROPHILS ABSOLUTE COUNT: 25.2 THOU/MM3 (ref 1.8–7.7)
SMUDGE CELLS: PRESENT
SODIUM, WHOLE BLOOD: 142 MEQ/L (ref 138–146)
TARGET CELLS: ABNORMAL
WBC # BLD: 36.3 THOU/MM3 (ref 4.8–10.8)

## 2020-09-03 PROCEDURE — G8427 DOCREV CUR MEDS BY ELIG CLIN: HCPCS | Performed by: INTERNAL MEDICINE

## 2020-09-03 PROCEDURE — 99215 OFFICE O/P EST HI 40 MIN: CPT | Performed by: INTERNAL MEDICINE

## 2020-09-03 PROCEDURE — 80047 BASIC METABLC PNL IONIZED CA: CPT

## 2020-09-03 PROCEDURE — 99211 OFF/OP EST MAY X REQ PHY/QHP: CPT

## 2020-09-03 PROCEDURE — G8400 PT W/DXA NO RESULTS DOC: HCPCS | Performed by: INTERNAL MEDICINE

## 2020-09-03 PROCEDURE — 1090F PRES/ABSN URINE INCON ASSESS: CPT | Performed by: INTERNAL MEDICINE

## 2020-09-03 PROCEDURE — 4040F PNEUMOC VAC/ADMIN/RCVD: CPT | Performed by: INTERNAL MEDICINE

## 2020-09-03 PROCEDURE — 36415 COLL VENOUS BLD VENIPUNCTURE: CPT

## 2020-09-03 PROCEDURE — 82374 ASSAY BLOOD CARBON DIOXIDE: CPT

## 2020-09-03 PROCEDURE — 1123F ACP DISCUSS/DSCN MKR DOCD: CPT | Performed by: INTERNAL MEDICINE

## 2020-09-03 PROCEDURE — 3017F COLORECTAL CA SCREEN DOC REV: CPT | Performed by: INTERNAL MEDICINE

## 2020-09-03 PROCEDURE — 84520 ASSAY OF UREA NITROGEN: CPT

## 2020-09-03 PROCEDURE — 85025 COMPLETE CBC W/AUTO DIFF WBC: CPT

## 2020-09-03 PROCEDURE — 1036F TOBACCO NON-USER: CPT | Performed by: INTERNAL MEDICINE

## 2020-09-03 PROCEDURE — G8417 CALC BMI ABV UP PARAM F/U: HCPCS | Performed by: INTERNAL MEDICINE

## 2020-09-03 RX ORDER — HYDROXYUREA 500 MG/1
500 CAPSULE ORAL DAILY
Qty: 30 CAPSULE | Refills: 3 | Status: SHIPPED | OUTPATIENT
Start: 2020-09-03 | End: 2020-12-03 | Stop reason: SDUPTHER

## 2020-09-04 NOTE — PROGRESS NOTES
identifiable masses. Chest: Inspection and palpation of chest is normal.  Pulmonary: Effort normal. No respiratory distress. Cardiovascular: RRR. No edema in any of the four extremities. Abdominal: Soft. No hepatomegaly or splenomegaly. Musculoskeletal: Gait is normal. Muscle strength and tone good. Neurological: Alert and oriented to person, place, and time. Judgment and thought content normal.  Skin: Skin is warm and dry. No rash. Psychiatric: Mood and affect appropriate for the clinical situation. Behavior is normal.      Data Analysis:    Hematology 10/3/2019 8/15/2019 7/2/2019   WBC 24.5 (H) 26.9 (H) 28.5 (H)   RBC 7.89 (H) 7.77 (H) 7.56 (H)   HGB 14.2 13.4 12.9   HCT 48.1 (H) 50.1 (H) 47.3 (H)   MCV 61 (L) 64.5 (L) 62.6 (L)   RDW 18.1 (H)      295 551 (H)     Assessment:   1. Myeloproliferative disorder. 2.  Leukocytosis. 3.  Thrombosis. Plan:   1. Continue phlebotomy as needed to maintain Hematocrit below 50%  2. Monitor hemoglobin/hematocrit and for any signs of blood loss. 3.  Monitor total WBC count and for signs of infection/fever. 4.  Monitor for recurrence of thrombosis. 5.  Monitor platelet count and for any signs of abnormal bleeding/bruising. Berna King M.D. Medical Director: [facility]  [address] [facility], [address] [place] [address], [address] [place] [address], [address] [place] [address] of [facility] at [facility]      **This report has been created using voice recognition software. It may contain minor errors which are inherent in voice recognition technology. **    [facility] PROFESSIONAL SERVICES  [facility]  [address] [place] [address], [address]  [address] [facility] [address]  Dept: [phone]  Dept Fax: [phone]  Loc: [phone]    Subjective:     Chief Complaint:  Jack Delong is a 75 y.o. old female with hematological disease. HPI:   The patient is here today for follow-up regarding her history of myeloproliferative disorder. On today's evaluation she states that she generally feels well and does not have any specific complaints. However on laboratory studies her total white blood cell count and platelet count have been trending upward. I therefore I recommended initiating therapy with hydroxyurea. The patient was on hydroxyurea several years ago and is uncertain of how well she tolerated. We will monitor for toxicity as we initiate therapy at low-dose treatment and adjust doses as needed to maintain adequate control of her platelet count and white blood cell count. The patient also continues to have phlebotomy therapy done once per month to maintain a hematocrit below 45%. She is tolerating this well without significant side effects or complications. Timmy Díaz has not had fever, cough, shortness of breath or other signs of infection. The patient's bowel and bladder habits have been normal.  She has not seen blood in her stool or urine. The patient remains active with an ECOG performance status of level 0. PMH, SH, and FH:  I reviewed the PMH, SH and FH as noted on the electronic medical record. There have been no changes as noted in the previous documentation. Review of Systems   Constitutional: Negative. HENT: Negative. Eyes: Negative. Respiratory: Negative. Cardiovascular: Negative. Gastrointestinal: Negative. Genitourinary: Negative. Musculoskeletal: Negative. Skin: Negative. Neurological: Negative. Hematological: Negative. Psychiatric/Behavioral: Negative.       Objective:   Physical Exam   Vitals:    09/03/20 1050   BP: (!) 144/78   Pulse: 69   Resp: 16   Temp: 98.7 °F (37.1 °C)   SpO2: 98%   Vitals reviewed and

## 2020-09-11 ENCOUNTER — HOSPITAL ENCOUNTER (OUTPATIENT)
Dept: INFUSION THERAPY | Age: 65
Discharge: HOME | End: 2020-09-11
Payer: MEDICARE

## 2020-09-11 VITALS
BODY MASS INDEX: 29.49 KG/M2 | DIASTOLIC BLOOD PRESSURE: 65 MMHG | HEART RATE: 65 BPM | WEIGHT: 177 LBS | RESPIRATION RATE: 18 BRPM | TEMPERATURE: 98.2 F | HEIGHT: 65 IN | OXYGEN SATURATION: 98 % | SYSTOLIC BLOOD PRESSURE: 126 MMHG

## 2020-09-11 DIAGNOSIS — D45 POLYCYTHEMIA VERA (HCC): Primary | ICD-10-CM

## 2020-09-11 LAB
HCT VFR BLD CALC: 51.3 % (ref 37–47)
HEMOGLOBIN: 13.6 GM/DL (ref 12–16)

## 2020-09-11 PROCEDURE — 36415 COLL VENOUS BLD VENIPUNCTURE: CPT

## 2020-09-11 PROCEDURE — 85014 HEMATOCRIT: CPT

## 2020-09-11 PROCEDURE — 99211 OFF/OP EST MAY X REQ PHY/QHP: CPT

## 2020-09-11 PROCEDURE — 85018 HEMOGLOBIN: CPT

## 2020-09-11 NOTE — PROGRESS NOTES
Patient informed that will not be getting a phlebotomy today due to hemocrit. Patient also informed that will follow up in one month with labs and possible hemocrit. She verbalized understanding of this and is agreeable to plan of care.  Patient discharge home with belongings

## 2020-09-11 NOTE — PLAN OF CARE
Problem: Musculor/Skeletal Functional Status  Goal: Absence of falls  Outcome: Met This Shift  Note: No falls this admission  Intervention: Fall precautions  Note: Patient aware of fall precautions for here and at home -call light in reach while here       Problem: Intellectual/Education/Knowledge Deficit  Goal: Teaching initiated upon admission  Outcome: Met This Shift  Note: Patient are well of signs and symptoms of increasing signs and symptoms polycythemia  Goal: Written Disposition Instruction form completed  Outcome: Met This Shift  Note: Discharge instructions given and reviewed with patient. All questions answered. Patient verbalized understanding   Intervention: Verbal/written education provided  Note: Discharge instructions sheets      Problem: Discharge Planning  Goal: Knowledge of discharge instructions  Description: Knowledge of discharge instructions  Outcome: Met This Shift  Note: Patient able to teach back follow up appointments and when to call the doctor. Patient offers no questions at this time   Intervention: Interaction with patient/family and care team  Note: All questions and concerns addressed   Intervention: Discharge to appropriate level of care  Note: Discharge home   Care plan reviewed with patient and she verbalized understanding of the plan of care and contributed to goal setting.

## 2020-10-09 ENCOUNTER — HOSPITAL ENCOUNTER (OUTPATIENT)
Dept: INFUSION THERAPY | Age: 65
Discharge: HOME | End: 2020-10-09
Payer: MEDICARE

## 2020-10-09 VITALS
OXYGEN SATURATION: 98 % | RESPIRATION RATE: 18 BRPM | DIASTOLIC BLOOD PRESSURE: 61 MMHG | SYSTOLIC BLOOD PRESSURE: 128 MMHG | HEART RATE: 65 BPM | TEMPERATURE: 97.6 F | WEIGHT: 180.2 LBS | BODY MASS INDEX: 29.99 KG/M2

## 2020-10-09 DIAGNOSIS — R79.83 HOMOCYSTEINEMIA: ICD-10-CM

## 2020-10-09 DIAGNOSIS — E66.9 OBESITY (BMI 30.0-34.9): ICD-10-CM

## 2020-10-09 DIAGNOSIS — D75.1 POLYCYTHEMIA: ICD-10-CM

## 2020-10-09 DIAGNOSIS — D45 POLYCYTHEMIA VERA (HCC): Primary | ICD-10-CM

## 2020-10-09 LAB
ANISOCYTOSIS: PRESENT
BASOPHILS # BLD: 2.8 %
BASOPHILS ABSOLUTE: 0.7 THOU/MM3 (ref 0–0.1)
EOSINOPHIL # BLD: 8.7 %
EOSINOPHILS ABSOLUTE: 2.3 THOU/MM3 (ref 0–0.4)
ERYTHROCYTE [DISTWIDTH] IN BLOOD BY AUTOMATED COUNT: 30.6 % (ref 11.5–14.5)
ERYTHROCYTE [DISTWIDTH] IN BLOOD BY AUTOMATED COUNT: 72.4 FL (ref 35–45)
HCT VFR BLD CALC: 51.4 % (ref 37–47)
HEMOGLOBIN: 14.3 GM/DL (ref 12–16)
HYPOCHROMIA: PRESENT
IMMATURE GRANS (ABS): 0.26 THOU/MM3 (ref 0–0.07)
IMMATURE GRANULOCYTES: 1 %
LYMPHOCYTES # BLD: 9.3 %
LYMPHOCYTES ABSOLUTE: 2.4 THOU/MM3 (ref 1–4.8)
MCH RBC QN AUTO: 19.7 PG (ref 26–33)
MCHC RBC AUTO-ENTMCNC: 27.8 GM/DL (ref 32.2–35.5)
MCV RBC AUTO: 70.7 FL (ref 81–99)
MONOCYTES # BLD: 4.6 %
MONOCYTES ABSOLUTE: 1.2 THOU/MM3 (ref 0.4–1.3)
NUCLEATED RED BLOOD CELLS: 0 /100 WBC
PLATELET # BLD: 231 THOU/MM3 (ref 130–400)
PMV BLD AUTO: ABNORMAL FL (ref 9.4–12.4)
RBC # BLD: 7.27 MILL/MM3 (ref 4.2–5.4)
SEG NEUTROPHILS: 73.6 %
SEGMENTED NEUTROPHILS ABSOLUTE COUNT: 19.1 THOU/MM3 (ref 1.8–7.7)
WBC # BLD: 26 THOU/MM3 (ref 4.8–10.8)

## 2020-10-09 PROCEDURE — 36415 COLL VENOUS BLD VENIPUNCTURE: CPT

## 2020-10-09 PROCEDURE — 85025 COMPLETE CBC W/AUTO DIFF WBC: CPT

## 2020-10-09 PROCEDURE — 99195 PHLEBOTOMY: CPT

## 2020-10-09 RX ORDER — 0.9 % SODIUM CHLORIDE 0.9 %
250 INTRAVENOUS SOLUTION INTRAVENOUS ONCE
Status: CANCELLED | OUTPATIENT
Start: 2020-10-09

## 2020-10-09 RX ORDER — ONDANSETRON 4 MG/1
4 TABLET, FILM COATED ORAL EVERY 8 HOURS PRN
Qty: 30 TABLET | Refills: 5 | Status: SHIPPED | OUTPATIENT
Start: 2020-10-09 | End: 2022-04-07 | Stop reason: ALTCHOICE

## 2020-10-09 RX ORDER — 0.9 % SODIUM CHLORIDE 0.9 %
500 INTRAVENOUS SOLUTION INTRAVENOUS ONCE
Status: CANCELLED | OUTPATIENT
Start: 2020-10-09

## 2020-10-09 NOTE — PLAN OF CARE
Problem: Musculor/Skeletal Functional Status  Goal: Absence of falls  Outcome: Met This Shift  Note: No falls this admission   Intervention: Fall precautions  Note: Patient aware of fall precautions for here and at home -call light in reach while here       Problem: Intellectual/Education/Knowledge Deficit  Goal: Teaching initiated upon admission  Outcome: Met This Shift  Note: Patient instructed on therapeutic phlebotomy procedure and potential complications. . Aware to call MD if complications occur. Goal: Written Disposition Instruction form completed  Outcome: Met This Shift  Note: Discharge instructions given and reviewed with patient. All questions answered. Patient verbalized understanding   Intervention: Verbal/written education provided  Note: Discharge instruction sheets     Problem: Discharge Planning  Goal: Knowledge of discharge instructions  Description: Knowledge of discharge instructions  Outcome: Met This Shift  Note: Patient  able to teach back follow up appointments and when to call the doctor. Patient offers no questions at this time   Intervention: Interaction with patient/family and care team  Note: All questions and concerns addressed     Intervention: Discharge to appropriate level of care  Note: Discharge home   Care plan reviewed with patient and she verbalized understanding of the plan of care and contributed to goal setting.

## 2020-10-09 NOTE — PROGRESS NOTES
THERAPEUTIC PHLEBOTOMY    Most recent Hemoglobin result: 14.3Gm/dl. Hematocrit result  51.4%  Date obtained:   10 /8 /2020    Pre-phlebotomy vital signs:see Doc flow sheet    Start time:835    Empty donor bag placed on TARE scale. Tourniquet placed on patient's arm and arm palpated for venous access. Area of venous stick cleansed with alcohol. Hemostat applied to tubing of donor bag. Sheath removed from the needle and needle inserted into the antecubital Vein in the  Right arm  Hemostat released. Blood flowing well down the tubing into the bag. Adjustment/no adjustment of needle needed to maintain adequate blood flow. Scale monitoring amount of blood in bag. Stop Time:850  Needle removed from patient's arm. Pressure applied to patient's arm until bleeding stopped. Dry sterile dressing applied over puncture site and taped down well and secured with coban wrap. Final amount of blood removed:500  Post Vital Signs:see Doc flow sheet  Patient drank one bottle of water  and observed for 20 minutes. Patient tolerated procedure well. Discharged ambulatory with belongings.

## 2020-11-03 ENCOUNTER — HOSPITAL ENCOUNTER (OUTPATIENT)
Dept: INFUSION THERAPY | Age: 65
Discharge: HOME | End: 2020-11-03
Payer: MEDICARE

## 2020-11-03 DIAGNOSIS — D45 POLYCYTHEMIA VERA (HCC): ICD-10-CM

## 2020-11-03 LAB
ANISOCYTOSIS: PRESENT
BASOPHILS # BLD: 2.5 %
BASOPHILS ABSOLUTE: 0.4 THOU/MM3 (ref 0–0.1)
EOSINOPHIL # BLD: 6.7 %
EOSINOPHILS ABSOLUTE: 1.1 THOU/MM3 (ref 0–0.4)
ERYTHROCYTE [DISTWIDTH] IN BLOOD BY AUTOMATED COUNT: 30.6 % (ref 11.5–14.5)
ERYTHROCYTE [DISTWIDTH] IN BLOOD BY AUTOMATED COUNT: 78.8 FL (ref 35–45)
HCT VFR BLD CALC: 48.3 % (ref 37–47)
HEMOGLOBIN: 13.6 GM/DL (ref 12–16)
HYPOCHROMIA: PRESENT
IMMATURE GRANS (ABS): 0.1 THOU/MM3 (ref 0–0.07)
IMMATURE GRANULOCYTES: 0.6 %
LYMPHOCYTES # BLD: 12.4 %
LYMPHOCYTES ABSOLUTE: 2 THOU/MM3 (ref 1–4.8)
MCH RBC QN AUTO: 21.1 PG (ref 26–33)
MCHC RBC AUTO-ENTMCNC: 28.2 GM/DL (ref 32.2–35.5)
MCV RBC AUTO: 74.9 FL (ref 81–99)
MONOCYTES # BLD: 4.7 %
MONOCYTES ABSOLUTE: 0.8 THOU/MM3 (ref 0.4–1.3)
NUCLEATED RED BLOOD CELLS: 0 /100 WBC
PLATELET # BLD: 72 THOU/MM3 (ref 130–400)
PLATELET ESTIMATE: ABNORMAL
PMV BLD AUTO: ABNORMAL FL (ref 9.4–12.4)
POIKILOCYTES: ABNORMAL
RBC # BLD: 6.45 MILL/MM3 (ref 4.2–5.4)
SCAN OF BLOOD SMEAR: NORMAL
SEG NEUTROPHILS: 73.1 %
SEGMENTED NEUTROPHILS ABSOLUTE COUNT: 11.9 THOU/MM3 (ref 1.8–7.7)
SPHEROCYTES: ABNORMAL
WBC # BLD: 16.3 THOU/MM3 (ref 4.8–10.8)

## 2020-11-03 PROCEDURE — 36415 COLL VENOUS BLD VENIPUNCTURE: CPT

## 2020-11-03 PROCEDURE — 85025 COMPLETE CBC W/AUTO DIFF WBC: CPT

## 2020-11-23 ENCOUNTER — OFFICE VISIT (OUTPATIENT)
Dept: CARDIOLOGY CLINIC | Age: 65
End: 2020-11-23
Payer: MEDICARE

## 2020-11-23 VITALS
WEIGHT: 181 LBS | DIASTOLIC BLOOD PRESSURE: 78 MMHG | HEART RATE: 66 BPM | SYSTOLIC BLOOD PRESSURE: 124 MMHG | BODY MASS INDEX: 30.16 KG/M2 | HEIGHT: 65 IN

## 2020-11-23 PROCEDURE — 99213 OFFICE O/P EST LOW 20 MIN: CPT | Performed by: INTERNAL MEDICINE

## 2020-11-23 PROCEDURE — G8417 CALC BMI ABV UP PARAM F/U: HCPCS | Performed by: INTERNAL MEDICINE

## 2020-11-23 PROCEDURE — 3017F COLORECTAL CA SCREEN DOC REV: CPT | Performed by: INTERNAL MEDICINE

## 2020-11-23 PROCEDURE — G8427 DOCREV CUR MEDS BY ELIG CLIN: HCPCS | Performed by: INTERNAL MEDICINE

## 2020-11-23 PROCEDURE — G8484 FLU IMMUNIZE NO ADMIN: HCPCS | Performed by: INTERNAL MEDICINE

## 2020-11-23 PROCEDURE — 4040F PNEUMOC VAC/ADMIN/RCVD: CPT | Performed by: INTERNAL MEDICINE

## 2020-11-23 PROCEDURE — 1036F TOBACCO NON-USER: CPT | Performed by: INTERNAL MEDICINE

## 2020-11-23 PROCEDURE — G8400 PT W/DXA NO RESULTS DOC: HCPCS | Performed by: INTERNAL MEDICINE

## 2020-11-23 PROCEDURE — 1123F ACP DISCUSS/DSCN MKR DOCD: CPT | Performed by: INTERNAL MEDICINE

## 2020-11-23 PROCEDURE — 1090F PRES/ABSN URINE INCON ASSESS: CPT | Performed by: INTERNAL MEDICINE

## 2020-11-23 RX ORDER — AMLODIPINE BESYLATE 10 MG/1
10 TABLET ORAL DAILY
Qty: 90 TABLET | Refills: 5 | Status: SHIPPED | OUTPATIENT
Start: 2020-11-23 | End: 2021-11-29 | Stop reason: SDUPTHER

## 2020-11-23 NOTE — PROGRESS NOTES
[address] [facility] [address] [place] [address]  Dept: [phone]  Dept Fax: [phone]  Loc: [phone]    Visit Date: 11/23/2020    Ms. Leslie Tejeda is a 75 y.o. female  who presented for:  Chief Complaint   Patient presents with    6 Month Follow-Up       HPI:   76 yo F c hxo f Polycythemia Vera, PE/DVT comes for a follow up. She follows up with hematology. Elaine Wooten is scheduled to get phlebotomies, last one in May 2019.   Denies any chest pain, no radiation, no aggravating or alleviating factors, and not associated with sob, palpitations, lightheadedness, dizziness, orthopnea, PND or pedal edema. Had Echo and is here for a follow up. RVSP 45-50 mm Hg. Current Outpatient Medications:     ondansetron (ZOFRAN) 4 MG tablet, Take 1 tablet by mouth every 8 hours as needed for Nausea or Vomiting, Disp: 30 tablet, Rfl: 5    hydroxyurea (HYDREA) 500 MG chemo capsule, Take 1 capsule by mouth daily, Disp: 30 capsule, Rfl: 3    folic acid (FOLVITE) 1 MG tablet, TAKE 1 TABLET DAILY, Disp: 90 tablet, Rfl: 1    enalapril (VASOTEC) 20 MG tablet, TAKE 1 TABLET TWICE A DAY, Disp: 180 tablet, Rfl: 3    cloNIDine (CATAPRES) 0.2 MG tablet, Take 1 tablet by mouth 2 times daily, Disp: 180 tablet, Rfl: 3    amLODIPine (NORVASC) 10 MG tablet, Take 1 tablet by mouth daily, Disp: 90 tablet, Rfl: 5    triamcinolone (KENALOG) 0.1 % cream, APPLY TO LEGS TWICE DAILY AS NEEDED, Disp: , Rfl: 1    Cyanocobalamin (B-12) 500 MCG TABS, Take 500 mg by mouth daily. , Disp: , Rfl:     Past Medical History  Rhiannon Bernard  has a past medical history of Basal cell cancer, Chronic kidney disease, stage III (moderate), DVT (deep venous thrombosis) ([address] History of shingles, Homocysteinemia ([facility] [address] Hypertension, Polycythemia vera(238.4), Pulmonary emboli ([facility] [address] Right-sided epistaxis, and Unspecified diseases of blood and blood-forming organs.     Social History  Rhiannon Bernard  reports that she quit smoking about 11 years ago. She has a 1.00 pack-year smoking history. She has never used smokeless tobacco. She reports current alcohol use of about 4.0 standard drinks of alcohol per week. She reports that she does not use drugs. Family History  Savanah Gautam family history includes Arthritis in her mother; Breast Cancer in her mother and sister; Heart Disease in her mother and sister; High Blood Pressure in her mother; Liver Cancer in her father. Past Surgical History   Past Surgical History:   Procedure Laterality Date    DENTAL SURGERY  11/09/15    EYE SURGERY Left 03/06/2015    [address] Ernesto Miranda, [address] [facility] OTHER SURGICAL HISTORY  12/12/2017    left breast bx at [facility] [address]         Subjective:     REVIEW OF SYSTEMS  Constitutional: denies sweats, chills and fever  HENT: denies  congestion, sinus pressure, sneezing and sore throat. Eyes: denies  pain, discharge, redness and itching. Respiratory: denies apnea, cough  Gastrointestinal: denies blood in stool, constipation, diarrhea   Endocrine: denies cold intolerance, heat intolerance, polydipsia. Genitourinary: denies dysuria, enuresis, flank pain and hematuria. Musculoskeletal: denies arthralgias, joint swelling and neck pain. Neurological: denies numbness and headaches. Psychiatric/Behavioral: denies agitation, confusion, decreased concentration and dysphoric mood    All others reviewed and are negative. Objective:     /78   Pulse 66   Ht 5' 5\" (1.651 m)   Wt 181 lb (82.1 kg)   BMI 30.12 kg/m²     Wt Readings from Last 3 Encounters:   11/23/20 181 lb (82.1 kg)   10/09/20 180 lb 3.2 oz (81.7 kg)   09/11/20 177 lb (80.3 kg)     BP Readings from Last 3 Encounters:   11/23/20 124/78   10/09/20 128/61   09/11/20 126/65       PHYSICAL EXAM  Constitutional: Oriented to person, place, and time. Appears well-developed and well-nourished. HENT:   Head: Normocephalic and atraumatic.    Eyes: EOM are normal. Pupils are equal, round, and reactive to light. Neck: Normal range of motion. Neck supple. No JVD present. Cardiovascular: Normal rate , normal heart sounds and intact distal pulses. Pulmonary/Chest: Effort normal and breath sounds normal. No respiratory distress. No wheezes. No rales. Abdominal: Soft. Bowel sounds are normal. No distension. There is no tenderness. Musculoskeletal: Normal range of motion. No edema. Neurological: Alert and oriented to person, place, and time. No cranial nerve deficit. Coordination normal.   Skin: Skin is warm and dry. Psychiatric: Normal mood and affect.        No results found for: CKTOTAL, CKMB, CKMBINDEX    Lab Results   Component Value Date    WBC 16.3 11/03/2020    RBC 6.45 11/03/2020    HGB 13.6 11/03/2020    HCT 48.3 11/03/2020    MCV 74.9 11/03/2020    MCH 21.1 11/03/2020    MCHC 28.2 11/03/2020    RDW 18.1 10/03/2019    PLT 72 11/03/2020    MPV ---- 11/03/2020       Lab Results   Component Value Date     09/03/2020     05/09/2020    K 4.6 09/03/2020    K 4.1 05/09/2020     05/09/2020    CO2 24 09/03/2020    BUN 21 09/03/2020    LABALBU 4.3 05/09/2020    CREATININE 1.0 09/03/2020    CREATININE 1.2 05/09/2020    CALCIUM 10.4 05/09/2020    LABGLOM 45 05/09/2020    GLUCOSE 134 05/09/2020    GLUCOSE 102 10/14/2014       Lab Results   Component Value Date    ALKPHOS 187 05/09/2020    ALT 33 05/09/2020    AST 38 05/09/2020    PROT 7.2 05/09/2020    BILITOT 0.4 05/09/2020    BILIDIR <0.2 04/23/2020    LABALBU 4.3 05/09/2020       No results found for: MG    Lab Results   Component Value Date    INR 1.08 05/09/2020    INR 1.40 (H) 07/02/2019    INR 1.05 08/18/2014         No results found for: LABA1C    Lab Results   Component Value Date    TRIG 125 06/04/2019    HDL 50 06/04/2019    LDLCALC 108 06/04/2019    LDLDIRECT 139 08/11/2015       Lab Results   Component Value Date    TSH 2.400 03/22/2018         Testing Reviewed:      I haveindividually reviewed the below cardiac tests    EKG:    ECHO:   Results for orders placed during the hospital encounter of 03/22/18   Echocardiogram complete 2D with doppler with color    Narrative Transthoracic Echocardiography Report (TTE)     Demographics      Patient Name   [facility] Gender              Female                  A      MR #           [id number]         Race                                                       Ethnicity      Account #      [de-identified]         Room Number         0004      Accession      [id number]         Date of Study       03/22/2018   Number      Date of Birth  1955        Referring Physician MD Noel Simmons MD [facility]      Age            57 year(s)        [facility]                                       Interpreting        Callie Jacques MD                                    Physician     Procedure    Type of Study      TTE procedure:ECHOCARDIOGRAM COMPLETE 2D W DOPPLER W COLOR. Procedure Date  Date: 03/22/2018 Start: 04:12 PM    Study Location: Bedside  Technical Quality: Adequate visualization    Indications:Acute coronary syndrome. Additional Medical History:Hypertension, Chest pain, Former smoker, Obesity,  PE, DVT, Shingles, Basal cell cancer    Patient Status: Routine    Height: 64.96 inches Weight: 178.58 pounds BSA: 1.88 m^2 BMI: 29.75 kg/m^2    BP: 139/88 mmHg     Conclusions      Summary   Normal left ventricle size and systolic function. Ejection fraction was   estimated at 55-60%. There were no regional left ventricular wall motion   abnormalities and wall thickness was within normal limits. The right ventricular size was normal with normal systolic function and   wall thickness. The aortic valve was trileaflet with normal thickness and cuspal   separation.  DOPPLER: Transaortic velocity was within the normal range with   no evidence of aortic stenosis. Trivial aortic regurgitation is noted. Mild thickening of anterior leaflet of mitral valve. Mild Mitral valve   prolapse is present. DOPPLER: The transmitral velocity was within the   normal range with no evidence for mitral stenosis. Mild to Moderate mitral regurgitation is present. Signature      ----------------------------------------------------------------   Electronically signed by Shelly Haley MD (Interpreting   physician) on 03/23/2018 at 07:55 AM   ----------------------------------------------------------------      Findings      Mitral Valve   Mild thickening of anterior leaflet of mitral valve. Mild Mitral valve   prolapse is present. DOPPLER: The transmitral velocity was within the   normal range with no evidence for mitral stenosis. Mild to Moderate mitral regurgitation is present. Aortic Valve   The aortic valve was trileaflet with normal thickness and cuspal   separation. DOPPLER: Transaortic velocity was within the normal range with   no evidence of aortic stenosis. Trivial aortic regurgitation is noted. Tricuspid Valve   The tricuspid valve structure was normal with normal leaflet separation. DOPPLER: There was no evidence of tricuspid stenosis. Mild tricuspid regurgitation. Pulmonic Valve   The pulmonic valve leaflets exhibited normal thickness, no calcification,   and normal cuspal separation. DOPPLER: The transpulmonic velocity was   within the normal range with no evidence for regurgitation. Left Atrium   Left atrial size was normal.      Left Ventricle   Normal left ventricle size and systolic function. Ejection fraction was   estimated at 55-60%. There were no regional left ventricular wall motion   abnormalities and wall thickness was within normal limits.       Right Atrium   Right atrial size was normal.      Right Ventricle   The right ventricular size was normal with normal systolic function and   wall thickness. Pericardial Effusion   The pericardium was normal in appearance with no evidence of a pericardial   effusion. Pleural Effusion   No evidence of pleural effusion. Aorta / Great Vessels   -Aortic root dimension within normal limits.   -The Pulmonary artery is within normal limits. -IVC size is within normal limits with normal respiratory phasic changes.      M-Mode/2D Measurements & Calculations      LV Diastolic   LV Systolic Dimension:    AV Cusp Separation: 1.8 cmLA   Dimension: 4.7 2.9 cm                    Dimension: 3.5 cmAO Root   cm             LV Volume Diastolic: 828  Dimension: 2.3 cmLA Area: 18.4   LV FS:38.3 %   ml                        cm^2   LV PW          LV Volume Systolic: 55.2   Diastolic: 1   ml   cm             LV EDV/LV EDV Index: 102   Septum         ml/54 m^2LV ESV/LV ESV    RV Diastolic Dimension: 3 cm   Diastolic: 1.2 Index: 38.3 ml/17 m^2   cm             EF Calculated: 68.4 %     LA/Aorta: 1.52                                               LA volume/Index: 55.3 ml /29m^2     Doppler Measurements & Calculations      MV Peak E-Wave: 91.8 cm/s  AV Peak Velocity: 145 LVOT Peak Velocity: 110   MV Peak A-Wave: 113 cm/s   cm/s                  cm/s   MV E/A Ratio: 0.81         AV Peak Gradient:     LVOT Peak Gradient: 5   MV Peak Gradient: 3.37     8.41 mmHg             mmHg   mmHg                                                    TV Peak E-Wave: 52.3 cm/s   MV Deceleration Time: 232                        TV Peak A-Wave: 37.5 cm/s   msec                              AV P1/2t: 745 msec    TV Peak Gradient: 1.09                                                    mmHg   MV E' Septal Velocity:                           TR Velocity:177 cm/s   5.26 cm/s                                        TR Gradient:12.53 mmHg   MV A' Septal Velocity:     AV DVI (Vmax):0.76    PV Peak Velocity: 68.1   9.75 cm/s                                        cm/s   MV E' Lateral Velocity:

## 2020-12-03 ENCOUNTER — OFFICE VISIT (OUTPATIENT)
Dept: ONCOLOGY | Age: 65
End: 2020-12-03
Payer: MEDICARE

## 2020-12-03 ENCOUNTER — HOSPITAL ENCOUNTER (OUTPATIENT)
Dept: INFUSION THERAPY | Age: 65
Discharge: HOME | End: 2020-12-03
Payer: MEDICARE

## 2020-12-03 VITALS
TEMPERATURE: 98.2 F | OXYGEN SATURATION: 98 % | HEIGHT: 65 IN | WEIGHT: 182 LBS | BODY MASS INDEX: 30.32 KG/M2 | SYSTOLIC BLOOD PRESSURE: 131 MMHG | HEART RATE: 64 BPM | RESPIRATION RATE: 18 BRPM | DIASTOLIC BLOOD PRESSURE: 67 MMHG

## 2020-12-03 DIAGNOSIS — D45 POLYCYTHEMIA VERA (HCC): ICD-10-CM

## 2020-12-03 LAB
ANISOCYTOSIS: PRESENT
BASOPHILS # BLD: 2 %
BASOPHILS ABSOLUTE: 0.5 THOU/MM3 (ref 0–0.1)
EOSINOPHIL # BLD: 5.8 %
EOSINOPHILS ABSOLUTE: 1.4 THOU/MM3 (ref 0–0.4)
ERYTHROCYTE [DISTWIDTH] IN BLOOD BY AUTOMATED COUNT: 25.8 % (ref 11.5–14.5)
ERYTHROCYTE [DISTWIDTH] IN BLOOD BY AUTOMATED COUNT: 69.8 FL (ref 35–45)
HCT VFR BLD CALC: 48.5 % (ref 37–47)
HEMOGLOBIN: 14.1 GM/DL (ref 12–16)
IMMATURE GRANS (ABS): 0.24 THOU/MM3 (ref 0–0.07)
IMMATURE GRANULOCYTES: 1 %
LYMPHOCYTES # BLD: 9.3 %
LYMPHOCYTES ABSOLUTE: 2.3 THOU/MM3 (ref 1–4.8)
MCH RBC QN AUTO: 22.7 PG (ref 26–33)
MCHC RBC AUTO-ENTMCNC: 29.1 GM/DL (ref 32.2–35.5)
MCV RBC AUTO: 78.2 FL (ref 81–99)
MONOCYTES # BLD: 4.4 %
MONOCYTES ABSOLUTE: 1.1 THOU/MM3 (ref 0.4–1.3)
NUCLEATED RED BLOOD CELLS: 0 /100 WBC
PLATELET # BLD: 259 THOU/MM3 (ref 130–400)
RBC # BLD: 6.2 MILL/MM3 (ref 4.2–5.4)
SEG NEUTROPHILS: 77.5 %
SEGMENTED NEUTROPHILS ABSOLUTE COUNT: 18.8 THOU/MM3 (ref 1.8–7.7)
WBC # BLD: 24.3 THOU/MM3 (ref 4.8–10.8)

## 2020-12-03 PROCEDURE — 1123F ACP DISCUSS/DSCN MKR DOCD: CPT | Performed by: INTERNAL MEDICINE

## 2020-12-03 PROCEDURE — G8427 DOCREV CUR MEDS BY ELIG CLIN: HCPCS | Performed by: INTERNAL MEDICINE

## 2020-12-03 PROCEDURE — 4040F PNEUMOC VAC/ADMIN/RCVD: CPT | Performed by: INTERNAL MEDICINE

## 2020-12-03 PROCEDURE — 99215 OFFICE O/P EST HI 40 MIN: CPT | Performed by: INTERNAL MEDICINE

## 2020-12-03 PROCEDURE — G8400 PT W/DXA NO RESULTS DOC: HCPCS | Performed by: INTERNAL MEDICINE

## 2020-12-03 PROCEDURE — 36415 COLL VENOUS BLD VENIPUNCTURE: CPT

## 2020-12-03 PROCEDURE — 3017F COLORECTAL CA SCREEN DOC REV: CPT | Performed by: INTERNAL MEDICINE

## 2020-12-03 PROCEDURE — 85025 COMPLETE CBC W/AUTO DIFF WBC: CPT

## 2020-12-03 PROCEDURE — G8417 CALC BMI ABV UP PARAM F/U: HCPCS | Performed by: INTERNAL MEDICINE

## 2020-12-03 PROCEDURE — 99211 OFF/OP EST MAY X REQ PHY/QHP: CPT

## 2020-12-03 PROCEDURE — 1090F PRES/ABSN URINE INCON ASSESS: CPT | Performed by: INTERNAL MEDICINE

## 2020-12-03 PROCEDURE — G8484 FLU IMMUNIZE NO ADMIN: HCPCS | Performed by: INTERNAL MEDICINE

## 2020-12-03 PROCEDURE — 1036F TOBACCO NON-USER: CPT | Performed by: INTERNAL MEDICINE

## 2020-12-03 RX ORDER — HYDROXYUREA 500 MG/1
500 CAPSULE ORAL DAILY
Qty: 90 CAPSULE | Refills: 5 | Status: SHIPPED | OUTPATIENT
Start: 2020-12-03 | End: 2022-02-25

## 2020-12-03 NOTE — PATIENT INSTRUCTIONS
1.  CBC once a month. 2.  Possible phlebotomy therapy monthly depending on CBC results. 3.  Labs on RTC.

## 2020-12-12 NOTE — PROGRESS NOTES
[facility] PROFESSIONAL SERVICES  ONCOLOGY SPECIALISTS OF [facility]  [address] [facility] [address]  [address]  Dept: [phone]  Dept Fax: [phone]  Loc: [phone]    Subjective:     Chief Complaint:  Cinthia Han is a 75 y.o. old female with hematological disease. HPI:   Michael Nix is here today for follow-up of a history of myeloproliferative disorder. Patient also has a more remote history of pulmonary emboli. She developed rectal bleeding this summer and her chronic anticoagulation was discontinued. The patient has not had any episode to suggest recurrence of thrombosis. On laboratory studies today she continues to have leukocytosis but her hemoglobin, hematocrit, and platelet count are normal.  She has not required recent phlebotomy therapy. The patient states that she generally feels well and has no specific complaints. She has not had fever or other signs of infection. The patient denies shortness of breath, chest pain, a change in bowel habits or a change in bladder habits. ECOG performance status is level 0. PMH, SH, and FH:  I reviewed the PMH, SH and FH as noted on the electronic medical record. There have been no changes as noted in the previous documentation. Review of Systems   Constitutional: Negative. HENT: Negative. Eyes: Negative. Respiratory: Negative. Cardiovascular: Negative. Gastrointestinal: Negative. Genitourinary: Negative. Musculoskeletal: Negative. Skin: Negative. Neurological: Negative. Hematological: Negative. Psychiatric/Behavioral: Negative. Objective:   Physical Exam   Vitals:    12/03/20 0855   BP: 131/67   Pulse: 64   Resp: 18   Temp: 98.2 °F (36.8 °C)   SpO2: 98%   Vitals reviewed and are stable. Constitutional: Well-developed and well-nourished. No acute distress. HENT: Normocephalic and atraumatic. Eyes: Pupils are equal and reactive. No scleral icterus.    Neck: Overall appearance is symmetrical. No identifiable masses. Chest: Inspection and palpation of chest is normal.  Pulmonary: Effort normal. No respiratory distress. Cardiovascular: RRR. No edema in any of the four extremities. Abdominal: Soft. No hepatomegaly or splenomegaly. Musculoskeletal: Gait is normal. Muscle strength and tone good. Neurological: Alert and oriented to person, place, and time. Judgment and thought content normal.  Skin: Skin is warm and dry. No rash. Psychiatric: Mood and affect appropriate for the clinical situation. Behavior is normal.      Data Analysis:    Hematology 10/3/2019 8/15/2019 7/2/2019   WBC 24.5 (H) 26.9 (H) 28.5 (H)   RBC 7.89 (H) 7.77 (H) 7.56 (H)   HGB 14.2 13.4 12.9   HCT 48.1 (H) 50.1 (H) 47.3 (H)   MCV 61 (L) 64.5 (L) 62.6 (L)   RDW 18.1 (H)      295 551 (H)     Assessment:   1. Myeloproliferative disorder. 2.  Leukocytosis. 3.  Thrombosis. Plan:   1. Continue phlebotomy as needed to maintain Hematocrit below 50%  2. Monitor hemoglobin/hematocrit and for any signs of blood loss. 3.  Monitor total WBC count and for signs of infection/fever. 4.  Monitor for recurrence of thrombosis. 5.  Monitor platelet count and for any signs of abnormal bleeding/bruising. Linda Hernandez M.D. Medical Director: [facility]  [facility]  [address] [facility], [address] [place] [address], [address] [place] [address], [address] [place] [address] of [facility] at [facility]      **This report has been created using voice recognition software. It may contain minor errors which are inherent in voice recognition technology. **    [facility] PROFESSIONAL SERVICES  ONCOLOGY SPECIALISTS OF [facility]  [address] [address], [address]  [place] [address] [address]  Dept: [phone]  Dept Fax: [phone]  Loc: [phone]    Subjective:     Chief Complaint:  Millie Black is a 75 y.o. old female with hematological disease. HPI:     Bhavani Borges returns today for follow-up regarding her history of myeloproliferative disorder. She currently is on therapy with Hydrea. She is taking Hydrea 500 mg tablet daily without difficulty or side effects. The patient also receives intermittent phlebotomy therapy to maintain adequate hemoglobin and hematocrit. On laboratory studies today her white blood cell count is 24,000 which is relatively stable. Her hematocrit is 48% which is also relatively stable. The patient feels well without specific complaints. She has not had fever, cough, shortness of breath or other signs of infection. The patient's bowel and bladder habits have been normal.  She has not seen blood in her stool or urine. The patient remains active with an ECOG performance status of level 0. She will continue with therapy with Hydrea at the current dose. We will continue to monitor her on a monthly basis for the need of phlebotomy therapy. PMH, SH, and FH:  I reviewed the PMH, SH and FH as noted on the electronic medical record. There have been no changes as noted in the previous documentation. Review of Systems   Constitutional: Negative. HENT: Negative. Eyes: Negative. Respiratory: Negative. Cardiovascular: Negative. Gastrointestinal: Negative. Genitourinary: Negative. Musculoskeletal: Negative. Skin: Negative. Neurological: Negative. Hematological: Negative. Psychiatric/Behavioral: Negative. Objective:   Physical Exam   Vitals:    12/03/20 0855   BP: 131/67   Pulse: 64   Resp: 18   Temp: 98.2 °F (36.8 °C)   SpO2: 98%   Vitals reviewed and are stable. Constitutional: Well-developed. No acute distress. HENT: Normocephalic and atraumatic. Eyes: PERRL. No scleral icterus.    Neck: Overall appearance is

## 2021-01-06 ENCOUNTER — TELEPHONE (OUTPATIENT)
Dept: ONCOLOGY | Age: 66
End: 2021-01-06

## 2021-01-06 NOTE — TELEPHONE ENCOUNTER
Donice Kawasaki called wanting a clearer answer on when exactly she is supposed to come in for labs. Please advise.

## 2021-01-18 ENCOUNTER — HOSPITAL ENCOUNTER (OUTPATIENT)
Age: 66
Discharge: HOME | End: 2021-01-18
Payer: MEDICARE

## 2021-01-18 DIAGNOSIS — D45 POLYCYTHEMIA VERA (HCC): ICD-10-CM

## 2021-01-18 LAB
BASOPHILS # BLD: 1.4 %
BASOPHILS ABSOLUTE: 0.3 THOU/MM3 (ref 0–0.1)
EOSINOPHIL # BLD: 6.3 %
EOSINOPHILS ABSOLUTE: 1.3 THOU/MM3 (ref 0–0.4)
ERYTHROCYTE [DISTWIDTH] IN BLOOD BY AUTOMATED COUNT: 21.7 % (ref 11.5–14.5)
ERYTHROCYTE [DISTWIDTH] IN BLOOD BY AUTOMATED COUNT: 58.3 FL (ref 35–45)
HCT VFR BLD CALC: 50.6 % (ref 37–47)
HEMOGLOBIN: 14.9 GM/DL (ref 12–16)
IMMATURE GRANS (ABS): 0.18 THOU/MM3 (ref 0–0.07)
IMMATURE GRANULOCYTES: 0.9 %
LYMPHOCYTES # BLD: 10.6 %
LYMPHOCYTES ABSOLUTE: 2.2 THOU/MM3 (ref 1–4.8)
MCH RBC QN AUTO: 23.4 PG (ref 26–33)
MCHC RBC AUTO-ENTMCNC: 29.4 GM/DL (ref 32.2–35.5)
MCV RBC AUTO: 79.4 FL (ref 81–99)
MONOCYTES # BLD: 5.1 %
MONOCYTES ABSOLUTE: 1 THOU/MM3 (ref 0.4–1.3)
NUCLEATED RED BLOOD CELLS: 0 /100 WBC
PLATELET # BLD: 284 THOU/MM3 (ref 130–400)
PMV BLD AUTO: 10.9 FL (ref 9.4–12.4)
RBC # BLD: 6.37 MILL/MM3 (ref 4.2–5.4)
SEG NEUTROPHILS: 75.7 %
SEGMENTED NEUTROPHILS ABSOLUTE COUNT: 15.4 THOU/MM3 (ref 1.8–7.7)
WBC # BLD: 20.4 THOU/MM3 (ref 4.8–10.8)

## 2021-01-18 PROCEDURE — 36415 COLL VENOUS BLD VENIPUNCTURE: CPT

## 2021-01-18 PROCEDURE — 85025 COMPLETE CBC W/AUTO DIFF WBC: CPT

## 2021-02-22 ENCOUNTER — HOSPITAL ENCOUNTER (OUTPATIENT)
Age: 66
Discharge: HOME | End: 2021-02-22
Payer: MEDICARE

## 2021-02-22 DIAGNOSIS — D45 POLYCYTHEMIA VERA (HCC): ICD-10-CM

## 2021-02-22 LAB
BASOPHILS # BLD: 1.5 %
BASOPHILS ABSOLUTE: 0.3 THOU/MM3 (ref 0–0.1)
EOSINOPHIL # BLD: 3.8 %
EOSINOPHILS ABSOLUTE: 0.7 THOU/MM3 (ref 0–0.4)
ERYTHROCYTE [DISTWIDTH] IN BLOOD BY AUTOMATED COUNT: 21.9 % (ref 11.5–14.5)
ERYTHROCYTE [DISTWIDTH] IN BLOOD BY AUTOMATED COUNT: 58.4 FL (ref 35–45)
HCT VFR BLD CALC: 53.6 % (ref 37–47)
HEMOGLOBIN: 15.3 GM/DL (ref 12–16)
HYPOCHROMIA: PRESENT
IMMATURE GRANS (ABS): 0.18 THOU/MM3 (ref 0–0.07)
IMMATURE GRANULOCYTES: 1 %
LYMPHOCYTES # BLD: 8.4 %
LYMPHOCYTES ABSOLUTE: 1.6 THOU/MM3 (ref 1–4.8)
MCH RBC QN AUTO: 22.8 PG (ref 26–33)
MCHC RBC AUTO-ENTMCNC: 28.5 GM/DL (ref 32.2–35.5)
MCV RBC AUTO: 80 FL (ref 81–99)
MONOCYTES # BLD: 4.3 %
MONOCYTES ABSOLUTE: 0.8 THOU/MM3 (ref 0.4–1.3)
NUCLEATED RED BLOOD CELLS: 0 /100 WBC
PLATELET # BLD: 141 THOU/MM3 (ref 130–400)
PLATELET ESTIMATE: ADEQUATE
PMV BLD AUTO: ABNORMAL FL (ref 9.4–12.4)
POIKILOCYTES: ABNORMAL
RBC # BLD: 6.7 MILL/MM3 (ref 4.2–5.4)
SCAN OF BLOOD SMEAR: NORMAL
SEG NEUTROPHILS: 81 %
SEGMENTED NEUTROPHILS ABSOLUTE COUNT: 15.1 THOU/MM3 (ref 1.8–7.7)
WBC # BLD: 18.7 THOU/MM3 (ref 4.8–10.8)

## 2021-02-22 PROCEDURE — 36415 COLL VENOUS BLD VENIPUNCTURE: CPT

## 2021-02-22 PROCEDURE — 85025 COMPLETE CBC W/AUTO DIFF WBC: CPT

## 2021-04-08 ENCOUNTER — HOSPITAL ENCOUNTER (OUTPATIENT)
Dept: INFUSION THERAPY | Age: 66
Discharge: HOME | End: 2021-04-08
Payer: MEDICARE

## 2021-04-08 ENCOUNTER — OFFICE VISIT (OUTPATIENT)
Dept: ONCOLOGY | Age: 66
End: 2021-04-08
Payer: MEDICARE

## 2021-04-08 VITALS
HEART RATE: 71 BPM | TEMPERATURE: 97 F | SYSTOLIC BLOOD PRESSURE: 129 MMHG | RESPIRATION RATE: 18 BRPM | WEIGHT: 190 LBS | DIASTOLIC BLOOD PRESSURE: 61 MMHG | BODY MASS INDEX: 31.65 KG/M2 | OXYGEN SATURATION: 96 % | HEIGHT: 65 IN

## 2021-04-08 DIAGNOSIS — D72.829 LEUKOCYTOSIS, UNSPECIFIED TYPE: ICD-10-CM

## 2021-04-08 DIAGNOSIS — Z51.81 ENCOUNTER FOR MONITORING OF HYDROXYUREA THERAPY: ICD-10-CM

## 2021-04-08 DIAGNOSIS — Z79.64 ENCOUNTER FOR MONITORING OF HYDROXYUREA THERAPY: ICD-10-CM

## 2021-04-08 DIAGNOSIS — D75.1 POLYCYTHEMIA: ICD-10-CM

## 2021-04-08 DIAGNOSIS — D45 POLYCYTHEMIA VERA (HCC): ICD-10-CM

## 2021-04-08 DIAGNOSIS — R71.8 ELEVATED HEMATOCRIT: ICD-10-CM

## 2021-04-08 DIAGNOSIS — D45 POLYCYTHEMIA VERA (HCC): Primary | ICD-10-CM

## 2021-04-08 LAB
ABSOLUTE IMMATURE GRANULOCYTE: 0.15 THOU/MM3 (ref 0–0.07)
ALBUMIN SERPL-MCNC: 4 G/DL (ref 3.5–5.1)
ALP BLD-CCNC: 144 U/L (ref 38–126)
ALT SERPL-CCNC: 19 U/L (ref 11–66)
AST SERPL-CCNC: 23 U/L (ref 5–40)
BASINOPHIL, AUTOMATED: 1 % (ref 0–3)
BASOPHILS ABSOLUTE: 0.2 THOU/MM3 (ref 0–0.1)
BILIRUB SERPL-MCNC: 0.4 MG/DL (ref 0.3–1.2)
BILIRUBIN DIRECT: < 0.2 MG/DL (ref 0–0.3)
BUN, WHOLE BLOOD: 15 MG/DL (ref 8–26)
CHLORIDE, WHOLE BLOOD: 106 MEQ/L (ref 98–109)
CREATININE, WHOLE BLOOD: 1.2 MG/DL (ref 0.5–1.2)
EOSINOPHILS ABSOLUTE: 0.7 THOU/MM3 (ref 0–0.4)
EOSINOPHILS RELATIVE PERCENT: 4 % (ref 0–4)
GFR, ESTIMATED: 48 ML/MIN/1.73M2
GLUCOSE, WHOLE BLOOD: 185 MG/DL (ref 70–108)
HCT VFR BLD CALC: 52.3 % (ref 37–47)
HEMOGLOBIN: 15.5 GM/DL (ref 12–16)
IMMATURE GRANULOCYTES: 1 %
IONIZED CALCIUM, WHOLE BLOOD: 1.28 MMOL/L (ref 1.12–1.32)
LYMPHOCYTES # BLD: 8 % (ref 15–47)
LYMPHOCYTES ABSOLUTE: 1.5 THOU/MM3 (ref 1–4.8)
MCH RBC QN AUTO: 23.6 PG (ref 26–33)
MCHC RBC AUTO-ENTMCNC: 29.6 GM/DL (ref 32.2–35.5)
MCV RBC AUTO: 80 FL (ref 81–99)
MONOCYTES ABSOLUTE: 0.8 THOU/MM3 (ref 0.4–1.3)
MONOCYTES: 4 % (ref 0–12)
PDW BLD-RTO: 21 % (ref 11.5–14.5)
PLATELET # BLD: 148 THOU/MM3 (ref 130–400)
PMV BLD AUTO: 10 FL (ref 9.4–12.4)
POTASSIUM, WHOLE BLOOD: 4.2 MEQ/L (ref 3.5–4.9)
RBC # BLD: 6.58 MILL/MM3 (ref 4.2–5.4)
SEG NEUTROPHILS: 82 % (ref 43–75)
SEGMENTED NEUTROPHILS ABSOLUTE COUNT: 15.5 THOU/MM3 (ref 1.8–7.7)
SODIUM, WHOLE BLOOD: 141 MEQ/L (ref 138–146)
TOTAL CO2, WHOLE BLOOD: 25 MEQ/L (ref 23–33)
TOTAL PROTEIN: 7.1 G/DL (ref 6.1–8)
WBC # BLD: 18.9 THOU/MM3 (ref 4.8–10.8)

## 2021-04-08 PROCEDURE — 36415 COLL VENOUS BLD VENIPUNCTURE: CPT

## 2021-04-08 PROCEDURE — 1123F ACP DISCUSS/DSCN MKR DOCD: CPT | Performed by: INTERNAL MEDICINE

## 2021-04-08 PROCEDURE — 80047 BASIC METABLC PNL IONIZED CA: CPT

## 2021-04-08 PROCEDURE — 1090F PRES/ABSN URINE INCON ASSESS: CPT | Performed by: INTERNAL MEDICINE

## 2021-04-08 PROCEDURE — 85025 COMPLETE CBC W/AUTO DIFF WBC: CPT

## 2021-04-08 PROCEDURE — 3017F COLORECTAL CA SCREEN DOC REV: CPT | Performed by: INTERNAL MEDICINE

## 2021-04-08 PROCEDURE — 99211 OFF/OP EST MAY X REQ PHY/QHP: CPT

## 2021-04-08 PROCEDURE — 4040F PNEUMOC VAC/ADMIN/RCVD: CPT | Performed by: INTERNAL MEDICINE

## 2021-04-08 PROCEDURE — G8427 DOCREV CUR MEDS BY ELIG CLIN: HCPCS | Performed by: INTERNAL MEDICINE

## 2021-04-08 PROCEDURE — 1036F TOBACCO NON-USER: CPT | Performed by: INTERNAL MEDICINE

## 2021-04-08 PROCEDURE — G8417 CALC BMI ABV UP PARAM F/U: HCPCS | Performed by: INTERNAL MEDICINE

## 2021-04-08 PROCEDURE — G8400 PT W/DXA NO RESULTS DOC: HCPCS | Performed by: INTERNAL MEDICINE

## 2021-04-08 PROCEDURE — 99215 OFFICE O/P EST HI 40 MIN: CPT | Performed by: INTERNAL MEDICINE

## 2021-04-08 PROCEDURE — 80076 HEPATIC FUNCTION PANEL: CPT

## 2021-04-08 NOTE — PROGRESS NOTES
Musculoskeletal: Negative. Skin: Negative. Neurological: Negative. Hematological: Negative. Psychiatric/Behavioral: Negative. Objective:   Physical Exam   Vitals:    04/08/21 0824   BP: 129/61   Pulse: 71   Resp: 18   Temp: 97 °F (36.1 °C)   SpO2: 96%   Vitals reviewed and are stable. Constitutional: Well-developed. No acute distress. HENT: Normocephalic and atraumatic. Eyes: Pupils appear equal and reactive. Neck: Overall appearance is symmetrical. No identifiable masses. Pulmonary: Effort normal. No respiratory distress. .  Neurological: Alert and oriented to person, place, and time. Judgment and thought content normal.  Skin: Skin is warm and dry. No rash. Psychiatric: Mood and affect appropriate for the clinical situation. Data Analysis:    Hematology 4/8/2021 2/22/2021 1/18/2021   WBC 18.9 (H) 18.7 (H) 20.4 (H)   RBC 6.58 (H) 6.70 (H) 6.37 (H)   HGB 15.5 15.3 14.9   HCT 52.3 (H) 53.6 (H) 50.6 (H)   MCV 80 (L) 80.0 (L) 79.4 (L)   RDW 21.0 (H)      141 284     Assessment:   1. Myeloproliferative disorder -polycythemia rubra vera  2. Leukocytosis. 3.  Elevated hematocrit. 4.  History of thrombosis. Plan:   1.  Schedule phlebotomy therapy once every two weeks and attempt to try to maintain hematocrit 50% and near 45%. 2.  Continue Hydrea at current dose of 500 mg daily  3. Monitor hemoglobin/hematocrit and for any signs of blood loss. 4.  Monitor total WBC count and for signs of infection/fever. 5.  Monitor for recurrence of thrombosis. 6.  Monitor platelet count and for any signs of abnormal bleeding/bruising. 7.  Monitor for side effects and toxicity from Hydrea. 8.  Monitor for progression of myeloproliferative disease. Javi Potts M.D.                                                                          Medical Director: [facility]  [facility] [address] [place] [address], [address] [place] [address], [address] [place] [address], [address] [facility] [address] of the [facility] Isael CANO at the [facility]      **This report has been created using voice recognition software. It may contain minor errors which are inherent in voice recognition technology. **

## 2021-04-12 ENCOUNTER — HOSPITAL ENCOUNTER (OUTPATIENT)
Dept: INFUSION THERAPY | Age: 66
Discharge: HOME | End: 2021-04-12
Payer: MEDICARE

## 2021-04-12 VITALS
WEIGHT: 186.2 LBS | TEMPERATURE: 98 F | SYSTOLIC BLOOD PRESSURE: 126 MMHG | HEIGHT: 65 IN | BODY MASS INDEX: 31.02 KG/M2 | RESPIRATION RATE: 18 BRPM | DIASTOLIC BLOOD PRESSURE: 84 MMHG | HEART RATE: 78 BPM | OXYGEN SATURATION: 94 %

## 2021-04-12 DIAGNOSIS — R79.83 HOMOCYSTEINEMIA: ICD-10-CM

## 2021-04-12 DIAGNOSIS — E66.9 OBESITY (BMI 30.0-34.9): ICD-10-CM

## 2021-04-12 DIAGNOSIS — D75.1 POLYCYTHEMIA: ICD-10-CM

## 2021-04-12 DIAGNOSIS — D45 POLYCYTHEMIA VERA (HCC): Primary | ICD-10-CM

## 2021-04-12 DIAGNOSIS — N18.31 STAGE 3A CHRONIC KIDNEY DISEASE (HCC): ICD-10-CM

## 2021-04-12 LAB
ABSOLUTE IMMATURE GRANULOCYTE: 0.22 THOU/MM3 (ref 0–0.07)
BASINOPHIL, AUTOMATED: 1 % (ref 0–3)
BASOPHILS ABSOLUTE: 0.2 THOU/MM3 (ref 0–0.1)
EOSINOPHILS ABSOLUTE: 0.4 THOU/MM3 (ref 0–0.4)
EOSINOPHILS RELATIVE PERCENT: 1 % (ref 0–4)
HCT VFR BLD CALC: 52 % (ref 37–47)
HCT VFR BLD CALC: 52 % (ref 37–47)
HEMOGLOBIN: 15.6 GM/DL (ref 12–16)
HEMOGLOBIN: 15.6 GM/DL (ref 12–16)
IMMATURE GRANULOCYTES: 1 %
LYMPHOCYTES # BLD: 4 % (ref 15–47)
LYMPHOCYTES ABSOLUTE: 1.1 THOU/MM3 (ref 1–4.8)
MCH RBC QN AUTO: 23.7 PG (ref 26–33)
MCHC RBC AUTO-ENTMCNC: 30 GM/DL (ref 32.2–35.5)
MCV RBC AUTO: 79 FL (ref 81–99)
MONOCYTES ABSOLUTE: 0.9 THOU/MM3 (ref 0.4–1.3)
MONOCYTES: 3 % (ref 0–12)
PDW BLD-RTO: 20.8 % (ref 11.5–14.5)
PLATELET # BLD: 271 THOU/MM3 (ref 130–400)
PMV BLD AUTO: 10.4 FL (ref 9.4–12.4)
RBC # BLD: 6.57 MILL/MM3 (ref 4.2–5.4)
SEG NEUTROPHILS: 89 % (ref 43–75)
SEGMENTED NEUTROPHILS ABSOLUTE COUNT: 22.8 THOU/MM3 (ref 1.8–7.7)
WBC # BLD: 25.5 THOU/MM3 (ref 4.8–10.8)

## 2021-04-12 PROCEDURE — 85025 COMPLETE CBC W/AUTO DIFF WBC: CPT

## 2021-04-12 PROCEDURE — 99195 PHLEBOTOMY: CPT

## 2021-04-12 PROCEDURE — 36415 COLL VENOUS BLD VENIPUNCTURE: CPT

## 2021-04-12 RX ORDER — 0.9 % SODIUM CHLORIDE 0.9 %
500 INTRAVENOUS SOLUTION INTRAVENOUS ONCE
Status: CANCELLED | OUTPATIENT
Start: 2021-04-12 | End: 2021-04-12

## 2021-04-12 RX ORDER — 0.9 % SODIUM CHLORIDE 0.9 %
250 INTRAVENOUS SOLUTION INTRAVENOUS ONCE
Status: CANCELLED | OUTPATIENT
Start: 2021-04-12 | End: 2021-04-12

## 2021-04-12 NOTE — PROGRESS NOTES
THERAPEUTIC PHLEBOTOMY    Most recent Hemoglobin result: 15.6Gm/dl. Hematocrit result  52.0%  Date obtained:   04 /12 /2021    Pre-phlebotomy vital signs:see Doc flow sheet    Start [id number]    Empty donor bag placed on TARE scale. Tourniquet placed on patient's arm and arm palpated for venous access. Area of venous stick cleansed with alcohol. Hemostat applied to tubing of donor bag. Sheath removed from the needle and needle inserted into the antecubital Vein in the  left Arm. Hemostat released. Blood flowing well down the tubing into the bag. No adjustment of needle needed to maintain adequate blood flow. Scale monitoring amount of blood in bag. Stop [id number]  Needle removed from patient's arm. Pressure applied to patient's arm until bleeding stopped. Dry sterile dressing applied over puncture site and taped down well and secured with coban wrap. Final amount of blood removed:500ml  Post Vital Signs:see Doc flow sheet  Patient drank water and snack and observed for 10 minutes. Patient tolerated procedure well. Discharged ambulatory with belongings.

## 2021-04-12 NOTE — PLAN OF CARE
Problem: Musculor/Skeletal Functional Status  Goal: Absence of falls  Outcome: Met This Shift  Note: No falls occurred with visit today. Intervention: Fall precautions  Note: Verbalized understanding of fall prevention to ask for assistance with ambulation. Call light within reach. Problem: Intellectual/Education/Knowledge Deficit  Goal: Teaching initiated upon admission  Outcome: Met This Shift  Note: Patient verbalized understanding to therapeutic phlebotomy procedure and possible complications. Goal: Written Disposition Instruction form completed  Outcome: Met This Shift  Intervention: Verbal/written education provided  Note: Patient instructed on therapeutic phlebotomy procedure and potential complications. Consent signed. Aware to call MD if complications occur. Problem: Discharge Planning  Goal: Knowledge of discharge instructions  Description: Knowledge of discharge instructions  Outcome: Met This Shift  Note: Patient understands home discharge instructions sheet. Intervention: Interaction with patient/family and care team  Note: Reviewed home going instructions with Patient. Care plan reviewed with patient . Patient  verbalize understanding of the plan of care and contribute to goal setting.

## 2021-04-26 ENCOUNTER — HOSPITAL ENCOUNTER (OUTPATIENT)
Dept: INFUSION THERAPY | Age: 66
Discharge: HOME | End: 2021-04-26
Payer: MEDICARE

## 2021-04-26 VITALS
TEMPERATURE: 97.6 F | RESPIRATION RATE: 16 BRPM | HEIGHT: 65 IN | WEIGHT: 188.4 LBS | BODY MASS INDEX: 31.39 KG/M2 | DIASTOLIC BLOOD PRESSURE: 70 MMHG | SYSTOLIC BLOOD PRESSURE: 128 MMHG | HEART RATE: 65 BPM | OXYGEN SATURATION: 99 %

## 2021-04-26 DIAGNOSIS — D75.1 POLYCYTHEMIA: ICD-10-CM

## 2021-04-26 DIAGNOSIS — N18.31 STAGE 3A CHRONIC KIDNEY DISEASE (HCC): ICD-10-CM

## 2021-04-26 DIAGNOSIS — D45 POLYCYTHEMIA VERA (HCC): Primary | ICD-10-CM

## 2021-04-26 DIAGNOSIS — R79.83 HOMOCYSTEINEMIA: ICD-10-CM

## 2021-04-26 DIAGNOSIS — E66.9 OBESITY (BMI 30.0-34.9): ICD-10-CM

## 2021-04-26 LAB
ABSOLUTE IMMATURE GRANULOCYTE: 0.08 THOU/MM3 (ref 0–0.07)
BASINOPHIL, AUTOMATED: 1 % (ref 0–3)
BASOPHILS ABSOLUTE: 0.2 THOU/MM3 (ref 0–0.1)
EOSINOPHILS ABSOLUTE: 0.6 THOU/MM3 (ref 0–0.4)
EOSINOPHILS RELATIVE PERCENT: 4 % (ref 0–4)
HCT VFR BLD CALC: 51.1 % (ref 37–47)
HEMOGLOBIN: 14.7 GM/DL (ref 12–16)
IMMATURE GRANULOCYTES: 1 %
LYMPHOCYTES # BLD: 7 % (ref 15–47)
LYMPHOCYTES ABSOLUTE: 1 THOU/MM3 (ref 1–4.8)
MCH RBC QN AUTO: 23.3 PG (ref 26–33)
MCHC RBC AUTO-ENTMCNC: 28.8 GM/DL (ref 32.2–35.5)
MCV RBC AUTO: 81 FL (ref 81–99)
MONOCYTES ABSOLUTE: 0.8 THOU/MM3 (ref 0.4–1.3)
MONOCYTES: 5 % (ref 0–12)
PDW BLD-RTO: 20.2 % (ref 11.5–14.5)
PLATELET # BLD: 129 THOU/MM3 (ref 130–400)
PMV BLD AUTO: 10.6 FL (ref 9.4–12.4)
RBC # BLD: 6.32 MILL/MM3 (ref 4.2–5.4)
SEG NEUTROPHILS: 82 % (ref 43–75)
SEGMENTED NEUTROPHILS ABSOLUTE COUNT: 11.9 THOU/MM3 (ref 1.8–7.7)
WBC # BLD: 14.5 THOU/MM3 (ref 4.8–10.8)

## 2021-04-26 PROCEDURE — 99195 PHLEBOTOMY: CPT

## 2021-04-26 PROCEDURE — 85025 COMPLETE CBC W/AUTO DIFF WBC: CPT

## 2021-04-26 PROCEDURE — 36415 COLL VENOUS BLD VENIPUNCTURE: CPT

## 2021-04-26 RX ORDER — 0.9 % SODIUM CHLORIDE 0.9 %
250 INTRAVENOUS SOLUTION INTRAVENOUS ONCE
Status: CANCELLED | OUTPATIENT
Start: 2021-04-26 | End: 2021-04-26

## 2021-04-26 RX ORDER — 0.9 % SODIUM CHLORIDE 0.9 %
500 INTRAVENOUS SOLUTION INTRAVENOUS ONCE
Status: CANCELLED | OUTPATIENT
Start: 2021-04-26 | End: 2021-04-26

## 2021-04-26 NOTE — PROGRESS NOTES
THERAPEUTIC PHLEBOTOMY    Most recent Hemoglobin result: 14.7Gm/dl. Hematocrit result  51.1%  Date obtained:   04 /26 /2021    Pre-phlebotomy vital signs:see Doc flow sheet    Start [id number]    Empty donor bag placed on TARE scale. Tourniquet placed on patient's arm and arm palpated for venous access. Area of venous stick cleansed with alcohol. Hemostat applied to tubing of donor bag. Sheath removed from the needle and needle inserted into the antecubital Vein in the  right Arm. Hemostat released. Blood flowing well down the tubing into the bag. No adjustment of needle needed to maintain adequate blood flow. Scale monitoring amount of blood in bag. Stop [id number]  Needle removed from patient's arm. Pressure applied to patient's arm until bleeding stopped. Dry sterile dressing applied over puncture site and taped down well and secured with coban wrap. Final amount of blood removed:500ml  Post Vital Signs:see Doc flow sheet  Patient drank water and opted to not be observed for 20 minutes. Patient tolerated procedure well. Discharged ambulatory with belongings.

## 2021-05-10 ENCOUNTER — HOSPITAL ENCOUNTER (OUTPATIENT)
Dept: INFUSION THERAPY | Age: 66
Discharge: HOME | End: 2021-05-10
Payer: MEDICARE

## 2021-05-10 VITALS
RESPIRATION RATE: 16 BRPM | HEART RATE: 72 BPM | SYSTOLIC BLOOD PRESSURE: 104 MMHG | WEIGHT: 187.2 LBS | BODY MASS INDEX: 31.19 KG/M2 | HEIGHT: 65 IN | OXYGEN SATURATION: 98 % | DIASTOLIC BLOOD PRESSURE: 61 MMHG | TEMPERATURE: 97.8 F

## 2021-05-10 DIAGNOSIS — D45 POLYCYTHEMIA VERA (HCC): Primary | ICD-10-CM

## 2021-05-10 DIAGNOSIS — E66.9 OBESITY (BMI 30.0-34.9): ICD-10-CM

## 2021-05-10 DIAGNOSIS — R79.83 HOMOCYSTEINEMIA: ICD-10-CM

## 2021-05-10 DIAGNOSIS — D75.1 POLYCYTHEMIA: ICD-10-CM

## 2021-05-10 DIAGNOSIS — N18.31 STAGE 3A CHRONIC KIDNEY DISEASE (HCC): ICD-10-CM

## 2021-05-10 LAB
ABSOLUTE IMMATURE GRANULOCYTE: 0.08 THOU/MM3 (ref 0–0.07)
BASINOPHIL, AUTOMATED: 1 % (ref 0–3)
BASOPHILS ABSOLUTE: 0.2 THOU/MM3 (ref 0–0.1)
EOSINOPHILS ABSOLUTE: 1 THOU/MM3 (ref 0–0.4)
EOSINOPHILS RELATIVE PERCENT: 6 % (ref 0–4)
HCT VFR BLD CALC: 46.1 % (ref 37–47)
HEMOGLOBIN: 13.3 GM/DL (ref 12–16)
IMMATURE GRANULOCYTES: 1 %
LYMPHOCYTES # BLD: 9 % (ref 15–47)
LYMPHOCYTES ABSOLUTE: 1.4 THOU/MM3 (ref 1–4.8)
MCH RBC QN AUTO: 23.4 PG (ref 26–33)
MCHC RBC AUTO-ENTMCNC: 28.9 GM/DL (ref 32.2–35.5)
MCV RBC AUTO: 81 FL (ref 81–99)
MONOCYTES ABSOLUTE: 0.8 THOU/MM3 (ref 0.4–1.3)
MONOCYTES: 5 % (ref 0–12)
PDW BLD-RTO: 19.1 % (ref 11.5–14.5)
PLATELET # BLD: 288 THOU/MM3 (ref 130–400)
PMV BLD AUTO: 9.9 FL (ref 9.4–12.4)
RBC # BLD: 5.69 MILL/MM3 (ref 4.2–5.4)
SEG NEUTROPHILS: 79 % (ref 43–75)
SEGMENTED NEUTROPHILS ABSOLUTE COUNT: 13 THOU/MM3 (ref 1.8–7.7)
WBC # BLD: 16.4 THOU/MM3 (ref 4.8–10.8)

## 2021-05-10 PROCEDURE — 36415 COLL VENOUS BLD VENIPUNCTURE: CPT

## 2021-05-10 PROCEDURE — 85025 COMPLETE CBC W/AUTO DIFF WBC: CPT

## 2021-05-10 PROCEDURE — 99195 PHLEBOTOMY: CPT

## 2021-05-10 RX ORDER — 0.9 % SODIUM CHLORIDE 0.9 %
250 INTRAVENOUS SOLUTION INTRAVENOUS ONCE
Status: CANCELLED | OUTPATIENT
Start: 2021-05-10 | End: 2021-05-10

## 2021-05-10 RX ORDER — 0.9 % SODIUM CHLORIDE 0.9 %
500 INTRAVENOUS SOLUTION INTRAVENOUS ONCE
Status: CANCELLED | OUTPATIENT
Start: 2021-05-10 | End: 2021-05-10

## 2021-05-10 NOTE — PROGRESS NOTES
THERAPEUTIC PHLEBOTOMY    Most recent Hemoglobin result: 13.3Gm/dl. Hematocrit result  46.8%  Date obtained:   05 /10 /2021    Pre-phlebotomy vital signs:see Doc flow sheet    Start [id number]    Empty donor bag placed on TARE scale. Tourniquet placed on patient's arm and arm palpated for venous access. Area of venous stick cleansed with alcohol. Hemostat applied to tubing of donor bag. Sheath removed from the needle and needle inserted into the antecubital Vein in the  right Arm. Hemostat released. Blood flowing well down the tubing into the bag. No adjustment of needle needed to maintain adequate blood flow. Scale monitoring amount of blood in bag. Stop [id number]  Needle removed from patient's arm. Pressure applied to patient's arm until bleeding stopped. Dry sterile dressing applied over puncture site and taped down well and secured with coban wrap. Final amount of blood removed:500ml  Post Vital Signs:see Doc flow sheet  Patient drank water  and observed for 10 minutes. Patient tolerated procedure well. Discharged ambulatory with belongings.

## 2021-05-26 ENCOUNTER — HOSPITAL ENCOUNTER (OUTPATIENT)
Dept: INFUSION THERAPY | Age: 66
Discharge: HOME | End: 2021-05-26
Payer: MEDICARE

## 2021-05-26 VITALS
SYSTOLIC BLOOD PRESSURE: 113 MMHG | RESPIRATION RATE: 16 BRPM | WEIGHT: 188.6 LBS | OXYGEN SATURATION: 98 % | HEIGHT: 65 IN | TEMPERATURE: 97.1 F | HEART RATE: 71 BPM | BODY MASS INDEX: 31.42 KG/M2 | DIASTOLIC BLOOD PRESSURE: 71 MMHG

## 2021-05-26 DIAGNOSIS — D45 POLYCYTHEMIA VERA (HCC): Primary | ICD-10-CM

## 2021-05-26 DIAGNOSIS — E66.9 OBESITY (BMI 30.0-34.9): ICD-10-CM

## 2021-05-26 DIAGNOSIS — D75.1 POLYCYTHEMIA: ICD-10-CM

## 2021-05-26 DIAGNOSIS — R79.83 HOMOCYSTEINEMIA: ICD-10-CM

## 2021-05-26 DIAGNOSIS — N18.31 STAGE 3A CHRONIC KIDNEY DISEASE (HCC): ICD-10-CM

## 2021-05-26 LAB
ABSOLUTE IMMATURE GRANULOCYTE: 0.1 THOU/MM3 (ref 0–0.07)
BASINOPHIL, AUTOMATED: 1 % (ref 0–3)
BASOPHILS ABSOLUTE: 0.2 THOU/MM3 (ref 0–0.1)
EOSINOPHILS ABSOLUTE: 1.5 THOU/MM3 (ref 0–0.4)
EOSINOPHILS RELATIVE PERCENT: 9 % (ref 0–4)
HCT VFR BLD CALC: 44.3 % (ref 37–47)
HEMOGLOBIN: 13 GM/DL (ref 12–16)
IMMATURE GRANULOCYTES: 1 %
LYMPHOCYTES # BLD: 9 % (ref 15–47)
LYMPHOCYTES ABSOLUTE: 1.6 THOU/MM3 (ref 1–4.8)
MCH RBC QN AUTO: 23.5 PG (ref 26–33)
MCHC RBC AUTO-ENTMCNC: 29.3 GM/DL (ref 32.2–35.5)
MCV RBC AUTO: 80 FL (ref 81–99)
MONOCYTES ABSOLUTE: 0.7 THOU/MM3 (ref 0.4–1.3)
MONOCYTES: 4 % (ref 0–12)
PDW BLD-RTO: 18.4 % (ref 11.5–14.5)
PLATELET # BLD: 292 THOU/MM3 (ref 130–400)
PMV BLD AUTO: 10.6 FL (ref 9.4–12.4)
RBC # BLD: 5.54 MILL/MM3 (ref 4.2–5.4)
SEG NEUTROPHILS: 76 % (ref 43–75)
SEGMENTED NEUTROPHILS ABSOLUTE COUNT: 13.4 THOU/MM3 (ref 1.8–7.7)
WBC # BLD: 17.5 THOU/MM3 (ref 4.8–10.8)

## 2021-05-26 PROCEDURE — 85025 COMPLETE CBC W/AUTO DIFF WBC: CPT

## 2021-05-26 PROCEDURE — 36415 COLL VENOUS BLD VENIPUNCTURE: CPT

## 2021-05-26 PROCEDURE — 99195 PHLEBOTOMY: CPT

## 2021-05-26 RX ORDER — 0.9 % SODIUM CHLORIDE 0.9 %
250 INTRAVENOUS SOLUTION INTRAVENOUS ONCE
Status: CANCELLED | OUTPATIENT
Start: 2021-05-26 | End: 2021-05-26

## 2021-05-26 RX ORDER — 0.9 % SODIUM CHLORIDE 0.9 %
500 INTRAVENOUS SOLUTION INTRAVENOUS ONCE
Status: CANCELLED | OUTPATIENT
Start: 2021-05-26 | End: 2021-05-26

## 2021-05-26 NOTE — PROGRESS NOTES
THERAPEUTIC PHLEBOTOMY    Most recent Hemoglobin result: 13.0 Gm/dl. Hematocrit result  44.3  Date obtained:   05 /26 /2021    Pre-phlebotomy vital signs:see Doc flow sheet    Start [id number]    Empty donor bag placed on TARE scale. Tourniquet placed on patient's arm and arm palpated for venous access. Area of venous stick cleansed with alcohol. Hemostat applied to tubing of donor bag. Sheath removed from the needle and needle inserted into the antecubital vein in the right arm. Hemostat released. Blood flowing well down the tubing into the bag. No adjustment of needle needed to maintain adequate blood flow. Scale monitoring amount of blood in bag. Stop [id number]  Needle removed from patient's arm. Pressure applied to patient's arm until bleeding stopped. Dry sterile dressing applied over puncture site and taped down well and secured with coban wrap. Final amount of blood removed:500  Post Vital Signs:see Doc flow sheet  Patient drank some water and snack and observed for 20 minutes. Patient tolerated procedure well. Discharged ambulatory with belongings.

## 2021-05-26 NOTE — PLAN OF CARE
Problem: Musculor/Skeletal Functional Status  Goal: Absence of falls  Outcome: Met This Shift  Note: Free from falls while in O.P. Oncology. Intervention: Fall precautions  Note: Discussed the need to use the call light for assistance when getting up to ambulate. Problem: Intellectual/Education/Knowledge Deficit  Goal: Teaching initiated upon admission  Outcome: Met This Shift  Note: Patient verbalizes understanding to verbal information given on Therapeutic Phlebotomy,action and possible side effects. Aware to call MD if develop complications. Goal: Written Disposition Instruction form completed  Outcome: Completed  Intervention: Verbal/written education provided  Note: Patient instructed on therapeutic phlebotomy procedure and potential complications. Consent signed. Aware to call MD if complications occur. Problem: Discharge Planning  Goal: Knowledge of discharge instructions  Description: Knowledge of discharge instructions  Outcome: Met This Shift  Note: Verbalize understanding of discharge instructions, follow up appointments, and when to call Physician. Intervention: Interaction with patient/family and care team  Note: Discuss understanding of discharge instructions, follow up appointments and when to call Physician. Care plan reviewed with patient. Patient verbalize understanding of the plan of care and contribute to goal setting.
Psych/Behavioral

## 2021-05-31 DIAGNOSIS — I10 ESSENTIAL HYPERTENSION: ICD-10-CM

## 2021-06-03 RX ORDER — CLONIDINE HYDROCHLORIDE 0.2 MG/1
TABLET ORAL
Qty: 180 TABLET | Refills: 3 | OUTPATIENT
Start: 2021-06-03

## 2021-06-03 RX ORDER — ENALAPRIL MALEATE 20 MG/1
TABLET ORAL
Qty: 180 TABLET | Refills: 3 | OUTPATIENT
Start: 2021-06-03

## 2021-06-07 ENCOUNTER — HOSPITAL ENCOUNTER (OUTPATIENT)
Dept: INFUSION THERAPY | Age: 66
Discharge: HOME | End: 2021-06-07
Payer: MEDICARE

## 2021-06-07 VITALS
HEIGHT: 65 IN | BODY MASS INDEX: 31.56 KG/M2 | OXYGEN SATURATION: 99 % | TEMPERATURE: 98 F | HEART RATE: 66 BPM | SYSTOLIC BLOOD PRESSURE: 121 MMHG | RESPIRATION RATE: 16 BRPM | DIASTOLIC BLOOD PRESSURE: 65 MMHG | WEIGHT: 189.4 LBS

## 2021-06-07 DIAGNOSIS — D45 POLYCYTHEMIA VERA (HCC): ICD-10-CM

## 2021-06-07 DIAGNOSIS — D75.1 POLYCYTHEMIA: Primary | ICD-10-CM

## 2021-06-07 LAB
ABSOLUTE IMMATURE GRANULOCYTE: 0.07 THOU/MM3 (ref 0–0.07)
BASINOPHIL, AUTOMATED: 1 % (ref 0–3)
BASOPHILS ABSOLUTE: 0.1 THOU/MM3 (ref 0–0.1)
EOSINOPHILS ABSOLUTE: 0.7 THOU/MM3 (ref 0–0.4)
EOSINOPHILS RELATIVE PERCENT: 5 % (ref 0–4)
HCT VFR BLD CALC: 41.4 % (ref 37–47)
HEMOGLOBIN: 11.9 GM/DL (ref 12–16)
IMMATURE GRANULOCYTES: 1 %
LYMPHOCYTES # BLD: 10 % (ref 15–47)
LYMPHOCYTES ABSOLUTE: 1.3 THOU/MM3 (ref 1–4.8)
MCH RBC QN AUTO: 22.8 PG (ref 26–33)
MCHC RBC AUTO-ENTMCNC: 28.7 GM/DL (ref 32.2–35.5)
MCV RBC AUTO: 79 FL (ref 81–99)
MONOCYTES ABSOLUTE: 0.6 THOU/MM3 (ref 0.4–1.3)
MONOCYTES: 5 % (ref 0–12)
PDW BLD-RTO: 17.8 % (ref 11.5–14.5)
PLATELET # BLD: 160 THOU/MM3 (ref 130–400)
PMV BLD AUTO: 10.4 FL (ref 9.4–12.4)
RBC # BLD: 5.22 MILL/MM3 (ref 4.2–5.4)
SEG NEUTROPHILS: 78 % (ref 43–75)
SEGMENTED NEUTROPHILS ABSOLUTE COUNT: 10.2 THOU/MM3 (ref 1.8–7.7)
WBC # BLD: 13.1 THOU/MM3 (ref 4.8–10.8)

## 2021-06-07 PROCEDURE — 85025 COMPLETE CBC W/AUTO DIFF WBC: CPT

## 2021-06-07 PROCEDURE — 36415 COLL VENOUS BLD VENIPUNCTURE: CPT

## 2021-06-07 PROCEDURE — 99211 OFF/OP EST MAY X REQ PHY/QHP: CPT

## 2021-06-07 NOTE — PLAN OF CARE
Problem: Intellectual/Education/Knowledge Deficit  Goal: Teaching initiated upon admission  Outcome: Met This Shift  Note: Understands holding therapeutic phlebotomy due to lab results  Intervention: Verbal/written education provided  Note: Review lab results- informed holding procedure today     Problem: Discharge Planning  Goal: Knowledge of discharge instructions  Description: Knowledge of discharge instructions  Outcome: Met This Shift  Note: Verbalized understanding of discharge instructions, follow-up appointments, and when to call the physician. Intervention: Interaction with patient/family and care team  Note: Discuss understanding of discharge instructions,follow-up appointments, and when to call the physician. Care plan reviewed with patient . Patient  verbalize understanding of the plan of care and contribute to goal setting.

## 2021-06-07 NOTE — PROGRESS NOTES
Patient informed holding procedure today due to lab results. Discharge instructions given to patient-verbalizes understanding. Ambulated off unit per self with belongings.

## 2021-06-08 ENCOUNTER — OFFICE VISIT (OUTPATIENT)
Dept: FAMILY MEDICINE CLINIC | Age: 66
End: 2021-06-08
Payer: MEDICARE

## 2021-06-08 VITALS
RESPIRATION RATE: 18 BRPM | BODY MASS INDEX: 31.32 KG/M2 | SYSTOLIC BLOOD PRESSURE: 112 MMHG | HEIGHT: 65 IN | DIASTOLIC BLOOD PRESSURE: 60 MMHG | WEIGHT: 188 LBS | HEART RATE: 70 BPM

## 2021-06-08 DIAGNOSIS — R73.9 HYPERGLYCEMIA: ICD-10-CM

## 2021-06-08 DIAGNOSIS — Z00.00 ROUTINE GENERAL MEDICAL EXAMINATION AT A HEALTH CARE FACILITY: Primary | ICD-10-CM

## 2021-06-08 DIAGNOSIS — I10 ESSENTIAL HYPERTENSION: ICD-10-CM

## 2021-06-08 DIAGNOSIS — R79.83 HOMOCYSTEINEMIA: ICD-10-CM

## 2021-06-08 DIAGNOSIS — I27.82 OTHER CHRONIC PULMONARY EMBOLISM, UNSPECIFIED WHETHER ACUTE COR PULMONALE PRESENT (HCC): ICD-10-CM

## 2021-06-08 PROCEDURE — G0439 PPPS, SUBSEQ VISIT: HCPCS | Performed by: FAMILY MEDICINE

## 2021-06-08 PROCEDURE — 3017F COLORECTAL CA SCREEN DOC REV: CPT | Performed by: FAMILY MEDICINE

## 2021-06-08 PROCEDURE — 1123F ACP DISCUSS/DSCN MKR DOCD: CPT | Performed by: FAMILY MEDICINE

## 2021-06-08 PROCEDURE — 4040F PNEUMOC VAC/ADMIN/RCVD: CPT | Performed by: FAMILY MEDICINE

## 2021-06-08 RX ORDER — ENALAPRIL MALEATE 20 MG/1
TABLET ORAL
Qty: 180 TABLET | Refills: 3 | Status: SHIPPED | OUTPATIENT
Start: 2021-06-08 | End: 2022-05-31 | Stop reason: SDUPTHER

## 2021-06-08 RX ORDER — CLONIDINE HYDROCHLORIDE 0.2 MG/1
0.2 TABLET ORAL 2 TIMES DAILY
Qty: 180 TABLET | Refills: 3 | Status: SHIPPED | OUTPATIENT
Start: 2021-06-08 | End: 2022-05-31 | Stop reason: SDUPTHER

## 2021-06-08 SDOH — ECONOMIC STABILITY: FOOD INSECURITY: WITHIN THE PAST 12 MONTHS, YOU WORRIED THAT YOUR FOOD WOULD RUN OUT BEFORE YOU GOT MONEY TO BUY MORE.: NEVER TRUE

## 2021-06-08 SDOH — ECONOMIC STABILITY: FOOD INSECURITY: WITHIN THE PAST 12 MONTHS, THE FOOD YOU BOUGHT JUST DIDN'T LAST AND YOU DIDN'T HAVE MONEY TO GET MORE.: NEVER TRUE

## 2021-06-08 ASSESSMENT — PATIENT HEALTH QUESTIONNAIRE - PHQ9
1. LITTLE INTEREST OR PLEASURE IN DOING THINGS: 0
SUM OF ALL RESPONSES TO PHQ QUESTIONS 1-9: 0
SUM OF ALL RESPONSES TO PHQ9 QUESTIONS 1 & 2: 0
SUM OF ALL RESPONSES TO PHQ QUESTIONS 1-9: 0
2. FEELING DOWN, DEPRESSED OR HOPELESS: 0
SUM OF ALL RESPONSES TO PHQ QUESTIONS 1-9: 0

## 2021-06-08 ASSESSMENT — LIFESTYLE VARIABLES
HOW OFTEN DURING THE LAST YEAR HAVE YOU FOUND THAT YOU WERE NOT ABLE TO STOP DRINKING ONCE YOU HAD STARTED: 0
AUDIT TOTAL SCORE: 4
HOW OFTEN DURING THE LAST YEAR HAVE YOU FAILED TO DO WHAT WAS NORMALLY EXPECTED FROM YOU BECAUSE OF DRINKING: 0
HOW OFTEN DURING THE LAST YEAR HAVE YOU HAD A FEELING OF GUILT OR REMORSE AFTER DRINKING: 0
HAVE YOU OR SOMEONE ELSE BEEN INJURED AS A RESULT OF YOUR DRINKING: 0
HOW OFTEN DURING THE LAST YEAR HAVE YOU BEEN UNABLE TO REMEMBER WHAT HAPPENED THE NIGHT BEFORE BECAUSE YOU HAD BEEN DRINKING: 0
HAS A RELATIVE, FRIEND, DOCTOR, OR ANOTHER HEALTH PROFESSIONAL EXPRESSED CONCERN ABOUT YOUR DRINKING OR SUGGESTED YOU CUT DOWN: 0
HOW MANY STANDARD DRINKS CONTAINING ALCOHOL DO YOU HAVE ON A TYPICAL DAY: 0
HOW OFTEN DURING THE LAST YEAR HAVE YOU NEEDED AN ALCOHOLIC DRINK FIRST THING IN THE MORNING TO GET YOURSELF GOING AFTER A NIGHT OF HEAVY DRINKING: 0
HOW OFTEN DO YOU HAVE A DRINK CONTAINING ALCOHOL: 4
AUDIT-C TOTAL SCORE: 4
HOW OFTEN DO YOU HAVE SIX OR MORE DRINKS ON ONE OCCASION: 0

## 2021-06-08 ASSESSMENT — SOCIAL DETERMINANTS OF HEALTH (SDOH): HOW HARD IS IT FOR YOU TO PAY FOR THE VERY BASICS LIKE FOOD, HOUSING, MEDICAL CARE, AND HEATING?: NOT HARD AT ALL

## 2021-06-08 NOTE — PATIENT INSTRUCTIONS
Personalized Preventive Plan for Jeremiah Avalos - 6/8/2021  Medicare offers a range of preventive health benefits. Some of the tests and screenings are paid in full while other may be subject to a deductible, co-insurance, and/or copay. Some of these benefits include a comprehensive review of your medical history including lifestyle, illnesses that may run in your family, and various assessments and screenings as appropriate. After reviewing your medical record and screening and assessments performed today your provider may have ordered immunizations, labs, imaging, and/or referrals for you. A list of these orders (if applicable) as well as your Preventive Care list are included within your After Visit Summary for your review. Other Preventive Recommendations:    · A preventive eye exam performed by an eye specialist is recommended every 1-2 years to screen for glaucoma; cataracts, macular degeneration, and other eye disorders. · A preventive dental visit is recommended every 6 months. · Try to get at least 150 minutes of exercise per week or 10,000 steps per day on a pedometer . · Order or download the FREE \"Exercise & Physical Activity: Your Everyday Guide\" from [employer]. Call 4-223.951.7835 or search [employer] online. · You need 9679-0141 mg of calcium and 1933-4205 IU of vitamin D per day. It is possible to meet your calcium requirement with diet alone, but a vitamin D supplement is usually necessary to meet this goal.  · When exposed to the sun, use a sunscreen that protects against both UVA and UVB radiation with an SPF of 30 or greater. Reapply every 2 to 3 hours or after sweating, drying off with a towel, or swimming. · Always wear a seat belt when traveling in a car. Always wear a helmet when riding a bicycle or motorcycle. Patient Education        Well Visit, Over 72: Care Instructions  Overview     Well visits can help you stay healthy. Your doctor has checked your overall health and may have suggested ways to take good care of yourself. Your doctor also may have recommended tests. At home, you can help prevent illness with healthy eating, regular exercise, and other steps. Follow-up care is a key part of your treatment and safety. Be sure to make and go to all appointments, and call your doctor if you are having problems. It's also a good idea to know your test results and keep a list of the medicines you take. How can you care for yourself at home? Get screening tests that you and your doctor decide on. Screening helps find diseases before any symptoms appear. Eat healthy foods. Choose fruits, vegetables, whole grains, protein, and low-fat dairy foods. Limit fat, especially saturated fat. Reduce salt in your diet. Limit alcohol. If you are a man, have no more than 2 drinks a day or 14 drinks a week. If you are a woman, have no more than 1 drink a day or 7 drinks a week. Since alcohol affects older adults differently, you may want to limit alcohol even more. Or you may not want to drink at all. Get at least 30 minutes of exercise on most days of the week. Walking is a good choice. You also may want to do other activities, such as running, swimming, cycling, or playing tennis or team sports. Reach and stay at a healthy weight. This will lower your risk for many problems, such as obesity, diabetes, heart disease, and high blood pressure. Do not smoke. Smoking can make health problems worse. If you need help quitting, talk to your doctor about stop-smoking programs and medicines. These can increase your chances of quitting for good. Care for your mental health. It is easy to get weighed down by worry and stress. Learn strategies to manage stress, like deep breathing and mindfulness, and stay connected with your family and community. If you find you often feel sad or hopeless, talk with your doctor. Treatment can help.   Talk to your doctor about whether you have any risk factors for sexually transmitted infections (STIs). You can help prevent STIs if you wait to have sex with a new partner (or partners) until you've each been tested for STIs. It also helps if you use condoms (male or female condoms) and if you limit your sex partners to one person who only has sex with you. Vaccines are available for some STIs. If you think you may have a problem with alcohol or drug use, talk to your doctor. This includes prescription medicines (such as amphetamines and opioids) and illegal drugs (such as cocaine and methamphetamine). Your doctor can help you figure out what type of treatment is best for you. Protect your skin from too much sun. When you're outdoors from 10 a.m. to 4 p.m., stay in the shade or cover up with clothing and a hat with a wide brim. Wear sunglasses that block UV rays. Even when it's cloudy, put broad-spectrum sunscreen (SPF 30 or higher) on any exposed skin. See a dentist one or two times a year for checkups and to have your teeth cleaned. Wear a seat belt in the car. When should you call for help? Watch closely for changes in your health, and be sure to contact your doctor if you have any problems or symptoms that concern you. Where can you learn more? Go to [email or web address]://[email or web address] org and sign in to your [employer] account. Enter G917 in the [facility] box to learn more about \"Well Visit, Over 65: Care Instructions. \"     If you do not have an account, please click on the \"Sign Up Now\" link. Current as of: May 27, 2020               Content Version: 12.8  © 6885-9484 Healthwise, [employer]. Care instructions adapted under license by [facility] ([facility]). If you have questions about a medical condition or this instruction, always ask your healthcare professional. [address] any warranty or liability for your use of this information.          Patient Education        DASH Diet: Care Instructions  Your Care Instructions     The DASH diet is an eating plan that can help lower your blood pressure. DASH stands for Dietary Approaches to Stop Hypertension. Hypertension is high blood pressure. The DASH diet focuses on eating foods that are high in calcium, potassium, and magnesium. These nutrients can lower blood pressure. The foods that are highest in these nutrients are fruits, vegetables, low-fat dairy products, nuts, seeds, and legumes. But taking calcium, potassium, and magnesium supplements instead of eating foods that are high in those nutrients does not have the same effect. The DASH diet also includes whole grains, fish, and poultry. The DASH diet is one of several lifestyle changes your doctor may recommend to lower your high blood pressure. Your doctor may also want you to decrease the amount of sodium in your diet. Lowering sodium while following the DASH diet can lower blood pressure even further than just the DASH diet alone. Follow-up care is a key part of your treatment and safety. Be sure to make and go to all appointments, and call your doctor if you are having problems. It's also a good idea to know your test results and keep a list of the medicines you take. How can you care for yourself at home? Following the DASH diet  Eat 4 to 5 servings of fruit each day. A serving is 1 medium-sized piece of fruit, ½ cup chopped or canned fruit, 1/4 cup dried fruit, or 4 ounces (½ cup) of fruit juice. Choose fruit more often than fruit juice. Eat 4 to 5 servings of vegetables each day. A serving is 1 cup of lettuce or raw leafy vegetables, ½ cup of chopped or cooked vegetables, or 4 ounces (½ cup) of vegetable juice. Choose vegetables more often than vegetable juice. Get 2 to 3 servings of low-fat and fat-free dairy each day. A serving is 8 ounces of milk, 1 cup of yogurt, or 1 ½ ounces of cheese. Eat 6 to 8 servings of grains each day.  A serving is 1 slice of bread, 1 ounce of dry cereal, or ½ cup of cooked rice, pasta, or cooked cereal. Try to choose whole-grain products as much as possible. Limit lean meat, poultry, and fish to 2 servings each day. A serving is 3 ounces, about the size of a deck of cards. Eat 4 to 5 servings of nuts, seeds, and legumes (cooked dried beans, lentils, and split peas) each week. A serving is 1/3 cup of nuts, 2 tablespoons of seeds, or ½ cup of cooked beans or peas. Limit fats and oils to 2 to 3 servings each day. A serving is 1 teaspoon of vegetable oil or 2 tablespoons of salad dressing. Limit sweets and added sugars to 5 servings or less a week. A serving is 1 tablespoon jelly or jam, ½ cup sorbet, or 1 cup of lemonade. Eat less than 2,300 milligrams (mg) of sodium a day. If you limit your sodium to 1,500 mg a day, you can lower your blood pressure even more. Be aware that all of these are the suggested number of servings for people who eat 1,800 to 2,000 calories a day. Your recommended number of servings may be different if you need more or fewer calories. Tips for success  Start small. Do not try to make dramatic changes to your diet all at once. You might feel that you are missing out on your favorite foods and then be more likely to not follow the plan. Make small changes, and stick with them. Once those changes become habit, add a few more changes. Try some of the following:  Make it a goal to eat a fruit or vegetable at every meal and at snacks. This will make it easy to get the recommended amount of fruits and vegetables each day. Try yogurt topped with fruit and nuts for a snack or healthy dessert. Add lettuce, tomato, cucumber, and onion to sandwiches. Combine a ready-made pizza crust with low-fat mozzarella cheese and lots of vegetable toppings. Try using tomatoes, squash, spinach, broccoli, carrots, cauliflower, and onions. Have a variety of cut-up vegetables with a low-fat dip as an appetizer instead of chips and dip.   Sprinkle

## 2021-06-08 NOTE — PROGRESS NOTES
Medicare Annual Wellness Visit  Name: Zhen Moffett Date: 2021   MRN: [id number] Sex: Female   Age: 77 y.o. Ethnicity: Non-/Non    : 1955 Race: Radha Alonzo is here for Medicare AWV    Screenings for behavioral, psychosocial and functional/safety risks, and cognitive dysfunction are all negative except as indicated below. These results, as well as other patient data from the [address] form, are documented in Flowsheets linked to this Encounter. Allergies   Allergen Reactions    Pcn [Penicillins] Anaphylaxis    Nitrates, Organic      Got bad headaches and stomachache    Keflex [Cephalexin] Itching and Rash         Prior to Visit Medications    Medication Sig Taking? Authorizing Provider   enalapril (VASOTEC) 20 MG tablet TAKE 1 TABLET TWICE A DAY Yes Rell Gallo MD   cloNIDine (CATAPRES) 0.2 MG tablet Take 1 tablet by mouth 2 times daily Yes Rell Gallo MD   hydroxyurea (HYDREA) 500 MG chemo capsule Take 1 capsule by mouth daily Yes Oskar Melton MD   amLODIPine (NORVASC) 10 MG tablet Take 1 tablet by mouth daily Yes Luis Felipe Fuller MD   ondansetron (ZOFRAN) 4 MG tablet Take 1 tablet by mouth every 8 hours as needed for Nausea or Vomiting Yes Oskar Melton MD   folic acid (FOLVITE) 1 MG tablet TAKE 1 TABLET DAILY Yes Oskar Melton MD   triamcinolone (KENALOG) 0.1 % cream APPLY TO LEGS TWICE DAILY AS NEEDED Yes Historical Provider, MD   Cyanocobalamin (B-12) 500 MCG TABS Take 500 mg by mouth daily.  Yes Historical Provider, MD         Past Medical History:   Diagnosis Date    Basal cell cancer     Margie/Gustavo    Chronic kidney disease, stage III (moderate) [facility] [address] 2019    DVT (deep venous thrombosis) ([facility] [address] 2010    History of shingles 2016    Homocysteinemia ([facility] [address]     Hypertension     Polycythemia vera(238.4)     Pulmonary emboli ([facility] [address]         Right-sided epistaxis 2019    Unspecified diseases of bruits  Abdomen: soft, non-tender, non-distended, normal bowel sounds, no masses or organomegaly  Extremities: no cyanosis, clubbing or edema  Musculoskeletal: normal range of motion, no joint swelling, deformity or tenderness  Neurologic: reflexes normal and symmetric, no cranial nerve deficit, gait, coordination and speech normal    Patient's complete Health Risk Assessment and screening values have been reviewed and are found in Flowsheets. The following problems were reviewed today and where indicated follow up appointments were made and/or referrals ordered. Positive Risk Factor Screenings with Interventions:            General Health and ACP:  General  In general, how would you say your health is?: Good  In the past 7 days, have you experienced any of the following? New or Increased Pain, New or Increased Fatigue, Loneliness, Social Isolation, Stress or Anger?: None of These  Do you get the social and emotional support that you need?: Yes  Do you have a Living Will?: Yes  Advance Directives     Power of [address] Will ACP-Advance Directive ACP-Power of     Not on File Not on File Not on File Not on File      General Health Risk Interventions:  · No Living Will: ACP documents already completed- patient asked to provide copy to the office    Health Habits/Nutrition:  Health Habits/Nutrition  Do you exercise for at least 20 minutes 2-3 times per week?: Yes  Have you lost any weight without trying in the past 3 months?: No  Do you eat only one meal per day?: No  Have you seen the dentist within the past year?: Yes  Body mass index: (!) 31.28  Health Habits/Nutrition Interventions:  · Nutritional issues:  educational materials for healthy, well-balanced diet provided       Personalized Preventive Plan   Current Health Maintenance Status    There is no immunization history on file for this patient.      Health Maintenance   Topic Date Due    Hepatitis C screen  Never done    COVID-19 Vaccine (1)

## 2021-07-08 ENCOUNTER — TELEPHONE (OUTPATIENT)
Dept: FAMILY MEDICINE CLINIC | Age: 66
End: 2021-07-08

## 2021-07-08 ENCOUNTER — HOSPITAL ENCOUNTER (OUTPATIENT)
Dept: INFUSION THERAPY | Age: 66
Discharge: HOME | End: 2021-07-08
Payer: MEDICARE

## 2021-07-08 ENCOUNTER — OFFICE VISIT (OUTPATIENT)
Dept: ONCOLOGY | Age: 66
End: 2021-07-08
Payer: MEDICARE

## 2021-07-08 VITALS
HEIGHT: 65 IN | SYSTOLIC BLOOD PRESSURE: 130 MMHG | DIASTOLIC BLOOD PRESSURE: 60 MMHG | TEMPERATURE: 98.6 F | RESPIRATION RATE: 18 BRPM | WEIGHT: 192 LBS | BODY MASS INDEX: 31.99 KG/M2 | HEART RATE: 60 BPM | OXYGEN SATURATION: 99 %

## 2021-07-08 DIAGNOSIS — Z79.64 ENCOUNTER FOR MONITORING OF HYDROXYUREA THERAPY: ICD-10-CM

## 2021-07-08 DIAGNOSIS — Z51.81 ENCOUNTER FOR MONITORING OF HYDROXYUREA THERAPY: ICD-10-CM

## 2021-07-08 DIAGNOSIS — R71.8 ELEVATED HEMATOCRIT: ICD-10-CM

## 2021-07-08 DIAGNOSIS — D45 POLYCYTHEMIA VERA (HCC): ICD-10-CM

## 2021-07-08 DIAGNOSIS — I10 ESSENTIAL HYPERTENSION: ICD-10-CM

## 2021-07-08 DIAGNOSIS — D75.1 POLYCYTHEMIA: ICD-10-CM

## 2021-07-08 DIAGNOSIS — D72.829 LEUKOCYTOSIS, UNSPECIFIED TYPE: ICD-10-CM

## 2021-07-08 DIAGNOSIS — D45 POLYCYTHEMIA VERA (HCC): Primary | ICD-10-CM

## 2021-07-08 LAB
BASOPHILS # BLD: 1.1 %
BASOPHILS ABSOLUTE: 0.2 THOU/MM3 (ref 0–0.1)
CHOLESTEROL, TOTAL: 165 MG/DL (ref 100–199)
EOSINOPHIL # BLD: 3.1 %
EOSINOPHILS ABSOLUTE: 0.6 THOU/MM3 (ref 0–0.4)
ERYTHROCYTE [DISTWIDTH] IN BLOOD BY AUTOMATED COUNT: 18.9 % (ref 11.5–14.5)
ERYTHROCYTE [DISTWIDTH] IN BLOOD BY AUTOMATED COUNT: 50.9 FL (ref 35–45)
HCT VFR BLD CALC: 44.7 % (ref 37–47)
HDLC SERPL-MCNC: 62 MG/DL
HEMOGLOBIN: 12.8 GM/DL (ref 12–16)
HYPOCHROMIA: PRESENT
IMMATURE GRANS (ABS): 0.17 THOU/MM3 (ref 0–0.07)
IMMATURE GRANULOCYTES: 0.9 %
LDL CHOLESTEROL CALCULATED: 85 MG/DL
LYMPHOCYTES # BLD: 7.4 %
LYMPHOCYTES ABSOLUTE: 1.4 THOU/MM3 (ref 1–4.8)
MCH RBC QN AUTO: 22.7 PG (ref 26–33)
MCHC RBC AUTO-ENTMCNC: 28.6 GM/DL (ref 32.2–35.5)
MCV RBC AUTO: 79.4 FL (ref 81–99)
MONOCYTES # BLD: 3.7 %
MONOCYTES ABSOLUTE: 0.7 THOU/MM3 (ref 0.4–1.3)
NUCLEATED RED BLOOD CELLS: 0 /100 WBC
PLATELET # BLD: 226 THOU/MM3 (ref 130–400)
PLATELET ESTIMATE: ADEQUATE
PMV BLD AUTO: 10.7 FL (ref 9.4–12.4)
RBC # BLD: 5.63 MILL/MM3 (ref 4.2–5.4)
SCAN OF BLOOD SMEAR: NORMAL
SEG NEUTROPHILS: 83.8 %
SEGMENTED NEUTROPHILS ABSOLUTE COUNT: 16.3 THOU/MM3 (ref 1.8–7.7)
TRIGL SERPL-MCNC: 92 MG/DL (ref 0–199)
WBC # BLD: 19.4 THOU/MM3 (ref 4.8–10.8)

## 2021-07-08 PROCEDURE — G8427 DOCREV CUR MEDS BY ELIG CLIN: HCPCS | Performed by: INTERNAL MEDICINE

## 2021-07-08 PROCEDURE — 1123F ACP DISCUSS/DSCN MKR DOCD: CPT | Performed by: INTERNAL MEDICINE

## 2021-07-08 PROCEDURE — 85025 COMPLETE CBC W/AUTO DIFF WBC: CPT

## 2021-07-08 PROCEDURE — G8417 CALC BMI ABV UP PARAM F/U: HCPCS | Performed by: INTERNAL MEDICINE

## 2021-07-08 PROCEDURE — 36415 COLL VENOUS BLD VENIPUNCTURE: CPT

## 2021-07-08 PROCEDURE — 1090F PRES/ABSN URINE INCON ASSESS: CPT | Performed by: INTERNAL MEDICINE

## 2021-07-08 PROCEDURE — 1036F TOBACCO NON-USER: CPT | Performed by: INTERNAL MEDICINE

## 2021-07-08 PROCEDURE — 80061 LIPID PANEL: CPT

## 2021-07-08 PROCEDURE — 99215 OFFICE O/P EST HI 40 MIN: CPT | Performed by: INTERNAL MEDICINE

## 2021-07-08 PROCEDURE — 99211 OFF/OP EST MAY X REQ PHY/QHP: CPT

## 2021-07-08 PROCEDURE — 4040F PNEUMOC VAC/ADMIN/RCVD: CPT | Performed by: INTERNAL MEDICINE

## 2021-07-08 PROCEDURE — G8400 PT W/DXA NO RESULTS DOC: HCPCS | Performed by: INTERNAL MEDICINE

## 2021-07-08 PROCEDURE — 3017F COLORECTAL CA SCREEN DOC REV: CPT | Performed by: INTERNAL MEDICINE

## 2021-07-08 NOTE — TELEPHONE ENCOUNTER
----- Message from NATHANIEL Sequeira CNP sent at 7/8/2021  4:45 PM EDT -----  Cholesterol has improved since last drawn. . Current ASCVD risk score is 8.4%. No change in treatment necessary.     The 10-year ASCVD risk score (Urszula Stack, et al., 2013) is: 8.4%    Values used to calculate the score:      Age: 77 years      Sex: Female      Is Non- : No      Diabetic: No      Tobacco smoker: No      Systolic Blood Pressure: 642 mmHg      Is BP treated: Yes      HDL Cholesterol: 62 mg/dL      Total Cholesterol: 165 mg/dL

## 2021-07-08 NOTE — PROGRESS NOTES
acute distress. HENT: Normocephalic and atraumatic. Eyes: Pupils appear equal and reactive. Neck: Overall appearance is symmetrical. No identifiable masses. Pulmonary: Effort normal. No respiratory distress. .  Neurological: Alert and oriented to person, place, and time. Judgment and thought content normal.  Skin: Skin is warm and dry. No rash. Psychiatric: Mood and affect appropriate for the clinical situation. Data Analysis:    Hematology 7/12/2021 7/8/2021 6/7/2021   WBC 14.6 (H) 19.4 (H) 13.1 (H)   RBC 5.37 5.63 (H) 5.22   HGB 12.2 12.8 11.9 (L)   HCT 43.5 44.7 41.4   MCV 81.0 79.4 (L) 79 (L)   RDW   17.8 (H)    226 160     Assessment:   1. Myeloproliferative disorder -polycythemia rubra vera  2. Leukocytosis. 3.  Elevated hematocrit. 4.  History of thrombosis. Plan:   1.  Schedule phlebotomy therapy once every two weeks and attempt to try to maintain hematocrit near 45%. 2.  Continue Hydrea at current dose of 500 mg daily  3. Monitor hemoglobin/hematocrit and for any signs of blood loss. 4.  Monitor total WBC count and for signs of infection/fever. 5.  Monitor for recurrence of thrombosis. 6.  Monitor platelet count and for any signs of abnormal bleeding/bruising. 7.  Monitor for side effects and toxicity from Hydrea. 8.  Monitor for progression of myeloproliferative disease. Ana M Monroe M.D. Medical Director: [facility] [place] [address] [address] [facility] [facility], [address] [place] [address], [address] [place] [address] of [facility] at [facility]      **This report has been created using voice recognition software.   It may contain minor errors which are inherent in voice recognition technology. **

## 2021-07-12 ENCOUNTER — HOSPITAL ENCOUNTER (OUTPATIENT)
Dept: INFUSION THERAPY | Age: 66
Discharge: HOME | End: 2021-07-12
Payer: MEDICARE

## 2021-07-12 VITALS
WEIGHT: 190 LBS | SYSTOLIC BLOOD PRESSURE: 107 MMHG | DIASTOLIC BLOOD PRESSURE: 69 MMHG | OXYGEN SATURATION: 100 % | HEIGHT: 65 IN | RESPIRATION RATE: 18 BRPM | BODY MASS INDEX: 31.65 KG/M2 | TEMPERATURE: 98.3 F | HEART RATE: 62 BPM

## 2021-07-12 DIAGNOSIS — D45 POLYCYTHEMIA VERA (HCC): ICD-10-CM

## 2021-07-12 DIAGNOSIS — D75.1 POLYCYTHEMIA: ICD-10-CM

## 2021-07-12 LAB
BASOPHILS # BLD: 1.6 %
BASOPHILS ABSOLUTE: 0.2 THOU/MM3 (ref 0–0.1)
EOSINOPHIL # BLD: 4.2 %
EOSINOPHILS ABSOLUTE: 0.6 THOU/MM3 (ref 0–0.4)
ERYTHROCYTE [DISTWIDTH] IN BLOOD BY AUTOMATED COUNT: 19.1 % (ref 11.5–14.5)
ERYTHROCYTE [DISTWIDTH] IN BLOOD BY AUTOMATED COUNT: 52.2 FL (ref 35–45)
HCT VFR BLD CALC: 43.5 % (ref 37–47)
HEMOGLOBIN: 12.2 GM/DL (ref 12–16)
HYPOCHROMIA: PRESENT
IMMATURE GRANS (ABS): 0.11 THOU/MM3 (ref 0–0.07)
IMMATURE GRANULOCYTES: 0.8 %
LYMPHOCYTES # BLD: 9.5 %
LYMPHOCYTES ABSOLUTE: 1.4 THOU/MM3 (ref 1–4.8)
MCH RBC QN AUTO: 22.7 PG (ref 26–33)
MCHC RBC AUTO-ENTMCNC: 28 GM/DL (ref 32.2–35.5)
MCV RBC AUTO: 81 FL (ref 81–99)
MONOCYTES # BLD: 5.3 %
MONOCYTES ABSOLUTE: 0.8 THOU/MM3 (ref 0.4–1.3)
NUCLEATED RED BLOOD CELLS: 0 /100 WBC
PLATELET # BLD: 201 THOU/MM3 (ref 130–400)
PMV BLD AUTO: 11.6 FL (ref 9.4–12.4)
RBC # BLD: 5.37 MILL/MM3 (ref 4.2–5.4)
SEG NEUTROPHILS: 78.6 %
SEGMENTED NEUTROPHILS ABSOLUTE COUNT: 11.5 THOU/MM3 (ref 1.8–7.7)
WBC # BLD: 14.6 THOU/MM3 (ref 4.8–10.8)

## 2021-07-12 PROCEDURE — 85025 COMPLETE CBC W/AUTO DIFF WBC: CPT

## 2021-07-12 PROCEDURE — 36415 COLL VENOUS BLD VENIPUNCTURE: CPT

## 2021-07-12 PROCEDURE — 99211 OFF/OP EST MAY X REQ PHY/QHP: CPT

## 2021-07-12 NOTE — PLAN OF CARE
Problem: Intellectual/Education/Knowledge Deficit  Goal: Teaching initiated upon admission  Outcome: Met This Shift  Note: Understands holding procedure today due to lab results   Intervention: Verbal/written education provided  Note: Review lab results- informed Dr. Kruger Hiss holding procedure today     Problem: Discharge Planning  Goal: Knowledge of discharge instructions  Description: Knowledge of discharge instructions  Outcome: Met This Shift  Note: Verbalized understanding of discharge instructions, follow-up appointments, and when to call the physician. Intervention: Interaction with patient/family and care team  Note: Discuss understanding of discharge instructions,follow-up appointments, and when to call the physician. Care plan reviewed with patient . Patient  verbalize understanding of the plan of care and contribute to goal setting.

## 2021-07-13 RX ORDER — FOLIC ACID 1 MG/1
TABLET ORAL
Qty: 90 TABLET | Refills: 0 | Status: SHIPPED | OUTPATIENT
Start: 2021-07-13 | End: 2021-10-11 | Stop reason: SDUPTHER

## 2021-07-21 ENCOUNTER — TELEPHONE (OUTPATIENT)
Dept: FAMILY MEDICINE CLINIC | Age: 66
End: 2021-07-21

## 2021-07-26 ENCOUNTER — HOSPITAL ENCOUNTER (OUTPATIENT)
Dept: INFUSION THERAPY | Age: 66
Discharge: HOME | End: 2021-07-26
Payer: MEDICARE

## 2021-07-26 VITALS
HEIGHT: 65 IN | HEART RATE: 69 BPM | TEMPERATURE: 97.8 F | OXYGEN SATURATION: 99 % | WEIGHT: 191 LBS | RESPIRATION RATE: 18 BRPM | DIASTOLIC BLOOD PRESSURE: 69 MMHG | SYSTOLIC BLOOD PRESSURE: 124 MMHG | BODY MASS INDEX: 31.82 KG/M2

## 2021-07-26 DIAGNOSIS — D45 POLYCYTHEMIA VERA (HCC): ICD-10-CM

## 2021-07-26 LAB
ABSOLUTE IMMATURE GRANULOCYTE: 0.11 THOU/MM3 (ref 0–0.07)
BASINOPHIL, AUTOMATED: 1 % (ref 0–3)
BASOPHILS ABSOLUTE: 0.2 THOU/MM3 (ref 0–0.1)
EOSINOPHILS ABSOLUTE: 0.7 THOU/MM3 (ref 0–0.4)
EOSINOPHILS RELATIVE PERCENT: 4 % (ref 0–4)
HCT VFR BLD CALC: 43.9 % (ref 37–47)
HEMOGLOBIN: 12.8 GM/DL (ref 12–16)
IMMATURE GRANULOCYTES: 1 %
LYMPHOCYTES # BLD: 7 % (ref 15–47)
LYMPHOCYTES ABSOLUTE: 1.2 THOU/MM3 (ref 1–4.8)
MCH RBC QN AUTO: 22.4 PG (ref 26–33)
MCHC RBC AUTO-ENTMCNC: 29.2 GM/DL (ref 32.2–35.5)
MCV RBC AUTO: 77 FL (ref 81–99)
MONOCYTES ABSOLUTE: 0.6 THOU/MM3 (ref 0.4–1.3)
MONOCYTES: 4 % (ref 0–12)
PDW BLD-RTO: 18.9 % (ref 11.5–14.5)
PLATELET # BLD: 218 THOU/MM3 (ref 130–400)
PMV BLD AUTO: 11 FL (ref 9.4–12.4)
RBC # BLD: 5.71 MILL/MM3 (ref 4.2–5.4)
SEG NEUTROPHILS: 85 % (ref 43–75)
SEGMENTED NEUTROPHILS ABSOLUTE COUNT: 15.4 THOU/MM3 (ref 1.8–7.7)
WBC # BLD: 18.2 THOU/MM3 (ref 4.8–10.8)

## 2021-07-26 PROCEDURE — 85025 COMPLETE CBC W/AUTO DIFF WBC: CPT

## 2021-07-26 PROCEDURE — 99211 OFF/OP EST MAY X REQ PHY/QHP: CPT

## 2021-07-26 PROCEDURE — 36415 COLL VENOUS BLD VENIPUNCTURE: CPT

## 2021-07-26 NOTE — PLAN OF CARE
Problem: Intellectual/Education/Knowledge Deficit  Goal: Teaching initiated upon admission  Description: Understands no procedure today due to good lab values. Outcome: Met This Shift  Note: Understands no phlebotomy today due to good lab results  Intervention: Verbal/written education provided  Description: Review lab results  Note: Review lab results     Problem: Discharge Planning  Description: Verbalized understanding of discharge instructions, follow-up appointments, and when to call the physician. Goal: Knowledge of discharge instructions  Description: Knowledge of discharge instructions  Outcome: Met This Shift  Note: Verbalized understanding of discharge instructions, follow-up appointments, and when to call the physician. Intervention: Interaction with patient/family and care team  Note: Discuss understanding of discharge instructions,follow-up appointments, and when to call the physician. Care plan reviewed with patient . Patient  verbalize understanding of the plan of care and contribute to goal setting.

## 2021-08-02 ENCOUNTER — OFFICE VISIT (OUTPATIENT)
Dept: FAMILY MEDICINE CLINIC | Age: 66
End: 2021-08-02
Payer: MEDICARE

## 2021-08-02 VITALS
DIASTOLIC BLOOD PRESSURE: 66 MMHG | RESPIRATION RATE: 16 BRPM | WEIGHT: 190.5 LBS | HEIGHT: 65 IN | BODY MASS INDEX: 31.74 KG/M2 | SYSTOLIC BLOOD PRESSURE: 120 MMHG | OXYGEN SATURATION: 98 % | HEART RATE: 73 BPM

## 2021-08-02 DIAGNOSIS — L03.115 CELLULITIS OF RIGHT LEG: Primary | ICD-10-CM

## 2021-08-02 PROCEDURE — 1036F TOBACCO NON-USER: CPT | Performed by: FAMILY MEDICINE

## 2021-08-02 PROCEDURE — G8400 PT W/DXA NO RESULTS DOC: HCPCS | Performed by: FAMILY MEDICINE

## 2021-08-02 PROCEDURE — 3017F COLORECTAL CA SCREEN DOC REV: CPT | Performed by: FAMILY MEDICINE

## 2021-08-02 PROCEDURE — G8427 DOCREV CUR MEDS BY ELIG CLIN: HCPCS | Performed by: FAMILY MEDICINE

## 2021-08-02 PROCEDURE — 4040F PNEUMOC VAC/ADMIN/RCVD: CPT | Performed by: FAMILY MEDICINE

## 2021-08-02 PROCEDURE — 1090F PRES/ABSN URINE INCON ASSESS: CPT | Performed by: FAMILY MEDICINE

## 2021-08-02 PROCEDURE — 99213 OFFICE O/P EST LOW 20 MIN: CPT | Performed by: FAMILY MEDICINE

## 2021-08-02 PROCEDURE — G8417 CALC BMI ABV UP PARAM F/U: HCPCS | Performed by: FAMILY MEDICINE

## 2021-08-02 PROCEDURE — 1123F ACP DISCUSS/DSCN MKR DOCD: CPT | Performed by: FAMILY MEDICINE

## 2021-08-02 RX ORDER — DOXYCYCLINE HYCLATE 100 MG
100 TABLET ORAL 2 TIMES DAILY
Qty: 20 TABLET | Refills: 0 | Status: SHIPPED | OUTPATIENT
Start: 2021-08-02 | End: 2021-08-12

## 2021-08-02 ASSESSMENT — ENCOUNTER SYMPTOMS
EYE PAIN: 0
CONSTIPATION: 0
NAUSEA: 0
SHORTNESS OF BREATH: 0
VOMITING: 0
RHINORRHEA: 0
ABDOMINAL PAIN: 0
BACK PAIN: 0
BLOOD IN STOOL: 0
SORE THROAT: 0
DIARRHEA: 0
WHEEZING: 0
COUGH: 0
CHEST TIGHTNESS: 0

## 2021-08-02 ASSESSMENT — PATIENT HEALTH QUESTIONNAIRE - PHQ9
2. FEELING DOWN, DEPRESSED OR HOPELESS: 0
1. LITTLE INTEREST OR PLEASURE IN DOING THINGS: 0
SUM OF ALL RESPONSES TO PHQ9 QUESTIONS 1 & 2: 0
SUM OF ALL RESPONSES TO PHQ QUESTIONS 1-9: 0

## 2021-08-02 NOTE — PATIENT INSTRUCTIONS
Patient Education        Cellulitis: Care Instructions  Your Care Instructions     Cellulitis is a skin infection caused by bacteria, most often strep or staph. It often occurs after a break in the skin from a scrape, cut, bite, or puncture, or after a rash. Cellulitis may be treated without doing tests to find out what caused it. But your doctor may do tests, if needed, to look for a specific bacteria, like methicillin-resistant Staphylococcus aureus (MRSA). The doctor has checked you carefully, but problems can develop later. If you notice any problems or new symptoms, get medical treatment right away. Follow-up care is a key part of your treatment and safety. Be sure to make and go to all appointments, and call your doctor if you are having problems. It's also a good idea to know your test results and keep a list of the medicines you take. How can you care for yourself at home? · Take your antibiotics as directed. Do not stop taking them just because you feel better. You need to take the full course of antibiotics. · Prop up the infected area on pillows to reduce pain and swelling. Try to keep the area above the level of your heart as often as you can. · If your doctor told you how to care for your wound, follow your doctor's instructions. If you did not get instructions, follow this general advice:  ? Wash the wound with clean water 2 times a day. Don't use hydrogen peroxide or alcohol, which can slow healing. ? You may cover the wound with a thin layer of petroleum jelly, such as Vaseline, and a nonstick bandage. ? Apply more petroleum jelly and replace the bandage as needed. · Be safe with medicines. Take pain medicines exactly as directed. ? If the doctor gave you a prescription medicine for pain, take it as prescribed. ? If you are not taking a prescription pain medicine, ask your doctor if you can take an over-the-counter medicine.   To prevent cellulitis in the future  · Try to prevent cuts, scrapes, or other injuries to your skin. Cellulitis most often occurs where there is a break in the skin. · If you get a scrape, cut, mild burn, or bite, wash the wound with clean water as soon as you can to help avoid infection. Don't use hydrogen peroxide or alcohol, which can slow healing. · If you have swelling in your legs (edema), support stockings and good skin care may help prevent leg sores and cellulitis. · Take care of your feet, especially if you have diabetes or other conditions that increase the risk of infection. Wear shoes and socks. Do not go barefoot. If you have athlete's foot or other skin problems on your feet, talk to your doctor about how to treat them. When should you call for help? Call your doctor now or seek immediate medical care if:    · You have signs that your infection is getting worse, such as:  ? Increased pain, swelling, warmth, or redness. ? Red streaks leading from the area. ? Pus draining from the area. ? A fever.     · You get a rash. Watch closely for changes in your health, and be sure to contact your doctor if:    · You do not get better as expected. Where can you learn more? Go to https://[employer]. org and sign in to your [employer] account. Enter Y699 in the [facility] box to learn more about \"Cellulitis: Care Instructions. \"     If you do not have an account, please click on the \"Sign Up Now\" link. Current as of: March 3, 2021               Content Version: 12.9  © 2006-2021 [employer]. Care instructions adapted under license by [facility] ([facility]). If you have questions about a medical condition or this instruction, always ask your healthcare professional. [place] [address] any warranty or liability for your use of this information. Patient Education        Cellulitis: Care Instructions  Your Care Instructions     Cellulitis is a skin infection caused by bacteria, most often strep or staph.  It often occurs after a break in the skin from a scrape, cut, bite, or puncture, or after a rash. Cellulitis may be treated without doing tests to find out what caused it. But your doctor may do tests, if needed, to look for a specific bacteria, like methicillin-resistant Staphylococcus aureus (MRSA). The doctor has checked you carefully, but problems can develop later. If you notice any problems or new symptoms, get medical treatment right away. Follow-up care is a key part of your treatment and safety. Be sure to make and go to all appointments, and call your doctor if you are having problems. It's also a good idea to know your test results and keep a list of the medicines you take. How can you care for yourself at home? · Take your antibiotics as directed. Do not stop taking them just because you feel better. You need to take the full course of antibiotics. · Prop up the infected area on pillows to reduce pain and swelling. Try to keep the area above the level of your heart as often as you can. · If your doctor told you how to care for your wound, follow your doctor's instructions. If you did not get instructions, follow this general advice:  ? Wash the wound with clean water 2 times a day. Don't use hydrogen peroxide or alcohol, which can slow healing. ? You may cover the wound with a thin layer of petroleum jelly, such as Vaseline, and a nonstick bandage. ? Apply more petroleum jelly and replace the bandage as needed. · Be safe with medicines. Take pain medicines exactly as directed. ? If the doctor gave you a prescription medicine for pain, take it as prescribed. ? If you are not taking a prescription pain medicine, ask your doctor if you can take an over-the-counter medicine. To prevent cellulitis in the future  · Try to prevent cuts, scrapes, or other injuries to your skin. Cellulitis most often occurs where there is a break in the skin.   · If you get a scrape, cut, mild burn, or bite, wash the wound with clean water as soon as you can to help avoid infection. Don't use hydrogen peroxide or alcohol, which can slow healing. · If you have swelling in your legs (edema), support stockings and good skin care may help prevent leg sores and cellulitis. · Take care of your feet, especially if you have diabetes or other conditions that increase the risk of infection. Wear shoes and socks. Do not go barefoot. If you have athlete's foot or other skin problems on your feet, talk to your doctor about how to treat them. When should you call for help? Call your doctor now or seek immediate medical care if:    · You have signs that your infection is getting worse, such as:  ? Increased pain, swelling, warmth, or redness. ? Red streaks leading from the area. ? Pus draining from the area. ? A fever.     · You get a rash. Watch closely for changes in your health, and be sure to contact your doctor if:    · You do not get better as expected. Where can you learn more? Go to https://[employer]. org and sign in to your [employer] account. Enter C834 in the [facility] box to learn more about \"Cellulitis: Care Instructions. \"     If you do not have an account, please click on the \"Sign Up Now\" link. Current as of: March 3, 2021               Content Version: 12.9  © 4832-5363 [employer]. Care instructions adapted under license by [facility] ([facility]). If you have questions about a medical condition or this instruction, always ask your healthcare professional. [place] [address] any warranty or liability for your use of this information.

## 2021-08-02 NOTE — PROGRESS NOTES
Melodie Morrow (:  1955) is a 77 y.o. female,Established patient, here for evaluation of the following chief complaint(s):  Rash (ankles, since Friday)         ASSESSMENT/PLAN:  1. Cellulitis of right leg  -     doxycycline hyclate (VIBRA-TABS) 100 MG tablet; Take 1 tablet by mouth 2 times daily for 10 days, Disp-20 tablet, R-0Normal  -monitor sxs, call if not improving. No follow-ups on file. Subjective   SUBJECTIVE/OBJECTIVE:  HPI   Pt here for a check up. Reviewed BMI of 32. Encouraged diet, exercise and weight loss. 4 days of right lower leg, rash, swelling. Spreading. Using the triamcinolone cream.  Had been mowing, no known bites. , former smoker, pmh reviewed. Review of Systems   Constitutional: Negative for chills, fatigue, fever and unexpected weight change. HENT: Negative for congestion, ear pain, rhinorrhea and sore throat. Eyes: Negative for pain and visual disturbance. Respiratory: Negative for cough, chest tightness, shortness of breath and wheezing. Cardiovascular: Positive for leg swelling. Negative for chest pain and palpitations. Gastrointestinal: Negative for abdominal pain, blood in stool, constipation, diarrhea, nausea and vomiting. Genitourinary: Negative for difficulty urinating, frequency, hematuria and urgency. Musculoskeletal: Negative for back pain, joint swelling, myalgias and neck pain. Skin: Positive for rash. Neurological: Negative for dizziness and headaches. Hematological: Negative for adenopathy. Does not bruise/bleed easily. Psychiatric/Behavioral: Negative for behavioral problems and sleep disturbance. The patient is not nervous/anxious. Objective   Physical Exam  Vitals and nursing note reviewed. Constitutional:       Appearance: She is well-developed. HENT:      Head: Normocephalic and atraumatic.       Right Ear: External ear normal.      Left Ear: External ear normal.      Nose: Nose normal. Mouth/Throat:      Mouth: Mucous membranes are moist.   Eyes:      Pupils: Pupils are equal, round, and reactive to light. Neck:      Thyroid: No thyromegaly. Cardiovascular:      Rate and Rhythm: Normal rate and regular rhythm. Heart sounds: Normal heart sounds. Pulmonary:      Breath sounds: Normal breath sounds. No wheezing or rales. Abdominal:      General: Bowel sounds are normal.      Palpations: Abdomen is soft. Tenderness: There is no abdominal tenderness. There is no guarding or rebound. Musculoskeletal:         General: Normal range of motion. Cervical back: Neck supple. Lymphadenopathy:      Cervical: No cervical adenopathy. Skin:     General: Skin is warm and dry. Findings: Erythema, lesion and rash present. Neurological:      Mental Status: She is alert and oriented to person, place, and time. Cranial Nerves: No cranial nerve deficit. Deep Tendon Reflexes: Reflexes are normal and symmetric. An electronic signature was used to authenticate this note.     --Karlee Meek MD

## 2021-09-01 ENCOUNTER — HOSPITAL ENCOUNTER (OUTPATIENT)
Dept: INFUSION THERAPY | Age: 66
Discharge: HOME | End: 2021-09-01
Payer: MEDICARE

## 2021-09-01 VITALS
WEIGHT: 192.8 LBS | SYSTOLIC BLOOD PRESSURE: 113 MMHG | DIASTOLIC BLOOD PRESSURE: 61 MMHG | OXYGEN SATURATION: 98 % | HEIGHT: 65 IN | HEART RATE: 70 BPM | RESPIRATION RATE: 16 BRPM | TEMPERATURE: 97.9 F | BODY MASS INDEX: 32.12 KG/M2

## 2021-09-01 DIAGNOSIS — D45 POLYCYTHEMIA VERA (HCC): ICD-10-CM

## 2021-09-01 LAB
BASOPHILS # BLD: 1.5 %
BASOPHILS ABSOLUTE: 0.3 THOU/MM3 (ref 0–0.1)
EOSINOPHIL # BLD: 3.3 %
EOSINOPHILS ABSOLUTE: 0.6 THOU/MM3 (ref 0–0.4)
ERYTHROCYTE [DISTWIDTH] IN BLOOD BY AUTOMATED COUNT: 20.6 % (ref 11.5–14.5)
ERYTHROCYTE [DISTWIDTH] IN BLOOD BY AUTOMATED COUNT: 56.8 FL (ref 35–45)
HCT VFR BLD CALC: 46.3 % (ref 37–47)
HEMOGLOBIN: 13.1 GM/DL (ref 12–16)
HYPOCHROMIA: PRESENT
IMMATURE GRANS (ABS): 0.14 THOU/MM3 (ref 0–0.07)
IMMATURE GRANULOCYTES: 0.8 %
LYMPHOCYTES # BLD: 6.6 %
LYMPHOCYTES ABSOLUTE: 1.2 THOU/MM3 (ref 1–4.8)
MCH RBC QN AUTO: 22.9 PG (ref 26–33)
MCHC RBC AUTO-ENTMCNC: 28.3 GM/DL (ref 32.2–35.5)
MCV RBC AUTO: 80.9 FL (ref 81–99)
MONOCYTES # BLD: 4.9 %
MONOCYTES ABSOLUTE: 0.9 THOU/MM3 (ref 0.4–1.3)
NUCLEATED RED BLOOD CELLS: 0 /100 WBC
PLATELET # BLD: 196 THOU/MM3 (ref 130–400)
PLATELET ESTIMATE: ADEQUATE
PMV BLD AUTO: 10.6 FL (ref 9.4–12.4)
RBC # BLD: 5.72 MILL/MM3 (ref 4.2–5.4)
SCAN OF BLOOD SMEAR: NORMAL
SEG NEUTROPHILS: 82.9 %
SEGMENTED NEUTROPHILS ABSOLUTE COUNT: 15.1 THOU/MM3 (ref 1.8–7.7)
WBC # BLD: 18.2 THOU/MM3 (ref 4.8–10.8)

## 2021-09-01 PROCEDURE — 99211 OFF/OP EST MAY X REQ PHY/QHP: CPT

## 2021-09-01 PROCEDURE — 85025 COMPLETE CBC W/AUTO DIFF WBC: CPT

## 2021-09-01 PROCEDURE — 36415 COLL VENOUS BLD VENIPUNCTURE: CPT

## 2021-09-01 NOTE — PLAN OF CARE
Problem: Intellectual/Education/Knowledge Deficit  Goal: Teaching initiated upon admission  Outcome: Met This Shift  Note: Understands no procedure needed today due lab results. Intervention: Verbal/written education provided  Note: Review lab results. Problem: Discharge Planning  Goal: Knowledge of discharge instructions  Outcome: Met This Shift  Note: Verbalized understanding of discharge instructions, follow-up appointments, and when to call the physician. Intervention: Interaction with patient/family and care team  Note: Discuss understanding of discharge instructions,follow-up appointments, and when to call the physician. Care plan reviewed with patient . Patient  verbalize understanding of the plan of care and contribute to goal setting.

## 2021-09-01 NOTE — PROGRESS NOTES
Patient informed hct 44.5% - Dr Henderson Beeson guidelines states below 45%. Patient wants to hold procedure today since feeling good. Will return in 2 weeks for lab draw and possible therapeutic phlebotomy.

## 2021-09-01 NOTE — PROGRESS NOTES
Patient informed no procedure today due to lab results. Discharge instructions given to patient-verbalizes understanding. Ambulated off unit per self with belongings.

## 2021-09-15 ENCOUNTER — HOSPITAL ENCOUNTER (OUTPATIENT)
Dept: INFUSION THERAPY | Age: 66
Discharge: HOME | End: 2021-09-15
Payer: MEDICARE

## 2021-09-15 DIAGNOSIS — D45 POLYCYTHEMIA VERA (HCC): ICD-10-CM

## 2021-09-15 LAB
BASOPHILS # BLD: 1.7 %
BASOPHILS ABSOLUTE: 0.3 THOU/MM3 (ref 0–0.1)
EOSINOPHIL # BLD: 4 %
EOSINOPHILS ABSOLUTE: 0.6 THOU/MM3 (ref 0–0.4)
ERYTHROCYTE [DISTWIDTH] IN BLOOD BY AUTOMATED COUNT: 20.3 % (ref 11.5–14.5)
ERYTHROCYTE [DISTWIDTH] IN BLOOD BY AUTOMATED COUNT: 56.2 FL (ref 35–45)
HCT VFR BLD CALC: 45.4 % (ref 37–47)
HEMOGLOBIN: 12.9 GM/DL (ref 12–16)
HYPOCHROMIA: PRESENT
IMMATURE GRANS (ABS): 0.12 THOU/MM3 (ref 0–0.07)
IMMATURE GRANULOCYTES: 0.8 %
LYMPHOCYTES # BLD: 7.9 %
LYMPHOCYTES ABSOLUTE: 1.2 THOU/MM3 (ref 1–4.8)
MCH RBC QN AUTO: 23 PG (ref 26–33)
MCHC RBC AUTO-ENTMCNC: 28.4 GM/DL (ref 32.2–35.5)
MCV RBC AUTO: 80.8 FL (ref 81–99)
MONOCYTES # BLD: 4.7 %
MONOCYTES ABSOLUTE: 0.7 THOU/MM3 (ref 0.4–1.3)
NUCLEATED RED BLOOD CELLS: 0 /100 WBC
PLATELET # BLD: 201 THOU/MM3 (ref 130–400)
PLATELET ESTIMATE: ADEQUATE
PMV BLD AUTO: 10.9 FL (ref 9.4–12.4)
RBC # BLD: 5.62 MILL/MM3 (ref 4.2–5.4)
SCAN OF BLOOD SMEAR: NORMAL
SEG NEUTROPHILS: 80.9 %
SEGMENTED NEUTROPHILS ABSOLUTE COUNT: 12.1 THOU/MM3 (ref 1.8–7.7)
WBC # BLD: 14.9 THOU/MM3 (ref 4.8–10.8)

## 2021-09-15 PROCEDURE — 36415 COLL VENOUS BLD VENIPUNCTURE: CPT

## 2021-09-15 PROCEDURE — 85025 COMPLETE CBC W/AUTO DIFF WBC: CPT

## 2021-10-11 ENCOUNTER — HOSPITAL ENCOUNTER (OUTPATIENT)
Dept: INFUSION THERAPY | Age: 66
Discharge: HOME | End: 2021-10-11
Payer: MEDICARE

## 2021-10-11 ENCOUNTER — OFFICE VISIT (OUTPATIENT)
Dept: ONCOLOGY | Age: 66
End: 2021-10-11
Payer: MEDICARE

## 2021-10-11 VITALS
HEIGHT: 65 IN | WEIGHT: 194 LBS | SYSTOLIC BLOOD PRESSURE: 131 MMHG | DIASTOLIC BLOOD PRESSURE: 62 MMHG | BODY MASS INDEX: 32.32 KG/M2 | HEART RATE: 71 BPM | OXYGEN SATURATION: 97 % | TEMPERATURE: 98.2 F | RESPIRATION RATE: 16 BRPM

## 2021-10-11 VITALS
HEART RATE: 65 BPM | RESPIRATION RATE: 16 BRPM | HEIGHT: 65 IN | BODY MASS INDEX: 32.32 KG/M2 | DIASTOLIC BLOOD PRESSURE: 61 MMHG | WEIGHT: 194 LBS | OXYGEN SATURATION: 97 % | TEMPERATURE: 98.4 F | SYSTOLIC BLOOD PRESSURE: 121 MMHG

## 2021-10-11 DIAGNOSIS — Z51.81 ENCOUNTER FOR MONITORING OF HYDROXYUREA THERAPY: ICD-10-CM

## 2021-10-11 DIAGNOSIS — D75.1 POLYCYTHEMIA: ICD-10-CM

## 2021-10-11 DIAGNOSIS — E66.9 OBESITY (BMI 30.0-34.9): ICD-10-CM

## 2021-10-11 DIAGNOSIS — D45 POLYCYTHEMIA VERA (HCC): Primary | ICD-10-CM

## 2021-10-11 DIAGNOSIS — Z79.64 ENCOUNTER FOR MONITORING OF HYDROXYUREA THERAPY: ICD-10-CM

## 2021-10-11 DIAGNOSIS — D45 POLYCYTHEMIA VERA (HCC): ICD-10-CM

## 2021-10-11 LAB
BASOPHILS # BLD: 1.5 %
BASOPHILS ABSOLUTE: 0.2 THOU/MM3 (ref 0–0.1)
EOSINOPHIL # BLD: 3.4 %
EOSINOPHILS ABSOLUTE: 0.5 THOU/MM3 (ref 0–0.4)
ERYTHROCYTE [DISTWIDTH] IN BLOOD BY AUTOMATED COUNT: 20.5 % (ref 11.5–14.5)
ERYTHROCYTE [DISTWIDTH] IN BLOOD BY AUTOMATED COUNT: 55.2 FL (ref 35–45)
HCT VFR BLD CALC: 48.5 % (ref 37–47)
HEMOGLOBIN: 14 GM/DL (ref 12–16)
HYPOCHROMIA: PRESENT
IMMATURE GRANS (ABS): 0.16 THOU/MM3 (ref 0–0.07)
IMMATURE GRANULOCYTES: 1 %
LYMPHOCYTES # BLD: 8.3 %
LYMPHOCYTES ABSOLUTE: 1.3 THOU/MM3 (ref 1–4.8)
MCH RBC QN AUTO: 23.2 PG (ref 26–33)
MCHC RBC AUTO-ENTMCNC: 28.9 GM/DL (ref 32.2–35.5)
MCV RBC AUTO: 80.4 FL (ref 81–99)
MONOCYTES # BLD: 4.8 %
MONOCYTES ABSOLUTE: 0.8 THOU/MM3 (ref 0.4–1.3)
NUCLEATED RED BLOOD CELLS: 0 /100 WBC
PLATELET # BLD: 150 THOU/MM3 (ref 130–400)
PLATELET ESTIMATE: ADEQUATE
PMV BLD AUTO: 11.7 FL (ref 9.4–12.4)
RBC # BLD: 6.03 MILL/MM3 (ref 4.2–5.4)
SCAN OF BLOOD SMEAR: NORMAL
SEG NEUTROPHILS: 81 %
SEGMENTED NEUTROPHILS ABSOLUTE COUNT: 12.8 THOU/MM3 (ref 1.8–7.7)
WBC # BLD: 15.8 THOU/MM3 (ref 4.8–10.8)

## 2021-10-11 PROCEDURE — 1090F PRES/ABSN URINE INCON ASSESS: CPT | Performed by: PHYSICIAN ASSISTANT

## 2021-10-11 PROCEDURE — 99195 PHLEBOTOMY: CPT

## 2021-10-11 PROCEDURE — 1036F TOBACCO NON-USER: CPT | Performed by: PHYSICIAN ASSISTANT

## 2021-10-11 PROCEDURE — G8427 DOCREV CUR MEDS BY ELIG CLIN: HCPCS | Performed by: PHYSICIAN ASSISTANT

## 2021-10-11 PROCEDURE — 36415 COLL VENOUS BLD VENIPUNCTURE: CPT

## 2021-10-11 PROCEDURE — 85025 COMPLETE CBC W/AUTO DIFF WBC: CPT

## 2021-10-11 PROCEDURE — 99214 OFFICE O/P EST MOD 30 MIN: CPT | Performed by: PHYSICIAN ASSISTANT

## 2021-10-11 PROCEDURE — G8484 FLU IMMUNIZE NO ADMIN: HCPCS | Performed by: PHYSICIAN ASSISTANT

## 2021-10-11 PROCEDURE — G8417 CALC BMI ABV UP PARAM F/U: HCPCS | Performed by: PHYSICIAN ASSISTANT

## 2021-10-11 PROCEDURE — G8400 PT W/DXA NO RESULTS DOC: HCPCS | Performed by: PHYSICIAN ASSISTANT

## 2021-10-11 PROCEDURE — 3017F COLORECTAL CA SCREEN DOC REV: CPT | Performed by: PHYSICIAN ASSISTANT

## 2021-10-11 PROCEDURE — 99211 OFF/OP EST MAY X REQ PHY/QHP: CPT

## 2021-10-11 PROCEDURE — 1123F ACP DISCUSS/DSCN MKR DOCD: CPT | Performed by: PHYSICIAN ASSISTANT

## 2021-10-11 PROCEDURE — 4040F PNEUMOC VAC/ADMIN/RCVD: CPT | Performed by: PHYSICIAN ASSISTANT

## 2021-10-11 RX ORDER — 0.9 % SODIUM CHLORIDE 0.9 %
250 INTRAVENOUS SOLUTION INTRAVENOUS ONCE
Status: CANCELLED | OUTPATIENT
Start: 2021-10-11 | End: 2021-10-11

## 2021-10-11 RX ORDER — 0.9 % SODIUM CHLORIDE 0.9 %
500 INTRAVENOUS SOLUTION INTRAVENOUS ONCE
Status: CANCELLED | OUTPATIENT
Start: 2021-10-11 | End: 2021-10-11

## 2021-10-11 RX ORDER — FOLIC ACID 1 MG/1
TABLET ORAL
Qty: 90 TABLET | Refills: 2 | Status: SHIPPED | OUTPATIENT
Start: 2021-10-11 | End: 2022-06-27

## 2021-10-11 NOTE — PATIENT INSTRUCTIONS
1. Proceed with phlebotomy today. 2. Return to [facility] for CBC and possible phlebotomy in 4 weeks. Then, move to CBC and possible phlebotomy every 6 weeks. 3. Return to clinic with Karina Quiñones CNP in 16 weeks with CBC, BMP, LFT and possible phlebotomy. 4. Follow up with Dr. Loyda Hudson in [facility] or [facility] office in 6 months. 5. Please call for questions or concerns.

## 2021-10-11 NOTE — PLAN OF CARE
Problem: Intellectual/Education/Knowledge Deficit  Goal: Teaching initiated upon admission  Outcome: Met This Shift  Note: Patient verbalized understanding to therapeutic phlebotomy procedure and possible complications. Intervention: Verbal/written education provided  Note: Patient instructed on therapeutic phlebotomy procedure and potential complications. Consent signed. Aware to call MD if complications occur. Problem: Discharge Planning  Goal: Knowledge of discharge instructions  Outcome: Met This Shift  Note: Patient understands home discharge instructions sheet. Intervention: Interaction with patient/family and care team  Note: Reviewed home going instructions with Patient. Problem: Musculor/Skeletal Functional Status  Goal: Absence of falls  Outcome: Met This Shift  Note: No falls occurred with visit today. Intervention: Fall precautions  Note: Verbalized understanding of fall prevention to ask for assistance with ambulation. Call light within reach. Care plan reviewed with patient . Patient  verbalize understanding of the plan of care and contribute to goal setting.

## 2021-10-11 NOTE — PROGRESS NOTES
[facility] ([facility]3] [address] [place] [address]  [address] [address]  Dept: [phone]  Dept Fax: [phone]: [phone]      Visit Date:10/11/2021     Andres Goldman is a 77 y.o. female who presents today for:   Chief Complaint   Patient presents with    Follow-up     Polycythemia vera         HPI:   Andres Goldman is a 77 y.o. female followed for polycythemia vera. Per Dr. Roxie Ley' note on 7/8/21: On today's evaluation the patient states that she feels well and has had stable health since being seen here last.  She continues to have a CBC drawn monthly and will receive phlebotomy therapy every two weeks to maintain control of her elevated hematocrit. Our goal is to keep her hematocrit below 45%. She states in doing this she does feel significantly better. The patient does not report any abnormal bleeding. She has not had fever, cough, shortness of breath or other signs of infection. The patient's bowel and bladder habits have been normal.  She has not seen blood in her stool or urine. The patient remains active with an ECOG performance status of level 0. The patient continues to take hydroxyurea therapy. She is tolerating this well without any significant side effects or complications. We will continue Hydrea therapy at present dose. Interval History 10/11/2021:   The patient is here for follow up evaluation of history of polycythemia. Since her last visit with Dr. Roxie Ley in July 2021 she reports she has been feeling well. Her last phlebotomy was on 5/26/2021. She affirms mild fatigue, otherwise states she has been feeling well. She denies fogginess, headache, lightheadedness, dizziness, chest pain, shortness of breath, leg pain or cramping. She continues to take Hydrea 500 mg daily without difficulty. PMH, SH, and FH:  I reviewed the patients medication list and allergy list as noted on the electronic medical record.  The PMH, SH and FH were also reviewed as noted on the EMR. Review of Systems:   Review of Systems   Pertinent review of systems noted in HPI, all other ROS negative. Objective:   Physical Exam   /61 (Site: Right Upper Arm, Position: Sitting, Cuff Size: Medium Adult)   Pulse 65   Temp 98.4 °F (36.9 °C) (Oral)   Resp 16   Ht 5' 5\" (1.651 m)   Wt 194 lb (88 kg)   SpO2 97%   BMI 32.28 kg/m²    General appearance: No apparent distress, well developed, and cooperative. HEENT: Pupils equal, round, and reactive to light. Conjunctivae/corneas clear. Neck: Supple, with full range of motion. Trachea midline. Respiratory:  Normal respiratory effort. Clear to auscultation, bilaterally without Rales/Wheezes/Rhonchi. Cardiovascular: Regular rate and rhythm with normal S1/S2  Abdomen: Soft, active bowel sounds. Musculoskeletal: No clubbing, cyanosis or edema bilaterally. Ambulates in office without difficulty. Skin: Skin color, texture, turgor normal.  No visible rashes or lesions. Neurologic:  Neurovascularly intact without any focal sensory/motor deficits. Psychiatric: Alert and oriented       Imaging Studies and Labs:   CBC:   Lab Results   Component Value Date    WBC 14.9 (H) 09/15/2021    HGB 12.9 09/15/2021    HCT 45.4 09/15/2021    MCV 80.8 (L) 09/15/2021     09/15/2021     BMP:   Lab Results   Component Value Date     04/08/2021     05/09/2020    K 4.2 04/08/2021    K 4.1 05/09/2020     05/09/2020    CO2 24 09/03/2020    BUN 21 09/03/2020    CREATININE 1.2 04/08/2021    CREATININE 1.2 05/09/2020    GLUCOSE 134 05/09/2020    GLUCOSE 102 10/14/2014    CALCIUM 10.4 05/09/2020      LFT:   Lab Results   Component Value Date    ALT 19 04/08/2021    AST 23 04/08/2021    ALKPHOS 144 (H) 04/08/2021    BILITOT 0.4 04/08/2021         Assessment and Plan:   1. Polycythemia Vera   Patient intermittently requires therapeutic phlebotomy to maintain Hgb/Hct. Her last phlebotomy was on 5/26/21.  Today on 10/11/21: Hgb 14.0, Hct 48.5   -Will proceed with therapeutic phlebotomy today. -Return to [facility] for CBC and possible phlebotomy in 4 weeks, then will monitor CBC and possible phlebotomy every 6 weeks. Maintain Hct 45% or less. -Continue Hydrea 500 mg daily    -Monitor for side effects and toxicity related to Hydrea. -Monitor for progression of myeloproliferative disease    -Return to clinic with Corrie Garrison CNP in 16 weeks with CBC, BMP, LFT   -Return to clinic with Dr. Elen Tim in 6 months   2. History of Thrombosis         All patient questions answered. Pt voiced understanding. Patient agreed with treatment plan. Follow up as directed. Patient instructed to call for questions or concerns.           Electronically signed by   Deangelo Law PA-C

## 2021-10-11 NOTE — PROGRESS NOTES
THERAPEUTIC PHLEBOTOMY    Most recent Hemoglobin result: 13.8Gm/dl. Hematocrit result  46.5%  Date obtained:   10 /11 /2021    Pre-phlebotomy vital signs:see Doc flow sheet    Start time:0850    Empty donor bag placed on TARE scale. Tourniquet placed on patient's arm and arm palpated for venous access. Area of venous stick cleansed with alcohol. Hemostat applied to tubing of donor bag. Sheath removed from the needle and needle inserted into the antecubital Vein in the  left Arm. Hemostat released. Blood flowing well down the tubing into the bag. No adjustment of needle needed to maintain adequate blood flow. Scale monitoring amount of blood in bag. Stop MTFC:5252  Needle removed from patient's arm. Pressure applied to patient's arm until bleeding stopped. Dry sterile dressing applied over puncture site and taped down well and secured with coban wrap. Final amount of blood removed:500ml  Post Vital Signs:see Doc flow sheet  Patient drank water and observed for 10 minutes. Patient tolerated procedure well. Discharged ambulatory with belongings.

## 2021-11-08 ENCOUNTER — HOSPITAL ENCOUNTER (OUTPATIENT)
Dept: INFUSION THERAPY | Age: 66
Discharge: HOME | End: 2021-11-08
Payer: MEDICARE

## 2021-11-08 VITALS
DIASTOLIC BLOOD PRESSURE: 62 MMHG | RESPIRATION RATE: 16 BRPM | SYSTOLIC BLOOD PRESSURE: 111 MMHG | BODY MASS INDEX: 32.52 KG/M2 | OXYGEN SATURATION: 97 % | HEIGHT: 65 IN | HEART RATE: 64 BPM | WEIGHT: 195.2 LBS | TEMPERATURE: 97.9 F

## 2021-11-08 DIAGNOSIS — Z79.64 ENCOUNTER FOR MONITORING OF HYDROXYUREA THERAPY: ICD-10-CM

## 2021-11-08 DIAGNOSIS — D75.1 POLYCYTHEMIA: ICD-10-CM

## 2021-11-08 DIAGNOSIS — D45 POLYCYTHEMIA VERA (HCC): ICD-10-CM

## 2021-11-08 DIAGNOSIS — Z51.81 ENCOUNTER FOR MONITORING OF HYDROXYUREA THERAPY: ICD-10-CM

## 2021-11-08 DIAGNOSIS — D45 POLYCYTHEMIA VERA (HCC): Primary | ICD-10-CM

## 2021-11-08 LAB
ABSOLUTE IMMATURE GRANULOCYTE: 0.07 THOU/MM3 (ref 0–0.07)
BASINOPHIL, AUTOMATED: 1 % (ref 0–3)
BASOPHILS ABSOLUTE: 0.2 THOU/MM3 (ref 0–0.1)
BUN, WHOLE BLOOD: 23 MG/DL (ref 8–26)
CHLORIDE, WHOLE BLOOD: 109 MEQ/L (ref 98–109)
CREATININE, WHOLE BLOOD: 1 MG/DL (ref 0.5–1.2)
EOSINOPHILS ABSOLUTE: 0.6 THOU/MM3 (ref 0–0.4)
EOSINOPHILS RELATIVE PERCENT: 3 % (ref 0–4)
GFR, ESTIMATED: 59 ML/MIN/1.73M2
GLUCOSE, WHOLE BLOOD: 172 MG/DL (ref 70–108)
HCT VFR BLD CALC: 44.5 % (ref 37–47)
HEMOGLOBIN: 12.8 GM/DL (ref 12–16)
IMMATURE GRANULOCYTES: 0 %
IONIZED CALCIUM, WHOLE BLOOD: 1.35 MMOL/L (ref 1.12–1.32)
LYMPHOCYTES # BLD: 8 % (ref 15–47)
LYMPHOCYTES ABSOLUTE: 1.3 THOU/MM3 (ref 1–4.8)
MCH RBC QN AUTO: 22.9 PG (ref 26–33)
MCHC RBC AUTO-ENTMCNC: 28.8 GM/DL (ref 32.2–35.5)
MCV RBC AUTO: 80 FL (ref 81–99)
MONOCYTES ABSOLUTE: 0.7 THOU/MM3 (ref 0.4–1.3)
MONOCYTES: 4 % (ref 0–12)
PDW BLD-RTO: 19.9 % (ref 11.5–14.5)
PLATELET # BLD: 319 THOU/MM3 (ref 130–400)
PMV BLD AUTO: 10.5 FL (ref 9.4–12.4)
POTASSIUM, WHOLE BLOOD: 4.2 MEQ/L (ref 3.5–4.9)
RBC # BLD: 5.6 MILL/MM3 (ref 4.2–5.4)
SEG NEUTROPHILS: 84 % (ref 43–75)
SEGMENTED NEUTROPHILS ABSOLUTE COUNT: 14.7 THOU/MM3 (ref 1.8–7.7)
SODIUM, WHOLE BLOOD: 141 MEQ/L (ref 138–146)
TOTAL CO2, WHOLE BLOOD: 26 MEQ/L (ref 23–33)
WBC # BLD: 17.5 THOU/MM3 (ref 4.8–10.8)

## 2021-11-08 PROCEDURE — 80047 BASIC METABLC PNL IONIZED CA: CPT

## 2021-11-08 PROCEDURE — 99211 OFF/OP EST MAY X REQ PHY/QHP: CPT

## 2021-11-08 PROCEDURE — 36415 COLL VENOUS BLD VENIPUNCTURE: CPT

## 2021-11-08 PROCEDURE — 85025 COMPLETE CBC W/AUTO DIFF WBC: CPT

## 2021-11-08 NOTE — PROGRESS NOTES
Patient informed no procedure needed today. Discharge instructions given to patient-verbalizes understanding. Ambulated off unit per self with belongings.

## 2021-11-08 NOTE — PLAN OF CARE
Problem: Intellectual/Education/Knowledge Deficit  Goal: Teaching initiated upon admission  Outcome: Met This Shift  Note: Understands no procedure needed with lab results. Intervention: Verbal/written education provided  Note: Review lab results- no phlebotomy needed     Problem: Discharge Planning  Goal: Knowledge of discharge instructions  Outcome: Met This Shift  Note: Verbalized understanding of discharge instructions, follow-up appointments, and when to call the physician. Intervention: Interaction with patient/family and care team  Note: Discuss understanding of discharge instructions,follow-up appointments, and when to call the physician. Care plan reviewed with patient . Patient  verbalize understanding of the plan of care and contribute to goal setting.

## 2021-11-29 ENCOUNTER — OFFICE VISIT (OUTPATIENT)
Dept: CARDIOLOGY CLINIC | Age: 66
End: 2021-11-29
Payer: MEDICARE

## 2021-11-29 VITALS
SYSTOLIC BLOOD PRESSURE: 150 MMHG | DIASTOLIC BLOOD PRESSURE: 82 MMHG | WEIGHT: 197 LBS | BODY MASS INDEX: 32.82 KG/M2 | HEART RATE: 74 BPM | HEIGHT: 65 IN

## 2021-11-29 DIAGNOSIS — R06.02 SOB (SHORTNESS OF BREATH): Primary | ICD-10-CM

## 2021-11-29 DIAGNOSIS — R00.2 INTERMITTENT PALPITATIONS: ICD-10-CM

## 2021-11-29 PROCEDURE — G8417 CALC BMI ABV UP PARAM F/U: HCPCS | Performed by: INTERNAL MEDICINE

## 2021-11-29 PROCEDURE — 1090F PRES/ABSN URINE INCON ASSESS: CPT | Performed by: INTERNAL MEDICINE

## 2021-11-29 PROCEDURE — G8484 FLU IMMUNIZE NO ADMIN: HCPCS | Performed by: INTERNAL MEDICINE

## 2021-11-29 PROCEDURE — G8400 PT W/DXA NO RESULTS DOC: HCPCS | Performed by: INTERNAL MEDICINE

## 2021-11-29 PROCEDURE — 1123F ACP DISCUSS/DSCN MKR DOCD: CPT | Performed by: INTERNAL MEDICINE

## 2021-11-29 PROCEDURE — 93000 ELECTROCARDIOGRAM COMPLETE: CPT | Performed by: INTERNAL MEDICINE

## 2021-11-29 PROCEDURE — G8427 DOCREV CUR MEDS BY ELIG CLIN: HCPCS | Performed by: INTERNAL MEDICINE

## 2021-11-29 PROCEDURE — 4040F PNEUMOC VAC/ADMIN/RCVD: CPT | Performed by: INTERNAL MEDICINE

## 2021-11-29 PROCEDURE — 1036F TOBACCO NON-USER: CPT | Performed by: INTERNAL MEDICINE

## 2021-11-29 PROCEDURE — 3017F COLORECTAL CA SCREEN DOC REV: CPT | Performed by: INTERNAL MEDICINE

## 2021-11-29 PROCEDURE — 99213 OFFICE O/P EST LOW 20 MIN: CPT | Performed by: INTERNAL MEDICINE

## 2021-11-29 RX ORDER — AMLODIPINE BESYLATE 10 MG/1
10 TABLET ORAL DAILY
Qty: 90 TABLET | Refills: 3 | Status: SHIPPED | OUTPATIENT
Start: 2021-11-29

## 2021-11-29 NOTE — PROGRESS NOTES
[facility],[place] [address] [address] [place] [address]  Dept: [phone]  Dept Fax: [phone]  Loc: [phone]    Visit Date: 11/29/2021    Ms. Teo Tao is a 77 y.o. female  who presented for:  Chief Complaint   Patient presents with    1 Year Follow Up       HPI:   78 yo F c hxo f Polycythemia Vera, PE/DVT comes for a follow up. She follows up with hematology. Jewels Aguiar is scheduled to get phlebotomies, last one on 11/8/2021. EKG was done today and shows mild left atrial enlargement. Admits to having some SOB on exertion and states relative severity coincides with timing of phlebotomies with SOB feeling better after a phlebotomy was done. Does not feel like SOB is unmanageable and it typically relieved with rest. Also admits to occasional chest pain occurring roughly four times in the summer months when waking up. Relieved completely with aspirin 81mg and does not admit to associated radiation. Patient admits HTN is typically well controlled on amlodipine 10mg that she takes once daily In the evening. Denies palpitations, lightheadedness, dizziness, orthopnea, PND or pedal edema.        Current Outpatient Medications:     amLODIPine (NORVASC) 10 MG tablet, Take 1 tablet by mouth daily, Disp: 90 tablet, Rfl: 3    folic acid (FOLVITE) 1 MG tablet, TAKE 1 TABLET BY MOUTH ONE TIME A DAY, Disp: 90 tablet, Rfl: 2    enalapril (VASOTEC) 20 MG tablet, TAKE 1 TABLET TWICE A DAY, Disp: 180 tablet, Rfl: 3    cloNIDine (CATAPRES) 0.2 MG tablet, Take 1 tablet by mouth 2 times daily, Disp: 180 tablet, Rfl: 3    hydroxyurea (HYDREA) 500 MG chemo capsule, Take 1 capsule by mouth daily, Disp: 90 capsule, Rfl: 5    ondansetron (ZOFRAN) 4 MG tablet, Take 1 tablet by mouth every 8 hours as needed for Nausea or Vomiting, Disp: 30 tablet, Rfl: 5    triamcinolone (KENALOG) 0.1 % cream, APPLY TO LEGS TWICE DAILY AS NEEDED, Disp: , Rfl: 1    Cyanocobalamin (B-12) 500 MCG TABS, Take 500 mg by mouth daily. , Disp: , Rfl:     Past Medical History  Hunter Castrejon  has a past medical history of Basal cell cancer, Chronic kidney disease, stage III (moderate) ([address] DVT (deep venous thrombosis) ([address] History of shingles, Homocysteinemia, Hypertension, Polycythemia vera(238.4), Pulmonary emboli ([address] Right-sided epistaxis, and Unspecified diseases of blood and blood-forming organs. Social History  Hunter Castrejon  reports that she quit smoking about 12 years ago. She has a 1.00 pack-year smoking history. She has never used smokeless tobacco. She reports current alcohol use of about 4.0 standard drinks of alcohol per week. She reports that she does not use drugs. Family History  Hunter Castrejon family history includes Arthritis in her mother; Breast Cancer in her mother and sister; Heart Disease in her mother and sister; High Blood Pressure in her mother; Liver Cancer in her father. Past Surgical History   Past Surgical History:   Procedure Laterality Date    DENTAL SURGERY  11/09/15    EYE SURGERY Left 03/06/2015    Exotrophia Dr Robyn Perrin, [facility] OTHER SURGICAL HISTORY  12/12/2017    left breast bx at [facility]         Subjective:     REVIEW OF SYSTEMS  Constitutional: denies sweats, chills and fever  HENT: denies  congestion, sinus pressure, sneezing and sore throat. Eyes: denies  pain, discharge, redness and itching. Respiratory: Admits to SOB on exertion as stated in HPI. Denies apnea, cough  CV: As stated in HPI  Gastrointestinal: denies blood in stool, constipation, diarrhea   Endocrine: denies cold intolerance, heat intolerance, polydipsia. Genitourinary: denies dysuria, enuresis, flank pain and hematuria. Musculoskeletal: denies arthralgias, joint swelling and neck pain. Neurological: denies numbness and headaches. Psychiatric/Behavioral: denies agitation, confusion, decreased concentration and dysphoric mood    All others reviewed and are negative. Objective:     BP (!) 150/82   Pulse 74   Ht 5' 5\" (1.651 m)   Wt 197 lb (89.4 kg)   BMI 32.78 kg/m²     Wt Readings from Last 3 Encounters:   11/29/21 197 lb (89.4 kg)   11/08/21 195 lb 3.2 oz (88.5 kg)   10/11/21 194 lb (88 kg)     BP Readings from Last 3 Encounters:   11/29/21 (!) 150/82   11/08/21 111/62   10/11/21 131/62       PHYSICAL EXAM  Constitutional: Oriented to person, place, and time. Appears well-developed and well-nourished. HENT:   Head: Normocephalic and atraumatic. Eyes: EOM are normal. Pupils are equal, round, and reactive to light. Neck: Normal range of motion. Neck supple. No JVD present. Cardiovascular: Normal rate , normal heart sounds and intact distal pulses. Pulmonary/Chest: Effort normal and breath sounds normal. No respiratory distress. No wheezes. No rales. Abdominal: Soft. Bowel sounds are normal. No distension. There is no tenderness. Musculoskeletal: Normal range of motion. No edema. Neurological: Alert and oriented to person, place, and time. No cranial nerve deficit. Coordination normal.   Skin: Skin is warm and dry. Psychiatric: Normal mood and affect.        No results found for: CKTOTAL, CKMB, CKMBINDEX    Lab Results   Component Value Date    WBC 17.5 11/08/2021    RBC 5.60 11/08/2021    HGB 12.8 11/08/2021    HCT 44.5 11/08/2021    MCV 80 11/08/2021    MCH 22.9 11/08/2021    MCHC 28.8 11/08/2021    RDW 19.9 11/08/2021     11/08/2021    MPV 10.5 11/08/2021       Lab Results   Component Value Date     11/08/2021     05/09/2020    K 4.2 11/08/2021    K 4.1 05/09/2020     05/09/2020    CO2 24 09/03/2020    BUN 21 09/03/2020    LABALBU 4.0 04/08/2021    CREATININE 1.0 11/08/2021    CREATININE 1.2 05/09/2020    CALCIUM 10.4 05/09/2020    LABGLOM 45 05/09/2020    GLUCOSE 134 05/09/2020    GLUCOSE 102 10/14/2014       Lab Results   Component Value Date    ALKPHOS 144 04/08/2021    ALT 19 04/08/2021    AST 23 04/08/2021    PROT 7.1 separation. DOPPLER: The transpulmonic velocity was   within the normal range with no evidence for regurgitation. Left Atrium   Left atrial size was normal.      Left Ventricle   Normal left ventricle size and systolic function. Ejection fraction was   estimated at 55-60%. There were no regional left ventricular wall motion   abnormalities and wall thickness was within normal limits. Right Atrium   Right atrial size was normal.      Right Ventricle   The right ventricular size was normal with normal systolic function and   wall thickness. Pericardial Effusion   The pericardium was normal in appearance with no evidence of a pericardial   effusion. Pleural Effusion   No evidence of pleural effusion. Aorta / Great Vessels   -Aortic root dimension within normal limits.   -The Pulmonary artery is within normal limits. -IVC size is within normal limits with normal respiratory phasic changes.      M-Mode/2D Measurements & Calculations      LV Diastolic   LV Systolic Dimension:    AV Cusp Separation: 1.8 cmLA   Dimension: 4.7 2.9 cm                    Dimension: 3.5 cmAO Root   cm             LV Volume Diastolic: 888  Dimension: 2.3 cmLA Area: 18.4   LV FS:38.3 %   ml                        cm^2   LV PW          LV Volume Systolic: 83.3   Diastolic: 1   ml   cm             LV EDV/LV EDV Index: 102   Septum         ml/54 m^2LV ESV/LV ESV    RV Diastolic Dimension: 3 cm   Diastolic: 1.2 Index: 77.0 ml/17 m^2   cm             EF Calculated: 68.4 %     LA/Aorta: 1.52                                               LA volume/Index: 55.3 ml /29m^2     Doppler Measurements & Calculations      MV Peak E-Wave: 91.8 cm/s  AV Peak Velocity: 145 LVOT Peak Velocity: 110   MV Peak A-Wave: 113 cm/s   cm/s                  cm/s   MV E/A Ratio: 0.81         AV Peak Gradient:     LVOT Peak Gradient: 5   MV Peak Gradient: 3.37     8.41 mmHg             mmHg   mmHg                                                    TV Peak E-Wave: 52.3 cm/s   MV Deceleration Time: 232                        TV Peak A-Wave: 37.5 cm/s   msec                              AV P1/2t: 745 msec    TV Peak Gradient: 1.09                                                    mmHg   MV E' Septal Velocity:                           TR Velocity:177 cm/s   5.26 cm/s                                        TR Gradient:12.53 mmHg   MV A' Septal Velocity:     AV DVI (Vmax):0.76    PV Peak Velocity: 68.1   9.75 cm/s                                        cm/s   MV E' Lateral Velocity:                          PV Peak Gradient: 1.86   7.7 cm/s                                         mmHg   MV A' Lateral Velocity:   15.1 cm/s   E/E' septal: 17.45   E/E' lateral: 11.92   MR Velocity: 741 cm/s     [email or web address] [id number]       STRESS:    CATH:    Assessment/Plan       Diagnosis Orders   1. SOB (shortness of breath)  EKG 12 lead   2. Intermittent palpitations  EKG 12 lead       Pulmonary HTN RVSP 45-50 mm Hg  DVT/PE  Polycythemia vera  Former smoking  HTN-uncontrolled        Reviewed EKG, and prior workup  Has mild shortness of breath on exertion which is relieved with rest   BP is elevated today, however patient states this is typically not the case. If BP continues to stay elevated, it is recommended to monitor blood pressure at home and keep a log and follow up to consider adjusting BP medications  Continue enalapril/catapress  On amlodipine to 10mg daily  Was on Xarelto in the past but had to stop due to bleeding issues. Follows with hematology  The patient is asked to make an attempt to improve diet and exercise patterns to aid in medical management of this problem. Advised patient to call office or seek immediate medical attention if there is any new onset of  any chest pain, sob, palpitations, lightheadedness, dizziness, orthopnea, PND or pedal edema. All medication side effects were discussed in details.     Thank you for allowing me to participate in the care of this patient. Please do not hesitate to contact me for any further questions. Return in about 1 year (around 11/29/2022), or if symptoms worsen or fail to improve, for Review testing, Regular follow up.        Electronically signed by Deonte Payan MD [facility]  11/29/2021 at 8:06 AM

## 2021-12-20 ENCOUNTER — HOSPITAL ENCOUNTER (OUTPATIENT)
Dept: INFUSION THERAPY | Age: 66
End: 2021-12-20

## 2021-12-27 ENCOUNTER — HOSPITAL ENCOUNTER (OUTPATIENT)
Dept: INFUSION THERAPY | Age: 66
Discharge: HOME | End: 2021-12-27
Payer: MEDICARE

## 2021-12-27 VITALS
TEMPERATURE: 97.6 F | SYSTOLIC BLOOD PRESSURE: 126 MMHG | RESPIRATION RATE: 16 BRPM | DIASTOLIC BLOOD PRESSURE: 61 MMHG | HEART RATE: 71 BPM | OXYGEN SATURATION: 97 %

## 2021-12-27 DIAGNOSIS — Z51.81 ENCOUNTER FOR MONITORING OF HYDROXYUREA THERAPY: ICD-10-CM

## 2021-12-27 DIAGNOSIS — D75.1 POLYCYTHEMIA: ICD-10-CM

## 2021-12-27 DIAGNOSIS — Z79.64 ENCOUNTER FOR MONITORING OF HYDROXYUREA THERAPY: ICD-10-CM

## 2021-12-27 DIAGNOSIS — D45 POLYCYTHEMIA VERA (HCC): ICD-10-CM

## 2021-12-27 LAB
ABSOLUTE IMMATURE GRANULOCYTE: 0.04 THOU/MM3 (ref 0–0.07)
BASINOPHIL, AUTOMATED: 2 % (ref 0–3)
BASOPHILS ABSOLUTE: 0.2 THOU/MM3 (ref 0–0.1)
EOSINOPHILS ABSOLUTE: 0.5 THOU/MM3 (ref 0–0.4)
EOSINOPHILS RELATIVE PERCENT: 3 % (ref 0–4)
HCT VFR BLD CALC: 45.1 % (ref 37–47)
HEMOGLOBIN: 13.3 GM/DL (ref 12–16)
IMMATURE GRANULOCYTES: 0 %
LYMPHOCYTES # BLD: 7 % (ref 15–47)
LYMPHOCYTES ABSOLUTE: 1 THOU/MM3 (ref 1–4.8)
MCH RBC QN AUTO: 23.3 PG (ref 26–33)
MCHC RBC AUTO-ENTMCNC: 29.5 GM/DL (ref 32.2–35.5)
MCV RBC AUTO: 79 FL (ref 81–99)
MONOCYTES ABSOLUTE: 0.7 THOU/MM3 (ref 0.4–1.3)
MONOCYTES: 5 % (ref 0–12)
PDW BLD-RTO: 19.9 % (ref 11.5–14.5)
PLATELET # BLD: 254 THOU/MM3 (ref 130–400)
PMV BLD AUTO: 10.1 FL (ref 9.4–12.4)
RBC # BLD: 5.71 MILL/MM3 (ref 4.2–5.4)
SEG NEUTROPHILS: 83 % (ref 43–75)
SEGMENTED NEUTROPHILS ABSOLUTE COUNT: 11.9 THOU/MM3 (ref 1.8–7.7)
WBC # BLD: 14.4 THOU/MM3 (ref 4.8–10.8)

## 2021-12-27 PROCEDURE — 36415 COLL VENOUS BLD VENIPUNCTURE: CPT

## 2021-12-27 PROCEDURE — 85025 COMPLETE CBC W/AUTO DIFF WBC: CPT

## 2021-12-27 PROCEDURE — 99211 OFF/OP EST MAY X REQ PHY/QHP: CPT

## 2021-12-27 NOTE — PROGRESS NOTES
No Phlebotomy today- per Dr. Rachael Cross. Patient to return on January 31 to see chelsi with labs and possible Phlebotomy that day. Discharged in stable condition with belongings.

## 2022-01-31 ENCOUNTER — HOSPITAL ENCOUNTER (OUTPATIENT)
Dept: INFUSION THERAPY | Age: 67
Discharge: HOME | End: 2022-01-31
Payer: MEDICARE

## 2022-01-31 ENCOUNTER — OFFICE VISIT (OUTPATIENT)
Dept: ONCOLOGY | Age: 67
End: 2022-01-31
Payer: MEDICARE

## 2022-01-31 VITALS
RESPIRATION RATE: 16 BRPM | OXYGEN SATURATION: 99 % | SYSTOLIC BLOOD PRESSURE: 124 MMHG | BODY MASS INDEX: 32.15 KG/M2 | HEIGHT: 65 IN | WEIGHT: 193 LBS | DIASTOLIC BLOOD PRESSURE: 67 MMHG | TEMPERATURE: 97.5 F | HEART RATE: 69 BPM

## 2022-01-31 VITALS
DIASTOLIC BLOOD PRESSURE: 67 MMHG | HEIGHT: 65 IN | RESPIRATION RATE: 16 BRPM | TEMPERATURE: 97.5 F | SYSTOLIC BLOOD PRESSURE: 144 MMHG | OXYGEN SATURATION: 99 % | WEIGHT: 193.4 LBS | HEART RATE: 67 BPM | BODY MASS INDEX: 32.22 KG/M2

## 2022-01-31 DIAGNOSIS — Z51.81 ENCOUNTER FOR MONITORING OF HYDROXYUREA THERAPY: ICD-10-CM

## 2022-01-31 DIAGNOSIS — Z79.64 ENCOUNTER FOR MONITORING OF HYDROXYUREA THERAPY: ICD-10-CM

## 2022-01-31 DIAGNOSIS — D75.1 POLYCYTHEMIA: ICD-10-CM

## 2022-01-31 DIAGNOSIS — E66.9 OBESITY (BMI 30.0-34.9): ICD-10-CM

## 2022-01-31 DIAGNOSIS — D45 POLYCYTHEMIA VERA (HCC): Primary | ICD-10-CM

## 2022-01-31 DIAGNOSIS — D72.829 LEUKOCYTOSIS, UNSPECIFIED TYPE: ICD-10-CM

## 2022-01-31 LAB
ABSOLUTE IMMATURE GRANULOCYTE: 0.08 THOU/MM3 (ref 0–0.07)
ALBUMIN SERPL-MCNC: 4.3 G/DL (ref 3.5–5.1)
ALP BLD-CCNC: 134 U/L (ref 38–126)
ALT SERPL-CCNC: 19 U/L (ref 11–66)
AST SERPL-CCNC: 24 U/L (ref 5–40)
BASINOPHIL, AUTOMATED: 2 % (ref 0–3)
BASOPHILS ABSOLUTE: 0.3 THOU/MM3 (ref 0–0.1)
BILIRUB SERPL-MCNC: 0.6 MG/DL (ref 0.3–1.2)
BILIRUBIN DIRECT: < 0.2 MG/DL (ref 0–0.3)
BUN, WHOLE BLOOD: 18 MG/DL (ref 8–26)
CHLORIDE, WHOLE BLOOD: 108 MEQ/L (ref 98–109)
CREATININE, WHOLE BLOOD: 0.9 MG/DL (ref 0.5–1.2)
EOSINOPHILS ABSOLUTE: 0.6 THOU/MM3 (ref 0–0.4)
EOSINOPHILS RELATIVE PERCENT: 3 % (ref 0–4)
GFR, ESTIMATED: 66 ML/MIN/1.73M2
GLUCOSE, WHOLE BLOOD: 139 MG/DL (ref 70–108)
HCT VFR BLD CALC: 49.1 % (ref 37–47)
HEMOGLOBIN: 14.4 GM/DL (ref 12–16)
IMMATURE GRANULOCYTES: 1 %
IONIZED CALCIUM, WHOLE BLOOD: 1.31 MMOL/L (ref 1.12–1.32)
LYMPHOCYTES # BLD: 9 % (ref 15–47)
LYMPHOCYTES ABSOLUTE: 1.6 THOU/MM3 (ref 1–4.8)
MCH RBC QN AUTO: 23.4 PG (ref 26–33)
MCHC RBC AUTO-ENTMCNC: 29.3 GM/DL (ref 32.2–35.5)
MCV RBC AUTO: 80 FL (ref 81–99)
MONOCYTES ABSOLUTE: 0.8 THOU/MM3 (ref 0.4–1.3)
MONOCYTES: 4 % (ref 0–12)
PDW BLD-RTO: 20.1 % (ref 11.5–14.5)
PLATELET # BLD: 199 THOU/MM3 (ref 130–400)
PMV BLD AUTO: 10.6 FL (ref 9.4–12.4)
POTASSIUM, WHOLE BLOOD: 4.1 MEQ/L (ref 3.5–4.9)
RBC # BLD: 6.16 MILL/MM3 (ref 4.2–5.4)
SEG NEUTROPHILS: 81 % (ref 43–75)
SEGMENTED NEUTROPHILS ABSOLUTE COUNT: 14.1 THOU/MM3 (ref 1.8–7.7)
SODIUM, WHOLE BLOOD: 143 MEQ/L (ref 138–146)
TOTAL CO2, WHOLE BLOOD: 26 MEQ/L (ref 23–33)
TOTAL PROTEIN: 7.1 G/DL (ref 6.1–8)
WBC # BLD: 17.3 THOU/MM3 (ref 4.8–10.8)

## 2022-01-31 PROCEDURE — G8484 FLU IMMUNIZE NO ADMIN: HCPCS | Performed by: NURSE PRACTITIONER

## 2022-01-31 PROCEDURE — 85025 COMPLETE CBC W/AUTO DIFF WBC: CPT

## 2022-01-31 PROCEDURE — 3017F COLORECTAL CA SCREEN DOC REV: CPT | Performed by: NURSE PRACTITIONER

## 2022-01-31 PROCEDURE — 1036F TOBACCO NON-USER: CPT | Performed by: NURSE PRACTITIONER

## 2022-01-31 PROCEDURE — G8427 DOCREV CUR MEDS BY ELIG CLIN: HCPCS | Performed by: NURSE PRACTITIONER

## 2022-01-31 PROCEDURE — 1090F PRES/ABSN URINE INCON ASSESS: CPT | Performed by: NURSE PRACTITIONER

## 2022-01-31 PROCEDURE — G8417 CALC BMI ABV UP PARAM F/U: HCPCS | Performed by: NURSE PRACTITIONER

## 2022-01-31 PROCEDURE — 99195 PHLEBOTOMY: CPT

## 2022-01-31 PROCEDURE — G8400 PT W/DXA NO RESULTS DOC: HCPCS | Performed by: NURSE PRACTITIONER

## 2022-01-31 PROCEDURE — 36415 COLL VENOUS BLD VENIPUNCTURE: CPT

## 2022-01-31 PROCEDURE — 4040F PNEUMOC VAC/ADMIN/RCVD: CPT | Performed by: NURSE PRACTITIONER

## 2022-01-31 PROCEDURE — 99211 OFF/OP EST MAY X REQ PHY/QHP: CPT

## 2022-01-31 PROCEDURE — 99215 OFFICE O/P EST HI 40 MIN: CPT | Performed by: NURSE PRACTITIONER

## 2022-01-31 PROCEDURE — 80047 BASIC METABLC PNL IONIZED CA: CPT

## 2022-01-31 PROCEDURE — 80076 HEPATIC FUNCTION PANEL: CPT

## 2022-01-31 PROCEDURE — 1123F ACP DISCUSS/DSCN MKR DOCD: CPT | Performed by: NURSE PRACTITIONER

## 2022-01-31 RX ORDER — 0.9 % SODIUM CHLORIDE 0.9 %
250 INTRAVENOUS SOLUTION INTRAVENOUS ONCE
Status: CANCELLED | OUTPATIENT
Start: 2022-01-31 | End: 2022-01-31

## 2022-01-31 RX ORDER — 0.9 % SODIUM CHLORIDE 0.9 %
500 INTRAVENOUS SOLUTION INTRAVENOUS ONCE
Status: CANCELLED | OUTPATIENT
Start: 2022-01-31 | End: 2022-01-31

## 2022-01-31 NOTE — PROGRESS NOTES
cancer     Migally/Gustavo    Chronic [facility], stage III (moderate) [facility] 2019    [facility]) [facility] 2010    History of shingles 2016    Homocysteinemia     Hypertension     Polycythemia vera(238.4)     Pulmonary emboli [facility]         Right-sided epistaxis 2019    Unspecified diseases of blood and blood-forming organs       Past Surgical History:   Procedure Laterality Date    DENTAL SURGERY  11/09/15    EYE SURGERY Left 2015    [address]otrophia [address] Samra Root, [place] [address]    OTHER SURGICAL HISTORY  2017    left breast bx at [facility]        Family History   Problem Relation Age of Onset    Arthritis Mother     High Blood Pressure Mother     Breast Cancer Mother     Heart Disease Mother     Liver Cancer Father     Heart Disease Sister     Breast Cancer Sister       Social History     Tobacco Use    Smoking status: Former Smoker     Packs/day: 0.10     Years: 10.00     Pack years: 1.00     Quit date: 2009     Years since quittin.0    Smokeless tobacco: Never Used   Substance Use Topics    Alcohol use: Yes     Alcohol/week: 4.0 standard drinks     Types: 4 Glasses of wine per week     Comment: wine occasional      Current Outpatient Medications   Medication Sig Dispense Refill    amLODIPine (NORVASC) 10 MG tablet Take 1 tablet by mouth daily 90 tablet 3    folic acid (FOLVITE) 1 MG tablet TAKE 1 TABLET BY MOUTH ONE TIME A DAY 90 tablet 2    enalapril (VASOTEC) 20 MG tablet TAKE 1 TABLET TWICE A  tablet 3    cloNIDine (CATAPRES) 0.2 MG tablet Take 1 tablet by mouth 2 times daily 180 tablet 3    hydroxyurea (HYDREA) 500 MG chemo capsule Take 1 capsule by mouth daily 90 capsule 5    triamcinolone (KENALOG) 0.1 % cream APPLY TO LEGS TWICE DAILY AS NEEDED  1    Cyanocobalamin (B-12) 500 MCG TABS Take 500 mg by mouth daily.       ondansetron (ZOFRAN) 4 MG tablet Take 1 tablet by mouth every 8 hours as needed for Nausea or Vomiting (Patient not taking: Reported on 1/31/2022) 30 tablet 5     No current facility-administered medications for this visit. Allergies   Allergen Reactions    Pcn [Penicillins] Anaphylaxis    Nitrates, Organic      Got bad headaches and stomachache    Keflex [Cephalexin] Itching and Rash          Review of Systems:   Review of Systems   Pertinent review of systems noted in HPI, all other ROS negative. Objective:   Physical Exam   /67 (Site: Left Upper Arm, Position: Sitting, Cuff Size: Medium Adult)   Pulse 69   Temp 97.5 °F (36.4 °C) (Oral)   Resp 16   Ht 5' 5\" (1.651 m)   Wt 193 lb (87.5 kg)   SpO2 99%   BMI 32.12 kg/m²    General appearance: No apparent distress, calm and cooperative. HEENT: Pupils equal, round, and reactive to light. Conjunctivae/corneas clear. Oral mucosa moist.  Neck: Supple, with full range of motion. Trachea midline. Respiratory:  Normal respiratory effort. Clear to auscultation all lung fields. Cardiovascular:  RRR, S1/S2. Abdomen: Soft, non-tender, non-distended with active BS x 4. Musculoskeletal: No clubbing, cyanosis or edema bilaterally. She is able to ambulate in office without difficulty or use of assistive device. Skin: Skin color, texture, turgor normal.  No visible rashes or lesions. Neurologic:  Neurovascularly intact without any focal sensory/motor deficits.  Cranial nerves: II-XII intact, grossly non-focal.  Psychiatric: Alert and oriented x 3, thought content appropriate, normal insight  Capillary Refill: < 3 seconds   Peripheral Pulses: +2 palpable, equal bilaterally       Imaging Studies and Labs:   CBC:   Lab Results   Component Value Date    WBC 17.3 (H) 01/31/2022    HGB 14.4 01/31/2022    HCT 49.1 (H) 01/31/2022    MCV 80 (L) 01/31/2022     01/31/2022     BMP:   Lab Results   Component Value Date     01/31/2022     05/09/2020    K 4.1 01/31/2022    K 4.1 05/09/2020     05/09/2020    CO2 24 09/03/2020    BUN 21 09/03/2020    CREATININE 0.9 01/31/2022    CREATININE 1.2 05/09/2020    GLUCOSE 134 05/09/2020    GLUCOSE 102 10/14/2014    CALCIUM 10.4 05/09/2020      LFT:   Lab Results   Component Value Date    ALT 19 04/08/2021    AST 23 04/08/2021    ALKPHOS 144 (H) 04/08/2021    BILITOT 0.4 04/08/2021         Assessment and Plan:     1. Polycythemia vera ([address]  She requires intermittent therapeutic phlebotomy to maintain H/H in therapeutic range. H/H today 14.4/49. 1. Therapeutic phlebotomy today. Continue CBC every 6 weeks with therapeutic phlebotomy prn to maintain goal of Hct < 45%. Continue Hydrea 500 mg daily. Pt did not want to return to office in 4 weeks, she prefers to maintain her every 6 week appnts. Hx thrombocytosis as well, maintain folic acid daily with history of homocysteinemia. 2. Leukocytosis, unspecified type  WBC 17.3 today. Chronically elevated with maintenance via Hydrea. Stable. Trend. Return in about 6 months (around 7/31/2022). All patient questions answered. Pt voiced understanding. Patient agreed with treatment plan. Follow up as directed. Patient instructed to call for questions or concerns. Electronically signed by   NATHANIEL Lee CNP     I spent a total of 43 minutes on the day of the visit.

## 2022-01-31 NOTE — PATIENT INSTRUCTIONS
1.  Return to clinic every 6 weeks for CBC and possible phlebotomy  3.   Return to clinic as scheduled to see Dr. Arcenio Dangelo in [facility] [place] office on 4/7/2022

## 2022-01-31 NOTE — PROGRESS NOTES
THERAPEUTIC PHLEBOTOMY    Most recent Hemoglobin result: 14.4Gm/dl. Hematocrit result  49.1%  Date obtained:   01 /31 /2022    Pre-phlebotomy vital signs:see Doc flow sheet    Start [id number]    Empty donor bag placed on TARE scale. Tourniquet placed on patient's arm and arm palpated for venous access. Area of venous stick cleansed with alcohol. Hemostat applied to tubing of donor bag. Sheath removed from the needle and needle inserted into the antecubital Vein in the  left Arm. Hemostat released. Blood flowing well down the tubing into the bag. No adjustment of needle needed to maintain adequate blood flow. Scale monitoring amount of blood in bag. Stop [id number]  Needle removed from patient's arm. Pressure applied to patient's arm until bleeding stopped. Dry sterile dressing applied over puncture site and taped down well and secured with coban wrap. Final amount of blood removed:500ml  Post Vital Signs:see Doc flow sheet  Patient drank water and snack and observed for 20 minutes. Patient tolerated procedure well. Discharged ambulatory with belongings.

## 2022-02-25 RX ORDER — HYDROXYUREA 500 MG/1
CAPSULE ORAL
Qty: 90 CAPSULE | Refills: 3 | Status: SHIPPED | OUTPATIENT
Start: 2022-02-25

## 2022-03-14 ENCOUNTER — HOSPITAL ENCOUNTER (OUTPATIENT)
Dept: INFUSION THERAPY | Age: 67
Discharge: HOME | End: 2022-03-14
Payer: MEDICARE

## 2022-03-14 VITALS
TEMPERATURE: 97.6 F | OXYGEN SATURATION: 99 % | HEART RATE: 64 BPM | WEIGHT: 194.4 LBS | BODY MASS INDEX: 32.35 KG/M2 | SYSTOLIC BLOOD PRESSURE: 117 MMHG | RESPIRATION RATE: 18 BRPM | DIASTOLIC BLOOD PRESSURE: 66 MMHG

## 2022-03-14 DIAGNOSIS — E66.9 OBESITY (BMI 30.0-34.9): ICD-10-CM

## 2022-03-14 DIAGNOSIS — D75.1 POLYCYTHEMIA: ICD-10-CM

## 2022-03-14 DIAGNOSIS — Z79.64 ENCOUNTER FOR MONITORING OF HYDROXYUREA THERAPY: ICD-10-CM

## 2022-03-14 DIAGNOSIS — D45 POLYCYTHEMIA VERA (HCC): Primary | ICD-10-CM

## 2022-03-14 DIAGNOSIS — Z51.81 ENCOUNTER FOR MONITORING OF HYDROXYUREA THERAPY: ICD-10-CM

## 2022-03-14 LAB
ABSOLUTE IMMATURE GRANULOCYTE: 0.07 THOU/MM3 (ref 0–0.07)
BASINOPHIL, AUTOMATED: 1 % (ref 0–3)
BASOPHILS ABSOLUTE: 0.2 THOU/MM3 (ref 0–0.1)
EOSINOPHILS ABSOLUTE: 0.4 THOU/MM3 (ref 0–0.4)
EOSINOPHILS RELATIVE PERCENT: 3 % (ref 0–4)
HCT VFR BLD CALC: 46.9 % (ref 37–47)
HEMOGLOBIN: 13.6 GM/DL (ref 12–16)
IMMATURE GRANULOCYTES: 1 %
LYMPHOCYTES # BLD: 7 % (ref 15–47)
LYMPHOCYTES ABSOLUTE: 1.1 THOU/MM3 (ref 1–4.8)
MCH RBC QN AUTO: 23.2 PG (ref 26–33)
MCHC RBC AUTO-ENTMCNC: 29 GM/DL (ref 32.2–35.5)
MCV RBC AUTO: 80 FL (ref 81–99)
MONOCYTES ABSOLUTE: 0.6 THOU/MM3 (ref 0.4–1.3)
MONOCYTES: 4 % (ref 0–12)
PDW BLD-RTO: 19.1 % (ref 11.5–14.5)
PLATELET # BLD: 213 THOU/MM3 (ref 130–400)
PMV BLD AUTO: 10.2 FL (ref 9.4–12.4)
RBC # BLD: 5.85 MILL/MM3 (ref 4.2–5.4)
SEG NEUTROPHILS: 84 % (ref 43–75)
SEGMENTED NEUTROPHILS ABSOLUTE COUNT: 12.9 THOU/MM3 (ref 1.8–7.7)
WBC # BLD: 15.2 THOU/MM3 (ref 4.8–10.8)

## 2022-03-14 PROCEDURE — 99195 PHLEBOTOMY: CPT

## 2022-03-14 PROCEDURE — 85025 COMPLETE CBC W/AUTO DIFF WBC: CPT

## 2022-03-14 PROCEDURE — 36415 COLL VENOUS BLD VENIPUNCTURE: CPT

## 2022-03-14 RX ORDER — 0.9 % SODIUM CHLORIDE 0.9 %
500 INTRAVENOUS SOLUTION INTRAVENOUS ONCE
Status: CANCELLED | OUTPATIENT
Start: 2022-03-14 | End: 2022-03-14

## 2022-03-14 RX ORDER — 0.9 % SODIUM CHLORIDE 0.9 %
250 INTRAVENOUS SOLUTION INTRAVENOUS ONCE
Status: CANCELLED | OUTPATIENT
Start: 2022-03-14 | End: 2022-03-14

## 2022-03-14 NOTE — PROGRESS NOTES
THERAPEUTIC PHLEBOTOMY    Most recent Hemoglobin result: 13.6Gm/dl. Hematocrit result  46.9%  Date obtained:   03 /14 /2022    Pre-phlebotomy vital signs:see Doc flow sheet    Start [id number]    Empty donor bag placed on TARE scale. Tourniquet placed on patient's arm and arm palpated for venous access. Area of venous stick cleansed with alcohol. Hemostat applied to tubing of donor bag. Sheath removed from the needle and needle inserted into the antecubital Vein in the  left Arm. Hemostat released. Blood flowing well down the tubing into the bag. No adjustment of needle needed to maintain adequate blood flow. Scale monitoring amount of blood in bag. Stop [id number]  Needle removed from patient's arm. Pressure applied to patient's arm until bleeding stopped. Dry sterile dressing applied over puncture site and taped down well and secured with coban wrap. Final amount of blood removed:500ml  Post Vital Signs:see Doc flow sheet  Patient drank water and snack and observed for 10 minutes. Patient tolerated procedure well. Discharged ambulatory with belongings.

## 2022-03-14 NOTE — PLAN OF CARE
Problem: Musculor/Skeletal Functional Status  Goal: Absence of falls  Outcome: Met This Shift  Note: No falls this admission   Intervention: Fall precautions  Note: Patient aware of fall precautions for here and at home -call light in reach while here       Problem: Intellectual/Education/Knowledge Deficit  Goal: Teaching initiated upon admission  Outcome: Met This Shift  Note: Patient instructed on therapeutic phlebotomy procedure and potential complications. Aware to call MD if complications occur. Goal: Written Disposition Instruction form completed  Outcome: Met This Shift  Note: Discharge instructions given and reviewed with patient. All questions answered. Patient verbalized understanding   Intervention: Verbal/written education provided  Note: Discharge instructing sheets      Problem: Discharge Planning  Goal: Knowledge of discharge instructions  Description: Knowledge of discharge instructions  Outcome: Met This Shift  Note: Patient  able to teach back follow up appointments and when to call the doctor. Patient offers no questions at this time   Intervention: Interaction with patient/family and care team  Note: All questions and concerns addressed   Intervention: Discharge to appropriate level of care  Note: Discharge home   Care plan reviewed with patient and she verbalized understanding of the plan of care and contributed to goal setting.

## 2022-04-07 ENCOUNTER — OFFICE VISIT (OUTPATIENT)
Dept: ONCOLOGY | Age: 67
End: 2022-04-07
Payer: MEDICARE

## 2022-04-07 VITALS
WEIGHT: 195.6 LBS | RESPIRATION RATE: 16 BRPM | TEMPERATURE: 98.6 F | HEART RATE: 65 BPM | HEIGHT: 65 IN | SYSTOLIC BLOOD PRESSURE: 160 MMHG | OXYGEN SATURATION: 96 % | DIASTOLIC BLOOD PRESSURE: 77 MMHG | BODY MASS INDEX: 32.59 KG/M2

## 2022-04-07 DIAGNOSIS — D72.829 LEUKOCYTOSIS, UNSPECIFIED TYPE: ICD-10-CM

## 2022-04-07 DIAGNOSIS — D45 POLYCYTHEMIA VERA (HCC): Primary | ICD-10-CM

## 2022-04-07 DIAGNOSIS — Z79.64 ENCOUNTER FOR MONITORING OF HYDROXYUREA THERAPY: ICD-10-CM

## 2022-04-07 DIAGNOSIS — D75.1 POLYCYTHEMIA: ICD-10-CM

## 2022-04-07 DIAGNOSIS — Z51.81 ENCOUNTER FOR MONITORING OF HYDROXYUREA THERAPY: ICD-10-CM

## 2022-04-07 DIAGNOSIS — R71.8 ELEVATED HEMATOCRIT: ICD-10-CM

## 2022-04-07 DIAGNOSIS — D75.839 THROMBOCYTOSIS: ICD-10-CM

## 2022-04-07 PROCEDURE — G8400 PT W/DXA NO RESULTS DOC: HCPCS | Performed by: INTERNAL MEDICINE

## 2022-04-07 PROCEDURE — G8427 DOCREV CUR MEDS BY ELIG CLIN: HCPCS | Performed by: INTERNAL MEDICINE

## 2022-04-07 PROCEDURE — 1090F PRES/ABSN URINE INCON ASSESS: CPT | Performed by: INTERNAL MEDICINE

## 2022-04-07 PROCEDURE — 99215 OFFICE O/P EST HI 40 MIN: CPT | Performed by: INTERNAL MEDICINE

## 2022-04-07 PROCEDURE — 1123F ACP DISCUSS/DSCN MKR DOCD: CPT | Performed by: INTERNAL MEDICINE

## 2022-04-07 PROCEDURE — 1036F TOBACCO NON-USER: CPT | Performed by: INTERNAL MEDICINE

## 2022-04-07 PROCEDURE — 3017F COLORECTAL CA SCREEN DOC REV: CPT | Performed by: INTERNAL MEDICINE

## 2022-04-07 PROCEDURE — G8417 CALC BMI ABV UP PARAM F/U: HCPCS | Performed by: INTERNAL MEDICINE

## 2022-04-07 PROCEDURE — 4040F PNEUMOC VAC/ADMIN/RCVD: CPT | Performed by: INTERNAL MEDICINE

## 2022-04-07 NOTE — PATIENT INSTRUCTIONS
1.  Phlebotomy therapy once every 6 weeks at [facility] office. 2.  Labs with each phlebotomy therapyevery 6 weeks.

## 2022-04-25 ENCOUNTER — HOSPITAL ENCOUNTER (OUTPATIENT)
Dept: INFUSION THERAPY | Age: 67
Discharge: HOME | End: 2022-04-25
Payer: MEDICARE

## 2022-04-25 VITALS
BODY MASS INDEX: 32.39 KG/M2 | RESPIRATION RATE: 16 BRPM | WEIGHT: 194.4 LBS | DIASTOLIC BLOOD PRESSURE: 72 MMHG | HEIGHT: 65 IN | TEMPERATURE: 97.9 F | OXYGEN SATURATION: 97 % | HEART RATE: 72 BPM | SYSTOLIC BLOOD PRESSURE: 128 MMHG

## 2022-04-25 DIAGNOSIS — D75.1 POLYCYTHEMIA: ICD-10-CM

## 2022-04-25 DIAGNOSIS — Z51.81 ENCOUNTER FOR MONITORING OF HYDROXYUREA THERAPY: ICD-10-CM

## 2022-04-25 DIAGNOSIS — D45 POLYCYTHEMIA VERA (HCC): ICD-10-CM

## 2022-04-25 DIAGNOSIS — Z79.64 ENCOUNTER FOR MONITORING OF HYDROXYUREA THERAPY: ICD-10-CM

## 2022-04-25 LAB
ANISOCYTOSIS: PRESENT
ATYPICAL LYMPHOCYTES: ABNORMAL %
BASOPHILS # BLD: 1.5 %
BASOPHILS ABSOLUTE: 0.2 THOU/MM3 (ref 0–0.1)
ELLIPTOCYTES: ABNORMAL
EOSINOPHIL # BLD: 3.4 %
EOSINOPHILS ABSOLUTE: 0.5 THOU/MM3 (ref 0–0.4)
ERYTHROCYTE [DISTWIDTH] IN BLOOD BY AUTOMATED COUNT: 19.8 % (ref 11.5–14.5)
ERYTHROCYTE [DISTWIDTH] IN BLOOD BY AUTOMATED COUNT: 54.6 FL (ref 35–45)
HCT VFR BLD CALC: 45.4 % (ref 37–47)
HEMOGLOBIN: 13 GM/DL (ref 12–16)
HYPOCHROMIA: PRESENT
IMMATURE GRANS (ABS): 0.14 THOU/MM3 (ref 0–0.07)
IMMATURE GRANULOCYTES: 0.9 %
LYMPHOCYTES # BLD: 8.2 %
LYMPHOCYTES ABSOLUTE: 1.3 THOU/MM3 (ref 1–4.8)
MCH RBC QN AUTO: 23.3 PG (ref 26–33)
MCHC RBC AUTO-ENTMCNC: 28.6 GM/DL (ref 32.2–35.5)
MCV RBC AUTO: 81.5 FL (ref 81–99)
MONOCYTES # BLD: 4.4 %
MONOCYTES ABSOLUTE: 0.7 THOU/MM3 (ref 0.4–1.3)
NUCLEATED RED BLOOD CELLS: 0 /100 WBC
PLATELET # BLD: 234 THOU/MM3 (ref 130–400)
PLATELET ESTIMATE: ADEQUATE
PMV BLD AUTO: 10.7 FL (ref 9.4–12.4)
RBC # BLD: 5.57 MILL/MM3 (ref 4.2–5.4)
SCAN OF BLOOD SMEAR: NORMAL
SEG NEUTROPHILS: 81.6 %
SEGMENTED NEUTROPHILS ABSOLUTE COUNT: 12.5 THOU/MM3 (ref 1.8–7.7)
TARGET CELLS: ABNORMAL
WBC # BLD: 15.3 THOU/MM3 (ref 4.8–10.8)

## 2022-04-25 PROCEDURE — 85025 COMPLETE CBC W/AUTO DIFF WBC: CPT

## 2022-04-25 PROCEDURE — 99211 OFF/OP EST MAY X REQ PHY/QHP: CPT

## 2022-04-25 PROCEDURE — 36415 COLL VENOUS BLD VENIPUNCTURE: CPT

## 2022-04-25 NOTE — PLAN OF CARE
Problem: Intellectual/Education/Knowledge Deficit  Goal: Teaching initiated upon admission  Outcome: Adequate for Discharge  Note: No procedure needed today with current lab results    Problem: Discharge Planning  Goal: Knowledge of discharge instructions  Description: Knowledge of discharge instructions  Outcome: Adequate for Discharge  Note: Verbalized understanding of discharge instructions, follow-up appointments, and when to call the physician. Intervention: Interaction with patient/family and care team  Note: Discuss understanding of discharge instructions,follow-up appointments, and when to call the physician. Intervention: Verbal/written education provided  Note: Review lab results - no procedure needed   Care plan reviewed with patient . Patient  verbalize understanding of the plan of care and contribute to goal setting.

## 2022-04-25 NOTE — PROGRESS NOTES
Terra ALCANTAR informed lab results from this morning were peliminary. Final HGB 45.4- Kennedi ALCANTAR to have patient return in 3 weeks. Patient notified.

## 2022-04-25 NOTE — PROGRESS NOTES
Patient tolerated  Lab draw without any complications. No therapeutic procedure needed today with current lab results. Discharge instructions given to patient-verbalizes understanding. Ambulated off unit per self with belongings.

## 2022-04-25 NOTE — PROGRESS NOTES
Patient tolerated  Lab draw without any complications. No therapeutic phlebotomy needed today. Discharge instructions given to patient-verbalizes understanding. Ambulated off unit per self with belongings.  Return June 6

## 2022-05-03 NOTE — PROGRESS NOTES
[facility] PROFESSIONAL SERVICES  ONCOLOGY SPECIALISTS OF [facility]  [address], [address] [facility] [address]  [address]  Dept: [phone]  Dept Fax: [phone]  Loc: [phone]    Subjective:     Chief Complaint:  Milton Linares is a 74 y.o. old female with hematological disease. HPI:   Don Ledesmae is here today for follow-up regarding her history of a myeloproliferative disorder. She continues to receive therapy with hydroxyurea and intermittent phlebotomy therapy. Her phlebotomy therapy is being done approximately once per month at this time. We are attempting to contact troll her hematocrit below 45% as she states that her general sense of wellbeing is best at this level. She has not had any signs of infection. The patient denies cough, shortness of breath fever, or other signs of infection. Both her bowel and bladder habits have been fairly stable. She has not seen blood in her stool or urine. The patient tolerates hydroxyurea well without significant side effects or complications. She does have some generalized weakness and fatigue but her ECOG performance status is level 0. The patient's blood pressure is modestly elevated. She will continue to monitor her blood pressure and follow-up with her primary care provider for further management. Laboratory studies from today were reviewed with the patient. The patient continues to have leukocytosis. She does not have any specific symptoms related to her white blood cell count. Her laboratory studies will continue to be monitored. PMH, SH, and FH:  I reviewed the PMH, SH and FH as noted on the electronic medical record. There have been no changes as noted in the previous documentation. Review of Systems   Constitutional: Fatigue. HENT: Negative. Eyes: Negative. Respiratory: Negative. Cardiovascular: Negative. Gastrointestinal: Negative. Genitourinary: Negative. Musculoskeletal: Negative. Skin: Negative.     Neurological: General weakness. Hematological: Negative. Psychiatric/Behavioral: Negative. Objective:   Physical Exam   Vitals:    04/07/22 1104   BP: (!) 160/77   Pulse: 65   Resp: 16   Temp: 98.6 °F (37 °C)   SpO2: 96%   Vitals reviewed and are stable. Constitutional: Well-developed. No acute distress. HENT: Normocephalic and atraumatic. Eyes: Pupils appear equal and reactive. Neck: Overall appearance is symmetrical. No identifiable masses. Pulmonary: Effort normal. No respiratory distress. .  Neurological: Alert and oriented to person, place, and time. Judgment and thought content normal.  Skin: Skin is warm and dry. No rash. Psychiatric: Mood and affect appropriate for the clinical situation. Data Analysis: The following laboratory studies were reviewed with the patient today:    Hematology 3/14/2022 1/31/2022 12/27/2021   WBC 15.2 (H) 17.3 (H) 14.4 (H)   RBC 5.85 (H) 6.16 (H) 5.71 (H)   HGB 13.6 14.4 13.3   HCT 46.9 49.1 (H) 45.1   MCV 80 (L) 80 (L) 79 (L)   RDW 19.1 (H) 20.1 (H) 19.9 (H)    199 254     Assessment:   1. Myeloproliferative disorder -polycythemia rubra vera  2. Leukocytosis. 3.  Elevated hematocrit. 4.  History of thrombosis. Plan:   1.  Schedule phlebotomy therapy once every six weeks and attempt to try to maintain hematocrit near 45%. 2.  Continue Hydrea at current dose of 500 mg daily  3. Monitor hemoglobin/hematocrit and for any signs of blood loss. 4.  Monitor total WBC count and for signs of infection/fever. 5.  Monitor for recurrence of thrombosis. 6.  Monitor platelet count and for any signs of abnormal bleeding/bruising. 7.  Monitor for side effects and toxicity from Hydrea. 8.  Monitor for progression of myeloproliferative disease. Christopher Dior M.D.                                                                          Medical Director: [address] [place] [address] of [facility] [address], [address] [place] [address], [address] [place] [address], [address] [place] [address], [address] [facility] [address] of the [facility] RACHAEL at the [facility]      **This report has been created using voice recognition software. It may contain minor errors which are inherent in voice recognition technology. **

## 2022-05-16 ENCOUNTER — HOSPITAL ENCOUNTER (OUTPATIENT)
Dept: INFUSION THERAPY | Age: 67
Discharge: HOME | End: 2022-05-16
Payer: MEDICARE

## 2022-05-16 VITALS
DIASTOLIC BLOOD PRESSURE: 60 MMHG | TEMPERATURE: 97.5 F | RESPIRATION RATE: 16 BRPM | OXYGEN SATURATION: 96 % | HEIGHT: 65 IN | BODY MASS INDEX: 32.42 KG/M2 | HEART RATE: 66 BPM | SYSTOLIC BLOOD PRESSURE: 108 MMHG | WEIGHT: 194.6 LBS

## 2022-05-16 DIAGNOSIS — D45 POLYCYTHEMIA VERA (HCC): ICD-10-CM

## 2022-05-16 DIAGNOSIS — D75.1 POLYCYTHEMIA: ICD-10-CM

## 2022-05-16 DIAGNOSIS — Z79.64 ENCOUNTER FOR MONITORING OF HYDROXYUREA THERAPY: ICD-10-CM

## 2022-05-16 DIAGNOSIS — Z51.81 ENCOUNTER FOR MONITORING OF HYDROXYUREA THERAPY: ICD-10-CM

## 2022-05-16 LAB
BASOPHILS # BLD: 1.5 %
BASOPHILS ABSOLUTE: 0.2 THOU/MM3 (ref 0–0.1)
EOSINOPHIL # BLD: 3.7 %
EOSINOPHILS ABSOLUTE: 0.5 THOU/MM3 (ref 0–0.4)
ERYTHROCYTE [DISTWIDTH] IN BLOOD BY AUTOMATED COUNT: 20.3 % (ref 11.5–14.5)
ERYTHROCYTE [DISTWIDTH] IN BLOOD BY AUTOMATED COUNT: 56.9 FL (ref 35–45)
HCT VFR BLD CALC: 45.5 % (ref 37–47)
HEMOGLOBIN: 13 GM/DL (ref 12–16)
HYPOCHROMIA: PRESENT
IMMATURE GRANS (ABS): 0.13 THOU/MM3 (ref 0–0.07)
IMMATURE GRANULOCYTES: 1 %
LYMPHOCYTES # BLD: 9.7 %
LYMPHOCYTES ABSOLUTE: 1.3 THOU/MM3 (ref 1–4.8)
MCH RBC QN AUTO: 23.4 PG (ref 26–33)
MCHC RBC AUTO-ENTMCNC: 28.6 GM/DL (ref 32.2–35.5)
MCV RBC AUTO: 81.8 FL (ref 81–99)
MONOCYTES # BLD: 5 %
MONOCYTES ABSOLUTE: 0.7 THOU/MM3 (ref 0.4–1.3)
NUCLEATED RED BLOOD CELLS: 0 /100 WBC
PLATELET # BLD: 271 THOU/MM3 (ref 130–400)
PLATELET ESTIMATE: ADEQUATE
PMV BLD AUTO: 10.9 FL (ref 9.4–12.4)
RBC # BLD: 5.56 MILL/MM3 (ref 4.2–5.4)
SCAN OF BLOOD SMEAR: NORMAL
SEG NEUTROPHILS: 79.1 %
SEGMENTED NEUTROPHILS ABSOLUTE COUNT: 10.8 THOU/MM3 (ref 1.8–7.7)
WBC # BLD: 13.7 THOU/MM3 (ref 4.8–10.8)

## 2022-05-16 PROCEDURE — 99211 OFF/OP EST MAY X REQ PHY/QHP: CPT

## 2022-05-16 PROCEDURE — 85025 COMPLETE CBC W/AUTO DIFF WBC: CPT

## 2022-05-16 PROCEDURE — 36415 COLL VENOUS BLD VENIPUNCTURE: CPT

## 2022-05-16 NOTE — PLAN OF CARE
Problem: Intellectual/Education/Knowledge Deficit  Goal: Teaching initiated upon admission  Outcome: Adequate for Discharge  Note: Understands no procedure needed due to lab results  Intervention: Verbal/written education provided  Note: Review lab results- no procedure needed     Problem: Discharge Planning  Goal: Knowledge of discharge instructions  Description: Knowledge of discharge instructions  Outcome: Adequate for Discharge  Note: Verbalized understanding of discharge instructions, follow-up appointments, and when to call the physician. Intervention: Interaction with patient/family and care team  Note: Discuss understanding of discharge instructions,follow-up appointments, and when to call the physician. Care plan reviewed with patient . Patient verbalize understanding of the plan of care and contribute to goal setting.

## 2022-05-16 NOTE — PROGRESS NOTES
Patient tolerated  Lab draw without any complications. Discharge instructions given to patient-verbalizes understanding. Ambulated off unit per self with belongings.   No procedure needed today per guidelines-return June 27

## 2022-05-26 DIAGNOSIS — I10 ESSENTIAL HYPERTENSION: ICD-10-CM

## 2022-05-26 RX ORDER — CLONIDINE HYDROCHLORIDE 0.2 MG/1
TABLET ORAL
Qty: 180 TABLET | Refills: 3 | OUTPATIENT
Start: 2022-05-26

## 2022-05-26 RX ORDER — ENALAPRIL MALEATE 20 MG/1
TABLET ORAL
Qty: 180 TABLET | Refills: 3 | OUTPATIENT
Start: 2022-05-26

## 2022-05-31 RX ORDER — CLONIDINE HYDROCHLORIDE 0.2 MG/1
0.2 TABLET ORAL 2 TIMES DAILY
Qty: 60 TABLET | Refills: 0 | Status: SHIPPED | OUTPATIENT
Start: 2022-05-31 | End: 2022-06-13 | Stop reason: SDUPTHER

## 2022-05-31 RX ORDER — ENALAPRIL MALEATE 20 MG/1
TABLET ORAL
Qty: 60 TABLET | Refills: 0 | Status: SHIPPED | OUTPATIENT
Start: 2022-05-31 | End: 2022-06-13 | Stop reason: SDUPTHER

## 2022-05-31 NOTE — TELEPHONE ENCOUNTER
Mason Dominguez needs refill of   Requested Prescriptions     Pending Prescriptions Disp Refills    cloNIDine (CATAPRES) 0.2 MG tablet 180 tablet 3     Sig: Take 1 tablet by mouth 2 times daily     Refused Prescriptions Disp Refills    cloNIDine (CATAPRES) 0.2 MG tablet [Pharmacy Med Name: CLONIDINE HCL TABS 0.2MG] 180 tablet 3     Sig: TAKE 1 TABLET TWICE A DAY     Refused By: Mel Kwong     Reason for Refusal: Patient needs an appointment    enalapril (VASOTEC) 20 MG tablet [Pharmacy Med Name: ENALAPRIL MALEATE TABS 20MG] 180 tablet 3     Sig: TAKE 1 TABLET TWICE A DAY     Refused By: Mel Kwong     Reason for Refusal: Patient needs an appointment       Last Filled on:  6/8/21    Last Visit Date:  8/2/2021    Next Visit Date:    6/13/2022      --Short-term refill until appt 6/13/22.

## 2022-06-09 SDOH — HEALTH STABILITY: PHYSICAL HEALTH: ON AVERAGE, HOW MANY DAYS PER WEEK DO YOU ENGAGE IN MODERATE TO STRENUOUS EXERCISE (LIKE A BRISK WALK)?: 7 DAYS

## 2022-06-09 SDOH — HEALTH STABILITY: PHYSICAL HEALTH: ON AVERAGE, HOW MANY MINUTES DO YOU ENGAGE IN EXERCISE AT THIS LEVEL?: 20 MIN

## 2022-06-09 ASSESSMENT — LIFESTYLE VARIABLES
HAS A RELATIVE, FRIEND, DOCTOR, OR ANOTHER HEALTH PROFESSIONAL EXPRESSED CONCERN ABOUT YOUR DRINKING OR SUGGESTED YOU CUT DOWN: NO
HOW OFTEN DURING THE LAST YEAR HAVE YOU FAILED TO DO WHAT WAS NORMALLY EXPECTED FROM YOU BECAUSE OF DRINKING: NEVER
HOW MANY STANDARD DRINKS CONTAINING ALCOHOL DO YOU HAVE ON A TYPICAL DAY: 3 OR 4
HOW OFTEN DURING THE LAST YEAR HAVE YOU NEEDED AN ALCOHOLIC DRINK FIRST THING IN THE MORNING TO GET YOURSELF GOING AFTER A NIGHT OF HEAVY DRINKING: NEVER
HOW OFTEN DURING THE LAST YEAR HAVE YOU HAD A FEELING OF GUILT OR REMORSE AFTER DRINKING: 0
HOW OFTEN DURING THE LAST YEAR HAVE YOU BEEN UNABLE TO REMEMBER WHAT HAPPENED THE NIGHT BEFORE BECAUSE YOU HAD BEEN DRINKING: NEVER
HOW OFTEN DO YOU HAVE SIX OR MORE DRINKS ON ONE OCCASION: 1
HOW OFTEN DURING THE LAST YEAR HAVE YOU FOUND THAT YOU WERE NOT ABLE TO STOP DRINKING ONCE YOU HAD STARTED: NEVER
HAVE YOU OR SOMEONE ELSE BEEN INJURED AS A RESULT OF YOUR DRINKING: NO
HOW OFTEN DURING THE LAST YEAR HAVE YOU NEEDED AN ALCOHOLIC DRINK FIRST THING IN THE MORNING TO GET YOURSELF GOING AFTER A NIGHT OF HEAVY DRINKING: 0
HOW OFTEN DURING THE LAST YEAR HAVE YOU FOUND THAT YOU WERE NOT ABLE TO STOP DRINKING ONCE YOU HAD STARTED: 0
HAS A RELATIVE, FRIEND, DOCTOR, OR ANOTHER HEALTH PROFESSIONAL EXPRESSED CONCERN ABOUT YOUR DRINKING OR SUGGESTED YOU CUT DOWN: 0
HOW OFTEN DO YOU HAVE A DRINK CONTAINING ALCOHOL: 4
HAVE YOU OR SOMEONE ELSE BEEN INJURED AS A RESULT OF YOUR DRINKING: 0
HOW OFTEN DURING THE LAST YEAR HAVE YOU BEEN UNABLE TO REMEMBER WHAT HAPPENED THE NIGHT BEFORE BECAUSE YOU HAD BEEN DRINKING: 0
HOW OFTEN DURING THE LAST YEAR HAVE YOU FAILED TO DO WHAT WAS NORMALLY EXPECTED FROM YOU BECAUSE OF DRINKING: 0
HOW MANY STANDARD DRINKS CONTAINING ALCOHOL DO YOU HAVE ON A TYPICAL DAY: 2
HOW OFTEN DURING THE LAST YEAR HAVE YOU HAD A FEELING OF GUILT OR REMORSE AFTER DRINKING: NEVER
HOW OFTEN DO YOU HAVE A DRINK CONTAINING ALCOHOL: 2-3 TIMES A WEEK

## 2022-06-09 ASSESSMENT — PATIENT HEALTH QUESTIONNAIRE - PHQ9
SUM OF ALL RESPONSES TO PHQ QUESTIONS 1-9: 0
1. LITTLE INTEREST OR PLEASURE IN DOING THINGS: 0
SUM OF ALL RESPONSES TO PHQ9 QUESTIONS 1 & 2: 0
SUM OF ALL RESPONSES TO PHQ QUESTIONS 1-9: 0
2. FEELING DOWN, DEPRESSED OR HOPELESS: 0

## 2022-06-13 ENCOUNTER — OFFICE VISIT (OUTPATIENT)
Dept: FAMILY MEDICINE CLINIC | Age: 67
End: 2022-06-13
Payer: MEDICARE

## 2022-06-13 VITALS
WEIGHT: 194.6 LBS | TEMPERATURE: 97.4 F | DIASTOLIC BLOOD PRESSURE: 62 MMHG | HEIGHT: 65 IN | HEART RATE: 82 BPM | OXYGEN SATURATION: 98 % | SYSTOLIC BLOOD PRESSURE: 126 MMHG | RESPIRATION RATE: 18 BRPM | BODY MASS INDEX: 32.42 KG/M2

## 2022-06-13 DIAGNOSIS — N18.30 STAGE 3 CHRONIC KIDNEY DISEASE, UNSPECIFIED WHETHER STAGE 3A OR 3B CKD (HCC): ICD-10-CM

## 2022-06-13 DIAGNOSIS — Z12.31 ENCOUNTER FOR SCREENING MAMMOGRAM FOR MALIGNANT NEOPLASM OF BREAST: ICD-10-CM

## 2022-06-13 DIAGNOSIS — I27.82 OTHER CHRONIC PULMONARY EMBOLISM, UNSPECIFIED WHETHER ACUTE COR PULMONALE PRESENT (HCC): ICD-10-CM

## 2022-06-13 DIAGNOSIS — Z00.00 MEDICARE ANNUAL WELLNESS VISIT, SUBSEQUENT: Primary | ICD-10-CM

## 2022-06-13 DIAGNOSIS — I10 ESSENTIAL HYPERTENSION: ICD-10-CM

## 2022-06-13 DIAGNOSIS — E72.11 HOMOCYSTINURIA (HCC): ICD-10-CM

## 2022-06-13 PROCEDURE — 3017F COLORECTAL CA SCREEN DOC REV: CPT | Performed by: FAMILY MEDICINE

## 2022-06-13 PROCEDURE — G0439 PPPS, SUBSEQ VISIT: HCPCS | Performed by: FAMILY MEDICINE

## 2022-06-13 PROCEDURE — 1123F ACP DISCUSS/DSCN MKR DOCD: CPT | Performed by: FAMILY MEDICINE

## 2022-06-13 RX ORDER — CLONIDINE HYDROCHLORIDE 0.2 MG/1
0.2 TABLET ORAL 2 TIMES DAILY
Qty: 180 TABLET | Refills: 3 | Status: SHIPPED | OUTPATIENT
Start: 2022-06-13

## 2022-06-13 RX ORDER — ENALAPRIL MALEATE 20 MG/1
TABLET ORAL
Qty: 180 TABLET | Refills: 3 | Status: SHIPPED | OUTPATIENT
Start: 2022-06-13

## 2022-06-13 SDOH — ECONOMIC STABILITY: FOOD INSECURITY: WITHIN THE PAST 12 MONTHS, YOU WORRIED THAT YOUR FOOD WOULD RUN OUT BEFORE YOU GOT MONEY TO BUY MORE.: NEVER TRUE

## 2022-06-13 SDOH — ECONOMIC STABILITY: FOOD INSECURITY: WITHIN THE PAST 12 MONTHS, THE FOOD YOU BOUGHT JUST DIDN'T LAST AND YOU DIDN'T HAVE MONEY TO GET MORE.: NEVER TRUE

## 2022-06-13 ASSESSMENT — SOCIAL DETERMINANTS OF HEALTH (SDOH): HOW HARD IS IT FOR YOU TO PAY FOR THE VERY BASICS LIKE FOOD, HOUSING, MEDICAL CARE, AND HEATING?: NOT HARD AT ALL

## 2022-06-13 NOTE — PATIENT INSTRUCTIONS
Personalized Preventive Plan for Ricky Multani - 6/13/2022  Medicare offers a range of preventive health benefits. Some of the tests and screenings are paid in full while other may be subject to a deductible, co-insurance, and/or copay. Some of these benefits include a comprehensive review of your medical history including lifestyle, illnesses that may run in your family, and various assessments and screenings as appropriate. After reviewing your medical record and screening and assessments performed today your provider may have ordered immunizations, labs, imaging, and/or referrals for you. A list of these orders (if applicable) as well as your Preventive Care list are included within your After Visit Summary for your review. Other Preventive Recommendations:    · A preventive eye exam performed by an eye specialist is recommended every 1-2 years to screen for glaucoma; cataracts, macular degeneration, and other eye disorders. · A preventive dental visit is recommended every 6 months. · Try to get at least 150 minutes of exercise per week or 10,000 steps per day on a pedometer . · Order or download the FREE \"Exercise & Physical Activity: Your Everyday Guide\" from [employer]. Call 4-923.608.5421 or search [employer] online. · You need 5094-2566 mg of calcium and 1023-9344 IU of vitamin D per day. It is possible to meet your calcium requirement with diet alone, but a vitamin D supplement is usually necessary to meet this goal.  · When exposed to the sun, use a sunscreen that protects against both UVA and UVB radiation with an SPF of 30 or greater. Reapply every 2 to 3 hours or after sweating, drying off with a towel, or swimming. · Always wear a seat belt when traveling in a car. Always wear a helmet when riding a bicycle or motorcycle. Patient Education        Well Visit, Over 72: Care Instructions  Overview     Well visits can help you stay healthy. Your doctor has checked your overall health and may have suggested ways to take good care of yourself. Your doctor also may have recommended tests. At home, you can help prevent illness withhealthy eating, regular exercise, and other steps. Follow-up care is a key part of your treatment and safety. Be sure to make and go to all appointments, and call your doctor if you are having problems. It's also a good idea to know your test results and keep alist of the medicines you take. How can you care for yourself at home? Get screening tests that you and your doctor decide on. Screening helps find diseases before any symptoms appear. Eat healthy foods. Choose fruits, vegetables, whole grains, protein, and low-fat dairy foods. Limit fat, especially saturated fat. Reduce salt in your diet. Limit alcohol. If you are a man, have no more than 2 drinks a day or 14 drinks a week. If you are a woman, have no more than 1 drink a day or 7 drinks a week. Since alcohol affects older adults differently, you may want to limit alcohol even more. Or you may not want to drink at all. Get at least 30 minutes of exercise on most days of the week. Walking is a good choice. You also may want to do other activities, such as running, swimming, cycling, or playing tennis or team sports. Reach and stay at a healthy weight. This will lower your risk for many problems, such as obesity, diabetes, heart disease, and high blood pressure. Do not smoke. Smoking can make health problems worse. If you need help quitting, talk to your doctor about stop-smoking programs and medicines. These can increase your chances of quitting for good. Care for your mental health. It is easy to get weighed down by worry and stress. Learn strategies to manage stress, like deep breathing and mindfulness, and stay connected with your family and community. If you find you often feel sad or hopeless, talk with your doctor. Treatment can help.   Talk to your doctor about whether you have any risk factors for sexually transmitted infections (STIs). You can help prevent STIs if you wait to have sex with a new partner (or partners) until you've each been tested for STIs. It also helps if you use condoms (male or female condoms) and if you limit your sex partners to one person who only has sex with you. Vaccines are available for some STIs. If you think you may have a problem with alcohol or drug use, talk to your doctor. This includes prescription medicines (such as amphetamines and opioids) and illegal drugs (such as cocaine and methamphetamine). Your doctor can help you figure out what type of treatment is best for you. Protect your skin from too much sun. When you're outdoors from 10 a.m. to 4 p.m., stay in the shade or cover up with clothing and a hat with a wide brim. Wear sunglasses that block UV rays. Even when it's cloudy, put broad-spectrum sunscreen (SPF 30 or higher) on any exposed skin. See a dentist one or two times a year for checkups and to have your teeth cleaned. Wear a seat belt in the car. When should you call for help? Watch closely for changes in your health, and be sure to contact your doctor if you have any problems or symptoms that concern you. Where can you learn more? Go to [email or web address] org and sign in to your [employer] account. Enter U733 in the [facility] box to learn more about \"Well Visit, Over 65: Care Instructions. \"     If you do not have an account, please click on the \"Sign Up Now\" link. Current as of: October 6, 2021               Content Version: 13.2  © 4802-7815 Healthwise, Incorporated. Care instructions adapted under license by [facility] ([facility]). If you have questions about a medical condition or this instruction, always ask your healthcare professional. [address] any warranty or liability for your use of this information.

## 2022-06-13 NOTE — PROGRESS NOTES
Medicare Annual Wellness Visit    Keon Martínez is here for Medicare AWV    Assessment & Plan   Medicare annual wellness visit, subsequent  Essential hypertension  -     cloNIDine (CATAPRES) 0.2 MG tablet; Take 1 tablet by mouth 2 times daily, Disp-180 tablet, R-3Normal  -     enalapril (VASOTEC) 20 MG tablet; TAKE 1 TABLET TWICE A DAY, Disp-180 tablet, R-3Normal  Homocystinuria (HCC)  -stable, sees hematology  Other chronic pulmonary embolism, unspecified whether acute cor pulmonale present (HCC)  -stable, controlled on hydroxyurea  Stage 3 chronic kidney disease, unspecified whether stage 3a or 3b CKD (HCC)  -stable, avoid nephrotoxic meds,   Lab Results   Component Value Date     01/31/2022    K 4.1 01/31/2022     05/09/2020    CO2 24 09/03/2020    BUN 21 09/03/2020    CREATININE 0.9 01/31/2022    GLUCOSE 134 (H) 05/09/2020    CALCIUM 10.4 05/09/2020    PROT 7.1 01/31/2022    LABALBU 4.3 01/31/2022    BILITOT 0.6 01/31/2022    ALKPHOS 134 (H) 01/31/2022    AST 24 01/31/2022    ALT 19 01/31/2022    LABGLOM 45 (A) 05/09/2020         Encounter for screening mammogram for malignant neoplasm of breast  -     FALGUNI MAIRA DIGITAL SCREEN BILATERAL; Future      Recommendations for Preventive Services Due: see orders and patient instructions/AVS.  Recommended screening schedule for the next 5-10 years is provided to the patient in written form: see Patient Instructions/AVS.     Return for Medicare Annual Wellness Visit in 1 year. Subjective       Patient's complete Health Risk Assessment and screening values have been reviewed and are found in Flowsheets. The following problems were reviewed today and where indicated follow up appointments were made and/or referrals ordered.     Positive Risk Factor Screenings with Interventions:    Fall Risk:  Do you feel unsteady or are you worried about falling? : no  2 or more falls in past year?: (!) yes  Fall with injury in past year?: no     Fall Risk No  No exam data present    Hearing/Vision Interventions:  · Vision concerns:  patient encouraged to make appointment with his/her eye specialist            Objective   Vitals:    06/13/22 0805   BP: 126/62   Pulse: 82   Resp: 18   Temp: 97.4 °F (36.3 °C)   TempSrc: Infrared   SpO2: 98%   Weight: 194 lb 9.6 oz (88.3 kg)   Height: 5' 5.1\" (1.654 m)      Body mass index is 32.28 kg/m². General Appearance: alert and oriented to person, place and time, well developed and well- nourished, in no acute distress  Skin: warm and dry, no rash or erythema  Head: normocephalic and atraumatic  Eyes: pupils equal, round, and reactive to light, extraocular eye movements intact, conjunctivae normal  ENT: tympanic membrane, external ear and ear canal normal bilaterally, nose without deformity, nasal mucosa and turbinates normal without polyps  Neck: supple and non-tender without mass, no thyromegaly or thyroid nodules, no cervical lymphadenopathy  Pulmonary/Chest: clear to auscultation bilaterally- no wheezes, rales or rhonchi, normal air movement, no respiratory distress  Cardiovascular: normal rate, regular rhythm, normal S1 and S2, no murmurs, rubs, clicks, or gallops, distal pulses intact, no carotid bruits  Abdomen: soft, non-tender, non-distended, normal bowel sounds, no masses or organomegaly  Extremities: no cyanosis, clubbing or edema  Musculoskeletal: normal range of motion, no joint swelling, deformity or tenderness  Neurologic: reflexes normal and symmetric, no cranial nerve deficit, gait, coordination and speech normal       Allergies   Allergen Reactions    Pcn [Penicillins] Anaphylaxis    Nitrates, Organic      Got bad headaches and stomachache    Keflex [Cephalexin] Itching and Rash     Prior to Visit Medications    Medication Sig Taking?  Authorizing Provider   cloNIDine (CATAPRES) 0.2 MG tablet Take 1 tablet by mouth 2 times daily Yes Susan Nicolas MD   enalapril (VASOTEC) 20 MG tablet TAKE 1 TABLET TWICE A DAY Yes Tosha Gibbs MD   hydroxyurea (HYDREA) 500 MG chemo capsule TAKE 1 CAPSULE DAILY Yes Sweetie Nash MD   amLODIPine (NORVASC) 10 MG tablet Take 1 tablet by mouth daily Yes Luis Felipe Fuller MD   folic acid (FOLVITE) 1 MG tablet TAKE 1 TABLET BY MOUTH ONE TIME A DAY Yes Kennedi Rodrigez PA-C   triamcinolone (KENALOG) 0.1 % cream APPLY TO LEGS TWICE DAILY AS NEEDED Yes Historical Provider, MD   Cyanocobalamin (B-12) 500 MCG TABS Take 500 mg by mouth daily.  Yes Historical Provider, MD Dunham (Including outside providers/suppliers regularly involved in providing care):   Patient Care Team:  Tosha Gibbs MD as PCP - Ottoniel Low MD as PCP - [facility] Empaneled Provider  Liz Whelan MD as Cardiologist (Cardiology)     Reviewed and updated this visit:  Tobacco  Allergies  Meds  Problems  Med Hx  Surg Hx  Soc Hx  Fam Hx

## 2022-06-22 ENCOUNTER — TELEPHONE (OUTPATIENT)
Dept: FAMILY MEDICINE CLINIC | Age: 67
End: 2022-06-22

## 2022-06-27 ENCOUNTER — HOSPITAL ENCOUNTER (OUTPATIENT)
Dept: INFUSION THERAPY | Age: 67
Discharge: HOME | End: 2022-06-27
Payer: MEDICARE

## 2022-06-27 VITALS
WEIGHT: 194 LBS | HEART RATE: 67 BPM | TEMPERATURE: 98.1 F | HEIGHT: 65 IN | SYSTOLIC BLOOD PRESSURE: 104 MMHG | OXYGEN SATURATION: 99 % | BODY MASS INDEX: 32.32 KG/M2 | RESPIRATION RATE: 18 BRPM | DIASTOLIC BLOOD PRESSURE: 64 MMHG

## 2022-06-27 DIAGNOSIS — Z51.81 ENCOUNTER FOR MONITORING OF HYDROXYUREA THERAPY: ICD-10-CM

## 2022-06-27 DIAGNOSIS — Z79.64 ENCOUNTER FOR MONITORING OF HYDROXYUREA THERAPY: ICD-10-CM

## 2022-06-27 DIAGNOSIS — D45 POLYCYTHEMIA VERA (HCC): Primary | ICD-10-CM

## 2022-06-27 DIAGNOSIS — E66.9 OBESITY (BMI 30.0-34.9): ICD-10-CM

## 2022-06-27 DIAGNOSIS — D75.1 POLYCYTHEMIA: ICD-10-CM

## 2022-06-27 LAB
ANISOCYTOSIS: PRESENT
ATYPICAL LYMPHOCYTES: ABNORMAL %
BASOPHILS # BLD: 1.6 %
BASOPHILS ABSOLUTE: 0.2 THOU/MM3 (ref 0–0.1)
EOSINOPHIL # BLD: 2.8 %
EOSINOPHILS ABSOLUTE: 0.4 THOU/MM3 (ref 0–0.4)
ERYTHROCYTE [DISTWIDTH] IN BLOOD BY AUTOMATED COUNT: 21.4 % (ref 11.5–14.5)
ERYTHROCYTE [DISTWIDTH] IN BLOOD BY AUTOMATED COUNT: 60.6 FL (ref 35–45)
HCT VFR BLD CALC: 48.1 % (ref 37–47)
HEMOGLOBIN: 13.5 GM/DL (ref 12–16)
HYPOCHROMIA: PRESENT
IMMATURE GRANS (ABS): 0.11 THOU/MM3 (ref 0–0.07)
IMMATURE GRANULOCYTES: 0.9 %
LYMPHOCYTES # BLD: 10.5 %
LYMPHOCYTES ABSOLUTE: 1.3 THOU/MM3 (ref 1–4.8)
MCH RBC QN AUTO: 23.4 PG (ref 26–33)
MCHC RBC AUTO-ENTMCNC: 28.1 GM/DL (ref 32.2–35.5)
MCV RBC AUTO: 83.2 FL (ref 81–99)
MONOCYTES # BLD: 5.3 %
MONOCYTES ABSOLUTE: 0.7 THOU/MM3 (ref 0.4–1.3)
NUCLEATED RED BLOOD CELLS: 0 /100 WBC
PLATELET # BLD: 207 THOU/MM3 (ref 130–400)
PLATELET ESTIMATE: ADEQUATE
PMV BLD AUTO: 11.9 FL (ref 9.4–12.4)
RBC # BLD: 5.78 MILL/MM3 (ref 4.2–5.4)
SCAN OF BLOOD SMEAR: NORMAL
SEG NEUTROPHILS: 78.9 %
SEGMENTED NEUTROPHILS ABSOLUTE COUNT: 10.1 THOU/MM3 (ref 1.8–7.7)
WBC # BLD: 12.8 THOU/MM3 (ref 4.8–10.8)

## 2022-06-27 PROCEDURE — 99195 PHLEBOTOMY: CPT

## 2022-06-27 PROCEDURE — 36415 COLL VENOUS BLD VENIPUNCTURE: CPT

## 2022-06-27 PROCEDURE — 85025 COMPLETE CBC W/AUTO DIFF WBC: CPT

## 2022-06-27 RX ORDER — 0.9 % SODIUM CHLORIDE 0.9 %
250 INTRAVENOUS SOLUTION INTRAVENOUS ONCE
Status: CANCELLED | OUTPATIENT
Start: 2022-06-27 | End: 2022-06-27

## 2022-06-27 RX ORDER — 0.9 % SODIUM CHLORIDE 0.9 %
500 INTRAVENOUS SOLUTION INTRAVENOUS ONCE
Status: CANCELLED | OUTPATIENT
Start: 2022-06-27 | End: 2022-06-27

## 2022-06-27 RX ORDER — FOLIC ACID 1 MG/1
TABLET ORAL
Qty: 90 TABLET | Refills: 1 | Status: SHIPPED | OUTPATIENT
Start: 2022-06-27

## 2022-06-27 NOTE — PROGRESS NOTES
THERAPEUTIC PHLEBOTOMY    Most recent Hemoglobin result: 13.5Gm/dl. Hematocrit result  46.2%  Date obtained:   06 /27 /2022    Pre-phlebotomy vital signs:see Doc flow sheet    Start KBHE:1030    Empty donor bag placed on TARE scale. Tourniquet placed on patient's arm and arm palpated for venous access. Area of venous stick cleansed with alcohol. Hemostat applied to tubing of donor bag. Sheath removed from the needle and needle inserted into the antecubital Vein in the  left Arm. Hemostat released. Blood flowing well down the tubing into the bag. No adjustment of needle needed to maintain adequate blood flow. Scale monitoring amount of blood in bag. Stop [id number]  Needle removed from patient's arm. Pressure applied to patient's arm until bleeding stopped. Dry sterile dressing applied over puncture site and taped down well and secured with coban wrap. Final amount of blood removed:500ml. Vital Signs:see Doc flow sheet  Patient drank water  and observed for 10 minutes. Patient tolerated procedure well. Discharged ambulatory with belongings.

## 2022-06-27 NOTE — PLAN OF CARE
Problem: Intellectual/Education/Knowledge Deficit  Goal: Teaching initiated upon admission  Outcome: Adequate for Discharge  Note: Patient verbalized understanding to therapeutic phlebotomy procedure and possible complications. Goal: Written Disposition Instruction form completed  Outcome: Adequate for Discharge  Intervention: Verbal/written education provided  Note: Patient instructed on therapeutic phlebotomy procedure and potential complications. Consent signed. Aware to call MD if complications occur. Problem: Discharge Planning  Goal: Knowledge of discharge instructions  Description: Knowledge of discharge instructions  Outcome: Adequate for Discharge  Note: Patient understands home discharge instructions sheet. Intervention: Interaction with patient/family and care team  Note: Reviewed home going instructions with Patient. Problem: Musculor/Skeletal Functional Status  Goal: Absence of falls  Outcome: Adequate for Discharge  Note: No falls occurred with visit today. Intervention: Fall precautions  Note: Verbalized understanding of fall prevention to ask for assistance with ambulation. Call light within reach. Care plan reviewed with patient . Patient  verbalize understanding of the plan of care and contribute to goal setting.

## 2022-08-08 ENCOUNTER — HOSPITAL ENCOUNTER (OUTPATIENT)
Dept: INFUSION THERAPY | Age: 67
Discharge: HOME | End: 2022-08-08
Payer: MEDICARE

## 2022-08-08 VITALS
RESPIRATION RATE: 18 BRPM | SYSTOLIC BLOOD PRESSURE: 112 MMHG | OXYGEN SATURATION: 97 % | WEIGHT: 195 LBS | HEART RATE: 71 BPM | BODY MASS INDEX: 32.49 KG/M2 | TEMPERATURE: 97.7 F | HEIGHT: 65 IN | DIASTOLIC BLOOD PRESSURE: 55 MMHG

## 2022-08-08 DIAGNOSIS — D45 POLYCYTHEMIA VERA (HCC): ICD-10-CM

## 2022-08-08 DIAGNOSIS — D75.1 POLYCYTHEMIA: ICD-10-CM

## 2022-08-08 DIAGNOSIS — Z51.81 ENCOUNTER FOR MONITORING OF HYDROXYUREA THERAPY: ICD-10-CM

## 2022-08-08 DIAGNOSIS — Z79.64 ENCOUNTER FOR MONITORING OF HYDROXYUREA THERAPY: ICD-10-CM

## 2022-08-08 LAB
ABSOLUTE IMMATURE GRANULOCYTE: 0.02 THOU/MM3 (ref 0–0.07)
BASINOPHIL, AUTOMATED: 1 % (ref 0–3)
BASOPHILS ABSOLUTE: 0.1 THOU/MM3 (ref 0–0.1)
EOSINOPHILS ABSOLUTE: 0.2 THOU/MM3 (ref 0–0.4)
EOSINOPHILS RELATIVE PERCENT: 3 % (ref 0–4)
HCT VFR BLD CALC: 45.7 % (ref 37–47)
HEMOGLOBIN: 13.3 GM/DL (ref 12–16)
IMMATURE GRANULOCYTES: 0 %
LYMPHOCYTES # BLD: 13 % (ref 15–47)
LYMPHOCYTES ABSOLUTE: 1 THOU/MM3 (ref 1–4.8)
MCH RBC QN AUTO: 24.2 PG (ref 26–33)
MCHC RBC AUTO-ENTMCNC: 29.1 GM/DL (ref 32.2–35.5)
MCV RBC AUTO: 83 FL (ref 81–99)
MONOCYTES ABSOLUTE: 0.4 THOU/MM3 (ref 0.4–1.3)
MONOCYTES: 6 % (ref 0–12)
PDW BLD-RTO: 20.5 % (ref 11.5–14.5)
PLATELET # BLD: 173 THOU/MM3 (ref 130–400)
PMV BLD AUTO: 10.8 FL (ref 9.4–12.4)
RBC # BLD: 5.5 MILL/MM3 (ref 4.2–5.4)
SEG NEUTROPHILS: 78 % (ref 43–75)
SEGMENTED NEUTROPHILS ABSOLUTE COUNT: 6.2 THOU/MM3 (ref 1.8–7.7)
WBC # BLD: 7.9 THOU/MM3 (ref 4.8–10.8)

## 2022-08-08 PROCEDURE — 36415 COLL VENOUS BLD VENIPUNCTURE: CPT

## 2022-08-08 PROCEDURE — 99211 OFF/OP EST MAY X REQ PHY/QHP: CPT

## 2022-08-08 PROCEDURE — 85025 COMPLETE CBC W/AUTO DIFF WBC: CPT

## 2022-08-08 NOTE — PLAN OF CARE
Problem: Intellectual/Education/Knowledge Deficit  Goal: Teaching initiated upon admission  Outcome: Progressing  Note: Understands no procedure needed with current lab results    Problem: Discharge Planning  Goal: Knowledge of discharge instructions  Description: Knowledge of discharge instructions  Outcome: Progressing  Note: Verbalized understanding of discharge instructions, follow-up appointments, and when to call the physician. Intervention: Interaction with patient/family and care team  Note: Discuss understanding of discharge instructions,follow-up appointments, and when to call the physician. Care plan reviewed with patient . Patient verbalize understanding of the plan of care and contribute to goal setting.

## 2022-08-08 NOTE — PROGRESS NOTES
Patient tolerated  lab draw without any complications. Understands no procedure needed today with current labs. Discharge instructions given to patient-verbalizes understanding. Ambulated off unit per self with belongings.

## 2022-08-08 NOTE — DISCHARGE INSTRUCTIONS
You received lab draw while in outpatient oncology clinic. Call if any uncontrolled nausea/vomiting  Call if any fevers/chills/ problems or concerns  Call if any bleeding  Call if any chest pain/pressure  Call if any severe shortness of breath  Drink at least (6) 8oz glasses of fluids daily     If develop any of above condition, please call your MD or go to Emergency Department.     No procedure today - return 9/19

## 2022-09-19 ENCOUNTER — HOSPITAL ENCOUNTER (OUTPATIENT)
Dept: INFUSION THERAPY | Age: 67
Discharge: HOME | End: 2022-09-19
Payer: MEDICARE

## 2022-09-19 VITALS
TEMPERATURE: 98.7 F | BODY MASS INDEX: 32.15 KG/M2 | OXYGEN SATURATION: 97 % | HEART RATE: 68 BPM | SYSTOLIC BLOOD PRESSURE: 137 MMHG | HEIGHT: 65 IN | DIASTOLIC BLOOD PRESSURE: 73 MMHG | WEIGHT: 193 LBS | RESPIRATION RATE: 18 BRPM

## 2022-09-19 DIAGNOSIS — D45 POLYCYTHEMIA VERA (HCC): ICD-10-CM

## 2022-09-19 DIAGNOSIS — Z79.64 ENCOUNTER FOR MONITORING OF HYDROXYUREA THERAPY: ICD-10-CM

## 2022-09-19 DIAGNOSIS — D75.1 POLYCYTHEMIA: ICD-10-CM

## 2022-09-19 DIAGNOSIS — Z51.81 ENCOUNTER FOR MONITORING OF HYDROXYUREA THERAPY: ICD-10-CM

## 2022-09-19 LAB
ABSOLUTE IMMATURE GRANULOCYTE: 0.04 THOU/MM3 (ref 0–0.07)
BASINOPHIL, AUTOMATED: 1 % (ref 0–3)
BASOPHILS ABSOLUTE: 0.1 THOU/MM3 (ref 0–0.1)
EOSINOPHILS ABSOLUTE: 0.3 THOU/MM3 (ref 0–0.4)
EOSINOPHILS RELATIVE PERCENT: 3 % (ref 0–4)
HCT VFR BLD CALC: 45.2 % (ref 37–47)
HEMOGLOBIN: 13.4 GM/DL (ref 12–16)
IMMATURE GRANULOCYTES: 0 %
LYMPHOCYTES # BLD: 9 % (ref 15–47)
LYMPHOCYTES ABSOLUTE: 1 THOU/MM3 (ref 1–4.8)
MCH RBC QN AUTO: 24.9 PG (ref 26–33)
MCHC RBC AUTO-ENTMCNC: 29.6 GM/DL (ref 32.2–35.5)
MCV RBC AUTO: 84 FL (ref 81–99)
MONOCYTES ABSOLUTE: 0.5 THOU/MM3 (ref 0.4–1.3)
MONOCYTES: 5 % (ref 0–12)
PDW BLD-RTO: 19.9 % (ref 11.5–14.5)
PLATELET # BLD: 224 THOU/MM3 (ref 130–400)
PMV BLD AUTO: 10.4 FL (ref 9.4–12.4)
RBC # BLD: 5.39 MILL/MM3 (ref 4.2–5.4)
SEG NEUTROPHILS: 82 % (ref 43–75)
SEGMENTED NEUTROPHILS ABSOLUTE COUNT: 9 THOU/MM3 (ref 1.8–7.7)
WBC # BLD: 11 THOU/MM3 (ref 4.8–10.8)

## 2022-09-19 PROCEDURE — 85025 COMPLETE CBC W/AUTO DIFF WBC: CPT

## 2022-09-19 PROCEDURE — 36415 COLL VENOUS BLD VENIPUNCTURE: CPT

## 2022-09-19 PROCEDURE — 99211 OFF/OP EST MAY X REQ PHY/QHP: CPT

## 2022-09-19 NOTE — DISCHARGE INSTRUCTIONS
No Phlebotomy today- Please call us at [phone] if you have any questions or concerns about your visit or medications that you received today. It is important that you drink 48-64 ounces of water everyday.

## 2022-09-19 NOTE — PROGRESS NOTES
No Phlebotomy today- discussed with patient her next appointment with a new provider, all questions answered, patient discharged with all belongings.

## 2022-10-28 DIAGNOSIS — D75.1 POLYCYTHEMIA: Primary | ICD-10-CM

## 2022-10-31 ENCOUNTER — OFFICE VISIT (OUTPATIENT)
Dept: ONCOLOGY | Age: 67
End: 2022-10-31
Payer: MEDICARE

## 2022-10-31 ENCOUNTER — HOSPITAL ENCOUNTER (OUTPATIENT)
Dept: INFUSION THERAPY | Age: 67
Discharge: HOME | End: 2022-10-31
Payer: MEDICARE

## 2022-10-31 VITALS
SYSTOLIC BLOOD PRESSURE: 160 MMHG | OXYGEN SATURATION: 96 % | TEMPERATURE: 98 F | WEIGHT: 196 LBS | RESPIRATION RATE: 16 BRPM | DIASTOLIC BLOOD PRESSURE: 86 MMHG | BODY MASS INDEX: 32.65 KG/M2 | HEIGHT: 65 IN | HEART RATE: 73 BPM

## 2022-10-31 VITALS
SYSTOLIC BLOOD PRESSURE: 128 MMHG | TEMPERATURE: 98.3 F | OXYGEN SATURATION: 97 % | RESPIRATION RATE: 16 BRPM | HEART RATE: 80 BPM | BODY MASS INDEX: 32.65 KG/M2 | DIASTOLIC BLOOD PRESSURE: 72 MMHG | WEIGHT: 196 LBS | HEIGHT: 65 IN

## 2022-10-31 DIAGNOSIS — E66.9 OBESITY (BMI 30.0-34.9): ICD-10-CM

## 2022-10-31 DIAGNOSIS — D75.1 POLYCYTHEMIA: ICD-10-CM

## 2022-10-31 DIAGNOSIS — D45 POLYCYTHEMIA VERA (HCC): ICD-10-CM

## 2022-10-31 DIAGNOSIS — D75.1 POLYCYTHEMIA: Primary | ICD-10-CM

## 2022-10-31 DIAGNOSIS — D45 POLYCYTHEMIA VERA (HCC): Primary | ICD-10-CM

## 2022-10-31 LAB
ABSOLUTE IMMATURE GRANULOCYTE: 0.06 THOU/MM3 (ref 0–0.07)
ALBUMIN SERPL-MCNC: 4 G/DL (ref 3.5–5.1)
ALP BLD-CCNC: 103 U/L (ref 38–126)
ALT SERPL-CCNC: 13 U/L (ref 11–66)
AST SERPL-CCNC: 20 U/L (ref 5–40)
BASINOPHIL, AUTOMATED: 1 % (ref 0–3)
BASOPHILS ABSOLUTE: 0.1 THOU/MM3 (ref 0–0.1)
BILIRUB SERPL-MCNC: 0.6 MG/DL (ref 0.3–1.2)
BILIRUBIN DIRECT: < 0.2 MG/DL (ref 0–0.3)
BUN, WHOLE BLOOD: 20 MG/DL (ref 8–26)
CHLORIDE, WHOLE BLOOD: 108 MEQ/L (ref 98–109)
CREATININE, WHOLE BLOOD: 1 MG/DL (ref 0.5–1.2)
EOSINOPHILS ABSOLUTE: 0.3 THOU/MM3 (ref 0–0.4)
EOSINOPHILS RELATIVE PERCENT: 2 % (ref 0–4)
GFR SERPL CREATININE-BSD FRML MDRD: > 60 ML/MIN/1.73M2
GLUCOSE, WHOLE BLOOD: 215 MG/DL (ref 70–108)
HCT VFR BLD CALC: 47.5 % (ref 37–47)
HEMOGLOBIN: 14 GM/DL (ref 12–16)
IMMATURE GRANULOCYTES: 1 %
IONIZED CALCIUM, WHOLE BLOOD: 1.29 MMOL/L (ref 1.12–1.32)
LYMPHOCYTES # BLD: 11 % (ref 15–47)
LYMPHOCYTES ABSOLUTE: 1.3 THOU/MM3 (ref 1–4.8)
MCH RBC QN AUTO: 25 PG (ref 26–33)
MCHC RBC AUTO-ENTMCNC: 29.5 GM/DL (ref 32.2–35.5)
MCV RBC AUTO: 85 FL (ref 81–99)
MONOCYTES ABSOLUTE: 0.5 THOU/MM3 (ref 0.4–1.3)
MONOCYTES: 5 % (ref 0–12)
PDW BLD-RTO: 19.7 % (ref 11.5–14.5)
PLATELET # BLD: 169 THOU/MM3 (ref 130–400)
PMV BLD AUTO: 11 FL (ref 9.4–12.4)
POTASSIUM, WHOLE BLOOD: 4.1 MEQ/L (ref 3.5–4.9)
RBC # BLD: 5.6 MILL/MM3 (ref 4.2–5.4)
SEG NEUTROPHILS: 81 % (ref 43–75)
SEGMENTED NEUTROPHILS ABSOLUTE COUNT: 9.5 THOU/MM3 (ref 1.8–7.7)
SODIUM, WHOLE BLOOD: 141 MEQ/L (ref 138–146)
TOTAL CO2, WHOLE BLOOD: 22 MEQ/L (ref 23–33)
TOTAL PROTEIN: 7 G/DL (ref 6.1–8)
WBC # BLD: 11.8 THOU/MM3 (ref 4.8–10.8)

## 2022-10-31 PROCEDURE — G8400 PT W/DXA NO RESULTS DOC: HCPCS | Performed by: INTERNAL MEDICINE

## 2022-10-31 PROCEDURE — 85025 COMPLETE CBC W/AUTO DIFF WBC: CPT

## 2022-10-31 PROCEDURE — 1090F PRES/ABSN URINE INCON ASSESS: CPT | Performed by: INTERNAL MEDICINE

## 2022-10-31 PROCEDURE — 3074F SYST BP LT 130 MM HG: CPT | Performed by: INTERNAL MEDICINE

## 2022-10-31 PROCEDURE — 1036F TOBACCO NON-USER: CPT | Performed by: INTERNAL MEDICINE

## 2022-10-31 PROCEDURE — 80047 BASIC METABLC PNL IONIZED CA: CPT

## 2022-10-31 PROCEDURE — 99195 PHLEBOTOMY: CPT

## 2022-10-31 PROCEDURE — 1123F ACP DISCUSS/DSCN MKR DOCD: CPT | Performed by: INTERNAL MEDICINE

## 2022-10-31 PROCEDURE — G8484 FLU IMMUNIZE NO ADMIN: HCPCS | Performed by: INTERNAL MEDICINE

## 2022-10-31 PROCEDURE — 80076 HEPATIC FUNCTION PANEL: CPT

## 2022-10-31 PROCEDURE — G8417 CALC BMI ABV UP PARAM F/U: HCPCS | Performed by: INTERNAL MEDICINE

## 2022-10-31 PROCEDURE — 36415 COLL VENOUS BLD VENIPUNCTURE: CPT

## 2022-10-31 PROCEDURE — G8427 DOCREV CUR MEDS BY ELIG CLIN: HCPCS | Performed by: INTERNAL MEDICINE

## 2022-10-31 PROCEDURE — 3078F DIAST BP <80 MM HG: CPT | Performed by: INTERNAL MEDICINE

## 2022-10-31 PROCEDURE — 3017F COLORECTAL CA SCREEN DOC REV: CPT | Performed by: INTERNAL MEDICINE

## 2022-10-31 PROCEDURE — 99211 OFF/OP EST MAY X REQ PHY/QHP: CPT

## 2022-10-31 PROCEDURE — 99215 OFFICE O/P EST HI 40 MIN: CPT | Performed by: INTERNAL MEDICINE

## 2022-10-31 RX ORDER — 0.9 % SODIUM CHLORIDE 0.9 %
500 INTRAVENOUS SOLUTION INTRAVENOUS ONCE
Status: CANCELLED | OUTPATIENT
Start: 2022-10-31 | End: 2022-10-31

## 2022-10-31 RX ORDER — 0.9 % SODIUM CHLORIDE 0.9 %
250 INTRAVENOUS SOLUTION INTRAVENOUS ONCE
Status: CANCELLED | OUTPATIENT
Start: 2022-10-31 | End: 2022-10-31

## 2022-10-31 ASSESSMENT — ENCOUNTER SYMPTOMS
ABDOMINAL PAIN: 0
COUGH: 0
SINUS PRESSURE: 0
VOMITING: 0
CONSTIPATION: 0
SORE THROAT: 0
SHORTNESS OF BREATH: 0
NAUSEA: 0
DIARRHEA: 0
ROS SKIN COMMENTS: GENERALIZED ERYTHEMA
TROUBLE SWALLOWING: 0

## 2022-10-31 NOTE — PROGRESS NOTES
[address] CANCER [address] [address]  Dept: [phone]  Loc: [address]   1955   No ref. provider found   Ariadne Marrero MD       Prudence Rosales is a 74 y.o. with a myeloproliferative disorder best characterized as polycythemia vera. CHIEF COMPLAINT    She is here today in follow-up         [facility]    2010. Deep vein thrombosis for which she had been treated with Coumadin for 1 year. This manifested as pain and swelling in her left leg. Since before 2012. Followed by Dr. Kadeem Davis and treated with hydroxyurea 500 mg p.o. daily. 8/18/2014 through 8/20/2014. Admitted with increasing shortness of breath. She was unable to have a CTA because of abnormal renal function. She had a VQ scan which was high probability for pulmonary embolus and she was started on heparin and transition to Xarelto. During hospitalization her renal function improved. 10/14/2014. Hemoglobin 14.6 hematocrit 44.6 white count and platelet count were normal.    1/5/2015. Hemoglobin 14.0 hematocrit 42.3 with normal white count and platelet count. August 2015. Hydroxyurea discontinued because of skin breakdown in the left lower extremity with darkening of the skin and sores in the left ankle. Hemoglobin 14.9 and hematocrit 44.3.    10/15/2015. Began seeing Dr. Kristel Arrington referred by Dr. Shanna Stevens her primary care physician. She felt well. Her skin lesions were improving. She was continued on Xarelto 20 mg a day without any significant bleeding except for 1 episode of epistaxis which was not prolonged. 1/14/2016. CBC showed a white count of 13,000 with a hemoglobin of 19.4 and hematocrit of 60.1 and a platelet count of 071,158. She was started on phlebotomies at that time weekly x3. Her medication list at that time did not include hydroxyurea. 2/15/2016.   Hemoglobin was 15.5 and hematocrit 48.3. White count was 15,000 and platelets were 850,366.    3/14/2016. White count 16.2 hemoglobin 15 hematocrit 47.4 with a platelet count of 440,566.    6/6/2016. White count was 15.6 with a hemoglobin of 15.9, hematocrit 53.7 and platelet count 645,564.    11/28/2016. Emergency department visit for atypical chest pain. CBC showed white count 19.5 hemoglobin 16.6 hematocrit 54.5. CTA showed nonocclusive bilateral segmental and subsegmental pulmonary emboli age undetermined. The appearance was similar although less abundant than on the previous study of 9/20/2010. There was also scattered groundglass opacities throughout both lungs. 12/6/2016. Phlebotomies were scheduled by Dr. Reji Looney x2. She was followed on a regular basis by Dr. Reji Looney several times a year. 3/22 through 3/25/2018. Hospitalized with atypical chest pain, ruled out for acute coronary syndrome. There is no evidence of pulmonary embolism. Liver was enlarged. Spleen was enlarged. There was marked perinephric stranding bilaterally greater on the right than on the left along with colonic diverticulosis with no evidence of diverticulitis. Her hemoglobin was 14.8 and hematocrit was 49.2 with a white count of 20.9 and platelet count of 031,703. BUN was 24 and creatinine was 0.9.    12/18/2018. CBC showed white count 25.2 hemoglobin of 13.8 hematocrit 50.7 with a platelet count of 697,757.    4/4/2019. Monthly phlebotomies ordered to keep her hematocrit 45 or less. Had 1 in May 2019.    7/2/2019. Nosebleed which lasted 45 minutes. Several more nosebleeds. She also noted blood in the whites of her eyes and blurred vision. Dr. Reji Looney recommended stopping her Xarelto. 4/8/2021. Increasing white count the reason for restarting her on Hydrea. The patient also complained of chronic fatigue. She is started back on 500 mg of Hydrea daily. Had a phlebotomy in May 2021.    7/8/2021. Seen in the office. CBC showed a white count of 19,000 hemoglobin of 12 hematocrit 44 and platelet count of 295,571.    10/11/2021. Hematocrit up to 48. Phlebotomy was performed. She continued on Hydrea. 1/31/2022. White count was 17.3 hemoglobin 14 hematocrit 49 platelets 165.    4/7/6183. Follow-up with Dr. Otis Watson in the office. Current follow-up was maintained. Comorbidities include hypertension, basal cell carcinoma, homocystinemia, monocular exotropia, history of smoking which she stopped in 2009. INTERVAL NOTE    Mrs. Tiara Melendrez returns to the office today for her evaluation of her polycythemia vera. She says that she has chronic fatigue. She has a rash on her lower extremities which is a thematous blotchy rash which gets worse when she is hot and also when she is up on her feet a lot. She also has rosacea. She works part-time in an office. She has had some chest pressure which is usually in the left side of her back and chest and occasional palpitations. She gets winded with any activity. She does not have much of a cough. She did quit smoking in 2009. She said that her feet swell from time to time she has had previous deep vein thrombosis and pulmonary embolus. He has had no recent clots. She says that she can feel when she needs a phlebotomy because she is more sluggish. Her energy level gets better after a phlebotomy. She has had no lumps and bumps. She is known to have mild hepatosplenomegaly. The only bleeding that she has had is epistaxis in the past and some recent bleeding from her gums which she attributes to periodontal disease. She also has had a basal cell carcinoma removed from her leg by Dr. Josue Franco. She has been off Xarelto but she continues on hydroxyurea.     MONITORING PARAMETERS    CBCs    PAST MEDICAL HISTORY  Past Medical History:   Diagnosis Date    Basal cell cancer     Migalessio/Gustavo    Chronic kidney disease, stage III (moderate) ([address] 6/4/2019    DVT (deep venous thrombosis) ([facility] 09/2010    History of shingles 06/06/2016    Homocysteinemia     Hypertension     Polycythemia vera(238.4)     Pulmonary emboli ([facility]     2010    Right-sided epistaxis 07/02/2019    Unspecified diseases of blood and blood-forming organs         REVIEW OF SYSTEMS  Review of Systems   Constitutional:  Positive for fatigue. Negative for appetite change, chills, fever and unexpected weight change. HENT:  Negative for dental problem, hearing loss, mouth sores, nosebleeds, sinus pressure, sore throat and trouble swallowing. Eyes:  Negative for visual disturbance. Respiratory:  Negative for cough and shortness of breath. Cardiovascular:  Negative for chest pain and leg swelling. Gastrointestinal:  Negative for abdominal pain, constipation, diarrhea, nausea and vomiting. Genitourinary:  Negative for dysuria, frequency, hematuria and urgency. Musculoskeletal:  Positive for arthralgias (generalized) and myalgias. Skin:         Generalized erythema   Neurological:  Negative for seizures, syncope, weakness, light-headedness, numbness and headaches. Hematological:  Negative for adenopathy. Bruises/bleeds easily. Psychiatric/Behavioral:  Negative for confusion, dysphoric mood, self-injury and sleep disturbance. The patient is not nervous/anxious.        FAMILY HISTORY  Family History   Problem Relation Age of Onset    Arthritis Mother     High Blood Pressure Mother     Breast Cancer Mother     Heart Disease Mother     Liver Cancer Father     Heart Disease Sister     Breast Cancer Sister         SOCIAL HISTORY  Social History     Socioeconomic History    Marital status:      Spouse name: Not on file    Number of children: Not on file    Years of education: Not on file    Highest education level: Not on file   Occupational History     Employer: [employer]   Tobacco Use    Smoking status: Former     Packs/day: 0.10     Years: 10.00     Pack years: 1.00     Types: Cigarettes     Quit date: 2009     Years since quittin.8    Smokeless tobacco: Never   Vaping Use    Vaping Use: Never used   Substance and Sexual Activity    Alcohol use: Yes     Alcohol/week: 4.0 standard drinks     Types: 4 Glasses of wine per week     Comment: wine occasional    Drug use: No    Sexual activity: Not on file   Other Topics Concern    Not on file   Social History Narrative    Not on file     Social Determinants of Health     Financial Resource Strain: Low Risk     Difficulty of Paying Living Expenses: Not hard at all   Food Insecurity: No Food Insecurity    Worried About Running Out of Food in the Last Year: Never true    Ran Out of Food in the Last Year: Never true   Transportation Needs: Not on file   Physical Activity: Insufficiently Active    Days of Exercise per Week: 7 days    Minutes of Exercise per Session: 20 min   Stress: Not on file   Social Connections: Not on file   Intimate Partner Violence: Not on file   Housing Stability: Not on file        CURRENT MEDICATIONS  Current Outpatient Medications   Medication Sig Dispense Refill    folic acid (FOLVITE) 1 MG tablet TAKE 1 TABLET BY MOUTH EVERY DAY 90 tablet 1    cloNIDine (CATAPRES) 0.2 MG tablet Take 1 tablet by mouth 2 times daily 180 tablet 3    enalapril (VASOTEC) 20 MG tablet TAKE 1 TABLET TWICE A  tablet 3    hydroxyurea (HYDREA) 500 MG chemo capsule TAKE 1 CAPSULE DAILY 90 capsule 3    amLODIPine (NORVASC) 10 MG tablet Take 1 tablet by mouth daily 90 tablet 3    Cyanocobalamin (B-12) 500 MCG TABS Take 500 mg by mouth daily. triamcinolone (KENALOG) 0.1 % cream APPLY TO LEGS TWICE DAILY AS NEEDED (Patient not taking: No sig reported)  1     No current facility-administered medications for this visit. OARRS    PDMP Monitoring: Review of this document shows that she has had no prescriptions for opioids, sedatives or stimulants.   PDMP Monitoring:    Last PDMP Daily cyr Reviewed:  Review User Review Instant Review Result   Abhilash Levin 10/31/2022 10:41 PM Reviewed PDMP [1]       Physical Exam  Vitals and nursing note reviewed. Exam conducted with a chaperone present. Constitutional:       General: She is not in acute distress. Appearance: Normal appearance. She is not ill-appearing, toxic-appearing or diaphoretic. Comments: Mrs. Johnny Cortez is a well-developed well-nourished  female who is in no acute distress. She does not appear to be acutely or chronically ill. She has a diffuse erythema of her face and chest.  She has blotchy erythematous rash of her lower extremities. HENT:      Head: Normocephalic and atraumatic. Nose: Nose normal.      Mouth/Throat:      Mouth: Mucous membranes are moist.      Pharynx: Oropharynx is clear. No oropharyngeal exudate or posterior oropharyngeal erythema. Eyes:      General: No scleral icterus. Extraocular Movements: Extraocular movements intact. Conjunctiva/sclera: Conjunctivae normal.      Pupils: Pupils are equal, round, and reactive to light. Cardiovascular:      Rate and Rhythm: Normal rate and regular rhythm. Pulses: Normal pulses. Heart sounds: Murmur (2 out of 6 ejection murmur) heard. Pulmonary:      Effort: Pulmonary effort is normal. No respiratory distress. Breath sounds: Normal breath sounds. No stridor. No wheezing, rhonchi or rales. Abdominal:      General: Abdomen is flat. Bowel sounds are normal. There is no distension. Palpations: Abdomen is soft. There is no mass. Tenderness: There is no abdominal tenderness. There is no guarding. Hernia: No hernia is present. Musculoskeletal:         General: No swelling or tenderness. Normal range of motion. Cervical back: Normal range of motion and neck supple. No rigidity or tenderness. Right lower leg: No edema. Left lower leg: No edema. Lymphadenopathy:      Cervical: No cervical adenopathy. Skin:     General: Skin is warm and dry.       Coloration: Skin is not jaundiced or pale. Findings: Erythema (generalized) and rash present. No bruising. Neurological:      General: No focal deficit present. Mental Status: She is alert and oriented to person, place, and time. Mental status is at baseline. Cranial Nerves: No cranial nerve deficit. Motor: No weakness. Gait: Gait normal.   Psychiatric:         Mood and Affect: Mood normal.         Behavior: Behavior normal.         Thought Content: Thought content normal.         Judgment: Judgment normal.        LABS/IMAGING    BMP shows a BUN of 20 and a creatinine of 1.0. Sugar was 215. I showed her that her sugars have been elevated for several years. Her function tests are normal.  CBC shows white count 11.8 hemoglobin of 14 hematocrit 47.5 with a platelet count of 999,662. White blood cell differential count showed 81% polys, 11 lymphs, 5 monos, 2 eosinophils and 1 basophil. Recent mammogram was negative. PATHOLOGY/GENETICS    Myeloproliferative disorder best characterized as polycythemia vera. ASSESSMENT and PLAN    1. Myeloproliferative disorder best characterized as polycythemia vera. She will continue on her surveillance and will have her hematocrit around 45. With her hematocrit of 47 today she was phlebotomized. She is known to have mild hepatosplenomegaly. Her white count and platelets are acceptable. She will continue on Hydrea. 2.  Chronic fatigue. This has been a problem for Mrs. Dominguez for the past 10 to 15 years. I have encouraged exercise. 3.  Glucose intolerance. I have encouraged her to cut back on her carbs and increase her weightbearing exercise. We will continue to monitor.         Samantha Stone MD 10/31/2022 10:41 PM

## 2022-10-31 NOTE — PROGRESS NOTES
THERAPEUTIC PHLEBOTOMY    Most recent Hemoglobin result: 14.0Gm/dl. Hematocrit result  47.5%  Date obtained:   10 /31 /2022    Pre-phlebotomy vital signs:see Doc flow sheet    Start [id number]    Empty donor bag placed on TARE scale. Tourniquet placed on patient's arm and arm palpated for venous access. Area of venous stick cleansed with alcohol. Hemostat applied to tubing of donor bag. Sheath removed from the needle and needle inserted into the antecubital Vein in the  left Arm. Hemostat released. Blood flowing well down the tubing into the bag. No adjustment of needle needed to maintain adequate blood flow. Scale monitoring amount of blood in bag. Stop [id number]  Needle removed from patient's arm. Pressure applied to patient's arm until bleeding stopped. Dry sterile dressing applied over puncture site and taped down well and secured with coban wrap. Final amount of blood removed:500ml  Post Vital Signs:see Doc flow sheet  Patient drank water and observed for 10 minutes. Patient tolerated procedure well. Discharged ambulatory with belongings.

## 2022-10-31 NOTE — DISCHARGE INSTRUCTIONS
THERAPEUTIC PHLEBOTOMY DISCHARGE INSTRUCTION SHEET    DIET:  Diet as tolerated-drink plenty of fluids    ACTIVITY: Up as tolerated, be cautious-may be unsteady or dizzy. No heavy lifting   with arm blood was drawn from. CARE OF PHLEBOTOMY SITE: Watch puncture site for redness, swelling or drainage. OTHER: Don't smoke for several hours. Return to doctor for follow-up instructions. If any problems, return to ER or to your doctor's office. Your next appointment is on: At: keep scheduled appt with MD    Call [phone] if you need to change your appointment. Medical Necessity Information: It is in your best interest to select a reason for this procedure from the list below. All of these items fulfill various CMS LCD requirements except the new and changing color options. Render Post-Care Instructions In Note?: no Detail Level: Detailed Post-Care Instructions: I reviewed with the patient in detail post-care instructions. Patient is to wear sunprotection, and avoid picking at any of the treated lesions. Pt may apply Vaseline to crusted or scabbing areas. Medical Necessity Clause: This procedure was medically necessary because the lesions that were treated were: Consent: The patient's consent was obtained including but not limited to risks of crusting, scabbing, blistering, scarring, darker or lighter pigmentary change, recurrence, incomplete removal and infection. Include Z78.9 (Other Specified Conditions Influencing Health Status) As An Associated Diagnosis?: Yes

## 2022-10-31 NOTE — PLAN OF CARE
Problem: Intellectual/Education/Knowledge Deficit  Goal: Teaching initiated upon admission  Outcome: Progressing  Note: Patient verbalized understanding to therapeutic phlebotomy procedure and possible complications. Goal: Written Disposition Instruction form completed  Outcome: Progressing  Intervention: Verbal/written education provided  Note: Patient instructed on therapeutic phlebotomy procedure and potential complications. Consent signed. Aware to call MD if complications occur. Problem: Discharge Planning  Goal: Knowledge of discharge instructions  Description: Knowledge of discharge instructions  Outcome: Progressing  Note: Patient understands home discharge instructions sheet. Intervention: Interaction with patient/family and care team  Note: Reviewed home going instructions with Patient. Problem: Musculor/Skeletal Functional Status  Goal: Absence of falls  Outcome: Progressing  Note: No falls occurred with visit today. Intervention: Fall precautions  Note: Verbalized understanding of fall prevention to ask for assistance with ambulation. Call light within reach. Care plan reviewed with patient . Patient  verbalize understanding of the plan of care and contribute to goal setting.

## 2022-10-31 NOTE — PATIENT INSTRUCTIONS
Reviewed labs and recent medical history. Discussed her Hydrea and continued use. Her Hct is 47.5 and okay to do therapeutic phlebotomy  Discussed her sugars/glucose levels. Encouraged her to watch her diet. Adopt a low carb diet. Ans he should follow up with her primary provider. The Obesity Code- Corey Lynn  Return to see MD in 6 weeks with next treatment date.

## 2022-11-21 ENCOUNTER — OFFICE VISIT (OUTPATIENT)
Dept: CARDIOLOGY CLINIC | Age: 67
End: 2022-11-21
Payer: MEDICARE

## 2022-11-21 VITALS
SYSTOLIC BLOOD PRESSURE: 111 MMHG | DIASTOLIC BLOOD PRESSURE: 63 MMHG | BODY MASS INDEX: 31.46 KG/M2 | WEIGHT: 188.8 LBS | HEART RATE: 87 BPM | HEIGHT: 65 IN

## 2022-11-21 DIAGNOSIS — R00.2 INTERMITTENT PALPITATIONS: ICD-10-CM

## 2022-11-21 DIAGNOSIS — R06.02 SOB (SHORTNESS OF BREATH): Primary | ICD-10-CM

## 2022-11-21 PROCEDURE — 93000 ELECTROCARDIOGRAM COMPLETE: CPT | Performed by: INTERNAL MEDICINE

## 2022-11-21 PROCEDURE — 3078F DIAST BP <80 MM HG: CPT | Performed by: INTERNAL MEDICINE

## 2022-11-21 PROCEDURE — G8427 DOCREV CUR MEDS BY ELIG CLIN: HCPCS | Performed by: INTERNAL MEDICINE

## 2022-11-21 PROCEDURE — 1123F ACP DISCUSS/DSCN MKR DOCD: CPT | Performed by: INTERNAL MEDICINE

## 2022-11-21 PROCEDURE — 3074F SYST BP LT 130 MM HG: CPT | Performed by: INTERNAL MEDICINE

## 2022-11-21 PROCEDURE — 1036F TOBACCO NON-USER: CPT | Performed by: INTERNAL MEDICINE

## 2022-11-21 PROCEDURE — G8400 PT W/DXA NO RESULTS DOC: HCPCS | Performed by: INTERNAL MEDICINE

## 2022-11-21 PROCEDURE — 3017F COLORECTAL CA SCREEN DOC REV: CPT | Performed by: INTERNAL MEDICINE

## 2022-11-21 PROCEDURE — 99213 OFFICE O/P EST LOW 20 MIN: CPT | Performed by: INTERNAL MEDICINE

## 2022-11-21 PROCEDURE — G8417 CALC BMI ABV UP PARAM F/U: HCPCS | Performed by: INTERNAL MEDICINE

## 2022-11-21 PROCEDURE — G8484 FLU IMMUNIZE NO ADMIN: HCPCS | Performed by: INTERNAL MEDICINE

## 2022-11-21 PROCEDURE — 1090F PRES/ABSN URINE INCON ASSESS: CPT | Performed by: INTERNAL MEDICINE

## 2022-11-21 NOTE — PROGRESS NOTES
[address] [place] [address],[place] [address] [address] [place] [address]  Dept: [phone]  Dept Fax: [phone]  Loc: [phone]    Visit Date: 11/21/2022    Ms. Cortez Singh is a 74 y.o. female  who presented for:  Chief Complaint   Patient presents with    1 Year Follow Up    Shortness of Breath    Check-Up       HPI:   73 yo F c hx of Polycythemia Vera, PE/DVT comes for a follow up. She follows up with hematology. She is scheduled to get phlebotomies, last one in May 2019. Denies any chest pain, no radiation, no aggravating or alleviating factors, and not associated with sob, palpitations, lightheadedness, dizziness, orthopnea, PND or pedal edema. Had Echo and is here for a follow up. RVSP 45-50 mm Hg. Current Outpatient Medications:     folic acid (FOLVITE) 1 MG tablet, TAKE 1 TABLET BY MOUTH EVERY DAY, Disp: 90 tablet, Rfl: 1    cloNIDine (CATAPRES) 0.2 MG tablet, Take 1 tablet by mouth 2 times daily, Disp: 180 tablet, Rfl: 3    enalapril (VASOTEC) 20 MG tablet, TAKE 1 TABLET TWICE A DAY, Disp: 180 tablet, Rfl: 3    hydroxyurea (HYDREA) 500 MG chemo capsule, TAKE 1 CAPSULE DAILY, Disp: 90 capsule, Rfl: 3    amLODIPine (NORVASC) 10 MG tablet, Take 1 tablet by mouth daily, Disp: 90 tablet, Rfl: 3    triamcinolone (KENALOG) 0.1 % cream, APPLY TO LEGS TWICE DAILY AS NEEDED, Disp: , Rfl: 1    Cyanocobalamin (B-12) 500 MCG TABS, Take 500 mg by mouth daily. , Disp: , Rfl:     Past Medical History  Robertjulienne Akers  has a past medical history of Basal cell cancer, Chronic kidney disease, stage III (moderate) ([address] DVT (deep venous thrombosis) ([address] History of shingles, Homocysteinemia, Hypertension, Polycythemia vera(238.4), Pulmonary emboli ([address] Right-sided epistaxis, and Unspecified diseases of blood and blood-forming organs. Social History  Robert Delphine  reports that she quit smoking about 13 years ago. She has a 1.00 pack-year smoking history.  She has never used smokeless tobacco. She reports current alcohol use of about 4.0 standard drinks per week. She reports that she does not use drugs. Family History  Deion Palacios family history includes Arthritis in her mother; Breast Cancer in her mother and sister; Heart Disease in her mother and sister; High Blood Pressure in her mother; Liver Cancer in her father. Past Surgical History   Past Surgical History:   Procedure Laterality Date    DENTAL SURGERY  11/09/15    EYE SURGERY Left 03/06/2015    [address]otrophia [address] Zeb Arnold, [address] [facility]    OTHER SURGICAL HISTORY  12/12/2017    left breast bx at [address] [facility]         Subjective:     REVIEW OF SYSTEMS  Constitutional: denies sweats, chills and fever  HENT: denies  congestion, sinus pressure, sneezing and sore throat. Eyes: denies  pain, discharge, redness and itching. Respiratory: denies apnea, cough  Gastrointestinal: denies blood in stool, constipation, diarrhea   Endocrine: denies cold intolerance, heat intolerance, polydipsia. Genitourinary: denies dysuria, enuresis, flank pain and hematuria. Musculoskeletal: denies arthralgias, joint swelling and neck pain. Neurological: denies numbness and headaches. Psychiatric/Behavioral: denies agitation, confusion, decreased concentration and dysphoric mood    All others reviewed and are negative. Objective:     /63   Pulse 87   Ht 5' 5\" (1.651 m)   Wt 188 lb 12.8 oz (85.6 kg)   BMI 31.42 kg/m²     Wt Readings from Last 3 Encounters:   11/21/22 188 lb 12.8 oz (85.6 kg)   10/31/22 196 lb (88.9 kg)   10/31/22 196 lb (88.9 kg)     BP Readings from Last 3 Encounters:   11/21/22 111/63   10/31/22 128/72   10/31/22 (!) 160/86       PHYSICAL EXAM  Constitutional: Oriented to person, place, and time. Appears well-developed and well-nourished. HENT:   Head: Normocephalic and atraumatic. Eyes: EOM are normal. Pupils are equal, round, and reactive to light. Neck: Normal range of motion. Neck supple. No JVD present. Cardiovascular: Normal rate , normal heart sounds and intact distal pulses. Pulmonary/Chest: Effort normal and breath sounds normal. No respiratory distress. No wheezes. No rales. Abdominal: Soft. Bowel sounds are normal. No distension. There is no tenderness. Musculoskeletal: Normal range of motion. No edema. Neurological: Alert and oriented to person, place, and time. No cranial nerve deficit. Coordination normal.   Skin: Skin is warm and dry. Psychiatric: Normal mood and affect.        No results found for: CKTOTAL, CKMB, CKMBINDEX    Lab Results   Component Value Date/Time    WBC 11.8 10/31/2022 07:55 AM    RBC 5.60 10/31/2022 07:55 AM    HGB 14.0 10/31/2022 07:55 AM    HCT 47.5 10/31/2022 07:55 AM    MCV 85 10/31/2022 07:55 AM    MCH 25.0 10/31/2022 07:55 AM    MCHC 29.5 10/31/2022 07:55 AM    RDW 19.7 10/31/2022 07:55 AM     10/31/2022 07:55 AM    MPV 11.0 10/31/2022 07:55 AM       Lab Results   Component Value Date/Time     10/31/2022 07:55 AM     05/09/2020 03:55 AM    K 4.1 10/31/2022 07:55 AM    K 4.1 05/09/2020 03:55 AM     05/09/2020 03:55 AM    CO2 24 09/03/2020 11:15 AM    BUN 21 09/03/2020 11:15 AM    LABALBU 4.0 10/31/2022 07:55 AM    CREATININE 1.0 10/31/2022 07:55 AM    CREATININE 1.2 05/09/2020 03:55 AM    CALCIUM 10.4 05/09/2020 03:55 AM    LABGLOM >60 10/31/2022 07:54 AM    GLUCOSE 134 05/09/2020 03:55 AM    GLUCOSE 102 10/14/2014 09:36 AM       Lab Results   Component Value Date/Time    ALKPHOS 103 10/31/2022 07:55 AM    ALT 13 10/31/2022 07:55 AM    AST 20 10/31/2022 07:55 AM    PROT 7.0 10/31/2022 07:55 AM    BILITOT 0.6 10/31/2022 07:55 AM    BILIDIR <0.2 10/31/2022 07:55 AM    LABALBU 4.0 10/31/2022 07:55 AM       No results found for: MG    Lab Results   Component Value Date    INR 1.08 05/09/2020    INR 1.40 (H) 07/02/2019    INR 1.05 08/18/2014         No results found for: LABA1C    Lab Results   Component Value Date/Time    TRIG 92 07/08/2021 11:08 AM    HDL 62 07/08/2021 11:08 AM    LDLCALC 85 07/08/2021 11:08 AM    LDLDIRECT 139 08/11/2015 08:27 AM       Lab Results   Component Value Date/Time    TSH 2.400 03/22/2018 10:01 AM         Testing Reviewed:      I haveindividually reviewed the below cardiac tests    EKG:    ECHO:   Results for orders placed during the hospital encounter of 03/22/18   Echocardiogram complete 2D with doppler with color    Narrative Transthoracic Echocardiography Report (TTE)     Demographics      Patient Name   [facility] Gender              Female                  A      MR #           [id number]         Race                                                       Ethnicity      Account #      [de-identified]         Room Number         0004      Accession      [id number]         Date of Study       03/22/2018   Number      Date of Birth  1955        Referring Physician MD Dorys Cheney MD      Age            57 year(s)        Adan Uribe, [facility]                                       Interpreting        Leticia Fitzpatrick MD                                    Physician     Procedure    Type of Study      TTE procedure:ECHOCARDIOGRAM COMPLETE 2D W DOPPLER W COLOR. Procedure Date  Date: 03/22/2018 Start: 04:12 PM    Study Location: Bedside  Technical Quality: Adequate visualization    Indications:Acute coronary syndrome. Additional Medical History:Hypertension, Chest pain, Former smoker, Obesity,  PE, DVT, Shingles, Basal cell cancer    Patient Status: Routine    Height: 64.96 inches Weight: 178.58 pounds BSA: 1.88 m^2 BMI: 29.75 kg/m^2    BP: 139/88 mmHg     Conclusions      Summary   Normal left ventricle size and systolic function. Ejection fraction was   estimated at 55-60%.  There were no regional left ventricular wall motion   abnormalities and wall thickness was within normal limits. The right ventricular size was normal with normal systolic function and   wall thickness. The aortic valve was trileaflet with normal thickness and cuspal   separation. DOPPLER: Transaortic velocity was within the normal range with   no evidence of aortic stenosis. Trivial aortic regurgitation is noted. Mild thickening of anterior leaflet of mitral valve. Mild Mitral valve   prolapse is present. DOPPLER: The transmitral velocity was within the   normal range with no evidence for mitral stenosis. Mild to Moderate mitral regurgitation is present. Signature      ----------------------------------------------------------------   Electronically signed by Dotti Bloch MD (Interpreting   physician) on 03/23/2018 at 07:55 AM   ----------------------------------------------------------------      Findings      Mitral Valve   Mild thickening of anterior leaflet of mitral valve. Mild Mitral valve   prolapse is present. DOPPLER: The transmitral velocity was within the   normal range with no evidence for mitral stenosis. Mild to Moderate mitral regurgitation is present. Aortic Valve   The aortic valve was trileaflet with normal thickness and cuspal   separation. DOPPLER: Transaortic velocity was within the normal range with   no evidence of aortic stenosis. Trivial aortic regurgitation is noted. Tricuspid Valve   The tricuspid valve structure was normal with normal leaflet separation. DOPPLER: There was no evidence of tricuspid stenosis. Mild tricuspid regurgitation. Pulmonic Valve   The pulmonic valve leaflets exhibited normal thickness, no calcification,   and normal cuspal separation. DOPPLER: The transpulmonic velocity was   within the normal range with no evidence for regurgitation. Left Atrium   Left atrial size was normal.      Left Ventricle   Normal left ventricle size and systolic function. Ejection fraction was   estimated at 55-60%.  There were no regional left ventricular wall motion   abnormalities and wall thickness was within normal limits. Right Atrium   Right atrial size was normal.      Right Ventricle   The right ventricular size was normal with normal systolic function and   wall thickness. Pericardial Effusion   The pericardium was normal in appearance with no evidence of a pericardial   effusion. Pleural Effusion   No evidence of pleural effusion. Aorta / Great Vessels   -Aortic root dimension within normal limits.   -The Pulmonary artery is within normal limits. -IVC size is within normal limits with normal respiratory phasic changes.      M-Mode/2D Measurements & Calculations      LV Diastolic   LV Systolic Dimension:    AV Cusp Separation: 1.8 cmLA   Dimension: 4.7 2.9 cm                    Dimension: 3.5 cmAO Root   cm             LV Volume Diastolic: 289  Dimension: 2.3 cmLA Area: 18.4   LV FS:38.3 %   ml                        cm^2   LV PW          LV Volume Systolic: 77.7   Diastolic: 1   ml   cm             LV EDV/LV EDV Index: 102   Septum         ml/54 m^2LV ESV/LV ESV    RV Diastolic Dimension: 3 cm   Diastolic: 1.2 Index: 77.3 ml/17 m^2   cm             EF Calculated: 68.4 %     LA/Aorta: 1.52                                               LA volume/Index: 55.3 ml /29m^2     Doppler Measurements & Calculations      MV Peak E-Wave: 91.8 cm/s  AV Peak Velocity: 145 LVOT Peak Velocity: 110   MV Peak A-Wave: 113 cm/s   cm/s                  cm/s   MV E/A Ratio: 0.81         AV Peak Gradient:     LVOT Peak Gradient: 5   MV Peak Gradient: 3.37     8.41 mmHg             mmHg   mmHg                                                    TV Peak E-Wave: 52.3 cm/s   MV Deceleration Time: 232                        TV Peak A-Wave: 37.5 cm/s   msec                              AV P1/2t: 745 msec    TV Peak Gradient: 1.09                                                    mmHg   MV E' Septal Velocity:                           TR Velocity:177 cm/s   5.26 cm/s                                        TR Gradient:12.53 mmHg   MV A' Septal Velocity:     AV DVI (Vmax):0.76    PV Peak Velocity: 68.1   9.75 cm/s                                        cm/s   MV E' Lateral Velocity:                          PV Peak Gradient: 1.86   7.7 cm/s                                         mmHg   MV A' Lateral Velocity:   15.1 cm/s   E/E' septal: 17.45   E/E' lateral: 11.92   MR Velocity: 741 cm/s     [id number]       STRESS:    CATH:    Assessment/Plan       Diagnosis Orders   1. SOB (shortness of breath)  EKG 12 Lead      2. Intermittent palpitations  EKG 12 Lead          Mild Pulmonary HTN   DVT/PE  Polycythemia vera  Former smoking  HTN        Reviewed EKG, and prior workup  Has mild shortness of breath  Echo to evlauate RVSP/RV function due to prior PEs  Continue enalapril/catapress  On amlodipine to 10mg daily  Was on Xarelto in the past but had to stop due to bleeding issues. Follows with hematology  The patient is asked to make an attempt to improve diet and exercise patterns to aid in medical management of this problem. Advised more plant based nutrition/meditarrean diet   Advised patient to call office or seek immediate medical attention if there is any new onset of  any chest pain, sob, palpitations, lightheadedness, dizziness, orthopnea, PND or pedal edema. All medication side effects were discussed in details. Thank youfor allowing me to participate in the care of this patient. Please do not hesitate to contact me for any further questions. Return if symptoms worsen or fail to improve, for Review testing, Regular follow up.        Electronically signed by Jennifer Casillas MD [facility]  11/21/2022 at 8:06 AM

## 2022-12-12 ENCOUNTER — OFFICE VISIT (OUTPATIENT)
Dept: ONCOLOGY | Age: 67
End: 2022-12-12
Payer: MEDICARE

## 2022-12-12 ENCOUNTER — HOSPITAL ENCOUNTER (OUTPATIENT)
Dept: INFUSION THERAPY | Age: 67
Discharge: HOME | End: 2022-12-12
Payer: MEDICARE

## 2022-12-12 VITALS
WEIGHT: 185 LBS | TEMPERATURE: 97.6 F | OXYGEN SATURATION: 99 % | BODY MASS INDEX: 30.82 KG/M2 | RESPIRATION RATE: 16 BRPM | DIASTOLIC BLOOD PRESSURE: 57 MMHG | SYSTOLIC BLOOD PRESSURE: 125 MMHG | HEIGHT: 65 IN | HEART RATE: 62 BPM

## 2022-12-12 VITALS
HEART RATE: 69 BPM | DIASTOLIC BLOOD PRESSURE: 77 MMHG | WEIGHT: 185.4 LBS | HEIGHT: 65 IN | OXYGEN SATURATION: 98 % | TEMPERATURE: 97.6 F | RESPIRATION RATE: 16 BRPM | SYSTOLIC BLOOD PRESSURE: 122 MMHG | BODY MASS INDEX: 30.89 KG/M2

## 2022-12-12 DIAGNOSIS — D75.1 POLYCYTHEMIA: ICD-10-CM

## 2022-12-12 DIAGNOSIS — E66.9 OBESITY (BMI 30.0-34.9): ICD-10-CM

## 2022-12-12 DIAGNOSIS — D75.1 POLYCYTHEMIA: Primary | ICD-10-CM

## 2022-12-12 DIAGNOSIS — D45 POLYCYTHEMIA VERA (HCC): ICD-10-CM

## 2022-12-12 DIAGNOSIS — D45 POLYCYTHEMIA VERA (HCC): Primary | ICD-10-CM

## 2022-12-12 LAB
BASOPHILS # BLD: 1.3 %
BASOPHILS ABSOLUTE: 0.2 THOU/MM3 (ref 0–0.1)
EOSINOPHIL # BLD: 2.5 %
EOSINOPHILS ABSOLUTE: 0.3 THOU/MM3 (ref 0–0.4)
ERYTHROCYTE [DISTWIDTH] IN BLOOD BY AUTOMATED COUNT: 18.7 % (ref 11.5–14.5)
ERYTHROCYTE [DISTWIDTH] IN BLOOD BY AUTOMATED COUNT: 53.3 FL (ref 35–45)
HCT VFR BLD CALC: 48.3 % (ref 37–47)
HEMOGLOBIN: 14.1 GM/DL (ref 12–16)
IMMATURE GRANS (ABS): 0.09 THOU/MM3 (ref 0–0.07)
IMMATURE GRANULOCYTES: 0.8 %
LYMPHOCYTES # BLD: 10.7 %
LYMPHOCYTES ABSOLUTE: 1.3 THOU/MM3 (ref 1–4.8)
MCH RBC QN AUTO: 24.8 PG (ref 26–33)
MCHC RBC AUTO-ENTMCNC: 29.2 GM/DL (ref 32.2–35.5)
MCV RBC AUTO: 84.9 FL (ref 81–99)
MONOCYTES # BLD: 5.5 %
MONOCYTES ABSOLUTE: 0.6 THOU/MM3 (ref 0.4–1.3)
NUCLEATED RED BLOOD CELLS: 0 /100 WBC
PLATELET # BLD: 215 THOU/MM3 (ref 130–400)
PMV BLD AUTO: 12 FL (ref 9.4–12.4)
RBC # BLD: 5.69 MILL/MM3 (ref 4.2–5.4)
SEG NEUTROPHILS: 79.2 %
SEGMENTED NEUTROPHILS ABSOLUTE COUNT: 9.3 THOU/MM3 (ref 1.8–7.7)
WBC # BLD: 11.8 THOU/MM3 (ref 4.8–10.8)

## 2022-12-12 PROCEDURE — G8417 CALC BMI ABV UP PARAM F/U: HCPCS | Performed by: PHYSICIAN ASSISTANT

## 2022-12-12 PROCEDURE — G8427 DOCREV CUR MEDS BY ELIG CLIN: HCPCS | Performed by: PHYSICIAN ASSISTANT

## 2022-12-12 PROCEDURE — 3074F SYST BP LT 130 MM HG: CPT | Performed by: PHYSICIAN ASSISTANT

## 2022-12-12 PROCEDURE — G8484 FLU IMMUNIZE NO ADMIN: HCPCS | Performed by: PHYSICIAN ASSISTANT

## 2022-12-12 PROCEDURE — G8400 PT W/DXA NO RESULTS DOC: HCPCS | Performed by: PHYSICIAN ASSISTANT

## 2022-12-12 PROCEDURE — 99211 OFF/OP EST MAY X REQ PHY/QHP: CPT

## 2022-12-12 PROCEDURE — 99214 OFFICE O/P EST MOD 30 MIN: CPT | Performed by: PHYSICIAN ASSISTANT

## 2022-12-12 PROCEDURE — 1090F PRES/ABSN URINE INCON ASSESS: CPT | Performed by: PHYSICIAN ASSISTANT

## 2022-12-12 PROCEDURE — 3078F DIAST BP <80 MM HG: CPT | Performed by: PHYSICIAN ASSISTANT

## 2022-12-12 PROCEDURE — 85025 COMPLETE CBC W/AUTO DIFF WBC: CPT

## 2022-12-12 PROCEDURE — 36415 COLL VENOUS BLD VENIPUNCTURE: CPT

## 2022-12-12 PROCEDURE — 99195 PHLEBOTOMY: CPT

## 2022-12-12 PROCEDURE — 3017F COLORECTAL CA SCREEN DOC REV: CPT | Performed by: PHYSICIAN ASSISTANT

## 2022-12-12 PROCEDURE — 1123F ACP DISCUSS/DSCN MKR DOCD: CPT | Performed by: PHYSICIAN ASSISTANT

## 2022-12-12 PROCEDURE — 1036F TOBACCO NON-USER: CPT | Performed by: PHYSICIAN ASSISTANT

## 2022-12-12 RX ORDER — 0.9 % SODIUM CHLORIDE 0.9 %
250 INTRAVENOUS SOLUTION INTRAVENOUS ONCE
OUTPATIENT
Start: 2022-12-12 | End: 2022-12-12

## 2022-12-12 RX ORDER — 0.9 % SODIUM CHLORIDE 0.9 %
250 INTRAVENOUS SOLUTION INTRAVENOUS ONCE
Status: CANCELLED | OUTPATIENT
Start: 2022-12-12 | End: 2022-12-12

## 2022-12-12 RX ORDER — 0.9 % SODIUM CHLORIDE 0.9 %
500 INTRAVENOUS SOLUTION INTRAVENOUS PRN
Status: CANCELLED | OUTPATIENT
Start: 2022-12-12

## 2022-12-12 RX ORDER — 0.9 % SODIUM CHLORIDE 0.9 %
500 INTRAVENOUS SOLUTION INTRAVENOUS PRN
OUTPATIENT
Start: 2022-12-12

## 2022-12-12 NOTE — PLAN OF CARE
Problem: Musculor/Skeletal Functional Status  Goal: Absence of falls  Outcome: Progressing  Note: No falls occurred with visit today. Intervention: Fall precautions  Note: Verbalized understanding of fall prevention to ask for assistance with ambulation. Call light within reach. Problem: Intellectual/Education/Knowledge Deficit  Goal: Teaching initiated upon admission  Outcome: Progressing  Note: Patient verbalized understanding to therapeutic phlebotomy procedure and possible complications. Goal: Written Disposition Instruction form completed  Outcome: Progressing  Intervention: Verbal/written education provided  Note: Patient instructed on therapeutic phlebotomy procedure and potential complications. Consent signed. Aware to call MD if complications occur. Problem: Discharge Planning  Goal: Knowledge of discharge instructions  Description: Knowledge of discharge instructions  Outcome: Progressing  Note: Patient understands home discharge instructions sheet. Intervention: Interaction with patient/family and care team  Note: Reviewed home going instructions with Patient. Care plan reviewed with patient . Patient  verbalize understanding of the plan of care and contribute to goal setting.

## 2022-12-12 NOTE — PROGRESS NOTES
THERAPEUTIC PHLEBOTOMY    Most recent Hemoglobin result: 13.9Gm/dl. Hematocrit result  46.5%  Date obtained:   12 /12 /2022    Pre-phlebotomy vital signs:see Doc flow sheet    Start time:0830    Empty donor bag placed on TARE scale. Tourniquet placed on patient's arm and arm palpated for venous access. Area of venous stick cleansed with alcohol. Hemostat applied to tubing of donor bag. Sheath removed from the needle and needle inserted into the antecubital Vein in the  left Arm. Hemostat released. Blood flowing well down the tubing into the bag. No adjustment of needle needed to maintain adequate blood flow. Scale monitoring amount of blood in bag. Stop [id number]  Needle removed from patient's arm. Pressure applied to patient's arm until bleeding stopped. Dry sterile dressing applied over puncture site and taped down well and secured with coban wrap. Final amount of blood removed:500ml  Post Vital Signs:see Doc flow sheet  Patient drank water and observed for 15 minutes. Patient tolerated procedure well. Discharged ambulatory with belongings.

## 2022-12-12 NOTE — DISCHARGE INSTRUCTIONS
THERAPEUTIC PHLEBOTOMY DISCHARGE INSTRUCTION SHEET    DIET:  Diet as tolerated-drink plenty of fluids    ACTIVITY: Up as tolerated, be cautious-may be unsteady or dizzy. No heavy lifting   with arm blood was drawn from. CARE OF PHLEBOTOMY SITE: Watch puncture site for redness, swelling or drainage. OTHER: Don't smoke for several hours. Return to doctor for follow-up instructions. If any problems, return to ER or to your doctor's office. Your next appointment is on:  1/23 At: 8 am    Call [phone] if you need to change your appointment.

## 2022-12-12 NOTE — PROGRESS NOTES
[facility] [address] [facility] [address] [facility] [address]  [place] [address]  Dept: [phone]  Dept Fax: [phone]: [phone]      Visit Date:12/12/2022     Eri Nielsen is a 74 y.o. female who presents today for:   Chief Complaint   Patient presents with    Follow-up     Polycythemia vera ([address]        HPI:   Eri Nielsen is a 74 y.o. female followed for polycythemia vera. She was previously followed by Dr. Sharon Huitron. She has intermittently  received therapeutic phlebotomy to control her blood counts. Her goal hematocrit is less than 45%. She takes hydroxyurea to control her counts. Interval History 12/12/2022:   The patient is here for follow up evaluation of history of polycythemia vera. Since her last visit in the office in October 2022 with Dr. Griselda Lao, she reports she has been feeling well. She offers no new complaints. Denies headache, lightheadedness, dizziness, new shortness of breath, chest pain, abdominal pain, nausea or vomiting. Denies leg pain or cramping. She has tolerated phlebotomies well. Her last phlebotomy was on 10/31/22. She tolerates Hydrea well. Denies associated side effects; denies any abnormal bleeding. PMH, SH, and FH:  I reviewed the patients medication list and allergy list as noted on the electronic medical record. The PMH, SH and FH were also reviewed as noted on the EMR. Review of Systems:   Review of Systems   Pertinent review of systems noted in HPI, all other ROS negative. Objective:   Physical Exam   Vitals:    12/12/22 0830   BP: (!) 125/57   Pulse: 62   Resp: 16   Temp: 97.6 °F (36.4 °C)   SpO2: 99%       General appearance: No apparent distress, well developed, and cooperative. HEENT: Pupils equal, round, and reactive to light. Conjunctivae/corneas clear. Oral mucosa moist.   Neck: Supple, with full range of motion. Trachea midline. Respiratory:  Normal respiratory effort.  Clear to auscultation, bilaterally without Rales/Wheezes/Rhonchi. On room air. Cardiovascular: Regular rate and rhythm with normal S1/S2   Abdomen: Soft, active bowel sounds. Musculoskeletal: No clubbing, cyanosis or edema bilaterally. Skin: Skin color, texture, turgor normal.  No visible rashes or lesions. Neurologic:  Neurovascularly intact without any focal sensory/motor deficits. Psychiatric: Alert and oriented        Imaging Studies and Labs:   CBC:   Lab Results   Component Value Date    WBC 11.8 (H) 10/31/2022    HGB 14.0 10/31/2022    HCT 47.5 (H) 10/31/2022    MCV 85 10/31/2022     10/31/2022     BMP:   Lab Results   Component Value Date/Time     10/31/2022 07:55 AM     05/09/2020 03:55 AM    K 4.1 10/31/2022 07:55 AM    K 4.1 05/09/2020 03:55 AM     05/09/2020 03:55 AM    CO2 24 09/03/2020 11:15 AM    BUN 21 09/03/2020 11:15 AM    CREATININE 1.0 10/31/2022 07:55 AM    CREATININE 1.2 05/09/2020 03:55 AM    GLUCOSE 134 05/09/2020 03:55 AM    GLUCOSE 102 10/14/2014 09:36 AM    CALCIUM 10.4 05/09/2020 03:55 AM      LFT:   Lab Results   Component Value Date    ALT 13 10/31/2022    AST 20 10/31/2022    ALKPHOS 103 10/31/2022    BILITOT 0.6 10/31/2022         Assessment and Plan:   1. Polycythemia Vera   Patient intermittently requires therapeutic phlebotomy to maintain Hgb/Hct. Last phlebotomy on 10/31/2022. Today on 12/12/22: Hgb 14.1, Hct 48.3. WBC count 11.8 and platelet count 389,758 at baseline.     -Will proceed with therapeutic phlebotomy today as Hct greater than 45%. -Return to [facility] every 6 weeks. Maintain Hct 45% or less. -Continue Hydrea 500 mg daily    -Monitor for side effects and toxicity related to Hydrea. -Monitor for progression of myeloproliferative disease    -Return to clinic in 12 weeks     2. History of Thrombosis     RTC in 12 weeks     All patient questions answered. Pt voiced understanding. Patient agreed with treatment plan. Follow up as directed.  Patient instructed to call for questions or concerns.           Electronically signed by   Natalie Tamayo PA-C

## 2022-12-12 NOTE — PATIENT INSTRUCTIONS
Proceed with phlebotomy today  Return to [facility] in 6 weeks for possible phlebotomy  Return to clinic in 12 weeks with Dr. Shyla Thacker and for possible phlebotomy  CBC on RTC  Please call for questions or concerns.

## 2023-01-08 DIAGNOSIS — D75.1 POLYCYTHEMIA: ICD-10-CM

## 2023-01-09 RX ORDER — FOLIC ACID 1 MG/1
TABLET ORAL
Qty: 90 TABLET | Refills: 0 | Status: SHIPPED | OUTPATIENT
Start: 2023-01-09

## 2023-01-23 ENCOUNTER — HOSPITAL ENCOUNTER (OUTPATIENT)
Dept: INFUSION THERAPY | Age: 68
Discharge: HOME | End: 2023-01-23
Payer: MEDICARE

## 2023-01-23 VITALS
HEIGHT: 65 IN | RESPIRATION RATE: 16 BRPM | DIASTOLIC BLOOD PRESSURE: 59 MMHG | OXYGEN SATURATION: 97 % | BODY MASS INDEX: 30.82 KG/M2 | SYSTOLIC BLOOD PRESSURE: 111 MMHG | WEIGHT: 185 LBS | TEMPERATURE: 97.7 F | HEART RATE: 69 BPM

## 2023-01-23 DIAGNOSIS — D75.1 POLYCYTHEMIA: ICD-10-CM

## 2023-01-23 DIAGNOSIS — D45 POLYCYTHEMIA VERA (HCC): Primary | ICD-10-CM

## 2023-01-23 DIAGNOSIS — D45 POLYCYTHEMIA VERA (HCC): ICD-10-CM

## 2023-01-23 DIAGNOSIS — E66.9 OBESITY (BMI 30.0-34.9): ICD-10-CM

## 2023-01-23 LAB
BASOPHILS ABSOLUTE: 0.2 THOU/MM3 (ref 0–0.1)
BASOPHILS NFR BLD AUTO: 1.3 %
DEPRECATED RDW RBC AUTO: 53.4 FL (ref 35–45)
EOSINOPHIL NFR BLD AUTO: 2.3 %
EOSINOPHILS ABSOLUTE: 0.4 THOU/MM3 (ref 0–0.4)
ERYTHROCYTE [DISTWIDTH] IN BLOOD BY AUTOMATED COUNT: 19 % (ref 11.5–14.5)
HCT VFR BLD AUTO: 43.9 % (ref 37–47)
HGB BLD-MCNC: 12.9 GM/DL (ref 12–16)
IMM GRANULOCYTES # BLD AUTO: 0.16 THOU/MM3 (ref 0–0.07)
IMM GRANULOCYTES NFR BLD AUTO: 1 %
LYMPHOCYTES ABSOLUTE: 1.1 THOU/MM3 (ref 1–4.8)
LYMPHOCYTES NFR BLD AUTO: 7.1 %
MCH RBC QN AUTO: 24.3 PG (ref 26–33)
MCHC RBC AUTO-ENTMCNC: 29.4 GM/DL (ref 32.2–35.5)
MCV RBC AUTO: 82.7 FL (ref 81–99)
MONOCYTES ABSOLUTE: 0.7 THOU/MM3 (ref 0.4–1.3)
MONOCYTES NFR BLD AUTO: 4.3 %
NEUTROPHILS NFR BLD AUTO: 84 %
NRBC BLD AUTO-RTO: 0 /100 WBC
PLATELET # BLD AUTO: 265 THOU/MM3 (ref 130–400)
PMV BLD AUTO: 12.5 FL (ref 9.4–12.4)
RBC # BLD AUTO: 5.31 MILL/MM3 (ref 4.2–5.4)
SEGMENTED NEUTROPHILS ABSOLUTE COUNT: 13.4 THOU/MM3 (ref 1.8–7.7)
WBC # BLD AUTO: 16 THOU/MM3 (ref 4.8–10.8)

## 2023-01-23 PROCEDURE — 99211 OFF/OP EST MAY X REQ PHY/QHP: CPT

## 2023-01-23 PROCEDURE — 85025 COMPLETE CBC W/AUTO DIFF WBC: CPT

## 2023-01-23 PROCEDURE — 36415 COLL VENOUS BLD VENIPUNCTURE: CPT

## 2023-01-23 RX ORDER — 0.9 % SODIUM CHLORIDE 0.9 %
500 INTRAVENOUS SOLUTION INTRAVENOUS PRN
OUTPATIENT
Start: 2023-01-23

## 2023-01-23 RX ORDER — 0.9 % SODIUM CHLORIDE 0.9 %
250 INTRAVENOUS SOLUTION INTRAVENOUS ONCE
OUTPATIENT
Start: 2023-01-23 | End: 2023-01-23

## 2023-01-23 NOTE — PROGRESS NOTES
Patient informed no therapeutic phlebotomy needed today with current labs. Discharge instructions given to patient-verbalizes understanding. Ambulated off unit per self with belongings.

## 2023-01-23 NOTE — DISCHARGE INSTRUCTIONS
You received lab draw while in outpatient oncology clinic. No therapeutic phlebotomy needed today  Call if any uncontrolled nausea/vomiting  Call if any fevers/chills/ problems or concerns  Call if any bleeding  Call if any chest pain/pressure  Call if any severe shortness of breath  Drink at least (6) 8oz glasses of fluids daily     If develop any of above condition, please call your MD or go to Emergency Department.

## 2023-01-23 NOTE — PLAN OF CARE
Problem: Intellectual/Education/Knowledge Deficit  Goal: Teaching initiated upon admission  Outcome: Progressing  Note: No therapeutic phlebotomy needed today with current labs  Goal: Written Disposition Instruction form completed  Outcome: Progressing  Intervention: Verbal/written education provided  Note: Review lab results- no procedure needed     Problem: Discharge Planning  Goal: Knowledge of discharge instructions  Description: Knowledge of discharge instructions  Outcome: Progressing  Note: Verbalized understanding of discharge instructions, follow-up appointments, and when to call the physician. Intervention: Interaction with patient/family and care team  Note: Discuss understanding of discharge instructions,follow-up appointments, and when to call the physician. Care plan reviewed with patient . Patient  verbalize understanding of the plan of care and contribute to goal setting.

## 2023-02-28 DIAGNOSIS — I10 ESSENTIAL HYPERTENSION: Primary | ICD-10-CM

## 2023-02-28 RX ORDER — AMLODIPINE BESYLATE 10 MG/1
10 TABLET ORAL EVERY EVENING
Qty: 90 TABLET | Refills: 1 | Status: SHIPPED | OUTPATIENT
Start: 2023-02-28

## 2023-02-28 NOTE — TELEPHONE ENCOUNTER
Ayana Meléndez called requesting a refill on the following medications:  Requested Prescriptions     Pending Prescriptions Disp Refills    amLODIPine (NORVASC) 10 MG tablet 90 tablet 3     Sig: Take 1 tablet by mouth daily       Date of last visit: 6/13/2022  Date of next visit (if applicable):Visit date not found  Date of last refill: 11/29/21  Pharm[facility]: [facility]. Thanks,  Darryle Michaelis        The pt usually gets this Rx from Dr. Anurag Self. She stated Dr. Anurag Self released her from his care because she is doing fine and is asking Dr. Trista Ruggiero to refill above.

## 2023-03-05 DIAGNOSIS — D75.1 POLYCYTHEMIA: Primary | ICD-10-CM

## 2023-03-06 ENCOUNTER — HOSPITAL ENCOUNTER (OUTPATIENT)
Dept: INFUSION THERAPY | Age: 68
Discharge: HOME | End: 2023-03-06
Payer: MEDICARE

## 2023-03-06 ENCOUNTER — OFFICE VISIT (OUTPATIENT)
Dept: ONCOLOGY | Age: 68
End: 2023-03-06

## 2023-03-06 VITALS
HEART RATE: 62 BPM | SYSTOLIC BLOOD PRESSURE: 111 MMHG | RESPIRATION RATE: 18 BRPM | BODY MASS INDEX: 30.39 KG/M2 | HEIGHT: 65 IN | DIASTOLIC BLOOD PRESSURE: 60 MMHG | WEIGHT: 182.4 LBS | TEMPERATURE: 97.5 F | OXYGEN SATURATION: 97 %

## 2023-03-06 VITALS
HEIGHT: 65 IN | RESPIRATION RATE: 18 BRPM | HEART RATE: 62 BPM | DIASTOLIC BLOOD PRESSURE: 60 MMHG | OXYGEN SATURATION: 97 % | SYSTOLIC BLOOD PRESSURE: 111 MMHG | TEMPERATURE: 97.5 F | BODY MASS INDEX: 30.39 KG/M2 | WEIGHT: 182.4 LBS

## 2023-03-06 DIAGNOSIS — D75.1 POLYCYTHEMIA: ICD-10-CM

## 2023-03-06 DIAGNOSIS — D45 POLYCYTHEMIA VERA (HCC): Primary | ICD-10-CM

## 2023-03-06 LAB
ALBUMIN SERPL BCG-MCNC: 3.7 G/DL (ref 3.5–5.1)
ALP SERPL-CCNC: 119 U/L (ref 38–126)
ALT SERPL W/O P-5'-P-CCNC: 24 U/L (ref 11–66)
AST SERPL-CCNC: 26 U/L (ref 5–40)
BASOPHILS ABSOLUTE: 0.3 THOU/MM3 (ref 0–0.1)
BASOPHILS NFR BLD AUTO: 1.5 %
BILIRUB CONJ SERPL-MCNC: < 0.2 MG/DL (ref 0–0.3)
BILIRUB SERPL-MCNC: 0.5 MG/DL (ref 0.3–1.2)
BUN BLDP-MCNC: 26 MG/DL (ref 8–26)
CHLORIDE BLD-SCNC: 109 MEQ/L (ref 98–109)
CREAT BLD-MCNC: 1 MG/DL (ref 0.5–1.2)
DEPRECATED RDW RBC AUTO: 53 FL (ref 35–45)
EOSINOPHIL NFR BLD AUTO: 2.9 %
EOSINOPHILS ABSOLUTE: 0.5 THOU/MM3 (ref 0–0.4)
ERYTHROCYTE [DISTWIDTH] IN BLOOD BY AUTOMATED COUNT: 19.6 % (ref 11.5–14.5)
GFR SERPL CREATININE-BSD FRML MDRD: > 60 ML/MIN/1.73M2
GLUCOSE BLD-MCNC: 133 MG/DL (ref 70–108)
HCT VFR BLD AUTO: 46.1 % (ref 37–47)
HGB BLD-MCNC: 13.4 GM/DL (ref 12–16)
IMM GRANULOCYTES # BLD AUTO: 0.17 THOU/MM3 (ref 0–0.07)
IMM GRANULOCYTES NFR BLD AUTO: 1 %
IONIZED CALCIUM, WHOLE BLOOD: 1.34 MMOL/L (ref 1.12–1.32)
LYMPHOCYTES ABSOLUTE: 1.5 THOU/MM3 (ref 1–4.8)
LYMPHOCYTES NFR BLD AUTO: 8.8 %
MCH RBC QN AUTO: 23.5 PG (ref 26–33)
MCHC RBC AUTO-ENTMCNC: 29.1 GM/DL (ref 32.2–35.5)
MCV RBC AUTO: 80.9 FL (ref 81–99)
MONOCYTES ABSOLUTE: 0.9 THOU/MM3 (ref 0.4–1.3)
MONOCYTES NFR BLD AUTO: 5.3 %
NEUTROPHILS NFR BLD AUTO: 80.5 %
NRBC BLD AUTO-RTO: 0 /100 WBC
PLATELET # BLD AUTO: 223 THOU/MM3 (ref 130–400)
PMV BLD AUTO: 12.2 FL (ref 9.4–12.4)
POTASSIUM BLD-SCNC: 4.3 MEQ/L (ref 3.5–4.9)
PROT SERPL-MCNC: 6.8 G/DL (ref 6.1–8)
RBC # BLD AUTO: 5.7 MILL/MM3 (ref 4.2–5.4)
SEGMENTED NEUTROPHILS ABSOLUTE COUNT: 13.5 THOU/MM3 (ref 1.8–7.7)
SODIUM BLD-SCNC: 141 MEQ/L (ref 138–146)
TOTAL CO2, WHOLE BLOOD: 24 MEQ/L (ref 23–33)
WBC # BLD AUTO: 16.8 THOU/MM3 (ref 4.8–10.8)

## 2023-03-06 PROCEDURE — 85025 COMPLETE CBC W/AUTO DIFF WBC: CPT

## 2023-03-06 PROCEDURE — 80047 BASIC METABLC PNL IONIZED CA: CPT

## 2023-03-06 PROCEDURE — 80076 HEPATIC FUNCTION PANEL: CPT

## 2023-03-06 PROCEDURE — 99211 OFF/OP EST MAY X REQ PHY/QHP: CPT

## 2023-03-06 PROCEDURE — 36415 COLL VENOUS BLD VENIPUNCTURE: CPT

## 2023-03-06 RX ORDER — HYDROXYUREA 500 MG/1
500 CAPSULE ORAL DAILY
Qty: 90 CAPSULE | Refills: 3 | Status: SHIPPED | OUTPATIENT
Start: 2023-03-06

## 2023-03-06 ASSESSMENT — ENCOUNTER SYMPTOMS
ANAL BLEEDING: 0
ABDOMINAL PAIN: 0
SINUS PRESSURE: 0
VOMITING: 0
DIARRHEA: 0
COUGH: 0
SORE THROAT: 0
NAUSEA: 0
CONSTIPATION: 0
SHORTNESS OF BREATH: 0

## 2023-03-06 NOTE — PROGRESS NOTES
Patient informed no therapeutic phlebotomy procedure needed today . Discharge instructions given to patient-verbalizes understanding. Ambulated off unit per self with belongings to examination room for an appointment with Dr. Nickie Seay.

## 2023-03-06 NOTE — DISCHARGE INSTRUCTIONS
You received lab draw while in outpatient oncology clinic. No therapeutic phlebotomy needed today. Call if any uncontrolled nausea/vomiting  Call if any fevers/chills/ problems or concerns  Call if any bleeding  Call if any chest pain/pressure  Call if any severe shortness of breath  Drink at least (6) 8oz glasses of fluids daily     If develop any of above condition, please call your MD or go to Emergency Department.

## 2023-03-06 NOTE — PATIENT INSTRUCTIONS
Reviewed labs and recent medical history. Does not need a phlebotomy at this time. Refill of Hydrea sent to pharmacy. Discussed her calcium level that has been elevated at times. Will check a PTH next visit. Discussed her blood pressure and the fatigue she feels in the am after taking her medications. Encouraged her to keep a log of her blood pressures and then follow up with her primary provider. Discuss with Dr. Amber Estrada. Check labs every 2 months.   Return to see MD in 2 months

## 2023-03-06 NOTE — PROGRESS NOTES
[address] CANCER [address] [place] [address]  Dept: [phone]  Loc: [address]   1955   No ref. provider found   Tresa Kumar MD       Ruth Ann Fernández is a 69 y.o.  female  with a myeloproliferative disorder best characterized as polycythemia vera. CHIEF COMPLAINT  Chief Complaint   Patient presents with    Follow-up     Polycythemia vera           HISTORY OF PRESENT ILLNESS    2010. Deep vein thrombosis for which she had been treated with Coumadin for 1 year. This manifested as pain and swelling in her left leg. Since before 2012. Followed by Dr. Adenike Mooney and treated with hydroxyurea 500 mg p.o. daily. 8/18/2014 through 8/20/2014. Admitted with increasing shortness of breath. She was unable to have a CTA because of abnormal renal function. She had a VQ scan which was high probability for pulmonary embolus and she was started on heparin and transition to Xarelto. During hospitalization her renal function improved. 10/14/2014. Hemoglobin 14.6 hematocrit 44.6 white count and platelet count were normal.    1/5/2015. Hemoglobin 14.0 hematocrit 42.3 with normal white count and platelet count. August 2015. Hydroxyurea discontinued because of skin breakdown in the left lower extremity with darkening of the skin and sores in the left ankle. Hemoglobin 14.9 and hematocrit 44.3.    10/15/2015. Began seeing Dr. Joann Nina referred by Dr. García Aus her primary care physician. She felt well. Her skin lesions were improving. She was continued on Xarelto 20 mg a day without any significant bleeding except for 1 episode of epistaxis which was not prolonged. 1/14/2016. CBC showed a white count of 13,000 with a hemoglobin of 19.4 and hematocrit of 60.1 and a platelet count of 326,086. She was started on phlebotomies at that time weekly x3.   Her medication list at that time did not include hydroxyurea. 2/15/2016. Hemoglobin was 15.5 and hematocrit 48.3. White count was 15,000 and platelets were 333,663.    3/14/2016. White count 16.2 hemoglobin 15 hematocrit 47.4 with a platelet count of 681,605.    6/6/2016. White count was 15.6 with a hemoglobin of 15.9, hematocrit 53.7 and platelet count 603,656.    11/28/2016. Emergency department visit for atypical chest pain. CBC showed white count 19.5 hemoglobin 16.6 hematocrit 54.5. CTA showed nonocclusive bilateral segmental and subsegmental pulmonary emboli age undetermined. The appearance was similar although less abundant than on the previous study of 9/20/2010. There was also scattered groundglass opacities throughout both lungs. 12/6/2016. Phlebotomies were scheduled by Dr. Nanci Calzada x2. She was followed on a regular basis by Dr. Nanci Calzada several times a year. 3/22 through 3/25/2018. Hospitalized with atypical chest pain, ruled out for acute coronary syndrome. There is no evidence of pulmonary embolism. Liver was enlarged. Spleen was enlarged. There was marked perinephric stranding bilaterally greater on the right than on the left along with colonic diverticulosis with no evidence of diverticulitis. Her hemoglobin was 14.8 and hematocrit was 49.2 with a white count of 20.9 and platelet count of 317,026. BUN was 24 and creatinine was 0.9.    12/18/2018. CBC showed white count 25.2 hemoglobin of 13.8 hematocrit 50.7 with a platelet count of 298,541.    4/4/2019. Monthly phlebotomies ordered to keep her hematocrit 45 or less. Had 1 in May 2019.    7/2/2019. Nosebleed which lasted 45 minutes. Several more nosebleeds. She also noted blood in the whites of her eyes and blurred vision. Dr. Nanci Calzada recommended stopping her Xarelto. 4/8/2021. Increasing white count the reason for restarting her on Hydrea. The patient also complained of chronic fatigue. She is started back on 500 mg of Hydrea daily.   Had a phlebotomy in May 2021.    7/8/2021. Seen in the office. CBC showed a white count of 19,000 hemoglobin of 12 hematocrit 44 and platelet count of 477,286.    10/11/2021. Hematocrit up to 48. Phlebotomy was performed. She continued on Hydrea. 1/31/2022. White count was 17.3 hemoglobin 14 hematocrit 49 platelets 412.    5/5/7416. Follow-up with Dr. Nanci Calzada in the office. Current follow-up was maintained. Comorbidities include hypertension, basal cell carcinoma, homocystinemia, monocular exotropia, history of smoking which she stopped in 2009.     10/31/22  Mrs. Long Ceja returns to the office today for her evaluation of her polycythemia vera. She says that she has chronic fatigue. She has a rash on her lower extremities which is a thematous blotchy rash which gets worse when she is hot and also when she is up on her feet a lot. She also has rosacea. She works part-time in an office. She has had some chest pressure which is usually in the left side of her back and chest and occasional palpitations. She gets winded with any activity. She does not have much of a cough. She did quit smoking in 2009. She said that her feet swell from time to time she has had previous deep vein thrombosis and pulmonary embolus. He has had no recent clots. She says that she can feel when she needs a phlebotomy because she is more sluggish. Her energy level gets better after a phlebotomy. She has had no lumps and bumps. She is known to have mild hepatosplenomegaly. The only bleeding that she has had is epistaxis in the past and some recent bleeding from her gums which she attributes to periodontal disease. She also has had a basal cell carcinoma removed from her leg by Dr. Cheryl Birch. She has been off Xarelto but she continues on hydroxyurea. INTERVAL NOTE    3/6/2023. Mrs. Long Ceja returns to the office today in follow-up of her polycythemia vera. She has not had to have a phlebotomy in several months.   She is taking her hydroxyurea 500 mg a day and feels well. She says that she knows when she needs a phlebotomy and she does not need one right now. She has had no symptoms of lightheadedness chest pain shortness of breath or other symptoms. She has lost 3 pounds. She denies fevers, chills, sweats, bleeding, nausea and vomiting, constipation diarrhea and she says that she has a good appetite. She has had some minor elevations of her calcium from time to time. We will check a PTH level next visit. She is on multiple medications to keep her blood pressure under control and today she has a blood pressure of 111/60. She says sometimes she feels foggy. I told her to keep a log of her blood pressures and then present them to her primary care physician Dr. Nicklas Bence for his decisions. She will continue to check labs every 2 months. MONITORING PARAMETERS    CBCs    PAST MEDICAL HISTORY  Past Medical History:   Diagnosis Date    Basal cell cancer     Margie/Gustavo    Chronic kidney disease, stage III (moderate) ([facility] 6/4/2019    DVT (deep venous thrombosis) ([facility] 09/2010    History of shingles 06/06/2016    Homocysteinemia     Hypertension     Polycythemia vera(238.4)     Pulmonary emboli ([facility]     2010    Right-sided epistaxis 07/02/2019    Unspecified diseases of blood and blood-forming organs         REVIEW OF SYSTEMS  Review of Systems   Constitutional:  Positive for fatigue. Negative for activity change, appetite change, chills, diaphoresis and fever. HENT:  Negative for dental problem, sinus pressure and sore throat. Respiratory:  Negative for cough and shortness of breath. Cardiovascular:  Negative for leg swelling. Gastrointestinal:  Negative for abdominal pain, anal bleeding, constipation, diarrhea, nausea and vomiting. Genitourinary:  Negative for dysuria, hematuria, urgency and vaginal discharge. Musculoskeletal:  Positive for arthralgias (generalized). Negative for myalgias.    Skin:  Negative for pallor. Neurological:  Negative for weakness, light-headedness and headaches. Hematological:  Bruises/bleeds easily. Psychiatric/Behavioral:  Negative for self-injury, sleep disturbance and suicidal ideas. The patient is not nervous/anxious. FAMILY HISTORY  Family History   Problem Relation Age of Onset    Arthritis Mother     High Blood Pressure Mother     Breast Cancer Mother     Heart Disease Mother     Liver Cancer Father     Heart Disease Sister     Breast Cancer Sister         SOCIAL HISTORY  Social History     Socioeconomic History    Marital status:      Spouse name: Not on file    Number of children: Not on file    Years of education: Not on file    Highest education level: Not on file   Occupational History     Employer: [employer]   Tobacco Use    Smoking status: Former     Packs/day: 0.10     Years: 10.00     Pack years: 1.00     Types: Cigarettes     Quit date: 2009     Years since quittin.1    Smokeless tobacco: Never   Vaping Use    Vaping Use: Never used   Substance and Sexual Activity    Alcohol use:  Yes     Alcohol/week: 4.0 standard drinks     Types: 4 Glasses of wine per week     Comment: wine occasional    Drug use: No    Sexual activity: Not on file   Other Topics Concern    Not on file   Social History Narrative    Not on file     Social Determinants of Health     Financial Resource Strain: Low Risk     Difficulty of Paying Living Expenses: Not hard at all   Food Insecurity: No Food Insecurity    Worried About Running Out of Food in the Last Year: Never true    Ran Out of Food in the Last Year: Never true   Transportation Needs: Not on file   Physical Activity: Insufficiently Active    Days of Exercise per Week: 7 days    Minutes of Exercise per Session: 20 min   Stress: Not on file   Social Connections: Not on file   Intimate Partner Violence: Not on file   Housing Stability: Not on file        CURRENT MEDICATIONS  Current Outpatient Medications   Medication Sig Dispense Refill    amLODIPine (NORVASC) 10 MG tablet Take 1 tablet by mouth every evening 90 tablet 1    folic acid (FOLVITE) 1 MG tablet TAKE 1 TABLET BY MOUTH EVERY DAY 90 tablet 0    cloNIDine (CATAPRES) 0.2 MG tablet Take 1 tablet by mouth 2 times daily 180 tablet 3    enalapril (VASOTEC) 20 MG tablet TAKE 1 TABLET TWICE A  tablet 3    hydroxyurea (HYDREA) 500 MG chemo capsule TAKE 1 CAPSULE DAILY 90 capsule 3    triamcinolone (KENALOG) 0.1 % cream APPLY TO LEGS TWICE DAILY AS NEEDED  1    Cyanocobalamin (B-12) 500 MCG TABS Take 500 mg by mouth daily. No current facility-administered medications for this visit. OARRS    PDMP Monitoring:    Last PDMP Alfredo Pearce as Reviewed [facility]):  Review User Review Instant Review Result   Maile Zhong 10/31/2022 10:41 PM Reviewed PDMP [1]       Urine Drug Screenings (1 yr)    No resulted procedures found. Medication Contract and Consent for Opioid Use Documents Filed        No documents found                     PHYSICAL EXAM    Physical Exam  Vitals and nursing note reviewed. Exam conducted with a chaperone present. Constitutional:       General: She is not in acute distress. Appearance: Normal appearance. She is not ill-appearing, toxic-appearing or diaphoretic. Comments: The patient is a well-developed well-nourished  female who is in no acute distress. She does not appear to be acutely or chronically ill. HENT:      Head: Normocephalic and atraumatic. Nose: Nose normal.      Mouth/Throat:      Mouth: Mucous membranes are moist.      Pharynx: Oropharynx is clear. Eyes:      General: No scleral icterus. Extraocular Movements: Extraocular movements intact. Conjunctiva/sclera: Conjunctivae normal.      Pupils: Pupils are equal, round, and reactive to light. Cardiovascular:      Rate and Rhythm: Normal rate and regular rhythm. Heart sounds: Normal heart sounds. No murmur heard. No gallop.    Pulmonary: Effort: Pulmonary effort is normal.      Breath sounds: Normal breath sounds. No wheezing, rhonchi or rales. Abdominal:      General: There is no distension. Palpations: Abdomen is soft. There is no mass. Tenderness: There is no abdominal tenderness. There is no guarding. Musculoskeletal:         General: Normal range of motion. Cervical back: Normal range of motion and neck supple. Right lower leg: No edema. Left lower leg: No edema. Lymphadenopathy:      Cervical: No cervical adenopathy. Skin:     General: Skin is warm and dry. Coloration: Skin is not jaundiced. Neurological:      General: No focal deficit present. Mental Status: She is alert and oriented to person, place, and time. Mental status is at baseline. Motor: No weakness. Gait: Gait normal.   Psychiatric:         Mood and Affect: Mood normal.         Behavior: Behavior normal.         Thought Content: Thought content normal.         Judgment: Judgment normal.        ECOG STATUS    0 : Fully active, able to carry all pre- disease performance without restriction    LABS/IMAGING    Laboratory data today shows that her BUN is 26 and her creatinine is 1.0. Ionized calcium is 1.34. Total protein is 6.8 with an albumin of 3.7. Liver function test are normal.  Sugar is somewhat elevated at 133. CBC shows white count of 16.8 with a hemoglobin of 13.4 hematocrit 46.1 and a platelet count of 749,988. White blood cell differential shows 80% polys, 8 lymphs, 5 monos, 3 eosinophils and 2 basophils. PATHOLOGY/GENETICS    Myeloproliferative disorder best characterized as polycythemia vera. ASSESSMENT and PLAN    1. Myeloproliferative disorder best characterized as polycythemia vera. She is doing well and has not had a phlebotomy in several months. She does not need 1 today either. She continues on hydroxyurea 500 mg daily.   She will have her blood count checked every 6 to 8 weeks and will receive a phlebotomy if it is indicated by the parameters that we have. 2.  Chronic fatigue. She seems to be stable. She wonders if her antihypertensive medication is contributing to this. I encouraged her to keep a log of her blood pressures and discuss this with her primary care physician. I encouraged her to do exercise. 3.  Glucose intolerance. I encouraged her to cut back on her carbohydrate intake and increase her weightbearing exercise.     She is going to return to see us in 2 months and knows to call if she has any change in her status, questions or concerns        Roderick Fisher MD 3/6/2023 8:51 AM

## 2023-03-06 NOTE — PLAN OF CARE
Problem: Intellectual/Education/Knowledge Deficit  Goal: Teaching initiated upon admission  Outcome: Progressing  Note: Understands no procedure needed today with current labs. Problem: Discharge Planning  Goal: Knowledge of discharge instructions  Description: Knowledge of discharge instructions  Outcome: Progressing  Note: Verbalized understanding of discharge instructions, follow-up appointments, and when to call the physician. Intervention: Interaction with patient/family and care team  Note: Discuss understanding of discharge instructions,follow-up appointments, and when to call the physician. Intervention: Verbal/written education provided  Note: Review labs - no procedure needed   Care plan reviewed with patient . Patient  verbalize understanding of the plan of care and contribute to goal setting.

## 2023-04-06 DIAGNOSIS — I10 ESSENTIAL HYPERTENSION: ICD-10-CM

## 2023-04-06 RX ORDER — CLONIDINE HYDROCHLORIDE 0.2 MG/1
TABLET ORAL
Qty: 180 TABLET | Refills: 0 | Status: SHIPPED | OUTPATIENT
Start: 2023-04-06

## 2023-04-06 NOTE — TELEPHONE ENCOUNTER
James Felipe called requesting a refill on the following medications:  Requested Prescriptions     Pending Prescriptions Disp Refills    cloNIDine (CATAPRES) 0.2 MG tablet [Pharmacy Med Name: cloNIDine HCl 0.2 MG Oral Tablet] 180 tablet 3     Sig: TAKE 1 TABLET TWICE A DAY       Date of last visit: 6/13/2022  Date of next visit (if applicable):Visit date not found  Date of last refill: 180 with 3 refills   Pharmacy Name: [facility]      Thanks,  [facility], [address] [place] [address]

## 2023-04-19 ENCOUNTER — HOSPITAL ENCOUNTER (OUTPATIENT)
Dept: INFUSION THERAPY | Age: 68
Discharge: HOME | End: 2023-04-19

## 2023-05-08 ENCOUNTER — HOSPITAL ENCOUNTER (OUTPATIENT)
Dept: INFUSION THERAPY | Age: 68
Discharge: HOME | End: 2023-05-08
Payer: MEDICARE

## 2023-05-08 ENCOUNTER — OFFICE VISIT (OUTPATIENT)
Dept: ONCOLOGY | Age: 68
End: 2023-05-08
Payer: MEDICARE

## 2023-05-08 VITALS
DIASTOLIC BLOOD PRESSURE: 56 MMHG | OXYGEN SATURATION: 98 % | HEART RATE: 62 BPM | SYSTOLIC BLOOD PRESSURE: 133 MMHG | TEMPERATURE: 97.8 F | RESPIRATION RATE: 16 BRPM

## 2023-05-08 VITALS
BODY MASS INDEX: 30.32 KG/M2 | WEIGHT: 182 LBS | RESPIRATION RATE: 16 BRPM | SYSTOLIC BLOOD PRESSURE: 133 MMHG | DIASTOLIC BLOOD PRESSURE: 56 MMHG | OXYGEN SATURATION: 98 % | HEIGHT: 65 IN | HEART RATE: 62 BPM | TEMPERATURE: 97.8 F

## 2023-05-08 DIAGNOSIS — D45 POLYCYTHEMIA VERA (HCC): ICD-10-CM

## 2023-05-08 DIAGNOSIS — D45 POLYCYTHEMIA VERA (HCC): Primary | ICD-10-CM

## 2023-05-08 LAB
BASOPHILS ABSOLUTE: 0.2 THOU/MM3 (ref 0–0.1)
BASOPHILS NFR BLD AUTO: 1.1 %
BUN BLDP-MCNC: 31 MG/DL (ref 8–26)
CHLORIDE BLD-SCNC: 111 MEQ/L (ref 98–109)
CREAT BLD-MCNC: 1.2 MG/DL (ref 0.5–1.2)
DEPRECATED RDW RBC AUTO: 54.1 FL (ref 35–45)
EOSINOPHIL NFR BLD AUTO: 2.4 %
EOSINOPHILS ABSOLUTE: 0.4 THOU/MM3 (ref 0–0.4)
ERYTHROCYTE [DISTWIDTH] IN BLOOD BY AUTOMATED COUNT: 20.1 % (ref 11.5–14.5)
GFR SERPL CREATININE-BSD FRML MDRD: 49 ML/MIN/1.73M2
GLUCOSE BLD-MCNC: 148 MG/DL (ref 70–108)
HCT VFR BLD AUTO: 46.2 % (ref 37–47)
HGB BLD-MCNC: 13.2 GM/DL (ref 12–16)
HYPOCHROMIA BLD QL SMEAR: PRESENT
IMM GRANULOCYTES # BLD AUTO: 0.19 THOU/MM3 (ref 0–0.07)
IMM GRANULOCYTES NFR BLD AUTO: 1.1 %
IONIZED CALCIUM, WHOLE BLOOD: 1.32 MMOL/L (ref 1.12–1.32)
LYMPHOCYTES ABSOLUTE: 1.3 THOU/MM3 (ref 1–4.8)
LYMPHOCYTES NFR BLD AUTO: 7.6 %
MCH RBC QN AUTO: 23 PG (ref 26–33)
MCHC RBC AUTO-ENTMCNC: 28.6 GM/DL (ref 32.2–35.5)
MCV RBC AUTO: 80.3 FL (ref 81–99)
MONOCYTES ABSOLUTE: 0.8 THOU/MM3 (ref 0.4–1.3)
MONOCYTES NFR BLD AUTO: 4.4 %
NEUTROPHILS NFR BLD AUTO: 83.4 %
NRBC BLD AUTO-RTO: 0 /100 WBC
PLATELET # BLD AUTO: 191 THOU/MM3 (ref 130–400)
PLATELET BLD QL SMEAR: ADEQUATE
PMV BLD AUTO: 11.9 FL (ref 9.4–12.4)
POTASSIUM BLD-SCNC: 4.6 MEQ/L (ref 3.5–4.9)
PTH-INTACT SERPL-MCNC: 78.4 PG/ML (ref 15–65)
RBC # BLD AUTO: 5.75 MILL/MM3 (ref 4.2–5.4)
SCAN OF BLOOD SMEAR: NORMAL
SEGMENTED NEUTROPHILS ABSOLUTE COUNT: 14.3 THOU/MM3 (ref 1.8–7.7)
SODIUM BLD-SCNC: 142 MEQ/L (ref 138–146)
TOTAL CO2, WHOLE BLOOD: 23 MEQ/L (ref 23–33)
WBC # BLD AUTO: 17.2 THOU/MM3 (ref 4.8–10.8)

## 2023-05-08 PROCEDURE — 85025 COMPLETE CBC W/AUTO DIFF WBC: CPT

## 2023-05-08 PROCEDURE — 99211 OFF/OP EST MAY X REQ PHY/QHP: CPT

## 2023-05-08 PROCEDURE — 3078F DIAST BP <80 MM HG: CPT | Performed by: INTERNAL MEDICINE

## 2023-05-08 PROCEDURE — 1123F ACP DISCUSS/DSCN MKR DOCD: CPT | Performed by: INTERNAL MEDICINE

## 2023-05-08 PROCEDURE — 99214 OFFICE O/P EST MOD 30 MIN: CPT | Performed by: INTERNAL MEDICINE

## 2023-05-08 PROCEDURE — 36415 COLL VENOUS BLD VENIPUNCTURE: CPT

## 2023-05-08 PROCEDURE — G8427 DOCREV CUR MEDS BY ELIG CLIN: HCPCS | Performed by: INTERNAL MEDICINE

## 2023-05-08 PROCEDURE — G8417 CALC BMI ABV UP PARAM F/U: HCPCS | Performed by: INTERNAL MEDICINE

## 2023-05-08 PROCEDURE — 3075F SYST BP GE 130 - 139MM HG: CPT | Performed by: INTERNAL MEDICINE

## 2023-05-08 PROCEDURE — G8400 PT W/DXA NO RESULTS DOC: HCPCS | Performed by: INTERNAL MEDICINE

## 2023-05-08 PROCEDURE — 83970 ASSAY OF PARATHORMONE: CPT

## 2023-05-08 PROCEDURE — 80047 BASIC METABLC PNL IONIZED CA: CPT

## 2023-05-08 PROCEDURE — 1090F PRES/ABSN URINE INCON ASSESS: CPT | Performed by: INTERNAL MEDICINE

## 2023-05-08 PROCEDURE — 1036F TOBACCO NON-USER: CPT | Performed by: INTERNAL MEDICINE

## 2023-05-08 PROCEDURE — 3017F COLORECTAL CA SCREEN DOC REV: CPT | Performed by: INTERNAL MEDICINE

## 2023-05-08 RX ORDER — TRIAMCINOLONE ACETONIDE 0.25 MG/G
CREAM TOPICAL
COMMUNITY
Start: 2023-04-06

## 2023-05-08 ASSESSMENT — ENCOUNTER SYMPTOMS
RECTAL PAIN: 0
TROUBLE SWALLOWING: 0
DIARRHEA: 0
COUGH: 0
SORE THROAT: 0
SHORTNESS OF BREATH: 0
NAUSEA: 0
RHINORRHEA: 0
ABDOMINAL PAIN: 0
VOMITING: 0

## 2023-05-08 NOTE — PATIENT INSTRUCTIONS
1.Reviewed labs and recent medical history  2. Does not need a phlebotomy today. 3. Discussed her rash on her legs and she will continue to follow up with dermatology. 4. Continue on same dose of Hydroxyurea as directed. 5. Return to see MD in 2 months for follow up.

## 2023-05-08 NOTE — PROGRESS NOTES
NEEDED  1    Cyanocobalamin (B-12) 500 MCG TABS Take 500 mg by mouth daily. No current facility-administered medications for this visit. OARRS    PDMP Monitoring:    Last PDMP Juan Marshall as Reviewed [facility]):  Review User Review Instant Review Result   Jennifer Bowden 10/31/2022 10:41 PM Reviewed PDMP [1]   PHYSICAL EXAM    Physical Exam  Vitals (Pressure today is 133/56) and nursing note reviewed. Exam conducted with a chaperone present. Constitutional:       General: She is not in acute distress. Appearance: Normal appearance. She is not ill-appearing, toxic-appearing or diaphoretic. Comments: The patient is a well-developed well-nourished  female who is in no acute distress. Does not appear to be acutely or chronically ill. HENT:      Head: Normocephalic and atraumatic. Nose: Nose normal.      Mouth/Throat:      Mouth: Mucous membranes are moist.      Pharynx: Oropharynx is clear. No oropharyngeal exudate or posterior oropharyngeal erythema. Eyes:      General: No scleral icterus. Extraocular Movements: Extraocular movements intact. Conjunctiva/sclera: Conjunctivae normal.      Pupils: Pupils are equal, round, and reactive to light. Cardiovascular:      Rate and Rhythm: Normal rate and regular rhythm. Heart sounds: Normal heart sounds. No murmur heard. No gallop. Pulmonary:      Effort: Pulmonary effort is normal. No respiratory distress. Breath sounds: Normal breath sounds. No wheezing, rhonchi or rales. Abdominal:      General: There is no distension. Palpations: Abdomen is soft. There is no mass. Tenderness: There is no abdominal tenderness. There is no guarding or rebound. Musculoskeletal:      Cervical back: Normal range of motion and neck supple. Comments: Ankles are puffy but there is no significant edema. Lymphadenopathy:      Cervical: No cervical adenopathy. Skin:     General: Skin is warm and dry.       Coloration: Skin is not

## 2023-05-09 ENCOUNTER — HOSPITAL ENCOUNTER (OUTPATIENT)
Dept: INFUSION THERAPY | Age: 68
Discharge: HOME | End: 2023-05-09
Payer: MEDICARE

## 2023-05-09 VITALS
WEIGHT: 182 LBS | BODY MASS INDEX: 30.32 KG/M2 | HEART RATE: 69 BPM | HEIGHT: 65 IN | TEMPERATURE: 97.8 F | DIASTOLIC BLOOD PRESSURE: 59 MMHG | OXYGEN SATURATION: 97 % | SYSTOLIC BLOOD PRESSURE: 130 MMHG | RESPIRATION RATE: 16 BRPM

## 2023-05-09 DIAGNOSIS — D45 POLYCYTHEMIA VERA (HCC): Primary | ICD-10-CM

## 2023-05-09 DIAGNOSIS — D75.1 POLYCYTHEMIA: ICD-10-CM

## 2023-05-09 PROCEDURE — 99195 PHLEBOTOMY: CPT

## 2023-05-09 RX ORDER — 0.9 % SODIUM CHLORIDE 0.9 %
250 INTRAVENOUS SOLUTION INTRAVENOUS ONCE
OUTPATIENT
Start: 2023-05-09 | End: 2023-05-09

## 2023-05-09 RX ORDER — 0.9 % SODIUM CHLORIDE 0.9 %
500 INTRAVENOUS SOLUTION INTRAVENOUS PRN
OUTPATIENT
Start: 2023-05-09

## 2023-05-09 NOTE — PROGRESS NOTES
THERAPEUTIC PHLEBOTOMY    Most recent Hemoglobin result: 13.2Gm/dl. Hematocrit result  46.2%  Date obtained:   05 /08 /2023    Pre-phlebotomy vital signs:see Doc flow sheet    Start time:1456    Empty donor bag placed on TARE scale. Tourniquet placed on patient's arm and arm palpated for venous access. Area of venous stick cleansed with alcohol. Hemostat applied to tubing of donor bag. Sheath removed from the needle and needle inserted into the antecubital Vein in the  left Arm. Hemostat released. Blood flowing well down the tubing into the bag. No adjustment of needle needed to maintain adequate blood flow. Scale monitoring amount of blood in bag. Stop Time:1508  Needle removed from patient's arm. Pressure applied to patient's arm until bleeding stopped. Dry sterile dressing applied over puncture site and taped down well and secured with coban wrap. Final amount of blood removed:500ml  Post Vital Signs:see Doc flow sheet  Patient drank water and observed for 10 minutes. Patient tolerated procedure well. Discharged ambulatory with belongings. no back pain, no gout, no musculoskeletal pain, no neck pain, and no weakness.

## 2023-05-09 NOTE — DISCHARGE INSTRUCTIONS
THERAPEUTIC PHLEBOTOMY DISCHARGE INSTRUCTION SHEET    DIET:  Diet as tolerated-drink plenty of fluids    ACTIVITY: Up as tolerated, be cautious-may be unsteady or dizzy. No heavy lifting   with arm blood was drawn from. CARE OF PHLEBOTOMY SITE: Watch puncture site for redness, swelling or drainage. OTHER: Don't smoke for several hours. Return to doctor for follow-up instructions. If any problems, return to ER or to your doctor's office. Your next appointment is on: At:    Call [phone] if you need to change your appointment.

## 2023-05-09 NOTE — PLAN OF CARE
Problem: Intellectual/Education/Knowledge Deficit  Goal: Teaching initiated upon admission  Outcome: Adequate for Discharge  Note: Patient verbalized understanding to therapeutic phlebotomy procedure and possible complications. Intervention: Verbal/written education provided  Note: Patient instructed on therapeutic phlebotomy procedure and potential complications. Aware to call MD if complications occur. Problem: Discharge Planning  Goal: Knowledge of discharge instructions  Description: Knowledge of discharge instructions  Outcome: Adequate for Discharge  Note: Patient understands home discharge instructions sheet. Intervention: Interaction with patient/family and care team  Note: Reviewed home going instructions with Patient. Care plan reviewed with patient . Patient  verbalize understanding of the plan of care and contribute to goal setting.

## 2023-05-30 DIAGNOSIS — I10 ESSENTIAL HYPERTENSION: ICD-10-CM

## 2023-05-30 NOTE — TELEPHONE ENCOUNTER
Received a fax from [facility] for patient's Amlodipine. Last filled 2/28/2023 90 with 1 sent to [facility]. Called and left a message for patient to give our office a call back in regards to wanting Rx filled at [facility]?

## 2023-06-01 RX ORDER — AMLODIPINE BESYLATE 10 MG/1
10 TABLET ORAL EVERY EVENING
Qty: 90 TABLET | Refills: 0 | Status: SHIPPED | OUTPATIENT
Start: 2023-06-01

## 2023-06-01 NOTE — TELEPHONE ENCOUNTER
Patient called back and does need her script to [facility]. Please send a new script.

## 2023-07-06 ENCOUNTER — HOSPITAL ENCOUNTER (OUTPATIENT)
Dept: INTERVENTIONAL RADIOLOGY/VASCULAR | Age: 68
Discharge: HOME | End: 2023-07-06
Attending: INTERNAL MEDICINE
Payer: MEDICARE

## 2023-07-06 ENCOUNTER — HOSPITAL ENCOUNTER (OUTPATIENT)
Dept: INFUSION THERAPY | Age: 68
Discharge: HOME | End: 2023-07-06
Payer: MEDICARE

## 2023-07-06 ENCOUNTER — OFFICE VISIT (OUTPATIENT)
Dept: ONCOLOGY | Age: 68
End: 2023-07-06
Payer: MEDICARE

## 2023-07-06 VITALS
TEMPERATURE: 97.4 F | WEIGHT: 184 LBS | OXYGEN SATURATION: 99 % | BODY MASS INDEX: 30.66 KG/M2 | SYSTOLIC BLOOD PRESSURE: 131 MMHG | HEART RATE: 76 BPM | RESPIRATION RATE: 18 BRPM | HEIGHT: 65 IN | DIASTOLIC BLOOD PRESSURE: 63 MMHG

## 2023-07-06 VITALS
DIASTOLIC BLOOD PRESSURE: 63 MMHG | HEIGHT: 65 IN | TEMPERATURE: 97.4 F | BODY MASS INDEX: 30.66 KG/M2 | OXYGEN SATURATION: 99 % | HEART RATE: 76 BPM | SYSTOLIC BLOOD PRESSURE: 131 MMHG | WEIGHT: 184 LBS | RESPIRATION RATE: 18 BRPM

## 2023-07-06 DIAGNOSIS — R60.0 LOCALIZED EDEMA: ICD-10-CM

## 2023-07-06 DIAGNOSIS — E83.52 HYPERCALCEMIA: Primary | ICD-10-CM

## 2023-07-06 DIAGNOSIS — E83.52 HYPERCALCEMIA: ICD-10-CM

## 2023-07-06 DIAGNOSIS — D75.1 POLYCYTHEMIA: ICD-10-CM

## 2023-07-06 DIAGNOSIS — D45 POLYCYTHEMIA VERA (HCC): ICD-10-CM

## 2023-07-06 DIAGNOSIS — D45 POLYCYTHEMIA VERA (HCC): Primary | ICD-10-CM

## 2023-07-06 LAB
BASOPHILS ABSOLUTE: 0.3 THOU/MM3 (ref 0–0.1)
BASOPHILS NFR BLD AUTO: 1.5 %
BUN BLDP-MCNC: 31 MG/DL (ref 8–26)
CHLORIDE BLD-SCNC: 108 MEQ/L (ref 98–109)
CREAT BLD-MCNC: 1.2 MG/DL (ref 0.5–1.2)
DEPRECATED RDW RBC AUTO: 52.7 FL (ref 35–45)
ELLIPTOCYTES: ABNORMAL
EOSINOPHIL NFR BLD AUTO: 4.4 %
EOSINOPHILS ABSOLUTE: 0.8 THOU/MM3 (ref 0–0.4)
ERYTHROCYTE [DISTWIDTH] IN BLOOD BY AUTOMATED COUNT: 19.6 % (ref 11.5–14.5)
GFR SERPL CREATININE-BSD FRML MDRD: 49 ML/MIN/1.73M2
GLUCOSE BLD-MCNC: 162 MG/DL (ref 70–108)
HCT VFR BLD AUTO: 45.4 % (ref 37–47)
HGB BLD-MCNC: 12.8 GM/DL (ref 12–16)
HYPOCHROMIA BLD QL SMEAR: PRESENT
IMM GRANULOCYTES # BLD AUTO: 0.32 THOU/MM3 (ref 0–0.07)
IMM GRANULOCYTES NFR BLD AUTO: 1.8 %
IONIZED CALCIUM, WHOLE BLOOD: 1.35 MMOL/L (ref 1.12–1.32)
LYMPHOCYTES ABSOLUTE: 1.3 THOU/MM3 (ref 1–4.8)
LYMPHOCYTES NFR BLD AUTO: 7.5 %
MCH RBC QN AUTO: 22.7 PG (ref 26–33)
MCHC RBC AUTO-ENTMCNC: 28.2 GM/DL (ref 32.2–35.5)
MCV RBC AUTO: 80.4 FL (ref 81–99)
MONOCYTES ABSOLUTE: 0.7 THOU/MM3 (ref 0.4–1.3)
MONOCYTES NFR BLD AUTO: 4.2 %
NEUTROPHILS NFR BLD AUTO: 80.6 %
NRBC BLD AUTO-RTO: 0 /100 WBC
PLATELET # BLD AUTO: 372 THOU/MM3 (ref 130–400)
PLATELET BLD QL SMEAR: ADEQUATE
PMV BLD AUTO: 11.7 FL (ref 9.4–12.4)
POTASSIUM BLD-SCNC: 4.5 MEQ/L (ref 3.5–4.9)
PTH-INTACT SERPL-MCNC: 82.7 PG/ML (ref 15–65)
RBC # BLD AUTO: 5.65 MILL/MM3 (ref 4.2–5.4)
SCAN OF BLOOD SMEAR: NORMAL
SEGMENTED NEUTROPHILS ABSOLUTE COUNT: 14.1 THOU/MM3 (ref 1.8–7.7)
SODIUM BLD-SCNC: 140 MEQ/L (ref 138–146)
TARGETS BLD QL SMEAR: ABNORMAL
TOTAL CO2, WHOLE BLOOD: 24 MEQ/L (ref 23–33)
WBC # BLD AUTO: 17.5 THOU/MM3 (ref 4.8–10.8)

## 2023-07-06 PROCEDURE — G8400 PT W/DXA NO RESULTS DOC: HCPCS | Performed by: INTERNAL MEDICINE

## 2023-07-06 PROCEDURE — G8417 CALC BMI ABV UP PARAM F/U: HCPCS | Performed by: INTERNAL MEDICINE

## 2023-07-06 PROCEDURE — 80047 BASIC METABLC PNL IONIZED CA: CPT

## 2023-07-06 PROCEDURE — 3017F COLORECTAL CA SCREEN DOC REV: CPT | Performed by: INTERNAL MEDICINE

## 2023-07-06 PROCEDURE — 36415 COLL VENOUS BLD VENIPUNCTURE: CPT

## 2023-07-06 PROCEDURE — 1090F PRES/ABSN URINE INCON ASSESS: CPT | Performed by: INTERNAL MEDICINE

## 2023-07-06 PROCEDURE — 1036F TOBACCO NON-USER: CPT | Performed by: INTERNAL MEDICINE

## 2023-07-06 PROCEDURE — 99211 OFF/OP EST MAY X REQ PHY/QHP: CPT

## 2023-07-06 PROCEDURE — 93971 EXTREMITY STUDY: CPT

## 2023-07-06 PROCEDURE — 3074F SYST BP LT 130 MM HG: CPT | Performed by: INTERNAL MEDICINE

## 2023-07-06 PROCEDURE — G8427 DOCREV CUR MEDS BY ELIG CLIN: HCPCS | Performed by: INTERNAL MEDICINE

## 2023-07-06 PROCEDURE — 85025 COMPLETE CBC W/AUTO DIFF WBC: CPT

## 2023-07-06 PROCEDURE — 83970 ASSAY OF PARATHORMONE: CPT

## 2023-07-06 PROCEDURE — 1123F ACP DISCUSS/DSCN MKR DOCD: CPT | Performed by: INTERNAL MEDICINE

## 2023-07-06 PROCEDURE — 99214 OFFICE O/P EST MOD 30 MIN: CPT | Performed by: INTERNAL MEDICINE

## 2023-07-06 PROCEDURE — 3078F DIAST BP <80 MM HG: CPT | Performed by: INTERNAL MEDICINE

## 2023-07-06 NOTE — PATIENT INSTRUCTIONS
Reviewed labs and recent medical history. Reviewed the pain and swelling in left leg and a venous doppler of left leg order to be completed stat. Discussed her borderline hematocrit and that the report is preliminary and she may need to come back to have the phlebotomy. Discussed her previous elevated PTH and rechecking today. If continues to be elevated may need referral to endocrinology. She will discuss with her primary provider. She will return to get labs checked in a few weeks and schedule phlebotomy as needed. Return to see PA in 3 months for follow up and possible phlebotomy.

## 2023-07-06 NOTE — PROGRESS NOTES
Negative for abdominal pain, diarrhea, nausea, rectal pain and vomiting. Endocrine: Negative for polyuria. Genitourinary:  Negative for dysuria, hematuria and urgency. Musculoskeletal:  Positive for arthralgias (generalized). Negative for gait problem and neck pain. Skin:  Positive for rash (on feet and legs). Neurological:  Negative for syncope, weakness, light-headedness, numbness and headaches. Psychiatric/Behavioral:  Negative for self-injury, sleep disturbance and suicidal ideas. The patient is not nervous/anxious. FAMILY HISTORY  Family History   Problem Relation Age of Onset    Arthritis Mother     High Blood Pressure Mother     Breast Cancer Mother     Heart Disease Mother     Liver Cancer Father     Heart Disease Sister     Breast Cancer Sister         SOCIAL HISTORY  Social History     Socioeconomic History    Marital status:      Spouse name: Not on file    Number of children: Not on file    Years of education: Not on file    Highest education level: Not on file   Occupational History     Employer: [employer]   Tobacco Use    Smoking status: Former     Packs/day: 0.10     Years: 10.00     Pack years: 1.00     Types: Cigarettes     Quit date: 2009     Years since quittin.5    Smokeless tobacco: Never   Vaping Use    Vaping Use: Never used   Substance and Sexual Activity    Alcohol use:  Yes     Alcohol/week: 4.0 standard drinks     Types: 4 Glasses of wine per week     Comment: wine occasional    Drug use: No    Sexual activity: Not on file   Other Topics Concern    Not on file   Social History Narrative    Not on file     Social Determinants of Health     Financial Resource Strain: Not on file   Food Insecurity: Not on file   Transportation Needs: Not on file   Physical Activity: Not on file   Stress: Not on file   Social Connections: Not on file   Intimate Partner Violence: Not on file   Housing Stability: Not on file        CURRENT MEDICATIONS  Current Outpatient

## 2023-07-07 ENCOUNTER — HOSPITAL ENCOUNTER (OUTPATIENT)
Dept: INFUSION THERAPY | Age: 68
Discharge: HOME | End: 2023-07-07
Payer: MEDICARE

## 2023-07-07 ENCOUNTER — OFFICE VISIT (OUTPATIENT)
Dept: ONCOLOGY | Age: 68
End: 2023-07-07
Payer: MEDICARE

## 2023-07-07 VITALS
HEART RATE: 78 BPM | BODY MASS INDEX: 30.52 KG/M2 | SYSTOLIC BLOOD PRESSURE: 122 MMHG | OXYGEN SATURATION: 98 % | WEIGHT: 183.2 LBS | TEMPERATURE: 98 F | RESPIRATION RATE: 16 BRPM | HEIGHT: 65 IN | DIASTOLIC BLOOD PRESSURE: 61 MMHG

## 2023-07-07 VITALS
HEART RATE: 74 BPM | RESPIRATION RATE: 16 BRPM | SYSTOLIC BLOOD PRESSURE: 118 MMHG | HEIGHT: 65 IN | BODY MASS INDEX: 30.52 KG/M2 | WEIGHT: 183.2 LBS | DIASTOLIC BLOOD PRESSURE: 59 MMHG | TEMPERATURE: 97.9 F | OXYGEN SATURATION: 99 %

## 2023-07-07 DIAGNOSIS — D45 POLYCYTHEMIA VERA (HCC): Primary | ICD-10-CM

## 2023-07-07 DIAGNOSIS — D75.1 POLYCYTHEMIA: ICD-10-CM

## 2023-07-07 DIAGNOSIS — I82.402 DEEP VEIN THROMBOSIS (DVT) OF LEFT LOWER EXTREMITY, UNSPECIFIED CHRONICITY, UNSPECIFIED VEIN (HCC): Primary | ICD-10-CM

## 2023-07-07 PROCEDURE — G8428 CUR MEDS NOT DOCUMENT: HCPCS | Performed by: INTERNAL MEDICINE

## 2023-07-07 PROCEDURE — 99211 OFF/OP EST MAY X REQ PHY/QHP: CPT

## 2023-07-07 PROCEDURE — 99214 OFFICE O/P EST MOD 30 MIN: CPT | Performed by: INTERNAL MEDICINE

## 2023-07-07 PROCEDURE — 99195 PHLEBOTOMY: CPT

## 2023-07-07 PROCEDURE — G8400 PT W/DXA NO RESULTS DOC: HCPCS | Performed by: INTERNAL MEDICINE

## 2023-07-07 PROCEDURE — 3078F DIAST BP <80 MM HG: CPT | Performed by: INTERNAL MEDICINE

## 2023-07-07 PROCEDURE — 1036F TOBACCO NON-USER: CPT | Performed by: INTERNAL MEDICINE

## 2023-07-07 PROCEDURE — G8417 CALC BMI ABV UP PARAM F/U: HCPCS | Performed by: INTERNAL MEDICINE

## 2023-07-07 PROCEDURE — 3074F SYST BP LT 130 MM HG: CPT | Performed by: INTERNAL MEDICINE

## 2023-07-07 PROCEDURE — 1123F ACP DISCUSS/DSCN MKR DOCD: CPT | Performed by: INTERNAL MEDICINE

## 2023-07-07 PROCEDURE — 3017F COLORECTAL CA SCREEN DOC REV: CPT | Performed by: INTERNAL MEDICINE

## 2023-07-07 PROCEDURE — 1090F PRES/ABSN URINE INCON ASSESS: CPT | Performed by: INTERNAL MEDICINE

## 2023-07-07 RX ORDER — 0.9 % SODIUM CHLORIDE 0.9 %
500 INTRAVENOUS SOLUTION INTRAVENOUS PRN
OUTPATIENT
Start: 2023-07-07

## 2023-07-07 RX ORDER — 0.9 % SODIUM CHLORIDE 0.9 %
250 INTRAVENOUS SOLUTION INTRAVENOUS ONCE
OUTPATIENT
Start: 2023-07-07 | End: 2023-07-07

## 2023-07-07 NOTE — PLAN OF CARE
Problem: Intellectual/Education/Knowledge Deficit  Goal: Teaching initiated upon admission  Outcome: Progressing  Note: Patient instructed on therapeutic phlebotomy procedure and potential complications. Aware to call MD if complications occur. Problem: Discharge Planning  Goal: Knowledge of discharge instructions  Description: Knowledge of discharge instructions  Outcome: Progressing  Note: Verbalize understanding of discharge instructions, follow up appointments, and when to call Physician. Problem: Musculor/Skeletal Functional Status  Goal: Absence of falls  Outcome: Progressing  Note: No falls occurred with visit today. Care plan reviewed with patient. Patient verbalizes understanding of the plan of care and contributes to goal setting.

## 2023-07-07 NOTE — PROGRESS NOTES
THERAPEUTIC PHLEBOTOMY    Most recent Hemoglobin result: 12.8 Gm/dl. Hematocrit result  45.4%  Date obtained:   07 /06 /2023    Pre-phlebotomy vital signs:see Doc flow sheet    Start time:1307    Empty donor bag placed on TARE scale. Tourniquet placed on patient's arm and arm palpated for venous access. Area of venous stick cleansed with alcohol. Hemostat applied to tubing of donor bag. Sheath removed from the needle and needle inserted into the antecubital Vein in the  left Arm. Hemostat released. Blood flowing well down the tubing into the bag. No adjustment of needle needed to maintain adequate blood flow. Scale monitoring amount of blood in bag. Stop Time:1340  Needle removed from patient's arm. Pressure applied to patient's arm until bleeding stopped. Dry sterile dressing applied over puncture site and taped down well and secured with coban wrap. Final amount of blood removed:500  Post Vital Signs:see Doc flow sheet  Patient drank water and snack and observed for 20 minutes. Patient tolerated procedure well. Discharged ambulatory with belongings.

## 2023-07-07 NOTE — PROGRESS NOTES
any injury, i.e. head injury. 3.  Multiple comorbidities. She will continue her current medication regimen and follow-up with her usual providers for her chronic kidney disease, hypertension, skin cancers, elevated PTH and hypercalcemia. She knows that she should call us for any change in her status, questions or concerns.         Russel Yi MD 7/7/2023 12:50 PM

## 2023-07-07 NOTE — PATIENT INSTRUCTIONS
1 Reviewed labs and recent medical history. 2. Discussed her abnormal finding on her ultrasound and plan to proceed with anticoagulation. 3. Reviewed medications and script for Eliquis sent to her pharmacy. 4. Asked her to call Dr. Conor Lemos if there is any problems with script. ([phone])  5. Keep her appts as previously scheduled.

## 2023-07-07 NOTE — DISCHARGE INSTRUCTIONS
THERAPEUTIC PHLEBOTOMY DISCHARGE INSTRUCTION SHEET    DIET:  Diet as tolerated-drink plenty of fluids    ACTIVITY: Up as tolerated, be cautious-may be unsteady or dizzy. No heavy lifting   with arm blood was drawn from. CARE OF PHLEBOTOMY SITE: Watch puncture site for redness, swelling or drainage. OTHER: Don't smoke for several hours. Return to doctor for follow-up instructions. If any problems, return to ER or to your doctor's office. Your next appointment is on: September 5  At: 8:00 AM    Call [phone] if you need to change your appointment.

## 2023-07-10 ENCOUNTER — TELEPHONE (OUTPATIENT)
Dept: INFUSION THERAPY | Age: 68
End: 2023-07-10

## 2023-07-10 ENCOUNTER — TELEPHONE (OUTPATIENT)
Dept: ONCOLOGY | Age: 68
End: 2023-07-10

## 2023-07-10 NOTE — TELEPHONE ENCOUNTER
Patient calling in to let us know that her copay for eliquis is almost $300. She is wondering if she can have some sort of financial assistance for the medication. Notified her I would reach out to our financial navigator, Nancy.

## 2023-07-10 NOTE — TELEPHONE ENCOUNTER
[facility] LVM on CMA line about eliquis being above what insurance will cover, there is a maximum of 2 pills per day. They are calling wondering if there is a way we can change the way the prescription is written so patient's insurance will cover it.

## 2023-07-10 NOTE — TELEPHONE ENCOUNTER
I called the patient and walked her through the co-pay assistance process in order  to have a discount card emailed for Eliquis. It should be only  $10.00/mth. now.

## 2023-07-11 ENCOUNTER — TELEPHONE (OUTPATIENT)
Dept: INFUSION THERAPY | Age: 68
End: 2023-07-11

## 2023-07-11 DIAGNOSIS — I82.402 DEEP VEIN THROMBOSIS (DVT) OF LEFT LOWER EXTREMITY, UNSPECIFIED CHRONICITY, UNSPECIFIED VEIN (HCC): Primary | ICD-10-CM

## 2023-07-12 DIAGNOSIS — I10 ESSENTIAL HYPERTENSION: ICD-10-CM

## 2023-07-12 RX ORDER — ENALAPRIL MALEATE 20 MG/1
TABLET ORAL
Qty: 180 TABLET | Refills: 3 | OUTPATIENT
Start: 2023-07-12

## 2023-07-13 SDOH — ECONOMIC STABILITY: FOOD INSECURITY: WITHIN THE PAST 12 MONTHS, THE FOOD YOU BOUGHT JUST DIDN'T LAST AND YOU DIDN'T HAVE MONEY TO GET MORE.: PATIENT DECLINED

## 2023-07-13 SDOH — ECONOMIC STABILITY: INCOME INSECURITY: HOW HARD IS IT FOR YOU TO PAY FOR THE VERY BASICS LIKE FOOD, HOUSING, MEDICAL CARE, AND HEATING?: PATIENT DECLINED

## 2023-07-13 SDOH — ECONOMIC STABILITY: FOOD INSECURITY: WITHIN THE PAST 12 MONTHS, YOU WORRIED THAT YOUR FOOD WOULD RUN OUT BEFORE YOU GOT MONEY TO BUY MORE.: PATIENT DECLINED

## 2023-07-13 SDOH — HEALTH STABILITY: PHYSICAL HEALTH: ON AVERAGE, HOW MANY DAYS PER WEEK DO YOU ENGAGE IN MODERATE TO STRENUOUS EXERCISE (LIKE A BRISK WALK)?: 4 DAYS

## 2023-07-13 SDOH — ECONOMIC STABILITY: TRANSPORTATION INSECURITY
IN THE PAST 12 MONTHS, HAS LACK OF TRANSPORTATION KEPT YOU FROM MEETINGS, WORK, OR FROM GETTING THINGS NEEDED FOR DAILY LIVING?: PATIENT DECLINED

## 2023-07-13 SDOH — HEALTH STABILITY: PHYSICAL HEALTH: ON AVERAGE, HOW MANY MINUTES DO YOU ENGAGE IN EXERCISE AT THIS LEVEL?: 30 MIN

## 2023-07-13 SDOH — ECONOMIC STABILITY: HOUSING INSECURITY
IN THE LAST 12 MONTHS, WAS THERE A TIME WHEN YOU DID NOT HAVE A STEADY PLACE TO SLEEP OR SLEPT IN A SHELTER (INCLUDING NOW)?: PATIENT REFUSED

## 2023-07-13 ASSESSMENT — LIFESTYLE VARIABLES
HOW OFTEN DO YOU HAVE A DRINK CONTAINING ALCOHOL: 3
HOW MANY STANDARD DRINKS CONTAINING ALCOHOL DO YOU HAVE ON A TYPICAL DAY: 1 OR 2
HOW OFTEN DO YOU HAVE SIX OR MORE DRINKS ON ONE OCCASION: 1
HOW OFTEN DO YOU HAVE A DRINK CONTAINING ALCOHOL: 2-4 TIMES A MONTH
HOW MANY STANDARD DRINKS CONTAINING ALCOHOL DO YOU HAVE ON A TYPICAL DAY: 1

## 2023-07-13 ASSESSMENT — PATIENT HEALTH QUESTIONNAIRE - PHQ9
1. LITTLE INTEREST OR PLEASURE IN DOING THINGS: 0
2. FEELING DOWN, DEPRESSED OR HOPELESS: 0
SUM OF ALL RESPONSES TO PHQ QUESTIONS 1-9: 0
SUM OF ALL RESPONSES TO PHQ9 QUESTIONS 1 & 2: 0

## 2023-07-14 ENCOUNTER — OFFICE VISIT (OUTPATIENT)
Dept: FAMILY MEDICINE CLINIC | Age: 68
End: 2023-07-14

## 2023-07-14 VITALS
DIASTOLIC BLOOD PRESSURE: 74 MMHG | HEIGHT: 65 IN | WEIGHT: 180.6 LBS | SYSTOLIC BLOOD PRESSURE: 126 MMHG | RESPIRATION RATE: 18 BRPM | TEMPERATURE: 97.7 F | BODY MASS INDEX: 30.09 KG/M2 | HEART RATE: 68 BPM

## 2023-07-14 DIAGNOSIS — I27.82 OTHER CHRONIC PULMONARY EMBOLISM, UNSPECIFIED WHETHER ACUTE COR PULMONALE PRESENT (HCC): ICD-10-CM

## 2023-07-14 DIAGNOSIS — Z00.00 MEDICARE ANNUAL WELLNESS VISIT, SUBSEQUENT: Primary | ICD-10-CM

## 2023-07-14 DIAGNOSIS — E72.11 HOMOCYSTINURIA (HCC): ICD-10-CM

## 2023-07-14 DIAGNOSIS — I10 ESSENTIAL HYPERTENSION: ICD-10-CM

## 2023-07-14 DIAGNOSIS — N18.30 STAGE 3 CHRONIC KIDNEY DISEASE, UNSPECIFIED WHETHER STAGE 3A OR 3B CKD (HCC): ICD-10-CM

## 2023-07-14 RX ORDER — AMLODIPINE BESYLATE 10 MG/1
10 TABLET ORAL EVERY EVENING
Qty: 90 TABLET | Refills: 3 | Status: SHIPPED | OUTPATIENT
Start: 2023-07-14

## 2023-07-14 RX ORDER — CLONIDINE HYDROCHLORIDE 0.2 MG/1
0.2 TABLET ORAL 2 TIMES DAILY
Qty: 180 TABLET | Refills: 3 | Status: SHIPPED | OUTPATIENT
Start: 2023-07-14

## 2023-07-14 RX ORDER — ENALAPRIL MALEATE 20 MG/1
TABLET ORAL
Qty: 180 TABLET | Refills: 3 | Status: SHIPPED | OUTPATIENT
Start: 2023-07-14

## 2023-07-14 NOTE — PROGRESS NOTES
Medicare Annual Wellness Visit    Alix Vincent is here for Medicare AWV (refills)    Assessment & Plan   Medicare annual wellness visit, subsequent  Essential hypertension  -     enalapril (VASOTEC) 20 MG tablet; TAKE 1 TABLET TWICE A DAY, Disp-180 tablet, R-3Normal  -     cloNIDine (CATAPRES) 0.2 MG tablet; Take 1 tablet by mouth 2 times daily, Disp-180 tablet, R-3Requesting 1 year supplyNormal  -     amLODIPine (NORVASC) 10 MG tablet; Take 1 tablet by mouth every evening, Disp-90 tablet, R-3Normal  Other chronic pulmonary embolism, unspecified whether acute cor pulmonale present (HCC)  -stable, controlled on eliquis  Homocystinuria ([address])  -stable, sees hematology  Stage 3 chronic kidney disease, unspecified whether stage 3a or 3b CKD (HCC)  -stable, avoid nephrotoxic meds,   Lab Results   Component Value Date     07/06/2023    K 4.5 07/06/2023     05/09/2020    CO2 24 09/03/2020    BUN 21 09/03/2020    CREATININE 1.2 07/06/2023    GLUCOSE 134 (H) 05/09/2020    CALCIUM 10.4 05/09/2020    PROT 6.8 03/06/2023    LABALBU 3.7 03/06/2023    BILITOT 0.5 03/06/2023    ALKPHOS 119 03/06/2023    AST 26 03/06/2023    ALT 24 03/06/2023    LABGLOM 49 07/06/2023         Encouraged diet, exercise and weight loss. Reviewed health maintenance    Recommendations for Preventive Services Due: see orders and patient instructions/AVS.  Recommended screening schedule for the next 5-10 years is provided to the patient in written form: see Patient Instructions/AVS.     Return in 1 year (on 7/14/2024). Subjective       Patient's complete Health Risk Assessment and screening values have been reviewed and are found in Flowsheets. The following problems were reviewed today and where indicated follow up appointments were made and/or referrals ordered.     Positive Risk Factor Screenings with Interventions:               General HRA Questions:  Select all that apply: (!) New or Increased Pain    Pain Interventions:  From

## 2023-08-04 ENCOUNTER — APPOINTMENT (OUTPATIENT)
Dept: CT IMAGING | Age: 68
End: 2023-08-04
Payer: MEDICARE

## 2023-08-04 ENCOUNTER — HOSPITAL ENCOUNTER (OUTPATIENT)
Age: 68
Setting detail: OBSERVATION
Discharge: HOME | End: 2023-08-05
Attending: PHYSICIAN ASSISTANT
Payer: MEDICARE

## 2023-08-04 ENCOUNTER — APPOINTMENT (OUTPATIENT)
Dept: GENERAL RADIOLOGY | Age: 68
End: 2023-08-04
Payer: MEDICARE

## 2023-08-04 DIAGNOSIS — D72.829 LEUKOCYTOSIS, UNSPECIFIED TYPE: ICD-10-CM

## 2023-08-04 DIAGNOSIS — Z79.01 CHRONIC ANTICOAGULATION: ICD-10-CM

## 2023-08-04 DIAGNOSIS — Z86.711 PERSONAL HISTORY OF PULMONARY EMBOLISM: ICD-10-CM

## 2023-08-04 DIAGNOSIS — M25.512 ACUTE PAIN OF LEFT SHOULDER: ICD-10-CM

## 2023-08-04 DIAGNOSIS — D45 POLYCYTHEMIA VERA (HCC): ICD-10-CM

## 2023-08-04 DIAGNOSIS — R07.9 CHEST PAIN, UNSPECIFIED TYPE: ICD-10-CM

## 2023-08-04 DIAGNOSIS — M79.602 LEFT ARM PAIN: Primary | ICD-10-CM

## 2023-08-04 PROBLEM — R07.89 CHEST PAIN, ATYPICAL: Status: ACTIVE | Noted: 2023-08-04

## 2023-08-04 LAB
ALBUMIN SERPL BCG-MCNC: 4.1 G/DL (ref 3.5–5.1)
ALP SERPL-CCNC: 95 U/L (ref 38–126)
ALT SERPL W/O P-5'-P-CCNC: 13 U/L (ref 11–66)
ANION GAP SERPL CALC-SCNC: 14 MEQ/L (ref 8–16)
AST SERPL-CCNC: 28 U/L (ref 5–40)
BASOPHILS ABSOLUTE: 0.2 THOU/MM3 (ref 0–0.1)
BASOPHILS NFR BLD AUTO: 1 %
BILIRUB SERPL-MCNC: 0.4 MG/DL (ref 0.3–1.2)
BUN SERPL-MCNC: 32 MG/DL (ref 7–22)
CALCIUM SERPL-MCNC: 10.8 MG/DL (ref 8.5–10.5)
CHLORIDE SERPL-SCNC: 105 MEQ/L (ref 98–111)
CO2 SERPL-SCNC: 19 MEQ/L (ref 23–33)
CREAT SERPL-MCNC: 1 MG/DL (ref 0.4–1.2)
DEPRECATED RDW RBC AUTO: 49.7 FL (ref 35–45)
EOSINOPHIL NFR BLD AUTO: 1.9 %
EOSINOPHILS ABSOLUTE: 0.3 THOU/MM3 (ref 0–0.4)
ERYTHROCYTE [DISTWIDTH] IN BLOOD BY AUTOMATED COUNT: 19.3 % (ref 11.5–14.5)
GFR SERPL CREATININE-BSD FRML MDRD: > 60 ML/MIN/1.73M2
GLUCOSE SERPL-MCNC: 130 MG/DL (ref 70–108)
HCT VFR BLD AUTO: 43.8 % (ref 37–47)
HGB BLD-MCNC: 12.7 GM/DL (ref 12–16)
IMM GRANULOCYTES # BLD AUTO: 0.28 THOU/MM3 (ref 0–0.07)
IMM GRANULOCYTES NFR BLD AUTO: 1.5 %
LV EF: 63 %
LVEF MODALITY: NORMAL
LYMPHOCYTES ABSOLUTE: 2 THOU/MM3 (ref 1–4.8)
LYMPHOCYTES NFR BLD AUTO: 10.7 %
MAGNESIUM SERPL-MCNC: 2 MG/DL (ref 1.6–2.4)
MCH RBC QN AUTO: 22.4 PG (ref 26–33)
MCHC RBC AUTO-ENTMCNC: 29 GM/DL (ref 32.2–35.5)
MCV RBC AUTO: 77.2 FL (ref 81–99)
MONOCYTES ABSOLUTE: 1 THOU/MM3 (ref 0.4–1.3)
MONOCYTES NFR BLD AUTO: 5.4 %
NEUTROPHILS NFR BLD AUTO: 79.5 %
NRBC BLD AUTO-RTO: 0 /100 WBC
NT-PROBNP SERPL IA-MCNC: 320.7 PG/ML (ref 0–124)
PLATELET # BLD AUTO: 236 THOU/MM3 (ref 130–400)
PMV BLD AUTO: 11.7 FL (ref 9.4–12.4)
POTASSIUM SERPL-SCNC: 4.6 MEQ/L (ref 3.5–5.2)
PROT SERPL-MCNC: 7.3 G/DL (ref 6.1–8)
RBC # BLD AUTO: 5.67 MILL/MM3 (ref 4.2–5.4)
SEGMENTED NEUTROPHILS ABSOLUTE COUNT: 14.6 THOU/MM3 (ref 1.8–7.7)
SODIUM SERPL-SCNC: 138 MEQ/L (ref 135–145)
TROPONIN, HIGH SENSITIVITY: 13 NG/L (ref 0–12)
TROPONIN, HIGH SENSITIVITY: 13 NG/L (ref 0–12)
WBC # BLD AUTO: 18.4 THOU/MM3 (ref 4.8–10.8)

## 2023-08-04 PROCEDURE — G0378 HOSPITAL OBSERVATION PER HR: HCPCS

## 2023-08-04 PROCEDURE — 2580000003 HC RX 258

## 2023-08-04 PROCEDURE — 71045 X-RAY EXAM CHEST 1 VIEW: CPT

## 2023-08-04 PROCEDURE — 6360000004 HC RX CONTRAST MEDICATION

## 2023-08-04 PROCEDURE — 2580000003 HC RX 258: Performed by: PHYSICIAN ASSISTANT

## 2023-08-04 PROCEDURE — 84484 ASSAY OF TROPONIN QUANT: CPT

## 2023-08-04 PROCEDURE — 93306 TTE W/DOPPLER COMPLETE: CPT

## 2023-08-04 PROCEDURE — 71275 CT ANGIOGRAPHY CHEST: CPT

## 2023-08-04 PROCEDURE — 83880 ASSAY OF NATRIURETIC PEPTIDE: CPT

## 2023-08-04 PROCEDURE — 99223 1ST HOSP IP/OBS HIGH 75: CPT | Performed by: PHYSICIAN ASSISTANT

## 2023-08-04 PROCEDURE — 99223 1ST HOSP IP/OBS HIGH 75: CPT | Performed by: INTERNAL MEDICINE

## 2023-08-04 PROCEDURE — 80053 COMPREHEN METABOLIC PANEL: CPT

## 2023-08-04 PROCEDURE — 85025 COMPLETE CBC W/AUTO DIFF WBC: CPT

## 2023-08-04 PROCEDURE — 36415 COLL VENOUS BLD VENIPUNCTURE: CPT

## 2023-08-04 PROCEDURE — 6370000000 HC RX 637 (ALT 250 FOR IP): Performed by: PHYSICIAN ASSISTANT

## 2023-08-04 PROCEDURE — 83735 ASSAY OF MAGNESIUM: CPT

## 2023-08-04 PROCEDURE — 6370000000 HC RX 637 (ALT 250 FOR IP)

## 2023-08-04 PROCEDURE — 93005 ELECTROCARDIOGRAM TRACING: CPT

## 2023-08-04 PROCEDURE — 99285 EMERGENCY DEPT VISIT HI MDM: CPT

## 2023-08-04 RX ORDER — ATROPINE SULFATE 0.1 MG/ML
0.5 INJECTION INTRAVENOUS EVERY 5 MIN PRN
OUTPATIENT
Start: 2023-08-04 | End: 2023-08-04

## 2023-08-04 RX ORDER — REGADENOSON 0.08 MG/ML
0.4 INJECTION, SOLUTION INTRAVENOUS
Status: DISCONTINUED | OUTPATIENT
Start: 2023-08-04 | End: 2023-08-05 | Stop reason: HOSPADM

## 2023-08-04 RX ORDER — AMLODIPINE BESYLATE 10 MG/1
10 TABLET ORAL EVERY EVENING
Status: DISCONTINUED | OUTPATIENT
Start: 2023-08-04 | End: 2023-08-05 | Stop reason: HOSPADM

## 2023-08-04 RX ORDER — SODIUM CHLORIDE 9 MG/ML
INJECTION, SOLUTION INTRAVENOUS CONTINUOUS
Status: ACTIVE | OUTPATIENT
Start: 2023-08-05 | End: 2023-08-05

## 2023-08-04 RX ORDER — ONDANSETRON 4 MG/1
4 TABLET, ORALLY DISINTEGRATING ORAL EVERY 8 HOURS PRN
Status: DISCONTINUED | OUTPATIENT
Start: 2023-08-04 | End: 2023-08-05 | Stop reason: HOSPADM

## 2023-08-04 RX ORDER — 0.9 % SODIUM CHLORIDE 0.9 %
1000 INTRAVENOUS SOLUTION INTRAVENOUS ONCE
Status: COMPLETED | OUTPATIENT
Start: 2023-08-04 | End: 2023-08-04

## 2023-08-04 RX ORDER — HYDROXYUREA 500 MG/1
500 CAPSULE ORAL DAILY
Status: DISCONTINUED | OUTPATIENT
Start: 2023-08-05 | End: 2023-08-05 | Stop reason: HOSPADM

## 2023-08-04 RX ORDER — SODIUM CHLORIDE 0.9 % (FLUSH) 0.9 %
5-40 SYRINGE (ML) INJECTION PRN
Status: DISCONTINUED | OUTPATIENT
Start: 2023-08-04 | End: 2023-08-05 | Stop reason: HOSPADM

## 2023-08-04 RX ORDER — SODIUM CHLORIDE 0.9 % (FLUSH) 0.9 %
5-40 SYRINGE (ML) INJECTION PRN
OUTPATIENT
Start: 2023-08-04 | End: 2023-08-04

## 2023-08-04 RX ORDER — ALBUTEROL SULFATE 90 UG/1
2 AEROSOL, METERED RESPIRATORY (INHALATION) PRN
OUTPATIENT
Start: 2023-08-04 | End: 2023-08-04

## 2023-08-04 RX ORDER — AMINOPHYLLINE DIHYDRATE 25 MG/ML
50 INJECTION, SOLUTION INTRAVENOUS PRN
OUTPATIENT
Start: 2023-08-04 | End: 2023-08-04

## 2023-08-04 RX ORDER — FOLIC ACID 1 MG/1
1000 TABLET ORAL DAILY
Status: DISCONTINUED | OUTPATIENT
Start: 2023-08-05 | End: 2023-08-05 | Stop reason: HOSPADM

## 2023-08-04 RX ORDER — POLYETHYLENE GLYCOL 3350 17 G/17G
17 POWDER, FOR SOLUTION ORAL DAILY PRN
Status: DISCONTINUED | OUTPATIENT
Start: 2023-08-04 | End: 2023-08-05 | Stop reason: HOSPADM

## 2023-08-04 RX ORDER — ACETAMINOPHEN 325 MG/1
650 TABLET ORAL EVERY 6 HOURS PRN
Status: DISCONTINUED | OUTPATIENT
Start: 2023-08-04 | End: 2023-08-05 | Stop reason: HOSPADM

## 2023-08-04 RX ORDER — SODIUM CHLORIDE 9 MG/ML
INJECTION, SOLUTION INTRAVENOUS PRN
Status: DISCONTINUED | OUTPATIENT
Start: 2023-08-04 | End: 2023-08-05 | Stop reason: HOSPADM

## 2023-08-04 RX ORDER — ASPIRIN 81 MG/1
324 TABLET, CHEWABLE ORAL ONCE
Status: COMPLETED | OUTPATIENT
Start: 2023-08-04 | End: 2023-08-04

## 2023-08-04 RX ORDER — SODIUM CHLORIDE 0.9 % (FLUSH) 0.9 %
5-40 SYRINGE (ML) INJECTION EVERY 12 HOURS SCHEDULED
Status: DISCONTINUED | OUTPATIENT
Start: 2023-08-04 | End: 2023-08-05 | Stop reason: HOSPADM

## 2023-08-04 RX ORDER — METOPROLOL TARTRATE 5 MG/5ML
5 INJECTION INTRAVENOUS EVERY 5 MIN PRN
OUTPATIENT
Start: 2023-08-04 | End: 2023-08-04

## 2023-08-04 RX ORDER — ACETAMINOPHEN 650 MG/1
650 SUPPOSITORY RECTAL EVERY 6 HOURS PRN
Status: DISCONTINUED | OUTPATIENT
Start: 2023-08-04 | End: 2023-08-05 | Stop reason: HOSPADM

## 2023-08-04 RX ORDER — CLONIDINE HYDROCHLORIDE 0.2 MG/1
0.2 TABLET ORAL 2 TIMES DAILY
Status: DISCONTINUED | OUTPATIENT
Start: 2023-08-04 | End: 2023-08-05 | Stop reason: HOSPADM

## 2023-08-04 RX ORDER — SODIUM CHLORIDE 9 MG/ML
500 INJECTION, SOLUTION INTRAVENOUS CONTINUOUS PRN
OUTPATIENT
Start: 2023-08-04 | End: 2023-08-04

## 2023-08-04 RX ORDER — LANOLIN ALCOHOL/MO/W.PET/CERES
500 CREAM (GRAM) TOPICAL DAILY
Status: DISCONTINUED | OUTPATIENT
Start: 2023-08-05 | End: 2023-08-05 | Stop reason: HOSPADM

## 2023-08-04 RX ORDER — ASPIRIN 81 MG/1
81 TABLET, CHEWABLE ORAL DAILY
Status: DISCONTINUED | OUTPATIENT
Start: 2023-08-05 | End: 2023-08-05 | Stop reason: HOSPADM

## 2023-08-04 RX ORDER — ENALAPRIL MALEATE 10 MG/1
20 TABLET ORAL 2 TIMES DAILY
Status: DISCONTINUED | OUTPATIENT
Start: 2023-08-04 | End: 2023-08-05 | Stop reason: HOSPADM

## 2023-08-04 RX ORDER — ONDANSETRON 2 MG/ML
4 INJECTION INTRAMUSCULAR; INTRAVENOUS EVERY 6 HOURS PRN
Status: DISCONTINUED | OUTPATIENT
Start: 2023-08-04 | End: 2023-08-05 | Stop reason: HOSPADM

## 2023-08-04 RX ORDER — NITROGLYCERIN 0.4 MG/1
0.4 TABLET SUBLINGUAL EVERY 5 MIN PRN
OUTPATIENT
Start: 2023-08-04 | End: 2023-08-04

## 2023-08-04 RX ADMIN — CLONIDINE HYDROCHLORIDE 0.2 MG: 0.2 TABLET ORAL at 20:32

## 2023-08-04 RX ADMIN — SODIUM CHLORIDE, PRESERVATIVE FREE 10 ML: 5 INJECTION INTRAVENOUS at 20:33

## 2023-08-04 RX ADMIN — IOPAMIDOL 80 ML: 755 INJECTION, SOLUTION INTRAVENOUS at 11:44

## 2023-08-04 RX ADMIN — ENALAPRIL MALEATE 20 MG: 10 TABLET ORAL at 20:32

## 2023-08-04 RX ADMIN — SODIUM CHLORIDE 1000 ML: 9 INJECTION, SOLUTION INTRAVENOUS at 12:08

## 2023-08-04 RX ADMIN — ASPIRIN 81 MG 324 MG: 81 TABLET ORAL at 12:07

## 2023-08-04 RX ADMIN — AMLODIPINE BESYLATE 10 MG: 10 TABLET ORAL at 18:00

## 2023-08-04 ASSESSMENT — HEART SCORE: ECG: 0

## 2023-08-04 ASSESSMENT — PAIN SCALES - GENERAL
PAINLEVEL_OUTOF10: 0

## 2023-08-04 NOTE — ED NOTES
Pt medicated per [facility]- updated on POC. Denies current needs or concerns at this time. VSS.       Thom Mcconnell RN  08/04/23 2023

## 2023-08-04 NOTE — ED NOTES
Spoke with Rafita Boswell on 8B.  Pt. Being transported to [address] [place] [address], RN  08/04/23 3689

## 2023-08-04 NOTE — CONSULTS
0.2 05/09/2020 05:14 AM    BILIRUBINUR NEGATIVE 05/09/2020 05:14 AM    BILIRUBINUR negative 07/28/2016 02:17 PM    BLOODU TRACE 05/09/2020 05:14 AM    GLUCOSEU NEGATIVE 05/09/2020 05:14 AM         Physical Exam:  Vitals:    08/04/23 1209   BP: 122/62   Pulse: 55   Resp: 17   Temp:    SpO2: 97%    No intake or output data in the 24 hours ending 08/04/23 1323   General:  No acute distress  Neck: Supple, no JVD, no carotid bruits  Heart: Regular rhythm normal S1 and S2, no rubs, murmurs or gallops  Lungs: clear to ascultation no rales, wheezes, or rhonchi  Abdomen: positive bowel sounds, soft, non-tender, non-distended, no bruits, no masses  Extremities:no clubbing, cyanosis or edema  Neurologic: alert and oriented x 3, cranial nerves 2-12 grossly intact, motor and sensory intact, moving all extremities  Skin: No rashes    Assessment:  Patient is a 70 y/o F presents to the emergency department left arm pain and shortness of breath. No chest pain. Patient states that she has had this left arm pain intermittently for about 3 months no exacerbation with exercise, relieved with Tylenol at home. Patient had no complaints at the time of evaluation at bedside. Cr 1.0    Serial troponins have been 13 and 13 likely secondary to chronic kidney disease and demand. Normal EKG. Atypical Chest pain   Elevated trops 2/2 to demand and CKD   CKD 3  Hx of DVTs  Hx of polycythemia vera  Hx of Homocystinemia  HTN      Ekg 8/4/23  Normal sinus rhythm Normal ECG When compared with ECG of 09-MAY-2020 03:50, Criteria for Septal infarct are no longer Present QT has shortened    Echo 5/19/20   Normal left ventricle size and systolic function. Ejection fraction was   estimated at 55-60%. There were no regional left ventricular wall motion   abnormalities and wall thickness was within normal limits. Right ventricular systolic pressure of 76-33 mm Hg consistent with   moderate pulmonary hypertension.    Mild mitral regurgitation is

## 2023-08-04 NOTE — H&P
Hospitalist History & Physical    Patient:  Chika Alfaro    Unit/Bed:8B-26/026-A  YOB: 1955  MRN: [id number]   Acct: [de-identified]   PCP: Greer Caraballo MD  Code Status: Full Code    Date of Service: Pt seen/examined on 08/04/23 and admitted to Observation with expected LOS less than two midnights due to medical therapy. Chief Complaint: Chest pain    Assessment/Plan:    Chest pain  EKG without ischemic changes. Delta Trops negative. Check echocardiogram.  Repeat EKG if patient becomes symptomatic. Cardiology consulted. Planning for stress test tomorrow. Left lower extremity DVT, history of DVT/PE  Continue Eliquis. CTA of chest today negative for PE. Hypertension  Continue home medication regimen  Polycythemia vera  H&H today 12.7/43. 8. Received phlebotomy in July. Follows with hematology on an outpatient basis. CKD stage III  Creatinine stable at 1.0. Homocystinemia  On folic acid, X76    History of Present Illness:  Chika Alfaro is a 69 y.o. female with a history of polycythemia vera, history of DVT/PE on Eliquis, hypertension, homocystinemia, and CKD stage III who presented to [facility] with chief complaint of arm pain and chest pain. The patient states last evening she had left arm pain which radiated into her chest.  It was an aching sensation. This was not associated with exertion. However, it was associated with dyspnea. It was not associated with palpitations, diaphoresis, nausea, or lightheadedness. She denies any numbness or weakness in the left arm. There is no radiation down the right arm or up into the jaw. She does endorse a history of similar symptoms and has been seen in the hospital for these. She has no history of coronary artery disease. She is on Eliquis for a DVT which was diagnosed in July. CTA of the chest was negative in the emergency department.   She has a history of polycythemia vera and undergoes phlebotomy on an intermittent

## 2023-08-05 VITALS
HEIGHT: 65 IN | OXYGEN SATURATION: 99 % | TEMPERATURE: 97.8 F | HEART RATE: 63 BPM | SYSTOLIC BLOOD PRESSURE: 142 MMHG | WEIGHT: 180 LBS | RESPIRATION RATE: 18 BRPM | BODY MASS INDEX: 29.99 KG/M2 | DIASTOLIC BLOOD PRESSURE: 65 MMHG

## 2023-08-05 LAB
ANION GAP SERPL CALC-SCNC: 8 MEQ/L (ref 8–16)
BASOPHILS ABSOLUTE: 0.2 THOU/MM3 (ref 0–0.1)
BASOPHILS NFR BLD AUTO: 1.2 %
BUN SERPL-MCNC: 23 MG/DL (ref 7–22)
CALCIUM SERPL-MCNC: 9.6 MG/DL (ref 8.5–10.5)
CHLORIDE SERPL-SCNC: 111 MEQ/L (ref 98–111)
CO2 SERPL-SCNC: 24 MEQ/L (ref 23–33)
CREAT SERPL-MCNC: 1.1 MG/DL (ref 0.4–1.2)
DEPRECATED RDW RBC AUTO: 52.1 FL (ref 35–45)
EKG ATRIAL RATE: 61 BPM
EKG ATRIAL RATE: 67 BPM
EKG P AXIS: 26 DEGREES
EKG P AXIS: 35 DEGREES
EKG P-R INTERVAL: 170 MS
EKG P-R INTERVAL: 182 MS
EKG Q-T INTERVAL: 390 MS
EKG Q-T INTERVAL: 442 MS
EKG QRS DURATION: 82 MS
EKG QRS DURATION: 86 MS
EKG QTC CALCULATION (BAZETT): 412 MS
EKG QTC CALCULATION (BAZETT): 444 MS
EKG R AXIS: -15 DEGREES
EKG R AXIS: 7 DEGREES
EKG T AXIS: 16 DEGREES
EKG T AXIS: 53 DEGREES
EKG VENTRICULAR RATE: 61 BPM
EKG VENTRICULAR RATE: 67 BPM
ELLIPTOCYTES: ABNORMAL
EOSINOPHIL NFR BLD AUTO: 2.6 %
EOSINOPHILS ABSOLUTE: 0.4 THOU/MM3 (ref 0–0.4)
ERYTHROCYTE [DISTWIDTH] IN BLOOD BY AUTOMATED COUNT: 19.3 % (ref 11.5–14.5)
GFR SERPL CREATININE-BSD FRML MDRD: 54 ML/MIN/1.73M2
GLUCOSE SERPL-MCNC: 96 MG/DL (ref 70–108)
HCT VFR BLD AUTO: 40.2 % (ref 37–47)
HGB BLD-MCNC: 11.5 GM/DL (ref 12–16)
HYPOCHROMIA BLD QL SMEAR: PRESENT
IMM GRANULOCYTES # BLD AUTO: 0.2 THOU/MM3 (ref 0–0.07)
IMM GRANULOCYTES NFR BLD AUTO: 1.4 %
LYMPHOCYTES ABSOLUTE: 1.3 THOU/MM3 (ref 1–4.8)
LYMPHOCYTES NFR BLD AUTO: 9.5 %
MCH RBC QN AUTO: 22.8 PG (ref 26–33)
MCHC RBC AUTO-ENTMCNC: 28.6 GM/DL (ref 32.2–35.5)
MCV RBC AUTO: 79.6 FL (ref 81–99)
MONOCYTES ABSOLUTE: 0.5 THOU/MM3 (ref 0.4–1.3)
MONOCYTES NFR BLD AUTO: 3.7 %
NEUTROPHILS NFR BLD AUTO: 81.6 %
NRBC BLD AUTO-RTO: 0 /100 WBC
PLATELET # BLD AUTO: 218 THOU/MM3 (ref 130–400)
PLATELET BLD QL SMEAR: ADEQUATE
PMV BLD AUTO: 11.7 FL (ref 9.4–12.4)
POTASSIUM SERPL-SCNC: 4.3 MEQ/L (ref 3.5–5.2)
RBC # BLD AUTO: 5.05 MILL/MM3 (ref 4.2–5.4)
SCAN OF BLOOD SMEAR: NORMAL
SEGMENTED NEUTROPHILS ABSOLUTE COUNT: 11.5 THOU/MM3 (ref 1.8–7.7)
SODIUM SERPL-SCNC: 143 MEQ/L (ref 135–145)
TARGETS BLD QL SMEAR: ABNORMAL
WBC # BLD AUTO: 14.1 THOU/MM3 (ref 4.8–10.8)

## 2023-08-05 PROCEDURE — G0378 HOSPITAL OBSERVATION PER HR: HCPCS

## 2023-08-05 PROCEDURE — 93017 CV STRESS TEST TRACING ONLY: CPT | Performed by: INTERNAL MEDICINE

## 2023-08-05 PROCEDURE — A9500 TC99M SESTAMIBI: HCPCS | Performed by: INTERNAL MEDICINE

## 2023-08-05 PROCEDURE — 3430000000 HC RX DIAGNOSTIC RADIOPHARMACEUTICAL: Performed by: INTERNAL MEDICINE

## 2023-08-05 PROCEDURE — 85025 COMPLETE CBC W/AUTO DIFF WBC: CPT

## 2023-08-05 PROCEDURE — 36415 COLL VENOUS BLD VENIPUNCTURE: CPT

## 2023-08-05 PROCEDURE — 6370000000 HC RX 637 (ALT 250 FOR IP): Performed by: PHYSICIAN ASSISTANT

## 2023-08-05 PROCEDURE — 6360000002 HC RX W HCPCS

## 2023-08-05 PROCEDURE — 78452 HT MUSCLE IMAGE SPECT MULT: CPT | Performed by: INTERNAL MEDICINE

## 2023-08-05 PROCEDURE — 80048 BASIC METABOLIC PNL TOTAL CA: CPT

## 2023-08-05 PROCEDURE — 93010 ELECTROCARDIOGRAM REPORT: CPT | Performed by: INTERNAL MEDICINE

## 2023-08-05 PROCEDURE — 2580000003 HC RX 258: Performed by: PHYSICIAN ASSISTANT

## 2023-08-05 RX ORDER — TETRAKIS(2-METHOXYISOBUTYLISOCYANIDE)COPPER(I) TETRAFLUOROBORATE 1 MG/ML
10.7 INJECTION, POWDER, LYOPHILIZED, FOR SOLUTION INTRAVENOUS
Status: COMPLETED | OUTPATIENT
Start: 2023-08-05 | End: 2023-08-05

## 2023-08-05 RX ORDER — ASPIRIN 81 MG/1
81 TABLET, CHEWABLE ORAL DAILY
Qty: 30 TABLET | Refills: 3 | Status: SHIPPED | OUTPATIENT
Start: 2023-08-05

## 2023-08-05 RX ORDER — TETRAKIS(2-METHOXYISOBUTYLISOCYANIDE)COPPER(I) TETRAFLUOROBORATE 1 MG/ML
34.5 INJECTION, POWDER, LYOPHILIZED, FOR SOLUTION INTRAVENOUS
Status: COMPLETED | OUTPATIENT
Start: 2023-08-05 | End: 2023-08-05

## 2023-08-05 RX ADMIN — FOLIC ACID 1000 MCG: 1 TABLET ORAL at 11:50

## 2023-08-05 RX ADMIN — ASPIRIN 81 MG: 81 TABLET, CHEWABLE ORAL at 11:50

## 2023-08-05 RX ADMIN — ENALAPRIL MALEATE 20 MG: 10 TABLET ORAL at 11:50

## 2023-08-05 RX ADMIN — HYDROXYUREA 500 MG: 500 CAPSULE ORAL at 11:50

## 2023-08-05 RX ADMIN — SODIUM CHLORIDE: 9 INJECTION, SOLUTION INTRAVENOUS at 02:03

## 2023-08-05 RX ADMIN — Medication 500 MCG: at 11:50

## 2023-08-05 RX ADMIN — CLONIDINE HYDROCHLORIDE 0.2 MG: 0.2 TABLET ORAL at 11:50

## 2023-08-05 RX ADMIN — Medication 10.7 MILLICURIE: at 08:00

## 2023-08-05 RX ADMIN — Medication 34.5 MILLICURIE: at 09:42

## 2023-08-05 ASSESSMENT — PAIN SCALES - GENERAL
PAINLEVEL_OUTOF10: 0

## 2023-08-05 NOTE — PROGRESS NOTES
Discharge paperwork gone over with patient and spouse. All questions answered. Informed patient to follow up with family practitioner in one week. IV removed, telemetry removed.

## 2023-08-05 NOTE — PLAN OF CARE
Problem: Discharge Planning  Goal: Discharge to home or other facility with appropriate resources  8/4/2023 2331 by Alber Adames RN  Outcome: Progressing  Note: Patient plans to discharge home following stress test.  No barriers noted at this time. Problem: Pain  Goal: Verbalizes/displays adequate comfort level or baseline comfort level  8/4/2023 2331 by Alber Adames RN  Outcome: Progressing  Note: Patient has no complaints of angina. RN will continue to monitor during hourly rounding.

## 2023-08-07 ENCOUNTER — CLINICAL DOCUMENTATION ONLY (OUTPATIENT)
Facility: CLINIC | Age: 68
End: 2023-08-07

## 2023-08-08 ENCOUNTER — TELEPHONE (OUTPATIENT)
Dept: FAMILY MEDICINE CLINIC | Age: 68
End: 2023-08-08

## 2023-08-08 NOTE — TELEPHONE ENCOUNTER
Gian REYES [facility] Clinical Staff  Patient discharged from [facility] on 8/04, disposition home. Transition Care Management care coordinator completed assessment and uploaded documentation to media tab. Note; patient do not have a follow up appt scheduled within 7 business days of discharge, please reach to patient for earlier appt. Thank you;     Dex Lopez,  [address] left for Pt to call the office.

## 2023-08-11 LAB
EKG ATRIAL RATE: 61 BPM
EKG ATRIAL RATE: 67 BPM
EKG P AXIS: 26 DEGREES
EKG P AXIS: 35 DEGREES
EKG P-R INTERVAL: 170 MS
EKG P-R INTERVAL: 182 MS
EKG Q-T INTERVAL: 390 MS
EKG Q-T INTERVAL: 442 MS
EKG QRS DURATION: 82 MS
EKG QRS DURATION: 86 MS
EKG QTC CALCULATION (BAZETT): 412 MS
EKG QTC CALCULATION (BAZETT): 444 MS
EKG R AXIS: -15 DEGREES
EKG R AXIS: 7 DEGREES
EKG T AXIS: 16 DEGREES
EKG T AXIS: 53 DEGREES
EKG VENTRICULAR RATE: 61 BPM
EKG VENTRICULAR RATE: 67 BPM

## 2023-08-14 ENCOUNTER — OFFICE VISIT (OUTPATIENT)
Dept: FAMILY MEDICINE CLINIC | Age: 68
End: 2023-08-14
Payer: MEDICARE

## 2023-08-14 ENCOUNTER — TELEPHONE (OUTPATIENT)
Dept: ONCOLOGY | Age: 68
End: 2023-08-14

## 2023-08-14 VITALS
SYSTOLIC BLOOD PRESSURE: 126 MMHG | BODY MASS INDEX: 30.52 KG/M2 | DIASTOLIC BLOOD PRESSURE: 64 MMHG | RESPIRATION RATE: 18 BRPM | TEMPERATURE: 97.6 F | HEART RATE: 72 BPM | OXYGEN SATURATION: 98 % | WEIGHT: 183.4 LBS

## 2023-08-14 DIAGNOSIS — I10 ESSENTIAL HYPERTENSION: ICD-10-CM

## 2023-08-14 DIAGNOSIS — R07.9 CHEST PAIN, UNSPECIFIED TYPE: Primary | ICD-10-CM

## 2023-08-14 DIAGNOSIS — I27.82 OTHER CHRONIC PULMONARY EMBOLISM, UNSPECIFIED WHETHER ACUTE COR PULMONALE PRESENT (HCC): ICD-10-CM

## 2023-08-14 PROCEDURE — 3074F SYST BP LT 130 MM HG: CPT | Performed by: FAMILY MEDICINE

## 2023-08-14 PROCEDURE — 3078F DIAST BP <80 MM HG: CPT | Performed by: FAMILY MEDICINE

## 2023-08-14 PROCEDURE — 1090F PRES/ABSN URINE INCON ASSESS: CPT | Performed by: FAMILY MEDICINE

## 2023-08-14 PROCEDURE — 3017F COLORECTAL CA SCREEN DOC REV: CPT | Performed by: FAMILY MEDICINE

## 2023-08-14 PROCEDURE — 99214 OFFICE O/P EST MOD 30 MIN: CPT | Performed by: FAMILY MEDICINE

## 2023-08-14 PROCEDURE — G8417 CALC BMI ABV UP PARAM F/U: HCPCS | Performed by: FAMILY MEDICINE

## 2023-08-14 PROCEDURE — 1123F ACP DISCUSS/DSCN MKR DOCD: CPT | Performed by: FAMILY MEDICINE

## 2023-08-14 PROCEDURE — G8400 PT W/DXA NO RESULTS DOC: HCPCS | Performed by: FAMILY MEDICINE

## 2023-08-14 PROCEDURE — 1036F TOBACCO NON-USER: CPT | Performed by: FAMILY MEDICINE

## 2023-08-14 PROCEDURE — G8427 DOCREV CUR MEDS BY ELIG CLIN: HCPCS | Performed by: FAMILY MEDICINE

## 2023-08-14 ASSESSMENT — ENCOUNTER SYMPTOMS
EYE PAIN: 0
SHORTNESS OF BREATH: 1
DIARRHEA: 0
SORE THROAT: 0
BLOOD IN STOOL: 0
NAUSEA: 0
COUGH: 0
ABDOMINAL PAIN: 0
CONSTIPATION: 0
BACK PAIN: 0
RHINORRHEA: 0
WHEEZING: 0
CHEST TIGHTNESS: 0
VOMITING: 0

## 2023-08-14 NOTE — PROGRESS NOTES
Doreen Mortensen (:  1955) is a 69 y.o. female,Established patient, here for evaluation of the following chief complaint(s):  Follow-up (ED follow up 2023 arm pain, rash on legs)         ASSESSMENT/PLAN:  1. Chest pain, unspecified type  -unknown diagnosis/prognosis, non cardiac, pains have subsided, -monitor sxs, call if not improving    2. Other chronic pulmonary embolism, unspecified whether acute cor pulmonale present (HCC)  -stable, controlled on eliquis  3. Essential hypertension  -stable, controlled on amlodipine, clonidine and enalapril    No follow-ups on file. Subjective   SUBJECTIVE/OBJECTIVE:  HPI    Patient here today for a check up. Reviewed BMI of 31. Encouraged diet, exercise and weight loss. Follow up of ED for chest pains and arm pains. Reviewed Blood work, cxr , CTA of chest, EKG, stress test and Echo. Is not cardiac in nature. Rash on her legs, some itch, sob, feeling not right. Does have polycythemia, sees hematology. , non smoker, pmh reviewed. Review of Systems   Constitutional:  Negative for chills, fatigue, fever and unexpected weight change. HENT:  Negative for congestion, ear pain, rhinorrhea and sore throat. Eyes:  Negative for pain and visual disturbance. Respiratory:  Positive for shortness of breath. Negative for cough, chest tightness and wheezing. Cardiovascular:  Positive for chest pain. Negative for palpitations. Gastrointestinal:  Negative for abdominal pain, blood in stool, constipation, diarrhea, nausea and vomiting. Genitourinary:  Negative for difficulty urinating, frequency, hematuria and urgency. Musculoskeletal:  Negative for back pain, joint swelling, myalgias and neck pain. Skin:  Negative for rash. Neurological:  Negative for dizziness and headaches. Hematological:  Negative for adenopathy. Does not bruise/bleed easily. Psychiatric/Behavioral:  Negative for behavioral problems and sleep disturbance.

## 2023-08-14 NOTE — TELEPHONE ENCOUNTER
Patient has been having bilateral itchy rashes on her legs off and on for a few weeks. She saw her PCP this morning and they recommended she call us because they \"think it's a vascular problem\". Please advise.

## 2023-08-18 ENCOUNTER — HOSPITAL ENCOUNTER (OUTPATIENT)
Age: 68
Discharge: HOME | End: 2023-08-18
Payer: MEDICARE

## 2023-08-18 ENCOUNTER — HOSPITAL ENCOUNTER (OUTPATIENT)
Dept: INFUSION THERAPY | Age: 68
Discharge: HOME | End: 2023-08-18
Payer: MEDICARE

## 2023-08-18 ENCOUNTER — OFFICE VISIT (OUTPATIENT)
Dept: ONCOLOGY | Age: 68
End: 2023-08-18
Payer: MEDICARE

## 2023-08-18 VITALS
TEMPERATURE: 97.8 F | OXYGEN SATURATION: 97 % | WEIGHT: 182.8 LBS | RESPIRATION RATE: 20 BRPM | DIASTOLIC BLOOD PRESSURE: 58 MMHG | SYSTOLIC BLOOD PRESSURE: 116 MMHG | HEART RATE: 73 BPM | BODY MASS INDEX: 30.46 KG/M2 | HEIGHT: 65 IN

## 2023-08-18 VITALS
SYSTOLIC BLOOD PRESSURE: 116 MMHG | HEIGHT: 65 IN | HEART RATE: 73 BPM | DIASTOLIC BLOOD PRESSURE: 58 MMHG | WEIGHT: 182.8 LBS | OXYGEN SATURATION: 97 % | BODY MASS INDEX: 30.46 KG/M2 | RESPIRATION RATE: 20 BRPM | TEMPERATURE: 97.8 F

## 2023-08-18 DIAGNOSIS — D45 POLYCYTHEMIA VERA (HCC): Primary | ICD-10-CM

## 2023-08-18 DIAGNOSIS — D45 POLYCYTHEMIA VERA (HCC): ICD-10-CM

## 2023-08-18 LAB
BASOPHILS ABSOLUTE: 0.3 THOU/MM3 (ref 0–0.1)
BASOPHILS NFR BLD AUTO: 1.5 %
DEPRECATED RDW RBC AUTO: 50.4 FL (ref 35–45)
EOSINOPHIL NFR BLD AUTO: 2.7 %
EOSINOPHILS ABSOLUTE: 0.6 THOU/MM3 (ref 0–0.4)
ERYTHROCYTE [DISTWIDTH] IN BLOOD BY AUTOMATED COUNT: 19.9 % (ref 11.5–14.5)
HCT VFR BLD AUTO: 44.3 % (ref 37–47)
HGB BLD-MCNC: 12.9 GM/DL (ref 12–16)
IMM GRANULOCYTES # BLD AUTO: 0.42 THOU/MM3 (ref 0–0.07)
IMM GRANULOCYTES NFR BLD AUTO: 1.9 %
LYMPHOCYTES ABSOLUTE: 2.1 THOU/MM3 (ref 1–4.8)
LYMPHOCYTES NFR BLD AUTO: 9.5 %
MCH RBC QN AUTO: 22.5 PG (ref 26–33)
MCHC RBC AUTO-ENTMCNC: 29.1 GM/DL (ref 32.2–35.5)
MCV RBC AUTO: 77.3 FL (ref 81–99)
MONOCYTES ABSOLUTE: 0.9 THOU/MM3 (ref 0.4–1.3)
MONOCYTES NFR BLD AUTO: 4.1 %
NEUTROPHILS NFR BLD AUTO: 80.3 %
NRBC BLD AUTO-RTO: 0 /100 WBC
PLATELET # BLD AUTO: 295 THOU/MM3 (ref 130–400)
PMV BLD AUTO: 11.5 FL (ref 9.4–12.4)
RBC # BLD AUTO: 5.73 MILL/MM3 (ref 4.2–5.4)
SEGMENTED NEUTROPHILS ABSOLUTE COUNT: 17.7 THOU/MM3 (ref 1.8–7.7)
WBC # BLD AUTO: 22 THOU/MM3 (ref 4.8–10.8)

## 2023-08-18 PROCEDURE — 1036F TOBACCO NON-USER: CPT | Performed by: NURSE PRACTITIONER

## 2023-08-18 PROCEDURE — G8427 DOCREV CUR MEDS BY ELIG CLIN: HCPCS | Performed by: NURSE PRACTITIONER

## 2023-08-18 PROCEDURE — 85025 COMPLETE CBC W/AUTO DIFF WBC: CPT

## 2023-08-18 PROCEDURE — 99211 OFF/OP EST MAY X REQ PHY/QHP: CPT

## 2023-08-18 PROCEDURE — 3017F COLORECTAL CA SCREEN DOC REV: CPT | Performed by: NURSE PRACTITIONER

## 2023-08-18 PROCEDURE — 1123F ACP DISCUSS/DSCN MKR DOCD: CPT | Performed by: NURSE PRACTITIONER

## 2023-08-18 PROCEDURE — 3078F DIAST BP <80 MM HG: CPT | Performed by: NURSE PRACTITIONER

## 2023-08-18 PROCEDURE — G8417 CALC BMI ABV UP PARAM F/U: HCPCS | Performed by: NURSE PRACTITIONER

## 2023-08-18 PROCEDURE — 3074F SYST BP LT 130 MM HG: CPT | Performed by: NURSE PRACTITIONER

## 2023-08-18 PROCEDURE — 36415 COLL VENOUS BLD VENIPUNCTURE: CPT

## 2023-08-18 PROCEDURE — 1090F PRES/ABSN URINE INCON ASSESS: CPT | Performed by: NURSE PRACTITIONER

## 2023-08-18 PROCEDURE — 99214 OFFICE O/P EST MOD 30 MIN: CPT | Performed by: NURSE PRACTITIONER

## 2023-08-18 PROCEDURE — G8400 PT W/DXA NO RESULTS DOC: HCPCS | Performed by: NURSE PRACTITIONER

## 2023-08-18 NOTE — PATIENT INSTRUCTIONS
Return to clinic as scheduled to see Dr. Anita Madison  2. Recommend seeing [address] since PCP thinks legs are possibly a \"vascular problem\"  3. Avoid hot water in showers, shower with warm water  4. Use lotions after shower  5. Recommend over the counter antihistamine such as zyrtec, allegra, claritin, or xyzal  6.   Can try eczema cream/lotion

## 2023-08-18 NOTE — PROGRESS NOTES
[facility] [address] [place] [address] [address]  [address] [facility] [address] [address]  Dept: [phone]  Dept Fax: [phone]: [phone]      Visit Date:8/18/2023     Noe Austin is a 69 y.o. female who presents today for:   Chief Complaint   Patient presents with    Follow-up     Polycythemia vera ([address])        HPI:   Noe Austin is a 69 y.o. female who follows in our office with polycythemia. HPI per prior note in our office:      [facility]     2010. Deep vein thrombosis for which she had been treated with Coumadin for 1 year. This manifested as pain and swelling in her left leg. Since before 2012. Followed by Dr. Zenia Velasco and treated with hydroxyurea 500 mg p.o. daily. 8/18/2014 through 8/20/2014. Admitted with increasing shortness of breath. She was unable to have a CTA because of abnormal renal function. She had a VQ scan which was high probability for pulmonary embolus and she was started on heparin and transition to Xarelto. During hospitalization her renal function improved. 10/14/2014. Hemoglobin 14.6 hematocrit 44.6 white count and platelet count were normal.    1/5/2015. Hemoglobin 14.0 hematocrit 42.3 with normal white count and platelet count. August 2015. Hydroxyurea discontinued because of skin breakdown in the left lower extremity with darkening of the skin and sores in the left ankle. Hemoglobin 14.9 and hematocrit 44.3.    10/15/2015. Began seeing Dr. Juan Luis Davis referred by Dr. Katrina Cooley her primary care physician. She felt well. Her skin lesions were improving. She was continued on Xarelto 20 mg a day without any significant bleeding except for 1 episode of epistaxis which was not prolonged. 1/14/2016. CBC showed a white count of 13,000 with a hemoglobin of 19.4 and hematocrit of 60.1 and a platelet count of 704,079. She was started on phlebotomies at that time weekly x3.   Her medication list at that time did not include

## 2023-09-05 ENCOUNTER — HOSPITAL ENCOUNTER (OUTPATIENT)
Dept: INFUSION THERAPY | Age: 68
Discharge: HOME | End: 2023-09-05
Payer: MEDICARE

## 2023-09-05 VITALS
DIASTOLIC BLOOD PRESSURE: 56 MMHG | WEIGHT: 183 LBS | SYSTOLIC BLOOD PRESSURE: 111 MMHG | HEIGHT: 65 IN | HEART RATE: 65 BPM | TEMPERATURE: 99.1 F | RESPIRATION RATE: 18 BRPM | OXYGEN SATURATION: 100 % | BODY MASS INDEX: 30.49 KG/M2

## 2023-09-05 DIAGNOSIS — D45 POLYCYTHEMIA VERA (HCC): ICD-10-CM

## 2023-09-05 DIAGNOSIS — D75.1 POLYCYTHEMIA: ICD-10-CM

## 2023-09-05 LAB
BASOPHILS ABSOLUTE: 0.2 THOU/MM3 (ref 0–0.1)
BASOPHILS NFR BLD AUTO: 0.8 %
DEPRECATED RDW RBC AUTO: 51.4 FL (ref 35–45)
ELLIPTOCYTES: ABNORMAL
EOSINOPHIL NFR BLD AUTO: 0.8 %
EOSINOPHILS ABSOLUTE: 0.2 THOU/MM3 (ref 0–0.4)
ERYTHROCYTE [DISTWIDTH] IN BLOOD BY AUTOMATED COUNT: 19.4 % (ref 11.5–14.5)
FERRITIN SERPL IA-MCNC: 37 NG/ML (ref 10–291)
HCT VFR BLD AUTO: 43.2 % (ref 37–47)
HGB BLD-MCNC: 12.3 GM/DL (ref 12–16)
HYPOCHROMIA BLD QL SMEAR: PRESENT
IMM GRANULOCYTES # BLD AUTO: 0.27 THOU/MM3 (ref 0–0.07)
IMM GRANULOCYTES NFR BLD AUTO: 1.4 %
IRON SATN MFR SERPL: 5 % (ref 20–50)
IRON SERPL-MCNC: 18 UG/DL (ref 50–170)
LYMPHOCYTES ABSOLUTE: 1.3 THOU/MM3 (ref 1–4.8)
LYMPHOCYTES NFR BLD AUTO: 6.9 %
MCH RBC QN AUTO: 22.4 PG (ref 26–33)
MCHC RBC AUTO-ENTMCNC: 28.5 GM/DL (ref 32.2–35.5)
MCV RBC AUTO: 78.5 FL (ref 81–99)
MONOCYTES ABSOLUTE: 1.1 THOU/MM3 (ref 0.4–1.3)
MONOCYTES NFR BLD AUTO: 5.5 %
NEUTROPHILS NFR BLD AUTO: 84.6 %
NRBC BLD AUTO-RTO: 0 /100 WBC
PLATELET # BLD AUTO: 175 THOU/MM3 (ref 130–400)
PLATELET BLD QL SMEAR: ADEQUATE
PMV BLD AUTO: ABNORMAL FL (ref 9.4–12.4)
RBC # BLD AUTO: 5.5 MILL/MM3 (ref 4.2–5.4)
SCAN OF BLOOD SMEAR: NORMAL
SEGMENTED NEUTROPHILS ABSOLUTE COUNT: 16.4 THOU/MM3 (ref 1.8–7.7)
TARGETS BLD QL SMEAR: ABNORMAL
TIBC SERPL-MCNC: 337 UG/DL (ref 171–450)
WBC # BLD AUTO: 19.4 THOU/MM3 (ref 4.8–10.8)

## 2023-09-05 PROCEDURE — 82728 ASSAY OF FERRITIN: CPT

## 2023-09-05 PROCEDURE — 83540 ASSAY OF IRON: CPT

## 2023-09-05 PROCEDURE — 83550 IRON BINDING TEST: CPT

## 2023-09-05 PROCEDURE — 36415 COLL VENOUS BLD VENIPUNCTURE: CPT

## 2023-09-05 PROCEDURE — 85025 COMPLETE CBC W/AUTO DIFF WBC: CPT

## 2023-10-02 DIAGNOSIS — D45 POLYCYTHEMIA VERA (HCC): Primary | ICD-10-CM

## 2023-10-02 NOTE — PROGRESS NOTES
hydroxyurea daily -ONGOING  (other regimens could be peginterferon [place]-2a, or anagrelide))  -monitor and manage cardiovascular risk factors  -monitor for new thrombosis, acquired VES and/or disease-related major bleeding  -monitor for response, s/s of disease progression Q3-6 mo    -We discussed leukocytosis-could possibly be part of PV, however it is higher than it has been couple years ago, recommend we at least rule out CML given there is basophilia and immature granulocytes by doing a BCR abl, also given her chronic rash think it is reasonable to do an ESR and CRP. Patient agreed was drawn today we will rediscuss on follow-up  -Discussed her nosebleeds -this is the time year where patient is get more nosebleeds, she is also on aspirin and Eliquis. H/H not elevated today- no there phleb.  -Discussed briefly may need to go up on hydroxyurea but will wait until labs drawn today are done and we will discuss on follow-up visit in approximately 6 weeks    2) acute on chronic BLE reddened rash 8/2023, improved from prior complaint of worsening but does wax/wane  - Recommend seeing [address]. -- previously provided recommendations for possible eczema related to Hydrea use, to include avoiding hot showers, lotion after showers, OTC antihistamine as well as OTC eczema cream.  Discussed PCV related rash likely lifelong issue. 3) Hematology diagnosis: iron deficiency anemia secondary to therapeutic phlebotomy, exacerbated by hydrea use  -hgb, mcv, and iron studies all showing MIRNA 9/2023-patient does not want start any more pills, she wishes instead to eat iron rich foods.  -We will repeat some iron studies and anemia studies on return to clinic in approximately 6 weeks. She will get them on the day of coming into clinic. 4) Comorbidities: hypertension, basal cell carcinoma, homocystinemia, monocular exotropia, history of smoking which she stopped in 2009    5) Medications reviewed.    Prescriptions

## 2023-10-05 ENCOUNTER — OFFICE VISIT (OUTPATIENT)
Dept: ONCOLOGY | Age: 68
End: 2023-10-05
Payer: MEDICARE

## 2023-10-05 ENCOUNTER — HOSPITAL ENCOUNTER (OUTPATIENT)
Dept: INFUSION THERAPY | Age: 68
Discharge: HOME | End: 2023-10-05
Payer: MEDICARE

## 2023-10-05 VITALS
TEMPERATURE: 97.9 F | OXYGEN SATURATION: 100 % | HEART RATE: 70 BPM | DIASTOLIC BLOOD PRESSURE: 66 MMHG | RESPIRATION RATE: 16 BRPM | SYSTOLIC BLOOD PRESSURE: 129 MMHG

## 2023-10-05 VITALS
DIASTOLIC BLOOD PRESSURE: 66 MMHG | SYSTOLIC BLOOD PRESSURE: 129 MMHG | RESPIRATION RATE: 16 BRPM | HEIGHT: 65 IN | WEIGHT: 185 LBS | OXYGEN SATURATION: 100 % | BODY MASS INDEX: 30.82 KG/M2 | TEMPERATURE: 97.9 F | HEART RATE: 70 BPM

## 2023-10-05 DIAGNOSIS — D72.829 LEUKOCYTOSIS, UNSPECIFIED TYPE: ICD-10-CM

## 2023-10-05 DIAGNOSIS — D45 POLYCYTHEMIA VERA (HCC): ICD-10-CM

## 2023-10-05 DIAGNOSIS — D50.9 IRON DEFICIENCY ANEMIA, UNSPECIFIED IRON DEFICIENCY ANEMIA TYPE: ICD-10-CM

## 2023-10-05 DIAGNOSIS — D45 POLYCYTHEMIA VERA (HCC): Primary | ICD-10-CM

## 2023-10-05 DIAGNOSIS — I82.402 DEEP VEIN THROMBOSIS (DVT) OF LEFT LOWER EXTREMITY, UNSPECIFIED CHRONICITY, UNSPECIFIED VEIN (HCC): ICD-10-CM

## 2023-10-05 LAB
ALBUMIN SERPL BCG-MCNC: 4.2 G/DL (ref 3.5–5.1)
ALP SERPL-CCNC: 90 U/L (ref 38–126)
ALT SERPL W/O P-5'-P-CCNC: 13 U/L (ref 11–66)
AST SERPL-CCNC: 20 U/L (ref 5–40)
BASOPHILS ABSOLUTE: 0.3 THOU/MM3 (ref 0–0.1)
BASOPHILS NFR BLD AUTO: 1.6 %
BILIRUB CONJ SERPL-MCNC: < 0.2 MG/DL (ref 0–0.3)
BILIRUB SERPL-MCNC: 0.5 MG/DL (ref 0.3–1.2)
BUN BLDP-MCNC: 39 MG/DL (ref 8–26)
CHLORIDE BLD-SCNC: 111 MEQ/L (ref 98–109)
CREAT BLD-MCNC: 1.3 MG/DL (ref 0.5–1.2)
CRP SERPL-MCNC: < 0.3 MG/DL (ref 0–1)
DEPRECATED RDW RBC AUTO: 55.8 FL (ref 35–45)
EOSINOPHIL NFR BLD AUTO: 3.3 %
EOSINOPHILS ABSOLUTE: 0.7 THOU/MM3 (ref 0–0.4)
ERYTHROCYTE [DISTWIDTH] IN BLOOD BY AUTOMATED COUNT: 21.3 % (ref 11.5–14.5)
ERYTHROCYTE [SEDIMENTATION RATE] IN BLOOD BY WESTERGREN METHOD: 1 MM/HR (ref 0–20)
GFR SERPL CREATININE-BSD FRML MDRD: 44 ML/MIN/1.73M2
GLUCOSE BLD-MCNC: 61 MG/DL (ref 70–108)
HCT VFR BLD AUTO: 46.8 % (ref 37–47)
HGB BLD-MCNC: 13.4 GM/DL (ref 12–16)
HYPOCHROMIA BLD QL SMEAR: PRESENT
IMM GRANULOCYTES # BLD AUTO: 0.4 THOU/MM3 (ref 0–0.07)
IMM GRANULOCYTES NFR BLD AUTO: 2 %
IONIZED CALCIUM, WHOLE BLOOD: 1.38 MMOL/L (ref 1.12–1.32)
LYMPHOCYTES ABSOLUTE: 2.1 THOU/MM3 (ref 1–4.8)
LYMPHOCYTES NFR BLD AUTO: 10.6 %
MCH RBC QN AUTO: 22.5 PG (ref 26–33)
MCHC RBC AUTO-ENTMCNC: 28.6 GM/DL (ref 32.2–35.5)
MCV RBC AUTO: 78.5 FL (ref 81–99)
MONOCYTES ABSOLUTE: 1 THOU/MM3 (ref 0.4–1.3)
MONOCYTES NFR BLD AUTO: 5.2 %
NEUTROPHILS NFR BLD AUTO: 77.3 %
NRBC BLD AUTO-RTO: 0 /100 WBC
PLATELET # BLD AUTO: 261 THOU/MM3 (ref 130–400)
PMV BLD AUTO: ABNORMAL FL (ref 9.4–12.4)
POTASSIUM BLD-SCNC: 4.6 MEQ/L (ref 3.5–4.9)
PROT SERPL-MCNC: 6.9 G/DL (ref 6.1–8)
RBC # BLD AUTO: 5.96 MILL/MM3 (ref 4.2–5.4)
SEGMENTED NEUTROPHILS ABSOLUTE COUNT: 15.4 THOU/MM3 (ref 1.8–7.7)
SODIUM BLD-SCNC: 141 MEQ/L (ref 138–146)
TOTAL CO2, WHOLE BLOOD: 22 MEQ/L (ref 23–33)
WBC # BLD AUTO: 19.9 THOU/MM3 (ref 4.8–10.8)

## 2023-10-05 PROCEDURE — G8484 FLU IMMUNIZE NO ADMIN: HCPCS | Performed by: INTERNAL MEDICINE

## 2023-10-05 PROCEDURE — 3078F DIAST BP <80 MM HG: CPT | Performed by: INTERNAL MEDICINE

## 2023-10-05 PROCEDURE — 99214 OFFICE O/P EST MOD 30 MIN: CPT | Performed by: INTERNAL MEDICINE

## 2023-10-05 PROCEDURE — 1036F TOBACCO NON-USER: CPT | Performed by: INTERNAL MEDICINE

## 2023-10-05 PROCEDURE — G8427 DOCREV CUR MEDS BY ELIG CLIN: HCPCS | Performed by: INTERNAL MEDICINE

## 2023-10-05 PROCEDURE — 3017F COLORECTAL CA SCREEN DOC REV: CPT | Performed by: INTERNAL MEDICINE

## 2023-10-05 PROCEDURE — 3074F SYST BP LT 130 MM HG: CPT | Performed by: INTERNAL MEDICINE

## 2023-10-05 PROCEDURE — G8417 CALC BMI ABV UP PARAM F/U: HCPCS | Performed by: INTERNAL MEDICINE

## 2023-10-05 PROCEDURE — 1090F PRES/ABSN URINE INCON ASSESS: CPT | Performed by: INTERNAL MEDICINE

## 2023-10-05 PROCEDURE — 99211 OFF/OP EST MAY X REQ PHY/QHP: CPT

## 2023-10-05 PROCEDURE — G8400 PT W/DXA NO RESULTS DOC: HCPCS | Performed by: INTERNAL MEDICINE

## 2023-10-05 PROCEDURE — 1123F ACP DISCUSS/DSCN MKR DOCD: CPT | Performed by: INTERNAL MEDICINE

## 2023-10-05 NOTE — PATIENT INSTRUCTIONS
No phlebotomy today hgb 13  Iron rich foods until you come again  RTC in appprox 6 weeks for review labs today, new labs FASTING

## 2023-10-15 LAB — MISC. #1 REFERENCE GROUP TEST: NORMAL

## 2023-11-13 NOTE — PROGRESS NOTES
[facility] CANCER [place] [address]  Dept: [phone]  Loc: [phone]   Hematology/Oncology Consult (Clinic)        11/16/23       Kaleigh Mcgee   1955     No ref. provider found   Angelo Philippe MD       Reason:   Chief Complaint   Patient presents with    Follow-up     Polycythemia vera ([address])      HPI: Kaleigh Mcgee is a 69 y.o. female with past medical history significant for hypertension, basal cell carcinoma, homocystinemia, monocular exotropia, history of smoking (stopped in 2009) who follows in our office with polycythemia.       -2010. Deep vein thrombosis for which she had been treated with Coumadin for 1 year. This manifested as pain and swelling in her left leg.  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 Followed by Dr. Jj Carrillo and treated with hydroxyurea 500 mg p.o. daily.     -8/18/2014 - 8/20/2014. Admitted with increasing shortness of breath. She was unable to have a CTA because of abnormal renal function. She had a VQ scan which was high probability for pulmonary embolus and she was started on heparin and transition to Xarelto. During hospitalization her renal function improved. -10/14/2014. Hemoglobin 14.6 hematocrit 44.6 white count and platelet count were normal.    -1/5/2015. Hemoglobin 14.0 hematocrit 42.3 with normal white count and platelet count. -8/2015. Hydroxyurea discontinued because of skin breakdown in the left lower extremity with darkening of the skin and sores in the left ankle. Hemoglobin 14.9 and hematocrit 44.3.    10/15/2015. Began seeing Dr. Effie Romero referred by Dr. Radha Kumar her primary care physician. She felt well. Her skin lesions were improving. She was continued on Xarelto 20 mg a day without any significant bleeding except for 1 episode of epistaxis which was not prolonged. 1/14/2016.   CBC showed a white count of

## 2023-11-16 ENCOUNTER — HOSPITAL ENCOUNTER (OUTPATIENT)
Dept: INFUSION THERAPY | Age: 68
Discharge: HOME | End: 2023-11-16
Payer: MEDICARE

## 2023-11-16 ENCOUNTER — OFFICE VISIT (OUTPATIENT)
Dept: ONCOLOGY | Age: 68
End: 2023-11-16
Payer: MEDICARE

## 2023-11-16 VITALS
TEMPERATURE: 98.1 F | DIASTOLIC BLOOD PRESSURE: 74 MMHG | SYSTOLIC BLOOD PRESSURE: 158 MMHG | RESPIRATION RATE: 16 BRPM | OXYGEN SATURATION: 97 % | HEART RATE: 73 BPM

## 2023-11-16 VITALS
HEART RATE: 73 BPM | TEMPERATURE: 98.1 F | RESPIRATION RATE: 16 BRPM | OXYGEN SATURATION: 97 % | WEIGHT: 185.4 LBS | DIASTOLIC BLOOD PRESSURE: 74 MMHG | BODY MASS INDEX: 30.89 KG/M2 | SYSTOLIC BLOOD PRESSURE: 158 MMHG | HEIGHT: 65 IN

## 2023-11-16 DIAGNOSIS — D45 POLYCYTHEMIA VERA (HCC): Primary | ICD-10-CM

## 2023-11-16 DIAGNOSIS — D50.9 IRON DEFICIENCY ANEMIA, UNSPECIFIED IRON DEFICIENCY ANEMIA TYPE: ICD-10-CM

## 2023-11-16 DIAGNOSIS — D45 POLYCYTHEMIA VERA (HCC): ICD-10-CM

## 2023-11-16 LAB
ALBUMIN SERPL BCG-MCNC: 4.1 G/DL (ref 3.5–5.1)
ALP SERPL-CCNC: 96 U/L (ref 38–126)
ALT SERPL W/O P-5'-P-CCNC: 19 U/L (ref 11–66)
AST SERPL-CCNC: 25 U/L (ref 5–40)
BASOPHILS ABSOLUTE: 0.2 THOU/MM3 (ref 0–0.1)
BASOPHILS NFR BLD AUTO: 1.3 %
BILIRUB CONJ SERPL-MCNC: < 0.2 MG/DL (ref 0–0.3)
BILIRUB SERPL-MCNC: 0.6 MG/DL (ref 0.3–1.2)
BUN BLDP-MCNC: 27 MG/DL (ref 8–26)
CHLORIDE BLD-SCNC: 109 MEQ/L (ref 98–109)
CREAT BLD-MCNC: 0.9 MG/DL (ref 0.5–1.2)
DEPRECATED RDW RBC AUTO: 54.6 FL (ref 35–45)
EOSINOPHIL NFR BLD AUTO: 2.6 %
EOSINOPHILS ABSOLUTE: 0.5 THOU/MM3 (ref 0–0.4)
ERYTHROCYTE [DISTWIDTH] IN BLOOD BY AUTOMATED COUNT: 20.8 % (ref 11.5–14.5)
FERRITIN SERPL IA-MCNC: 25 NG/ML (ref 10–291)
FOLATE SERPL-MCNC: > 20 NG/ML (ref 4.8–24.2)
GFR SERPL CREATININE-BSD FRML MDRD: > 60 ML/MIN/1.73M2
GLUCOSE BLD-MCNC: 123 MG/DL (ref 70–108)
HCT VFR BLD AUTO: 46.4 % (ref 37–47)
HGB BLD-MCNC: 13.5 GM/DL (ref 12–16)
HGB RETIC QN AUTO: 24.6 PG (ref 28.2–35.7)
IMM GRANULOCYTES # BLD AUTO: 0.29 THOU/MM3 (ref 0–0.07)
IMM GRANULOCYTES NFR BLD AUTO: 1.6 %
IMM RETICS NFR: 23.8 % (ref 3–15.9)
IONIZED CALCIUM, WHOLE BLOOD: 1.35 MMOL/L (ref 1.12–1.32)
IRON SATN MFR SERPL: 9 % (ref 20–50)
IRON SERPL-MCNC: 38 UG/DL (ref 50–170)
LYMPHOCYTES ABSOLUTE: 1.7 THOU/MM3 (ref 1–4.8)
LYMPHOCYTES NFR BLD AUTO: 9.4 %
MCH RBC QN AUTO: 22.7 PG (ref 26–33)
MCHC RBC AUTO-ENTMCNC: 29.1 GM/DL (ref 32.2–35.5)
MCV RBC AUTO: 78 FL (ref 81–99)
MONOCYTES ABSOLUTE: 0.9 THOU/MM3 (ref 0.4–1.3)
MONOCYTES NFR BLD AUTO: 5.1 %
NEUTROPHILS NFR BLD AUTO: 80 %
NRBC BLD AUTO-RTO: 0 /100 WBC
PLATELET # BLD AUTO: 175 THOU/MM3 (ref 130–400)
PMV BLD AUTO: ABNORMAL FL (ref 9.4–12.4)
POTASSIUM BLD-SCNC: 4.6 MEQ/L (ref 3.5–4.9)
PROT SERPL-MCNC: 6.9 G/DL (ref 6.1–8)
RBC # BLD AUTO: 5.95 MILL/MM3 (ref 4.2–5.4)
RETICS # AUTO: 90 THOU/MM3 (ref 20–115)
RETICS/RBC NFR AUTO: 1.5 % (ref 0.5–2)
SEGMENTED NEUTROPHILS ABSOLUTE COUNT: 14.7 THOU/MM3 (ref 1.8–7.7)
SODIUM BLD-SCNC: 140 MEQ/L (ref 138–146)
TIBC SERPL-MCNC: 418 UG/DL (ref 171–450)
TOTAL CO2, WHOLE BLOOD: 23 MEQ/L (ref 23–33)
VIT B12 SERPL-MCNC: > 2000 PG/ML (ref 211–911)
WBC # BLD AUTO: 18.4 THOU/MM3 (ref 4.8–10.8)

## 2023-11-16 PROCEDURE — 99211 OFF/OP EST MAY X REQ PHY/QHP: CPT

## 2023-11-16 PROCEDURE — 3078F DIAST BP <80 MM HG: CPT | Performed by: INTERNAL MEDICINE

## 2023-11-16 PROCEDURE — G8484 FLU IMMUNIZE NO ADMIN: HCPCS | Performed by: INTERNAL MEDICINE

## 2023-11-16 PROCEDURE — G8427 DOCREV CUR MEDS BY ELIG CLIN: HCPCS | Performed by: INTERNAL MEDICINE

## 2023-11-16 PROCEDURE — 36415 COLL VENOUS BLD VENIPUNCTURE: CPT

## 2023-11-16 PROCEDURE — 83540 ASSAY OF IRON: CPT

## 2023-11-16 PROCEDURE — 85046 RETICYTE/HGB CONCENTRATE: CPT

## 2023-11-16 PROCEDURE — 80047 BASIC METABLC PNL IONIZED CA: CPT

## 2023-11-16 PROCEDURE — 85025 COMPLETE CBC W/AUTO DIFF WBC: CPT

## 2023-11-16 PROCEDURE — G8417 CALC BMI ABV UP PARAM F/U: HCPCS | Performed by: INTERNAL MEDICINE

## 2023-11-16 PROCEDURE — G8400 PT W/DXA NO RESULTS DOC: HCPCS | Performed by: INTERNAL MEDICINE

## 2023-11-16 PROCEDURE — 3017F COLORECTAL CA SCREEN DOC REV: CPT | Performed by: INTERNAL MEDICINE

## 2023-11-16 PROCEDURE — 1036F TOBACCO NON-USER: CPT | Performed by: INTERNAL MEDICINE

## 2023-11-16 PROCEDURE — 3077F SYST BP >= 140 MM HG: CPT | Performed by: INTERNAL MEDICINE

## 2023-11-16 PROCEDURE — 82607 VITAMIN B-12: CPT

## 2023-11-16 PROCEDURE — 1090F PRES/ABSN URINE INCON ASSESS: CPT | Performed by: INTERNAL MEDICINE

## 2023-11-16 PROCEDURE — 82728 ASSAY OF FERRITIN: CPT

## 2023-11-16 PROCEDURE — 1123F ACP DISCUSS/DSCN MKR DOCD: CPT | Performed by: INTERNAL MEDICINE

## 2023-11-16 PROCEDURE — 80076 HEPATIC FUNCTION PANEL: CPT

## 2023-11-16 PROCEDURE — 82746 ASSAY OF FOLIC ACID SERUM: CPT

## 2023-11-16 PROCEDURE — 83550 IRON BINDING TEST: CPT

## 2023-11-16 PROCEDURE — 99214 OFFICE O/P EST MOD 30 MIN: CPT | Performed by: INTERNAL MEDICINE

## 2023-11-16 PROCEDURE — 83921 ORGANIC ACID SINGLE QUANT: CPT

## 2023-11-16 RX ORDER — HYDROXYUREA 500 MG/1
500 CAPSULE ORAL DAILY
Qty: 90 CAPSULE | Refills: 3 | Status: SHIPPED | OUTPATIENT
Start: 2023-11-16

## 2023-11-16 NOTE — PATIENT INSTRUCTIONS
Please double the dose of your hydrea (2 pills a day)   Return to clinic in approx 3 weeks with labs

## 2023-11-19 LAB — METHYLMALONATE SERPL-SCNC: NORMAL

## 2023-11-30 NOTE — PROGRESS NOTES
[place] [address]  Dept: [phone]  Loc: [phone]   Hematology/Oncology Consult (Clinic)        12/06/23       Nicol Langston   1955     No ref. provider found   Byron Jones MD       Reason:   Chief Complaint   Patient presents with    Follow-up     Polycythemia vera ([address])            HPI: Nicol Langston is a 69 y.o. female with past medical history significant for hypertension, basal cell carcinoma, homocystinemia, monocular exotropia, history of smoking (stopped in 2009) who follows in our office with polycythemia.       -2010. Deep vein thrombosis for which she had been treated with Coumadin for 1 year. This manifested as pain and swelling in her left leg.  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 Followed by Dr. Rachel Bal and treated with hydroxyurea 500 mg p.o. daily.     -8/18/2014 - 8/20/2014. Admitted with increasing shortness of breath. She was unable to have a CTA because of abnormal renal function. She had a VQ scan which was high probability for pulmonary embolus and she was started on heparin and transition to Xarelto. During hospitalization her renal function improved. -10/14/2014. Hemoglobin 14.6 hematocrit 44.6 white count and platelet count were normal.    -1/5/2015. Hemoglobin 14.0 hematocrit 42.3 with normal white count and platelet count. -8/2015. Hydroxyurea discontinued because of skin breakdown in the left lower extremity with darkening of the skin and sores in the left ankle. Hemoglobin 14.9 and hematocrit 44.3.    10/15/2015. Began seeing Dr. Vaishnavi Valdez referred by Dr. Rachel Mims her primary care physician. She felt well. Her skin lesions were improving. She was continued on Xarelto 20 mg a day without any significant bleeding except for 1 episode of epistaxis which was not prolonged. 1/14/2016.   CBC showed a white

## 2023-12-06 ENCOUNTER — HOSPITAL ENCOUNTER (OUTPATIENT)
Dept: INFUSION THERAPY | Age: 68
Discharge: HOME | End: 2023-12-06
Payer: MEDICARE

## 2023-12-06 ENCOUNTER — OFFICE VISIT (OUTPATIENT)
Dept: ONCOLOGY | Age: 68
End: 2023-12-06
Payer: MEDICARE

## 2023-12-06 VITALS
DIASTOLIC BLOOD PRESSURE: 65 MMHG | OXYGEN SATURATION: 99 % | WEIGHT: 184 LBS | SYSTOLIC BLOOD PRESSURE: 112 MMHG | HEIGHT: 65 IN | RESPIRATION RATE: 20 BRPM | TEMPERATURE: 98.3 F | HEART RATE: 63 BPM | BODY MASS INDEX: 30.66 KG/M2

## 2023-12-06 VITALS
OXYGEN SATURATION: 99 % | RESPIRATION RATE: 20 BRPM | BODY MASS INDEX: 30.66 KG/M2 | TEMPERATURE: 98.3 F | DIASTOLIC BLOOD PRESSURE: 65 MMHG | HEIGHT: 65 IN | SYSTOLIC BLOOD PRESSURE: 112 MMHG | HEART RATE: 63 BPM | WEIGHT: 184 LBS

## 2023-12-06 DIAGNOSIS — R21 RASH AND NONSPECIFIC SKIN ERUPTION: ICD-10-CM

## 2023-12-06 DIAGNOSIS — Z51.81 ENCOUNTER FOR MONITORING OF HYDROXYUREA THERAPY: ICD-10-CM

## 2023-12-06 DIAGNOSIS — R53.81 OTHER MALAISE: ICD-10-CM

## 2023-12-06 DIAGNOSIS — D45 POLYCYTHEMIA VERA (HCC): Primary | ICD-10-CM

## 2023-12-06 DIAGNOSIS — Z79.64 ENCOUNTER FOR MONITORING OF HYDROXYUREA THERAPY: ICD-10-CM

## 2023-12-06 DIAGNOSIS — D45 POLYCYTHEMIA VERA (HCC): ICD-10-CM

## 2023-12-06 LAB
ALBUMIN SERPL BCG-MCNC: 3.8 G/DL (ref 3.5–5.1)
ALP SERPL-CCNC: 75 U/L (ref 38–126)
ALT SERPL W/O P-5'-P-CCNC: 17 U/L (ref 11–66)
ANISOCYTOSIS BLD QL SMEAR: PRESENT
AST SERPL-CCNC: 16 U/L (ref 5–40)
BASOPHILS ABSOLUTE: 0.2 THOU/MM3 (ref 0–0.1)
BASOPHILS NFR BLD AUTO: 1.6 %
BILIRUB CONJ SERPL-MCNC: < 0.2 MG/DL (ref 0–0.3)
BILIRUB SERPL-MCNC: 0.6 MG/DL (ref 0.3–1.2)
BUN BLDP-MCNC: 31 MG/DL (ref 8–26)
CHLORIDE BLD-SCNC: 109 MEQ/L (ref 98–109)
CREAT BLD-MCNC: 1.1 MG/DL (ref 0.5–1.2)
DEPRECATED RDW RBC AUTO: 59.7 FL (ref 35–45)
EOSINOPHIL NFR BLD AUTO: 2 %
EOSINOPHILS ABSOLUTE: 0.2 THOU/MM3 (ref 0–0.4)
ERYTHROCYTE [DISTWIDTH] IN BLOOD BY AUTOMATED COUNT: 22.3 % (ref 11.5–14.5)
GFR SERPL CREATININE-BSD FRML MDRD: 54 ML/MIN/1.73M2
GLUCOSE BLD-MCNC: 112 MG/DL (ref 70–108)
HCT VFR BLD AUTO: 43.2 % (ref 37–47)
HGB BLD-MCNC: 12.7 GM/DL (ref 12–16)
IMM GRANULOCYTES # BLD AUTO: 0.11 THOU/MM3 (ref 0–0.07)
IMM GRANULOCYTES NFR BLD AUTO: 0.9 %
IONIZED CALCIUM, WHOLE BLOOD: 1.38 MMOL/L (ref 1.12–1.32)
LYMPHOCYTES ABSOLUTE: 1.5 THOU/MM3 (ref 1–4.8)
LYMPHOCYTES NFR BLD AUTO: 12.4 %
MCH RBC QN AUTO: 23.1 PG (ref 26–33)
MCHC RBC AUTO-ENTMCNC: 29.4 GM/DL (ref 32.2–35.5)
MCV RBC AUTO: 78.7 FL (ref 81–99)
MONOCYTES ABSOLUTE: 0.5 THOU/MM3 (ref 0.4–1.3)
MONOCYTES NFR BLD AUTO: 4.4 %
NEUTROPHILS NFR BLD AUTO: 78.7 %
NRBC BLD AUTO-RTO: 0 /100 WBC
PLATELET # BLD AUTO: 94 THOU/MM3 (ref 130–400)
PLATELET BLD QL SMEAR: ABNORMAL
PMV BLD AUTO: ABNORMAL FL (ref 9.4–12.4)
POIKILOCYTES: ABNORMAL
POTASSIUM BLD-SCNC: 4.4 MEQ/L (ref 3.5–4.9)
PROT SERPL-MCNC: 6.6 G/DL (ref 6.1–8)
RBC # BLD AUTO: 5.49 MILL/MM3 (ref 4.2–5.4)
SCAN OF BLOOD SMEAR: NORMAL
SEGMENTED NEUTROPHILS ABSOLUTE COUNT: 9.7 THOU/MM3 (ref 1.8–7.7)
SODIUM BLD-SCNC: 140 MEQ/L (ref 138–146)
TARGETS BLD QL SMEAR: ABNORMAL
TOTAL CO2, WHOLE BLOOD: 24 MEQ/L (ref 23–33)
VARIANT LYMPHS BLD QL SMEAR: ABNORMAL %
WBC # BLD AUTO: 12.3 THOU/MM3 (ref 4.8–10.8)

## 2023-12-06 PROCEDURE — 36415 COLL VENOUS BLD VENIPUNCTURE: CPT

## 2023-12-06 PROCEDURE — 1036F TOBACCO NON-USER: CPT | Performed by: INTERNAL MEDICINE

## 2023-12-06 PROCEDURE — 3074F SYST BP LT 130 MM HG: CPT | Performed by: INTERNAL MEDICINE

## 2023-12-06 PROCEDURE — G8417 CALC BMI ABV UP PARAM F/U: HCPCS | Performed by: INTERNAL MEDICINE

## 2023-12-06 PROCEDURE — 99214 OFFICE O/P EST MOD 30 MIN: CPT | Performed by: INTERNAL MEDICINE

## 2023-12-06 PROCEDURE — G8427 DOCREV CUR MEDS BY ELIG CLIN: HCPCS | Performed by: INTERNAL MEDICINE

## 2023-12-06 PROCEDURE — G8400 PT W/DXA NO RESULTS DOC: HCPCS | Performed by: INTERNAL MEDICINE

## 2023-12-06 PROCEDURE — 80076 HEPATIC FUNCTION PANEL: CPT

## 2023-12-06 PROCEDURE — 99211 OFF/OP EST MAY X REQ PHY/QHP: CPT

## 2023-12-06 PROCEDURE — 1090F PRES/ABSN URINE INCON ASSESS: CPT | Performed by: INTERNAL MEDICINE

## 2023-12-06 PROCEDURE — 85025 COMPLETE CBC W/AUTO DIFF WBC: CPT

## 2023-12-06 PROCEDURE — 80047 BASIC METABLC PNL IONIZED CA: CPT

## 2023-12-06 PROCEDURE — G8484 FLU IMMUNIZE NO ADMIN: HCPCS | Performed by: INTERNAL MEDICINE

## 2023-12-06 PROCEDURE — 3017F COLORECTAL CA SCREEN DOC REV: CPT | Performed by: INTERNAL MEDICINE

## 2023-12-06 PROCEDURE — 3078F DIAST BP <80 MM HG: CPT | Performed by: INTERNAL MEDICINE

## 2023-12-06 PROCEDURE — 1123F ACP DISCUSS/DSCN MKR DOCD: CPT | Performed by: INTERNAL MEDICINE

## 2023-12-06 NOTE — PATIENT INSTRUCTIONS
Continue hydrea 1 pill daily now  Continue eliquis BID   RTC 3 months with labs a few days prior ([facility])

## 2024-01-05 DIAGNOSIS — I82.402 DEEP VEIN THROMBOSIS (DVT) OF LEFT LOWER EXTREMITY, UNSPECIFIED CHRONICITY, UNSPECIFIED VEIN (HCC): ICD-10-CM

## 2024-01-11 DIAGNOSIS — D75.1 POLYCYTHEMIA: ICD-10-CM

## 2024-01-12 RX ORDER — FOLIC ACID 1 MG/1
1000 TABLET ORAL DAILY
Qty: 90 TABLET | Refills: 2 | Status: SHIPPED | OUTPATIENT
Start: 2024-01-12

## 2024-02-14 ENCOUNTER — NURSE ONLY (OUTPATIENT)
Dept: LAB | Age: 69
End: 2024-02-14

## 2024-02-14 DIAGNOSIS — D45 POLYCYTHEMIA VERA (HCC): ICD-10-CM

## 2024-02-14 DIAGNOSIS — Z51.81 ENCOUNTER FOR MONITORING OF HYDROXYUREA THERAPY: ICD-10-CM

## 2024-02-14 DIAGNOSIS — R53.81 OTHER MALAISE: ICD-10-CM

## 2024-02-14 DIAGNOSIS — R21 RASH AND NONSPECIFIC SKIN ERUPTION: ICD-10-CM

## 2024-02-14 DIAGNOSIS — Z79.64 ENCOUNTER FOR MONITORING OF HYDROXYUREA THERAPY: ICD-10-CM

## 2024-02-14 LAB
ALBUMIN SERPL BCG-MCNC: 4.2 G/DL (ref 3.5–5.1)
ALP SERPL-CCNC: 90 U/L (ref 38–126)
ALT SERPL W/O P-5'-P-CCNC: 20 U/L (ref 11–66)
ANION GAP SERPL CALC-SCNC: 12 MEQ/L (ref 8–16)
AST SERPL-CCNC: 20 U/L (ref 5–40)
BASOPHILS ABSOLUTE: 0.4 THOU/MM3 (ref 0–0.1)
BASOPHILS NFR BLD AUTO: 1.7 %
BILIRUB SERPL-MCNC: 0.4 MG/DL (ref 0.3–1.2)
BUN SERPL-MCNC: 29 MG/DL (ref 7–22)
CALCIUM SERPL-MCNC: 10.3 MG/DL (ref 8.5–10.5)
CHLORIDE SERPL-SCNC: 106 MEQ/L (ref 98–111)
CO2 SERPL-SCNC: 23 MEQ/L (ref 23–33)
CREAT SERPL-MCNC: 1.1 MG/DL (ref 0.4–1.2)
DEPRECATED RDW RBC AUTO: 55.8 FL (ref 35–45)
EOSINOPHIL NFR BLD AUTO: 2 %
EOSINOPHILS ABSOLUTE: 0.4 THOU/MM3 (ref 0–0.4)
ERYTHROCYTE [DISTWIDTH] IN BLOOD BY AUTOMATED COUNT: 19.9 % (ref 11.5–14.5)
GFR SERPL CREATININE-BSD FRML MDRD: 54 ML/MIN/1.73M2
GLUCOSE SERPL-MCNC: 108 MG/DL (ref 70–108)
HAV IGM SER QL: NEGATIVE
HBV CORE IGM SERPL QL IA: NEGATIVE
HBV SURFACE AG SERPL QL IA: NEGATIVE
HCT VFR BLD AUTO: 46.3 % (ref 37–47)
HCV IGG SERPL QL IA: NEGATIVE
HGB BLD-MCNC: 13.5 GM/DL (ref 12–16)
IMM GRANULOCYTES # BLD AUTO: 0.36 THOU/MM3 (ref 0–0.07)
IMM GRANULOCYTES NFR BLD AUTO: 1.7 %
LYMPHOCYTES ABSOLUTE: 2.4 THOU/MM3 (ref 1–4.8)
LYMPHOCYTES NFR BLD AUTO: 11.5 %
MCH RBC QN AUTO: 24.2 PG (ref 26–33)
MCHC RBC AUTO-ENTMCNC: 29.2 GM/DL (ref 32.2–35.5)
MCV RBC AUTO: 83 FL (ref 81–99)
MONOCYTES ABSOLUTE: 0.9 THOU/MM3 (ref 0.4–1.3)
MONOCYTES NFR BLD AUTO: 4.5 %
NEUTROPHILS NFR BLD AUTO: 78.6 %
NRBC BLD AUTO-RTO: 0 /100 WBC
PLATELET # BLD AUTO: 183 THOU/MM3 (ref 130–400)
PMV BLD AUTO: ABNORMAL FL (ref 9.4–12.4)
POTASSIUM SERPL-SCNC: 4.6 MEQ/L (ref 3.5–5.2)
PROT SERPL-MCNC: 7.3 G/DL (ref 6.1–8)
RBC # BLD AUTO: 5.58 MILL/MM3 (ref 4.2–5.4)
SEGMENTED NEUTROPHILS ABSOLUTE COUNT: 16.2 THOU/MM3 (ref 1.8–7.7)
SODIUM SERPL-SCNC: 141 MEQ/L (ref 135–145)
WBC # BLD AUTO: 20.6 THOU/MM3 (ref 4.8–10.8)

## 2024-02-16 LAB — LEUK/LYMPH PHENOTYPING WB: NORMAL

## 2024-02-17 NOTE — PROGRESS NOTES
[facility] [place] [facility]  [facility]  [address]  [address]  [place] [address]  Dept: [phone]  Loc: [phone]   Hematology/Oncology Consult (Clinic)        02/21/24       Maria De Jesus Dominguez   1955     No ref. provider found   Ja Sarkar MD       Reason:   Chief Complaint   Patient presents with    Follow-up     Polycythemia vera (HCC)      HPI: Maria De Jesus Dominguez is a 67 y.o. female with past medical history significant for hypertension, basal cell carcinoma, homocystinemia, monocular exotropia, history of smoking (stopped in 2009) who follows in our office with polycythemia.       -2010.  Deep vein thrombosis for which she had been treated with Coumadin for 1 year.  This manifested as pain and swelling in her left leg.  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  Followed by Dr. Marshall and treated with hydroxyurea 500 mg p.o. daily.     -8/18/2014 - 8/20/2014.  Admitted with increasing shortness of breath.  She was unable to have a CTA because of abnormal renal function.  She had a VQ scan which was high probability for pulmonary embolus and she was started on heparin and transition to Xarelto.  During hospitalization her renal function improved.    -10/14/2014.  Hemoglobin 14.6 hematocrit 44.6 white count and platelet count were normal.    -1/5/2015.  Hemoglobin 14.0 hematocrit 42.3 with normal white count and platelet count.    -8/2015.  Hydroxyurea discontinued because of skin breakdown in the left lower extremity with darkening of the skin and sores in the left ankle.  Hemoglobin 14.9 and hematocrit 44.3.    10/15/2015.  Began seeing Dr. Judge referred by Dr. Sarkar her primary care physician.  She felt well.  Her skin lesions were improving.  She was continued on Xarelto 20 mg a day without any significant bleeding except for 1 episode of epistaxis which was not prolonged.    1/14/2016.  CBC showed a white count of

## 2024-02-21 ENCOUNTER — OFFICE VISIT (OUTPATIENT)
Dept: ONCOLOGY | Age: 69
End: 2024-02-21
Payer: MEDICARE

## 2024-02-21 ENCOUNTER — HOSPITAL ENCOUNTER (OUTPATIENT)
Dept: INFUSION THERAPY | Age: 69
Discharge: HOME | End: 2024-02-21
Payer: MEDICARE

## 2024-02-21 VITALS
TEMPERATURE: 98.2 F | HEART RATE: 70 BPM | RESPIRATION RATE: 18 BRPM | DIASTOLIC BLOOD PRESSURE: 60 MMHG | SYSTOLIC BLOOD PRESSURE: 135 MMHG

## 2024-02-21 VITALS
WEIGHT: 187 LBS | TEMPERATURE: 98.2 F | RESPIRATION RATE: 18 BRPM | HEART RATE: 70 BPM | DIASTOLIC BLOOD PRESSURE: 60 MMHG | HEIGHT: 65 IN | BODY MASS INDEX: 31.16 KG/M2 | SYSTOLIC BLOOD PRESSURE: 135 MMHG | OXYGEN SATURATION: 98 %

## 2024-02-21 DIAGNOSIS — Z79.64 ENCOUNTER FOR MONITORING OF HYDROXYUREA THERAPY: ICD-10-CM

## 2024-02-21 DIAGNOSIS — I82.402 DEEP VEIN THROMBOSIS (DVT) OF LEFT LOWER EXTREMITY, UNSPECIFIED CHRONICITY, UNSPECIFIED VEIN (HCC): ICD-10-CM

## 2024-02-21 DIAGNOSIS — D45 POLYCYTHEMIA VERA (HCC): Primary | ICD-10-CM

## 2024-02-21 DIAGNOSIS — R21 RASH AND NONSPECIFIC SKIN ERUPTION: ICD-10-CM

## 2024-02-21 DIAGNOSIS — Z51.81 ENCOUNTER FOR MONITORING OF HYDROXYUREA THERAPY: ICD-10-CM

## 2024-02-21 PROCEDURE — G8417 CALC BMI ABV UP PARAM F/U: HCPCS | Performed by: INTERNAL MEDICINE

## 2024-02-21 PROCEDURE — 3075F SYST BP GE 130 - 139MM HG: CPT | Performed by: INTERNAL MEDICINE

## 2024-02-21 PROCEDURE — 99214 OFFICE O/P EST MOD 30 MIN: CPT | Performed by: INTERNAL MEDICINE

## 2024-02-21 PROCEDURE — 99211 OFF/OP EST MAY X REQ PHY/QHP: CPT

## 2024-02-21 PROCEDURE — 1090F PRES/ABSN URINE INCON ASSESS: CPT | Performed by: INTERNAL MEDICINE

## 2024-02-21 PROCEDURE — G8427 DOCREV CUR MEDS BY ELIG CLIN: HCPCS | Performed by: INTERNAL MEDICINE

## 2024-02-21 PROCEDURE — G8400 PT W/DXA NO RESULTS DOC: HCPCS | Performed by: INTERNAL MEDICINE

## 2024-02-21 PROCEDURE — G8484 FLU IMMUNIZE NO ADMIN: HCPCS | Performed by: INTERNAL MEDICINE

## 2024-02-21 PROCEDURE — 1123F ACP DISCUSS/DSCN MKR DOCD: CPT | Performed by: INTERNAL MEDICINE

## 2024-02-21 PROCEDURE — 3017F COLORECTAL CA SCREEN DOC REV: CPT | Performed by: INTERNAL MEDICINE

## 2024-02-21 PROCEDURE — 1036F TOBACCO NON-USER: CPT | Performed by: INTERNAL MEDICINE

## 2024-02-21 PROCEDURE — 3078F DIAST BP <80 MM HG: CPT | Performed by: INTERNAL MEDICINE

## 2024-04-29 ENCOUNTER — OFFICE VISIT (OUTPATIENT)
Dept: ONCOLOGY | Age: 69
End: 2024-04-29
Payer: MEDICARE

## 2024-04-29 ENCOUNTER — HOSPITAL ENCOUNTER (OUTPATIENT)
Dept: INFUSION THERAPY | Age: 69
Discharge: HOME | End: 2024-04-29
Payer: MEDICARE

## 2024-04-29 VITALS
WEIGHT: 187 LBS | TEMPERATURE: 98.2 F | HEIGHT: 65 IN | HEART RATE: 71 BPM | OXYGEN SATURATION: 96 % | DIASTOLIC BLOOD PRESSURE: 88 MMHG | RESPIRATION RATE: 18 BRPM | BODY MASS INDEX: 31.16 KG/M2 | SYSTOLIC BLOOD PRESSURE: 139 MMHG

## 2024-04-29 VITALS
HEART RATE: 71 BPM | RESPIRATION RATE: 18 BRPM | DIASTOLIC BLOOD PRESSURE: 88 MMHG | SYSTOLIC BLOOD PRESSURE: 139 MMHG | TEMPERATURE: 98.2 F

## 2024-04-29 DIAGNOSIS — Z79.64 ENCOUNTER FOR MONITORING OF HYDROXYUREA THERAPY: ICD-10-CM

## 2024-04-29 DIAGNOSIS — D50.9 IRON DEFICIENCY ANEMIA, UNSPECIFIED IRON DEFICIENCY ANEMIA TYPE: Primary | ICD-10-CM

## 2024-04-29 DIAGNOSIS — I82.402 DEEP VEIN THROMBOSIS (DVT) OF LEFT LOWER EXTREMITY, UNSPECIFIED CHRONICITY, UNSPECIFIED VEIN (HCC): ICD-10-CM

## 2024-04-29 DIAGNOSIS — D45 POLYCYTHEMIA VERA (HCC): ICD-10-CM

## 2024-04-29 DIAGNOSIS — Z51.81 ENCOUNTER FOR MONITORING OF HYDROXYUREA THERAPY: ICD-10-CM

## 2024-04-29 LAB
ALBUMIN SERPL BCG-MCNC: 3.9 G/DL (ref 3.5–5.1)
ALP SERPL-CCNC: 104 U/L (ref 38–126)
ALT SERPL W/O P-5'-P-CCNC: 23 U/L (ref 11–66)
AST SERPL-CCNC: 30 U/L (ref 5–40)
BASOPHILS ABSOLUTE: 0.2 THOU/MM3 (ref 0–0.1)
BASOPHILS NFR BLD AUTO: 1.3 %
BILIRUB CONJ SERPL-MCNC: < 0.2 MG/DL (ref 0–0.3)
BILIRUB SERPL-MCNC: 0.3 MG/DL (ref 0.3–1.2)
BUN BLDP-MCNC: 33 MG/DL (ref 8–26)
CHLORIDE BLD-SCNC: 109 MEQ/L (ref 98–109)
CREAT BLD-MCNC: 1.1 MG/DL (ref 0.5–1.2)
DEPRECATED RDW RBC AUTO: 56.2 FL (ref 35–45)
EOSINOPHIL NFR BLD AUTO: 2.3 %
EOSINOPHILS ABSOLUTE: 0.4 THOU/MM3 (ref 0–0.4)
ERYTHROCYTE [DISTWIDTH] IN BLOOD BY AUTOMATED COUNT: 20.8 % (ref 11.5–14.5)
GFR SERPL CREATININE-BSD FRML MDRD: 54 ML/MIN/1.73M2
GLUCOSE BLD-MCNC: 120 MG/DL (ref 70–108)
HCT VFR BLD AUTO: 47.4 % (ref 37–47)
HGB BLD-MCNC: 14.2 GM/DL (ref 12–16)
IMM GRANULOCYTES # BLD AUTO: 0.26 THOU/MM3 (ref 0–0.07)
IMM GRANULOCYTES NFR BLD AUTO: 1.5 %
IONIZED CALCIUM, WHOLE BLOOD: 1.31 MMOL/L (ref 1.12–1.32)
LYMPHOCYTES ABSOLUTE: 1.6 THOU/MM3 (ref 1–4.8)
LYMPHOCYTES NFR BLD AUTO: 9.4 %
MCH RBC QN AUTO: 24.2 PG (ref 26–33)
MCHC RBC AUTO-ENTMCNC: 30 GM/DL (ref 32.2–35.5)
MCV RBC AUTO: 80.7 FL (ref 81–99)
MONOCYTES ABSOLUTE: 0.6 THOU/MM3 (ref 0.4–1.3)
MONOCYTES NFR BLD AUTO: 3.8 %
NEUTROPHILS NFR BLD AUTO: 81.7 %
NRBC BLD AUTO-RTO: 0 /100 WBC
PLATELET # BLD AUTO: 193 THOU/MM3 (ref 130–400)
PMV BLD AUTO: ABNORMAL FL (ref 9.4–12.4)
POTASSIUM BLD-SCNC: 4.4 MEQ/L (ref 3.5–4.9)
PROT SERPL-MCNC: 7.1 G/DL (ref 6.1–8)
RBC # BLD AUTO: 5.87 MILL/MM3 (ref 4.2–5.4)
SEGMENTED NEUTROPHILS ABSOLUTE COUNT: 14 THOU/MM3 (ref 1.8–7.7)
SODIUM BLD-SCNC: 141 MEQ/L (ref 138–146)
TOTAL CO2, WHOLE BLOOD: 24 MEQ/L (ref 23–33)
WBC # BLD AUTO: 17.1 THOU/MM3 (ref 4.8–10.8)

## 2024-04-29 PROCEDURE — 80047 BASIC METABLC PNL IONIZED CA: CPT

## 2024-04-29 PROCEDURE — 3075F SYST BP GE 130 - 139MM HG: CPT | Performed by: INTERNAL MEDICINE

## 2024-04-29 PROCEDURE — 36415 COLL VENOUS BLD VENIPUNCTURE: CPT

## 2024-04-29 PROCEDURE — 3079F DIAST BP 80-89 MM HG: CPT | Performed by: INTERNAL MEDICINE

## 2024-04-29 PROCEDURE — 80076 HEPATIC FUNCTION PANEL: CPT

## 2024-04-29 PROCEDURE — 85025 COMPLETE CBC W/AUTO DIFF WBC: CPT

## 2024-04-29 PROCEDURE — 99211 OFF/OP EST MAY X REQ PHY/QHP: CPT

## 2024-04-29 PROCEDURE — 99214 OFFICE O/P EST MOD 30 MIN: CPT | Performed by: INTERNAL MEDICINE

## 2024-04-29 PROCEDURE — 1123F ACP DISCUSS/DSCN MKR DOCD: CPT | Performed by: INTERNAL MEDICINE

## 2024-04-29 RX ORDER — BRIMONID/IVERMEC/METRONID/NIAC 0.25-1-1 %
GEL (GRAM) TOPICAL
COMMUNITY

## 2024-04-29 NOTE — PATIENT INSTRUCTIONS
RTC in 2-3 months  Needs phlebotomy this week  Phlebotomy if hematocrit above 45-46  CBC every 6 weeks

## 2024-04-29 NOTE — PROGRESS NOTES
Last Year: Not on file     Number of Places Lived in the Last Year: Not on file     Unstable Housing in the Last Year: Patient refused      EXAM:   height is 1.651 m (5' 5\") and weight is 84.8 kg (187 lb). Her oral temperature is 98.2 °F (36.8 °C). Her blood pressure is 139/88 and her pulse is 71. Her respiration is 18 and oxygen saturation is 96%.     Estimated body surface area is 1.97 meters squared as calculated from the following:    Height as of this encounter: 1.651 m (5' 5\").    Weight as of this encounter: 84.8 kg (187 lb).     ECO  GENERAL: The patient is a pleasant middle aged white female who appears their stated age and is awake, in no acute distress, alert and oriented x3.  SKIN: +scattered red papular nonblanching rash mostly on lower legs not very visible today and negative for ulcers.   HEME/LYMPHATICS: No bruising or petechiae on visual inspection. No lymphadenopathy on visual inspection and palpation of cervical, supraclavicular, infraclavicular lymph nodes.  HEAD/FACE: atraumatic and normocephalic. Normal hair for age.   EYES: PERRLA/EOMI. No scleral icterus. No tearing or dry eyes.  ENT: External ears normal with no evidence of discharge. No evidence of ulceration or bleeding in the nostrils. Mouth has no ulcers, bleeding or inflammation of the gums, floor or roof of the mouth with normal dentition.  NECK: Supple, symmetric.   CHEST/RESPIRATORY: Expansion maintained bilaterally and symmetrically. On auscultation, clear breath sounds in both lungs. No wheezes, rhonchi or rales.  BREAST: Deferred.  CARDIOVASCULAR: On auscultation, regular rate and rhythm. No murmurs, gallops or rubs are heard.  GASTROINTESTINAL/ABDOMEN: Symmetric. On auscultation of the abdomen, there are normal bowel sounds in all four quadrants. Nontender to palpation. No palpable organomegaly (liver or spleen) or masses.  GENITOURINARY: Deferred.  NEUROLOGIC: Alert and oriented time, person, and place, with no apparent

## 2024-05-06 ENCOUNTER — HOSPITAL ENCOUNTER (OUTPATIENT)
Dept: INFUSION THERAPY | Age: 69
Discharge: HOME | End: 2024-05-06
Payer: MEDICARE

## 2024-05-06 VITALS
RESPIRATION RATE: 16 BRPM | HEIGHT: 65 IN | BODY MASS INDEX: 31.16 KG/M2 | HEART RATE: 74 BPM | WEIGHT: 187 LBS | DIASTOLIC BLOOD PRESSURE: 76 MMHG | TEMPERATURE: 98 F | OXYGEN SATURATION: 98 % | SYSTOLIC BLOOD PRESSURE: 139 MMHG

## 2024-05-06 DIAGNOSIS — D45 POLYCYTHEMIA VERA (HCC): ICD-10-CM

## 2024-05-06 DIAGNOSIS — D75.1 POLYCYTHEMIA: Primary | ICD-10-CM

## 2024-05-06 LAB
HCT VFR BLD AUTO: 45.5 % (ref 37–47)
HGB BLD-MCNC: 13.8 GM/DL (ref 12–16)

## 2024-05-06 PROCEDURE — 85018 HEMOGLOBIN: CPT

## 2024-05-06 PROCEDURE — 99195 PHLEBOTOMY: CPT

## 2024-05-06 PROCEDURE — 85014 HEMATOCRIT: CPT

## 2024-05-06 PROCEDURE — 36415 COLL VENOUS BLD VENIPUNCTURE: CPT

## 2024-05-06 RX ORDER — 0.9 % SODIUM CHLORIDE 0.9 %
500 INTRAVENOUS SOLUTION INTRAVENOUS PRN
Status: CANCELLED | OUTPATIENT
Start: 2024-05-06

## 2024-05-06 RX ORDER — 0.9 % SODIUM CHLORIDE 0.9 %
250 INTRAVENOUS SOLUTION INTRAVENOUS ONCE
OUTPATIENT
Start: 2024-05-06 | End: 2024-05-06

## 2024-05-06 RX ORDER — 0.9 % SODIUM CHLORIDE 0.9 %
250 INTRAVENOUS SOLUTION INTRAVENOUS ONCE
Status: CANCELLED | OUTPATIENT
Start: 2024-05-06 | End: 2024-05-06

## 2024-05-06 RX ORDER — 0.9 % SODIUM CHLORIDE 0.9 %
500 INTRAVENOUS SOLUTION INTRAVENOUS PRN
OUTPATIENT
Start: 2024-05-06

## 2024-05-06 NOTE — PLAN OF CARE
Problem: Musculor/Skeletal Functional Status  Goal: Absence of falls  Outcome: Progressing  Note: No falls occurred with visit today.     Problem: Intellectual/Education/Knowledge Deficit  Goal: Teaching initiated upon admission  Outcome: Progressing  Note: Patient instructed on therapeutic phlebotomy procedure and potential complications.  Aware to call MD if complications occur.     Problem: Discharge Planning  Goal: Knowledge of discharge instructions  Description: Knowledge of discharge instructions  Outcome: Progressing  Note: Verbalize understanding of discharge instructions, follow up appointments, and when to call Physician.   Care plan reviewed with patient.  Patient verbalizes understanding of the plan of care and contributes to goal setting.

## 2024-05-06 NOTE — PROGRESS NOTES
THERAPEUTIC PHLEBOTOMY    Most recent Hemoglobin result: 13.8 Gm/dl.Hematocrit result  45.5 %  Date obtained:   05 /06 /2024    Pre-phlebotomy vital signs:see Doc flow sheet    Start time:1433    Empty donor bag placed on TARE scale. Tourniquet placed on patient's arm and arm palpated for venous access. Area of venous stick cleansed with alcohol. Hemostat applied to tubing of donor bag. Sheath removed from the needle and needle inserted into the antecubital vein in the left arm. Hemostat released. Blood flowing well down the tubing into the bag. N adjustment of needle needed to maintain adequate blood flow. Scale monitoring amount of blood in bag.    Stop Time:1440  Needle removed from patient's arm.  Pressure applied to patient's arm until bleeding stopped. Dry sterile dressing applied over puncture site and taped down well and secured with coban wrap.    Final amount of blood removed:500  Post Vital Signs:see Doc flow sheet  Patient drank water and snack and observed for 20 minutes. Patient tolerated procedure well. Discharged ambulatory with belongings.

## 2024-06-17 ENCOUNTER — HOSPITAL ENCOUNTER (OUTPATIENT)
Dept: INFUSION THERAPY | Age: 69
Discharge: HOME | End: 2024-06-17
Payer: MEDICARE

## 2024-06-17 ENCOUNTER — OFFICE VISIT (OUTPATIENT)
Dept: ONCOLOGY | Age: 69
End: 2024-06-17
Payer: MEDICARE

## 2024-06-17 VITALS
BODY MASS INDEX: 31.49 KG/M2 | DIASTOLIC BLOOD PRESSURE: 86 MMHG | RESPIRATION RATE: 16 BRPM | OXYGEN SATURATION: 98 % | HEART RATE: 76 BPM | HEIGHT: 65 IN | SYSTOLIC BLOOD PRESSURE: 135 MMHG | TEMPERATURE: 98.2 F | WEIGHT: 189 LBS

## 2024-06-17 VITALS
HEART RATE: 76 BPM | OXYGEN SATURATION: 98 % | TEMPERATURE: 98.2 F | RESPIRATION RATE: 16 BRPM | SYSTOLIC BLOOD PRESSURE: 135 MMHG | DIASTOLIC BLOOD PRESSURE: 86 MMHG

## 2024-06-17 VITALS
DIASTOLIC BLOOD PRESSURE: 86 MMHG | SYSTOLIC BLOOD PRESSURE: 135 MMHG | TEMPERATURE: 98.2 F | HEART RATE: 76 BPM | RESPIRATION RATE: 16 BRPM | OXYGEN SATURATION: 98 %

## 2024-06-17 DIAGNOSIS — I83.812 VARICOSE VEINS OF LEFT LOWER EXTREMITY WITH PAIN: ICD-10-CM

## 2024-06-17 DIAGNOSIS — D50.9 IRON DEFICIENCY ANEMIA, UNSPECIFIED IRON DEFICIENCY ANEMIA TYPE: Primary | ICD-10-CM

## 2024-06-17 LAB
HCT VFR BLD AUTO: 44.2 % (ref 37–47)
HGB BLD-MCNC: 12.7 GM/DL (ref 12–16)

## 2024-06-17 PROCEDURE — 99213 OFFICE O/P EST LOW 20 MIN: CPT | Performed by: INTERNAL MEDICINE

## 2024-06-17 PROCEDURE — 99211 OFF/OP EST MAY X REQ PHY/QHP: CPT

## 2024-06-17 PROCEDURE — 3075F SYST BP GE 130 - 139MM HG: CPT | Performed by: INTERNAL MEDICINE

## 2024-06-17 PROCEDURE — 3079F DIAST BP 80-89 MM HG: CPT | Performed by: INTERNAL MEDICINE

## 2024-06-17 PROCEDURE — 1123F ACP DISCUSS/DSCN MKR DOCD: CPT | Performed by: INTERNAL MEDICINE

## 2024-06-17 NOTE — PROGRESS NOTES
04/29/2024 02:59 PM    AST 30 04/29/2024 02:59 PM    ALT 23 04/29/2024 02:59 PM     BMP:    Lab Results   Component Value Date/Time     04/29/2024 02:59 PM     02/14/2024 09:50 AM    K 4.4 04/29/2024 02:59 PM    K 4.6 02/14/2024 09:50 AM    K 4.3 08/05/2023 05:57 AM     02/14/2024 09:50 AM    CO2 23 02/14/2024 09:50 AM    BUN 29 02/14/2024 09:50 AM    CREATININE 1.1 04/29/2024 02:59 PM    CREATININE 1.1 02/14/2024 09:50 AM    CALCIUM 10.3 02/14/2024 09:50 AM    LABGLOM 54 04/29/2024 02:59 PM    GLUCOSE 108 02/14/2024 09:50 AM    GLUCOSE 102 10/14/2014 09:36 AM     PATHOLOGY:   -12/5/2017 left breast fibroadenoma  -no bone marrow biopsy      ASSESSMENT/PLAN:    Patient Active Problem List   Diagnosis    Obesity (BMI 30.0-34.9)    Pulmonary emboli (HCC)    Polycythemia    Chronic anticoagulation    DVT (deep venous thrombosis) (HCC)    Leukocytosis    Chest pain    Leucocytosis    Polycythemia    ACS (acute coronary syndrome) (HCC)    Personal history of pulmonary embolism    Thrombocytosis    Thrombosis    Polycythemia vera (HCC)    Homocysteinemia    Chronic kidney disease, stage III (moderate) (HCC)    Monocular exotropia    Hypertension    Homocystinuria (HCC)    Left arm pain    Chest pain, atypical        Oncology Diagnosis: polycythemia vera  Given she is > 57 y/o AND JAK2+ with hx thrombosis (prior PE/DVT):  high risk  Will continue with   -ASA (81 mg) -ONGOING   cytoreductive regiments: most commonly hydroxyurea daily, Currently on 500 mg daily as she felt a little worse on higher dose (1000), and had thrombocytopenia to 100K (is on Eliquis). -ONGOING  Therapeutic phlebotomy to maintain hct <45-46%-ONGOING S/P phlebotomy in May 2024. Check CBC in 4 weeks.    monitor and manage cardiovascular risk factors per PCP.  monitor for new thrombosis, acquired VES and/or disease-related major bleeding  monitor for response, s/s of disease progression Q3-6 mo    Leukocytosis :    -Previously discussed

## 2024-06-18 DIAGNOSIS — I10 ESSENTIAL HYPERTENSION: ICD-10-CM

## 2024-06-19 ENCOUNTER — HOSPITAL ENCOUNTER (OUTPATIENT)
Dept: INTERVENTIONAL RADIOLOGY/VASCULAR | Age: 69
Discharge: HOME | End: 2024-06-21
Attending: INTERNAL MEDICINE
Payer: MEDICARE

## 2024-06-19 DIAGNOSIS — I83.812 VARICOSE VEINS OF LEFT LOWER EXTREMITY WITH PAIN: ICD-10-CM

## 2024-06-19 DIAGNOSIS — D50.9 IRON DEFICIENCY ANEMIA, UNSPECIFIED IRON DEFICIENCY ANEMIA TYPE: ICD-10-CM

## 2024-06-19 PROCEDURE — 93971 EXTREMITY STUDY: CPT

## 2024-06-19 NOTE — TELEPHONE ENCOUNTER
Maria De Jesus Dominguez called requesting a refill on the following medications:  Requested Prescriptions     Pending Prescriptions Disp Refills    enalapril (VASOTEC) 20 MG tablet [Pharmacy Med Name: Enalapril Maleate 20 MG Oral Tablet] 180 tablet 3     Sig: TAKE 1 TABLET BY MOUTH TWICE  DAILY    amLODIPine (NORVASC) 10 MG tablet [Pharmacy Med Name: amLODIPine Besylate 10 MG Oral Tablet] 90 tablet 3     Sig: TAKE 1 TABLET BY MOUTH IN THE  EVENING       Date of last visit: 8/14/2023 7/14/23 annual  Date of next visit (if applicable):Visit date not found  Date of last refill: 7/14/23 x 1 year  [facility]    Pt due for annual in July, last annual appt 7/14/23    Thanks,  Radha Duncan RN

## 2024-06-21 ENCOUNTER — TELEPHONE (OUTPATIENT)
Dept: FAMILY MEDICINE CLINIC | Age: 69
End: 2024-06-21

## 2024-06-21 NOTE — TELEPHONE ENCOUNTER
Received a refill request from [facility] for Vasotec. Patient is due for physical and  needs an appt. Please check to see if she is out of medication.     Left a message for patient to return our phone call.

## 2024-06-24 RX ORDER — ENALAPRIL MALEATE 20 MG/1
TABLET ORAL
Qty: 180 TABLET | Refills: 3 | OUTPATIENT
Start: 2024-06-24

## 2024-06-24 RX ORDER — AMLODIPINE BESYLATE 10 MG/1
10 TABLET ORAL EVERY EVENING
Qty: 90 TABLET | Refills: 3 | OUTPATIENT
Start: 2024-06-24

## 2024-07-03 SDOH — HEALTH STABILITY: PHYSICAL HEALTH
ON AVERAGE, HOW MANY DAYS PER WEEK DO YOU ENGAGE IN MODERATE TO STRENUOUS EXERCISE (LIKE A BRISK WALK)?: PATIENT DECLINED

## 2024-07-03 ASSESSMENT — PATIENT HEALTH QUESTIONNAIRE - PHQ9
SUM OF ALL RESPONSES TO PHQ QUESTIONS 1-9: 0
SUM OF ALL RESPONSES TO PHQ QUESTIONS 1-9: 0
2. FEELING DOWN, DEPRESSED OR HOPELESS: NOT AT ALL
SUM OF ALL RESPONSES TO PHQ9 QUESTIONS 1 & 2: 0
SUM OF ALL RESPONSES TO PHQ QUESTIONS 1-9: 0
SUM OF ALL RESPONSES TO PHQ QUESTIONS 1-9: 0
1. LITTLE INTEREST OR PLEASURE IN DOING THINGS: NOT AT ALL

## 2024-07-03 ASSESSMENT — LIFESTYLE VARIABLES
HOW MANY STANDARD DRINKS CONTAINING ALCOHOL DO YOU HAVE ON A TYPICAL DAY: 98
HOW OFTEN DO YOU HAVE SIX OR MORE DRINKS ON ONE OCCASION: 98
HOW OFTEN DO YOU HAVE A DRINK CONTAINING ALCOHOL: MONTHLY OR LESS
HOW OFTEN DO YOU HAVE A DRINK CONTAINING ALCOHOL: 2
HOW MANY STANDARD DRINKS CONTAINING ALCOHOL DO YOU HAVE ON A TYPICAL DAY: PATIENT DECLINED

## 2024-07-16 SDOH — ECONOMIC STABILITY: FOOD INSECURITY: WITHIN THE PAST 12 MONTHS, THE FOOD YOU BOUGHT JUST DIDN'T LAST AND YOU DIDN'T HAVE MONEY TO GET MORE.: PATIENT DECLINED

## 2024-07-16 SDOH — ECONOMIC STABILITY: HOUSING INSECURITY
IN THE LAST 12 MONTHS, WAS THERE A TIME WHEN YOU DID NOT HAVE A STEADY PLACE TO SLEEP OR SLEPT IN A SHELTER (INCLUDING NOW)?: PATIENT DECLINED

## 2024-07-16 SDOH — ECONOMIC STABILITY: INCOME INSECURITY: HOW HARD IS IT FOR YOU TO PAY FOR THE VERY BASICS LIKE FOOD, HOUSING, MEDICAL CARE, AND HEATING?: PATIENT DECLINED

## 2024-07-16 SDOH — ECONOMIC STABILITY: FOOD INSECURITY: WITHIN THE PAST 12 MONTHS, YOU WORRIED THAT YOUR FOOD WOULD RUN OUT BEFORE YOU GOT MONEY TO BUY MORE.: PATIENT DECLINED

## 2024-07-17 ENCOUNTER — OFFICE VISIT (OUTPATIENT)
Dept: FAMILY MEDICINE CLINIC | Age: 69
End: 2024-07-17

## 2024-07-17 VITALS
HEIGHT: 65 IN | SYSTOLIC BLOOD PRESSURE: 128 MMHG | RESPIRATION RATE: 18 BRPM | TEMPERATURE: 96.6 F | HEART RATE: 63 BPM | BODY MASS INDEX: 30.99 KG/M2 | DIASTOLIC BLOOD PRESSURE: 64 MMHG | OXYGEN SATURATION: 96 % | WEIGHT: 186 LBS

## 2024-07-17 DIAGNOSIS — B35.1 ONYCHOMYCOSIS: ICD-10-CM

## 2024-07-17 DIAGNOSIS — I27.82 OTHER CHRONIC PULMONARY EMBOLISM, UNSPECIFIED WHETHER ACUTE COR PULMONALE PRESENT (HCC): ICD-10-CM

## 2024-07-17 DIAGNOSIS — E72.11 HOMOCYSTINURIA (HCC): ICD-10-CM

## 2024-07-17 DIAGNOSIS — Z00.00 MEDICARE ANNUAL WELLNESS VISIT, SUBSEQUENT: Primary | ICD-10-CM

## 2024-07-17 DIAGNOSIS — N18.30 STAGE 3 CHRONIC KIDNEY DISEASE, UNSPECIFIED WHETHER STAGE 3A OR 3B CKD (HCC): ICD-10-CM

## 2024-07-17 DIAGNOSIS — I10 ESSENTIAL HYPERTENSION: ICD-10-CM

## 2024-07-17 RX ORDER — AMLODIPINE BESYLATE 10 MG/1
10 TABLET ORAL EVERY EVENING
Qty: 90 TABLET | Refills: 3 | Status: SHIPPED | OUTPATIENT
Start: 2024-07-17

## 2024-07-17 RX ORDER — ENALAPRIL MALEATE 20 MG/1
TABLET ORAL
Qty: 180 TABLET | Refills: 3 | Status: SHIPPED | OUTPATIENT
Start: 2024-07-17

## 2024-07-17 RX ORDER — TERBINAFINE HYDROCHLORIDE 250 MG/1
250 TABLET ORAL DAILY
Qty: 90 TABLET | Refills: 0 | Status: SHIPPED | OUTPATIENT
Start: 2024-07-17 | End: 2024-10-15

## 2024-07-17 NOTE — PROGRESS NOTES
Medicare Annual Wellness Visit    Maria De Jesus Dominguez is here for Medicare AWV (refills)    Assessment & Plan   Medicare annual wellness visit, subsequent  Essential hypertension  -     amLODIPine (NORVASC) 10 MG tablet; Take 1 tablet by mouth every evening, Disp-90 tablet, R-3Normal  -     enalapril (VASOTEC) 20 MG tablet; TAKE 1 TABLET TWICE A DAY, Disp-180 tablet, R-3Normal  -stable, controlled on enalapril and amlodipine  Other chronic pulmonary embolism, unspecified whether acute cor pulmonale present (HCC)  -stable, controlled on eliquis  Homocystinuria (HCC)  -stable, controlled on folic acid and hydrea  Stage 3 chronic kidney disease, unspecified whether stage 3a or 3b CKD (HCC)  -stable, avoid nephrotoxic meds, serial labs  Onychomycosis  -     terbinafine (LAMISIL) 250 MG tablet; Take 1 tablet by mouth daily, Disp-90 tablet, R-0Normal  -new problem, add lamisil, discussed 50 % efficacy, -monitor sxs, call if not improving    Encouraged diet, exercise and weight loss.  Reviewed health maintenance-upcoming egd, colonoscopy and mammogram, blood work per oncology    Recommendations for Preventive Services Due: see orders and patient instructions/AVS.  Recommended screening schedule for the next 5-10 years is provided to the patient in written form: see Patient Instructions/AVS.     Return in 1 year (on 7/17/2025) for Medicare AWV.     Subjective       Patient's complete Health Risk Assessment and screening values have been reviewed and are found in Flowsheets. The following problems were reviewed today and where indicated follow up appointments were made and/or referrals ordered.    Positive Risk Factor Screenings with Interventions:               General HRA Questions:  Select all that apply: (!) New or Increased Fatigue    Interventions Fatigue:  Comes and goes, upcoming EGD.        Abnormal BMI (obese):  Body mass index is 31.24 kg/m². (!) Abnormal  Interventions:  See AVS for additional education  No complaints

## 2024-08-18 DIAGNOSIS — I10 ESSENTIAL HYPERTENSION: ICD-10-CM

## 2024-08-19 NOTE — TELEPHONE ENCOUNTER
Maria De Jesus Dominguez called requesting a refill on the following medications:  Requested Prescriptions     Pending Prescriptions Disp Refills    cloNIDine (CATAPRES) 0.2 MG tablet [Pharmacy Med Name: cloNIDine HCl 0.2 MG Oral Tablet] 180 tablet 3     Sig: TAKE 1 TABLET BY MOUTH TWICE  DAILY       Date of last visit: 7/17/2024  Date of next visit (if applicable):Visit date not found  Date of last refill: 7/14/2023 1 year  Pharmacy Name: [facility]    Medication states has been discontinued, called and left a message for patient to call us back to see if she is requesting this refill.       Thanks,  Darlene Dumont, CMA

## 2024-08-20 RX ORDER — CLONIDINE HYDROCHLORIDE 0.2 MG/1
0.2 TABLET ORAL 2 TIMES DAILY
Qty: 180 TABLET | Refills: 3 | OUTPATIENT
Start: 2024-08-20

## 2024-08-20 NOTE — TELEPHONE ENCOUNTER
Spoke with patient, she is no longer taking clonidine. Sent a discontinued message to the pharmacy.

## 2024-09-16 ENCOUNTER — LAB (OUTPATIENT)
Dept: LAB | Age: 69
End: 2024-09-16

## 2024-09-16 DIAGNOSIS — D50.9 IRON DEFICIENCY ANEMIA, UNSPECIFIED IRON DEFICIENCY ANEMIA TYPE: ICD-10-CM

## 2024-09-16 LAB
BASOPHILS ABSOLUTE: 0.2 THOU/MM3 (ref 0–0.1)
BASOPHILS NFR BLD AUTO: 1.4 %
DEPRECATED RDW RBC AUTO: 57.4 FL (ref 35–45)
EOSINOPHIL NFR BLD AUTO: 1.8 %
EOSINOPHILS ABSOLUTE: 0.3 THOU/MM3 (ref 0–0.4)
ERYTHROCYTE [DISTWIDTH] IN BLOOD BY AUTOMATED COUNT: 21.2 % (ref 11.5–14.5)
HCT VFR BLD AUTO: 46.7 % (ref 37–47)
HGB BLD-MCNC: 13.9 GM/DL (ref 12–16)
IMM GRANULOCYTES # BLD AUTO: 0.4 THOU/MM3 (ref 0–0.07)
IMM GRANULOCYTES NFR BLD AUTO: 2.8 %
LYMPHOCYTES ABSOLUTE: 1.7 THOU/MM3 (ref 1–4.8)
LYMPHOCYTES NFR BLD AUTO: 12.1 %
MCH RBC QN AUTO: 24.1 PG (ref 26–33)
MCHC RBC AUTO-ENTMCNC: 29.8 GM/DL (ref 32.2–35.5)
MCV RBC AUTO: 80.9 FL (ref 81–99)
MONOCYTES ABSOLUTE: 0.6 THOU/MM3 (ref 0.4–1.3)
MONOCYTES NFR BLD AUTO: 4.2 %
NEUTROPHILS ABSOLUTE: 11 THOU/MM3 (ref 1.8–7.7)
NEUTROPHILS NFR BLD AUTO: 77.7 %
NRBC BLD AUTO-RTO: 0 /100 WBC
PLATELET # BLD AUTO: 148 THOU/MM3 (ref 130–400)
PMV BLD AUTO: ABNORMAL FL (ref 9.4–12.4)
RBC # BLD AUTO: 5.77 MILL/MM3 (ref 4.2–5.4)
WBC # BLD AUTO: 14.1 THOU/MM3 (ref 4.8–10.8)

## 2024-09-23 ENCOUNTER — HOSPITAL ENCOUNTER (OUTPATIENT)
Dept: INFUSION THERAPY | Age: 69
Discharge: HOME | End: 2024-09-23
Payer: MEDICARE

## 2024-09-23 ENCOUNTER — OFFICE VISIT (OUTPATIENT)
Dept: ONCOLOGY | Age: 69
End: 2024-09-23
Payer: MEDICARE

## 2024-09-23 VITALS — DIASTOLIC BLOOD PRESSURE: 60 MMHG | HEART RATE: 69 BPM | OXYGEN SATURATION: 99 % | SYSTOLIC BLOOD PRESSURE: 127 MMHG

## 2024-09-23 VITALS
OXYGEN SATURATION: 99 % | SYSTOLIC BLOOD PRESSURE: 127 MMHG | HEART RATE: 69 BPM | RESPIRATION RATE: 16 BRPM | DIASTOLIC BLOOD PRESSURE: 60 MMHG | TEMPERATURE: 98 F

## 2024-09-23 DIAGNOSIS — D50.9 IRON DEFICIENCY ANEMIA, UNSPECIFIED IRON DEFICIENCY ANEMIA TYPE: Primary | ICD-10-CM

## 2024-09-23 DIAGNOSIS — I83.812 VARICOSE VEINS OF LEFT LOWER EXTREMITY WITH PAIN: ICD-10-CM

## 2024-09-23 LAB
BASOPHILS ABSOLUTE: 0.2 THOU/MM3 (ref 0–0.1)
BASOPHILS NFR BLD AUTO: 1.5 %
DEPRECATED RDW RBC AUTO: 57.6 FL (ref 35–45)
ELLIPTOCYTES: ABNORMAL
EOSINOPHIL NFR BLD AUTO: 1.9 %
EOSINOPHILS ABSOLUTE: 0.2 THOU/MM3 (ref 0–0.4)
ERYTHROCYTE [DISTWIDTH] IN BLOOD BY AUTOMATED COUNT: 21.6 % (ref 11.5–14.5)
HCT VFR BLD AUTO: 47.1 % (ref 37–47)
HGB BLD-MCNC: 13.6 GM/DL (ref 12–16)
HYPOCHROMIA BLD QL SMEAR: PRESENT
IMM GRANULOCYTES # BLD AUTO: 0.27 THOU/MM3 (ref 0–0.07)
IMM GRANULOCYTES NFR BLD AUTO: 2.3 %
LYMPHOCYTES ABSOLUTE: 1.2 THOU/MM3 (ref 1–4.8)
LYMPHOCYTES NFR BLD AUTO: 10.1 %
MCH RBC QN AUTO: 23.3 PG (ref 26–33)
MCHC RBC AUTO-ENTMCNC: 28.9 GM/DL (ref 32.2–35.5)
MCV RBC AUTO: 80.8 FL (ref 81–99)
MONOCYTES ABSOLUTE: 0.5 THOU/MM3 (ref 0.4–1.3)
MONOCYTES NFR BLD AUTO: 4 %
NEUTROPHILS ABSOLUTE: 9.2 THOU/MM3 (ref 1.8–7.7)
NEUTROPHILS NFR BLD AUTO: 80.2 %
NRBC BLD AUTO-RTO: 0 /100 WBC
PLATELET # BLD AUTO: 144 THOU/MM3 (ref 130–400)
PMV BLD AUTO: ABNORMAL FL (ref 9.4–12.4)
POIKILOCYTES: ABNORMAL
RBC # BLD AUTO: 5.83 MILL/MM3 (ref 4.2–5.4)
SCAN OF BLOOD SMEAR: NORMAL
WBC # BLD AUTO: 11.5 THOU/MM3 (ref 4.8–10.8)

## 2024-09-23 PROCEDURE — 85025 COMPLETE CBC W/AUTO DIFF WBC: CPT

## 2024-09-23 PROCEDURE — 99214 OFFICE O/P EST MOD 30 MIN: CPT | Performed by: INTERNAL MEDICINE

## 2024-09-23 PROCEDURE — 3078F DIAST BP <80 MM HG: CPT | Performed by: INTERNAL MEDICINE

## 2024-09-23 PROCEDURE — 99211 OFF/OP EST MAY X REQ PHY/QHP: CPT

## 2024-09-23 PROCEDURE — 3074F SYST BP LT 130 MM HG: CPT | Performed by: INTERNAL MEDICINE

## 2024-09-23 PROCEDURE — 1123F ACP DISCUSS/DSCN MKR DOCD: CPT | Performed by: INTERNAL MEDICINE

## 2024-09-23 PROCEDURE — 36415 COLL VENOUS BLD VENIPUNCTURE: CPT

## 2024-10-05 DIAGNOSIS — D75.1 POLYCYTHEMIA: ICD-10-CM

## 2024-10-07 RX ORDER — FOLIC ACID 1 MG/1
1000 TABLET ORAL DAILY
Qty: 90 TABLET | Refills: 0 | Status: SHIPPED | OUTPATIENT
Start: 2024-10-07

## 2024-10-24 ENCOUNTER — TELEPHONE (OUTPATIENT)
Dept: FAMILY MEDICINE CLINIC | Age: 69
End: 2024-10-24

## 2024-10-24 DIAGNOSIS — M85.852 OSTEOPENIA OF BOTH HIPS: ICD-10-CM

## 2024-10-24 DIAGNOSIS — Z12.31 ENCOUNTER FOR SCREENING MAMMOGRAM FOR MALIGNANT NEOPLASM OF BREAST: Primary | ICD-10-CM

## 2024-10-24 DIAGNOSIS — M85.851 OSTEOPENIA OF BOTH HIPS: ICD-10-CM

## 2024-10-24 DIAGNOSIS — M85.80 OSTEOPENIA, UNSPECIFIED LOCATION: ICD-10-CM

## 2024-10-24 NOTE — TELEPHONE ENCOUNTER
Patient called in and states she needs an order for screening mammogram and bone density as she hasn't had one done since 2019.  No breast issues at this time. She is having testing done at [facility] on [address]. They need the order faxed. [phone]

## 2024-10-30 DIAGNOSIS — I82.402 DEEP VEIN THROMBOSIS (DVT) OF LEFT LOWER EXTREMITY, UNSPECIFIED CHRONICITY, UNSPECIFIED VEIN (HCC): ICD-10-CM

## 2024-10-31 RX ORDER — APIXABAN 5 MG/1
5 TABLET, FILM COATED ORAL 2 TIMES DAILY
Qty: 180 TABLET | Refills: 3 | OUTPATIENT
Start: 2024-10-31

## 2024-11-12 ENCOUNTER — TELEPHONE (OUTPATIENT)
Dept: FAMILY MEDICINE CLINIC | Age: 69
End: 2024-11-12

## 2024-11-12 NOTE — TELEPHONE ENCOUNTER
----- Message from Dr. Ja Sarkar MD sent at 11/12/2024  1:37 PM EST -----  Normal mammogram , continue yearly screenings

## 2024-11-24 DIAGNOSIS — D45 POLYCYTHEMIA VERA (HCC): ICD-10-CM

## 2024-11-25 RX ORDER — HYDROXYUREA 500 MG/1
500 CAPSULE ORAL DAILY
Qty: 90 CAPSULE | Refills: 1 | Status: SHIPPED | OUTPATIENT
Start: 2024-11-25

## 2024-12-11 ENCOUNTER — LAB (OUTPATIENT)
Dept: LAB | Age: 69
End: 2024-12-11

## 2024-12-11 DIAGNOSIS — D50.9 IRON DEFICIENCY ANEMIA, UNSPECIFIED IRON DEFICIENCY ANEMIA TYPE: ICD-10-CM

## 2024-12-11 LAB — SCAN OF BLOOD SMEAR: NORMAL

## 2024-12-12 LAB
ANISOCYTOSIS BLD QL SMEAR: PRESENT
BASOPHILS ABSOLUTE: 0.2 THOU/MM3 (ref 0–0.1)
BASOPHILS NFR BLD AUTO: 1.8 %
DEPRECATED RDW RBC AUTO: 71.5 FL (ref 35–45)
ELLIPTOCYTES: ABNORMAL
EOSINOPHIL NFR BLD AUTO: 1.7 %
EOSINOPHILS ABSOLUTE: 0.2 THOU/MM3 (ref 0–0.4)
ERYTHROCYTE [DISTWIDTH] IN BLOOD BY AUTOMATED COUNT: 24.4 % (ref 11.5–14.5)
HCT VFR BLD AUTO: 45.5 % (ref 37–47)
HGB BLD-MCNC: 13.7 GM/DL (ref 12–16)
IMM GRANULOCYTES # BLD AUTO: 0.65 THOU/MM3 (ref 0–0.07)
IMM GRANULOCYTES NFR BLD AUTO: 6.6 %
LYMPHOCYTES ABSOLUTE: 1.6 THOU/MM3 (ref 1–4.8)
LYMPHOCYTES NFR BLD AUTO: 16 %
MCH RBC QN AUTO: 25.3 PG (ref 26–33)
MCHC RBC AUTO-ENTMCNC: 30.1 GM/DL (ref 32.2–35.5)
MCV RBC AUTO: 83.9 FL (ref 81–99)
MONOCYTES ABSOLUTE: 0.5 THOU/MM3 (ref 0.4–1.3)
MONOCYTES NFR BLD AUTO: 4.6 %
NEUTROPHILS ABSOLUTE: 6.9 THOU/MM3 (ref 1.8–7.7)
NEUTROPHILS NFR BLD AUTO: 69.3 %
NRBC BLD AUTO-RTO: 0 /100 WBC
PATHOLOGIST REVIEW: ABNORMAL
PLATELET # BLD AUTO: 125 THOU/MM3 (ref 130–400)
PLATELET BLD QL SMEAR: ABNORMAL
PMV BLD AUTO: ABNORMAL FL (ref 9.4–12.4)
RBC # BLD AUTO: 5.42 MILL/MM3 (ref 4.2–5.4)
VARIANT LYMPHS BLD QL SMEAR: ABNORMAL %
WBC # BLD AUTO: 9.9 THOU/MM3 (ref 4.8–10.8)

## 2024-12-17 DIAGNOSIS — D45 POLYCYTHEMIA VERA (HCC): Primary | ICD-10-CM

## 2024-12-18 ENCOUNTER — OFFICE VISIT (OUTPATIENT)
Dept: ONCOLOGY | Age: 69
End: 2024-12-18
Payer: MEDICARE

## 2024-12-18 ENCOUNTER — HOSPITAL ENCOUNTER (OUTPATIENT)
Dept: INFUSION THERAPY | Age: 69
Discharge: HOME | End: 2024-12-18
Payer: MEDICARE

## 2024-12-18 VITALS
BODY MASS INDEX: 31.16 KG/M2 | HEART RATE: 71 BPM | SYSTOLIC BLOOD PRESSURE: 138 MMHG | WEIGHT: 187 LBS | DIASTOLIC BLOOD PRESSURE: 69 MMHG | HEIGHT: 65 IN | OXYGEN SATURATION: 99 % | RESPIRATION RATE: 18 BRPM | TEMPERATURE: 97.8 F

## 2024-12-18 DIAGNOSIS — D45 POLYCYTHEMIA VERA (HCC): ICD-10-CM

## 2024-12-18 DIAGNOSIS — I82.402 DEEP VEIN THROMBOSIS (DVT) OF LEFT LOWER EXTREMITY, UNSPECIFIED CHRONICITY, UNSPECIFIED VEIN (HCC): ICD-10-CM

## 2024-12-18 LAB
ALBUMIN SERPL BCG-MCNC: 3.7 G/DL (ref 3.5–5.1)
ALP SERPL-CCNC: 114 U/L (ref 38–126)
ALT SERPL W/O P-5'-P-CCNC: 13 U/L (ref 11–66)
ANISOCYTOSIS BLD QL SMEAR: PRESENT
AST SERPL-CCNC: 17 U/L (ref 5–40)
BASOPHILS ABSOLUTE: 0.2 THOU/MM3 (ref 0–0.1)
BASOPHILS NFR BLD AUTO: 1.9 %
BILIRUB CONJ SERPL-MCNC: 0.2 MG/DL (ref 0.1–13.8)
BILIRUB SERPL-MCNC: 0.5 MG/DL (ref 0.3–1.2)
BUN BLDP-MCNC: 30 MG/DL (ref 8–26)
CHLORIDE BLD-SCNC: 107 MEQ/L (ref 98–109)
CREAT BLD-MCNC: 1.2 MG/DL (ref 0.5–1.2)
DEPRECATED RDW RBC AUTO: 72.8 FL (ref 35–45)
ELLIPTOCYTES: ABNORMAL
EOSINOPHIL NFR BLD AUTO: 1.4 %
EOSINOPHILS ABSOLUTE: 0.1 THOU/MM3 (ref 0–0.4)
ERYTHROCYTE [DISTWIDTH] IN BLOOD BY AUTOMATED COUNT: 24.3 % (ref 11.5–14.5)
FERRITIN SERPL IA-MCNC: 96 NG/ML (ref 10–291)
GFR SERPL CREATININE-BSD FRML MDRD: 48 ML/MIN/1.73M2
GLUCOSE BLD-MCNC: 135 MG/DL (ref 70–108)
HCT VFR BLD AUTO: 46.6 % (ref 37–47)
HGB BLD-MCNC: 14.2 GM/DL (ref 12–16)
IMM GRANULOCYTES # BLD AUTO: 0.62 THOU/MM3 (ref 0–0.07)
IMM GRANULOCYTES NFR BLD AUTO: 6.8 %
IONIZED CALCIUM, WHOLE BLOOD: 1.4 MMOL/L (ref 1.12–1.32)
IRON SATN MFR SERPL: 23 % (ref 20–50)
IRON SERPL-MCNC: 68 UG/DL (ref 50–170)
LYMPHOCYTES ABSOLUTE: 1.3 THOU/MM3 (ref 1–4.8)
LYMPHOCYTES NFR BLD AUTO: 14.4 %
MCH RBC QN AUTO: 26 PG (ref 26–33)
MCHC RBC AUTO-ENTMCNC: 30.5 GM/DL (ref 32.2–35.5)
MCV RBC AUTO: 85.2 FL (ref 81–99)
MONOCYTES ABSOLUTE: 0.5 THOU/MM3 (ref 0.4–1.3)
MONOCYTES NFR BLD AUTO: 5.4 %
NEUTROPHILS ABSOLUTE: 6.4 THOU/MM3 (ref 1.8–7.7)
NEUTROPHILS NFR BLD AUTO: 70.1 %
NRBC BLD AUTO-RTO: 0 /100 WBC
PLATELET # BLD AUTO: 112 THOU/MM3 (ref 130–400)
PLATELET BLD QL SMEAR: ABNORMAL
PMV BLD AUTO: ABNORMAL FL (ref 9.4–12.4)
POIKILOCYTES: ABNORMAL
POTASSIUM BLD-SCNC: 4.5 MEQ/L (ref 3.5–4.9)
PROT SERPL-MCNC: 7.3 G/DL (ref 6.1–8)
RBC # BLD AUTO: 5.47 MILL/MM3 (ref 4.2–5.4)
SCAN OF BLOOD SMEAR: NORMAL
SODIUM BLD-SCNC: 138 MEQ/L (ref 138–146)
TIBC SERPL-MCNC: 302 UG/DL (ref 171–450)
TOTAL CO2, WHOLE BLOOD: 24 MEQ/L (ref 23–33)
TOXIC GRANULES BLD QL SMEAR: PRESENT
VARIANT LYMPHS BLD QL SMEAR: ABNORMAL %
WBC # BLD AUTO: 9.1 THOU/MM3 (ref 4.8–10.8)

## 2024-12-18 PROCEDURE — 80047 BASIC METABLC PNL IONIZED CA: CPT

## 2024-12-18 PROCEDURE — 1123F ACP DISCUSS/DSCN MKR DOCD: CPT | Performed by: INTERNAL MEDICINE

## 2024-12-18 PROCEDURE — 82728 ASSAY OF FERRITIN: CPT

## 2024-12-18 PROCEDURE — 80076 HEPATIC FUNCTION PANEL: CPT

## 2024-12-18 PROCEDURE — 83550 IRON BINDING TEST: CPT

## 2024-12-18 PROCEDURE — 99211 OFF/OP EST MAY X REQ PHY/QHP: CPT

## 2024-12-18 PROCEDURE — G8417 CALC BMI ABV UP PARAM F/U: HCPCS | Performed by: INTERNAL MEDICINE

## 2024-12-18 PROCEDURE — 1036F TOBACCO NON-USER: CPT | Performed by: INTERNAL MEDICINE

## 2024-12-18 PROCEDURE — 3017F COLORECTAL CA SCREEN DOC REV: CPT | Performed by: INTERNAL MEDICINE

## 2024-12-18 PROCEDURE — G8400 PT W/DXA NO RESULTS DOC: HCPCS | Performed by: INTERNAL MEDICINE

## 2024-12-18 PROCEDURE — 36415 COLL VENOUS BLD VENIPUNCTURE: CPT

## 2024-12-18 PROCEDURE — 83540 ASSAY OF IRON: CPT

## 2024-12-18 PROCEDURE — 99213 OFFICE O/P EST LOW 20 MIN: CPT | Performed by: INTERNAL MEDICINE

## 2024-12-18 PROCEDURE — G8427 DOCREV CUR MEDS BY ELIG CLIN: HCPCS | Performed by: INTERNAL MEDICINE

## 2024-12-18 PROCEDURE — 3078F DIAST BP <80 MM HG: CPT | Performed by: INTERNAL MEDICINE

## 2024-12-18 PROCEDURE — 85025 COMPLETE CBC W/AUTO DIFF WBC: CPT

## 2024-12-18 PROCEDURE — 1126F AMNT PAIN NOTED NONE PRSNT: CPT | Performed by: INTERNAL MEDICINE

## 2024-12-18 PROCEDURE — 3075F SYST BP GE 130 - 139MM HG: CPT | Performed by: INTERNAL MEDICINE

## 2024-12-18 PROCEDURE — G8484 FLU IMMUNIZE NO ADMIN: HCPCS | Performed by: INTERNAL MEDICINE

## 2024-12-18 PROCEDURE — 1159F MED LIST DOCD IN RCRD: CPT | Performed by: INTERNAL MEDICINE

## 2024-12-18 PROCEDURE — 1090F PRES/ABSN URINE INCON ASSESS: CPT | Performed by: INTERNAL MEDICINE

## 2024-12-18 NOTE — PROGRESS NOTES
tibial, peroneal veins. Findings of acute/subacute deep venous thrombosis involving the common femoral vein and femoral vein. currently on Eliquis 5 mg BID life long.  she has had two \"unprovoked\" vs 2/2 myeloproliferative disease VTE events, including one while on Hydrea; pt tolerating full dose with no bleeding issues so will continue for now. Venous doppler US showed chronic DVT. No acute.         7) Follow Up:  Return in about 3 months (around 3/18/2025).     Charly Valdes MD

## 2024-12-20 VITALS
DIASTOLIC BLOOD PRESSURE: 69 MMHG | TEMPERATURE: 97.8 F | RESPIRATION RATE: 18 BRPM | HEART RATE: 71 BPM | SYSTOLIC BLOOD PRESSURE: 138 MMHG

## 2024-12-29 DIAGNOSIS — D75.1 POLYCYTHEMIA: ICD-10-CM

## 2024-12-30 RX ORDER — FOLIC ACID 1 MG/1
1000 TABLET ORAL DAILY
Qty: 90 TABLET | Refills: 0 | Status: SHIPPED | OUTPATIENT
Start: 2024-12-30

## 2025-03-30 DIAGNOSIS — D75.1 POLYCYTHEMIA: ICD-10-CM

## 2025-04-01 RX ORDER — FOLIC ACID 1 MG/1
1000 TABLET ORAL DAILY
Qty: 90 TABLET | Refills: 0 | OUTPATIENT
Start: 2025-04-01

## 2025-04-01 NOTE — TELEPHONE ENCOUNTER
I haven't seen this patient in nearly 2 years.  She has appnt in 2 days, can be addressed then.  This is also available over the counter.

## 2025-04-03 ENCOUNTER — HOSPITAL ENCOUNTER (OUTPATIENT)
Dept: INFUSION THERAPY | Age: 70
Discharge: HOME | End: 2025-04-03
Payer: MEDICARE

## 2025-04-03 ENCOUNTER — OFFICE VISIT (OUTPATIENT)
Dept: ONCOLOGY | Age: 70
End: 2025-04-03

## 2025-04-03 VITALS
BODY MASS INDEX: 32.02 KG/M2 | TEMPERATURE: 98 F | DIASTOLIC BLOOD PRESSURE: 58 MMHG | HEIGHT: 65 IN | RESPIRATION RATE: 16 BRPM | WEIGHT: 192.2 LBS | HEART RATE: 79 BPM | OXYGEN SATURATION: 98 % | SYSTOLIC BLOOD PRESSURE: 127 MMHG

## 2025-04-03 VITALS
DIASTOLIC BLOOD PRESSURE: 58 MMHG | TEMPERATURE: 98 F | OXYGEN SATURATION: 98 % | RESPIRATION RATE: 16 BRPM | HEART RATE: 79 BPM | SYSTOLIC BLOOD PRESSURE: 127 MMHG

## 2025-04-03 DIAGNOSIS — E61.1 IRON DEFICIENCY: ICD-10-CM

## 2025-04-03 DIAGNOSIS — D72.829 LEUKOCYTOSIS, UNSPECIFIED TYPE: ICD-10-CM

## 2025-04-03 DIAGNOSIS — D45 POLYCYTHEMIA VERA: Primary | ICD-10-CM

## 2025-04-03 DIAGNOSIS — D50.9 IRON DEFICIENCY ANEMIA, UNSPECIFIED IRON DEFICIENCY ANEMIA TYPE: ICD-10-CM

## 2025-04-03 DIAGNOSIS — Z79.64 ENCOUNTER FOR MONITORING OF HYDROXYUREA THERAPY: ICD-10-CM

## 2025-04-03 DIAGNOSIS — Z51.81 ENCOUNTER FOR MONITORING OF HYDROXYUREA THERAPY: ICD-10-CM

## 2025-04-03 DIAGNOSIS — I82.402 DEEP VEIN THROMBOSIS (DVT) OF LEFT LOWER EXTREMITY, UNSPECIFIED CHRONICITY, UNSPECIFIED VEIN: ICD-10-CM

## 2025-04-03 LAB
ANISOCYTOSIS BLD QL SMEAR: PRESENT
BASOPHILS ABSOLUTE: 0.2 THOU/MM3 (ref 0–0.1)
BASOPHILS NFR BLD AUTO: 1.6 %
DEPRECATED RDW RBC AUTO: 80.6 FL (ref 35–45)
EOSINOPHIL NFR BLD AUTO: 1.6 %
EOSINOPHILS ABSOLUTE: 0.2 THOU/MM3 (ref 0–0.4)
ERYTHROCYTE [DISTWIDTH] IN BLOOD BY AUTOMATED COUNT: 22.5 % (ref 11.5–14.5)
HCT VFR BLD AUTO: 37.8 % (ref 37–47)
HGB BLD-MCNC: 11.5 GM/DL (ref 12–16)
IMM GRANULOCYTES # BLD AUTO: 1 THOU/MM3 (ref 0–0.07)
IMM GRANULOCYTES NFR BLD AUTO: 10.6 %
LYMPHOCYTES ABSOLUTE: 1.3 THOU/MM3 (ref 1–4.8)
LYMPHOCYTES NFR BLD AUTO: 14.1 %
MCH RBC QN AUTO: 29.8 PG (ref 26–33)
MCHC RBC AUTO-ENTMCNC: 30.4 GM/DL (ref 32.2–35.5)
MCV RBC AUTO: 97.9 FL (ref 81–99)
MONOCYTES ABSOLUTE: 0.8 THOU/MM3 (ref 0.4–1.3)
MONOCYTES NFR BLD AUTO: 8.2 %
NEUTROPHILS ABSOLUTE: 6 THOU/MM3 (ref 1.8–7.7)
NEUTROPHILS NFR BLD AUTO: 63.9 %
NRBC BLD AUTO-RTO: 0 /100 WBC
PLATELET # BLD AUTO: 80 THOU/MM3 (ref 130–400)
PLATELET BLD QL SMEAR: ABNORMAL
PMV BLD AUTO: ABNORMAL FL (ref 9.4–12.4)
POIKILOCYTES: ABNORMAL
RBC # BLD AUTO: 3.86 MILL/MM3 (ref 4.2–5.4)
SCAN OF BLOOD SMEAR: NORMAL
VARIANT LYMPHS BLD QL SMEAR: ABNORMAL %
WBC # BLD AUTO: 9.4 THOU/MM3 (ref 4.8–10.8)

## 2025-04-03 PROCEDURE — 99211 OFF/OP EST MAY X REQ PHY/QHP: CPT

## 2025-04-03 PROCEDURE — 85025 COMPLETE CBC W/AUTO DIFF WBC: CPT

## 2025-04-03 PROCEDURE — 36415 COLL VENOUS BLD VENIPUNCTURE: CPT

## 2025-04-03 RX ORDER — CYANOCOBALAMIN (VITAMIN B-12) 500 MCG
500 TABLET ORAL DAILY
Qty: 90 TABLET | Refills: 2 | Status: SHIPPED | OUTPATIENT
Start: 2025-04-03

## 2025-04-03 RX ORDER — FOLIC ACID 1 MG/1
1000 TABLET ORAL DAILY
Qty: 90 TABLET | Refills: 2 | Status: SHIPPED | OUTPATIENT
Start: 2025-04-03

## 2025-04-03 NOTE — PROGRESS NOTES
[facility] [place] [facility]  [facility]  [address]  [address]  [place] [address]  Dept: [phone]  Loc: [phone]   Hematology/Oncology (Clinic)        04/03/25       Maria De Jesus Dominguez   1955     No ref. provider found   Ja Sarkar MD       Total time spent 31 minutes, this includes reviewing past providers notes, laboratory    Reason:   Polycythemia    HPI: Maria De Jesus Dominguez is a 67 y.o. female with past medical history significant for hypertension, basal cell carcinoma, homocystinemia, monocular exotropia, history of smoking (stopped in 2009) who follows in our office with polycythemia.             Oncology History :  -2010.  Deep vein thrombosis for which she had been treated with Coumadin for 1 year.  This manifested as pain and swelling in her left leg.  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  Followed by Dr. Marshall and treated with hydroxyurea 500 mg p.o. daily.     -8/18/2014 - 8/20/2014.  Admitted with increasing shortness of breath.  She was unable to have a CTA because of abnormal renal function.  She had a VQ scan which was high probability for pulmonary embolus and she was started on heparin and transition to Xarelto.  During hospitalization her renal function improved.    -10/14/2014.  Hemoglobin 14.6 hematocrit 44.6 white count and platelet count were normal.    -1/5/2015.  Hemoglobin 14.0 hematocrit 42.3 with normal white count and platelet count.    -8/2015.  Hydroxyurea discontinued because of skin breakdown in the left lower extremity with darkening of the skin and sores in the left ankle.  Hemoglobin 14.9 and hematocrit 44.3.    10/15/2015.  Began seeing Dr. Judge referred by Dr. Sarkar her primary care physician.  She felt well.  Her skin lesions were improving.  She was continued on Xarelto 20 mg a day without any significant bleeding except for 1 episode of epistaxis which was not

## 2025-04-04 DIAGNOSIS — D45 POLYCYTHEMIA VERA: Primary | ICD-10-CM

## 2025-04-04 DIAGNOSIS — D69.6 THROMBOCYTOPENIA: ICD-10-CM

## 2025-04-14 DIAGNOSIS — D45 POLYCYTHEMIA VERA: ICD-10-CM

## 2025-04-14 RX ORDER — HYDROXYUREA 500 MG/1
500 CAPSULE ORAL DAILY
Qty: 90 CAPSULE | Refills: 1 | Status: SHIPPED | OUTPATIENT
Start: 2025-04-14

## 2025-04-15 ENCOUNTER — LAB (OUTPATIENT)
Dept: LAB | Age: 70
End: 2025-04-15

## 2025-04-15 DIAGNOSIS — D69.6 THROMBOCYTOPENIA: ICD-10-CM

## 2025-04-15 DIAGNOSIS — D45 POLYCYTHEMIA VERA: ICD-10-CM

## 2025-04-15 LAB — PLATELET # BLD AUTO: 70 THOU/MM3 (ref 130–400)

## 2025-04-18 DIAGNOSIS — D45 POLYCYTHEMIA VERA: Primary | ICD-10-CM

## 2025-04-22 ENCOUNTER — HOSPITAL ENCOUNTER (OUTPATIENT)
Age: 70
Discharge: HOME | End: 2025-04-22

## 2025-05-11 DIAGNOSIS — I10 ESSENTIAL HYPERTENSION: ICD-10-CM

## 2025-05-12 RX ORDER — AMLODIPINE BESYLATE 10 MG/1
10 TABLET ORAL EVERY EVENING
Qty: 90 TABLET | Refills: 3 | OUTPATIENT
Start: 2025-05-12

## 2025-05-12 RX ORDER — ENALAPRIL MALEATE 20 MG/1
20 TABLET ORAL 2 TIMES DAILY
Qty: 180 TABLET | Refills: 3 | OUTPATIENT
Start: 2025-05-12

## 2025-05-15 ENCOUNTER — LAB (OUTPATIENT)
Dept: LAB | Age: 70
End: 2025-05-15

## 2025-05-15 ENCOUNTER — HOSPITAL ENCOUNTER (OUTPATIENT)
Dept: INFUSION THERAPY | Age: 70
Discharge: HOME | End: 2025-05-15
Payer: MEDICARE

## 2025-05-15 ENCOUNTER — OFFICE VISIT (OUTPATIENT)
Dept: ONCOLOGY | Age: 70
End: 2025-05-15
Payer: MEDICARE

## 2025-05-15 VITALS
RESPIRATION RATE: 18 BRPM | DIASTOLIC BLOOD PRESSURE: 65 MMHG | SYSTOLIC BLOOD PRESSURE: 140 MMHG | TEMPERATURE: 98.2 F | HEART RATE: 63 BPM

## 2025-05-15 VITALS
HEART RATE: 63 BPM | WEIGHT: 190 LBS | HEIGHT: 65 IN | OXYGEN SATURATION: 96 % | SYSTOLIC BLOOD PRESSURE: 140 MMHG | RESPIRATION RATE: 18 BRPM | BODY MASS INDEX: 31.65 KG/M2 | DIASTOLIC BLOOD PRESSURE: 65 MMHG | TEMPERATURE: 98.2 F

## 2025-05-15 DIAGNOSIS — D45 POLYCYTHEMIA VERA (HCC): Primary | ICD-10-CM

## 2025-05-15 DIAGNOSIS — D45 POLYCYTHEMIA VERA (HCC): ICD-10-CM

## 2025-05-15 DIAGNOSIS — D69.6 THROMBOCYTOPENIA: ICD-10-CM

## 2025-05-15 DIAGNOSIS — I82.402 DEEP VEIN THROMBOSIS (DVT) OF LEFT LOWER EXTREMITY, UNSPECIFIED CHRONICITY, UNSPECIFIED VEIN (HCC): ICD-10-CM

## 2025-05-15 DIAGNOSIS — Z79.899 HIGH RISK MEDICATION USE: ICD-10-CM

## 2025-05-15 DIAGNOSIS — D75.1 POLYCYTHEMIA: ICD-10-CM

## 2025-05-15 DIAGNOSIS — D50.9 IRON DEFICIENCY ANEMIA, UNSPECIFIED IRON DEFICIENCY ANEMIA TYPE: ICD-10-CM

## 2025-05-15 DIAGNOSIS — D72.829 LEUKOCYTOSIS, UNSPECIFIED TYPE: ICD-10-CM

## 2025-05-15 LAB
ANION GAP SERPL CALC-SCNC: 12 MEQ/L (ref 8–16)
ANISOCYTOSIS BLD QL SMEAR: PRESENT
BASOPHILS ABSOLUTE: 0.1 THOU/MM3 (ref 0–0.1)
BASOPHILS NFR BLD AUTO: 1.7 %
BUN SERPL-MCNC: 28 MG/DL (ref 8–23)
CALCIUM SERPL-MCNC: 10.5 MG/DL (ref 8.8–10.2)
CHLORIDE SERPL-SCNC: 106 MEQ/L (ref 98–111)
CO2 SERPL-SCNC: 20 MEQ/L (ref 22–29)
CREAT SERPL-MCNC: 1.2 MG/DL (ref 0.5–0.9)
DEPRECATED RDW RBC AUTO: 74.9 FL (ref 35–45)
ELLIPTOCYTES: ABNORMAL
EOSINOPHIL NFR BLD AUTO: 1.3 %
EOSINOPHILS ABSOLUTE: 0.1 THOU/MM3 (ref 0–0.4)
ERYTHROCYTE [DISTWIDTH] IN BLOOD BY AUTOMATED COUNT: 20.4 % (ref 11.5–14.5)
FOLATE SERPL-MCNC: > 20 NG/ML (ref 4.6–34.8)
GFR SERPL CREATININE-BSD FRML MDRD: 48 ML/MIN/1.73M2
GLUCOSE SERPL-MCNC: 85 MG/DL (ref 74–109)
HCT VFR BLD AUTO: 38.1 % (ref 37–47)
HGB BLD-MCNC: 11.8 GM/DL (ref 12–16)
IMM GRANULOCYTES # BLD AUTO: 0.95 THOU/MM3 (ref 0–0.07)
IMM GRANULOCYTES NFR BLD AUTO: 11.4 %
LYMPHOCYTES ABSOLUTE: 1.5 THOU/MM3 (ref 1–4.8)
LYMPHOCYTES NFR BLD AUTO: 17.8 %
MCH RBC QN AUTO: 31.1 PG (ref 26–33)
MCHC RBC AUTO-ENTMCNC: 31 GM/DL (ref 32.2–35.5)
MCV RBC AUTO: 100.3 FL (ref 81–99)
MONOCYTES ABSOLUTE: 0.5 THOU/MM3 (ref 0.4–1.3)
MONOCYTES NFR BLD AUTO: 5.8 %
NEUTROPHILS ABSOLUTE: 5.1 THOU/MM3 (ref 1.8–7.7)
NEUTROPHILS NFR BLD AUTO: 62 %
NRBC BLD AUTO-RTO: 0 /100 WBC
PLATELET # BLD AUTO: 77 THOU/MM3 (ref 130–400)
PLATELET BLD QL SMEAR: ABNORMAL
PMV BLD AUTO: ABNORMAL FL (ref 9.4–12.4)
POIKILOCYTES: ABNORMAL
POLYCHROMASIA BLD QL SMEAR: ABNORMAL
POTASSIUM SERPL-SCNC: 4.8 MEQ/L (ref 3.5–5.2)
RBC # BLD AUTO: 3.8 MILL/MM3 (ref 4.2–5.4)
SCAN OF BLOOD SMEAR: NORMAL
SODIUM SERPL-SCNC: 138 MEQ/L (ref 135–145)
VARIANT LYMPHS BLD QL SMEAR: ABNORMAL %
VIT B12 SERPL-MCNC: 1617 PG/ML (ref 232–1245)
WBC # BLD AUTO: 8.3 THOU/MM3 (ref 4.8–10.8)

## 2025-05-15 PROCEDURE — 36415 COLL VENOUS BLD VENIPUNCTURE: CPT

## 2025-05-15 PROCEDURE — 3077F SYST BP >= 140 MM HG: CPT | Performed by: INTERNAL MEDICINE

## 2025-05-15 PROCEDURE — 85025 COMPLETE CBC W/AUTO DIFF WBC: CPT

## 2025-05-15 PROCEDURE — 1159F MED LIST DOCD IN RCRD: CPT | Performed by: INTERNAL MEDICINE

## 2025-05-15 PROCEDURE — 1123F ACP DISCUSS/DSCN MKR DOCD: CPT | Performed by: INTERNAL MEDICINE

## 2025-05-15 PROCEDURE — 99211 OFF/OP EST MAY X REQ PHY/QHP: CPT

## 2025-05-15 PROCEDURE — 99214 OFFICE O/P EST MOD 30 MIN: CPT | Performed by: INTERNAL MEDICINE

## 2025-05-15 PROCEDURE — G8400 PT W/DXA NO RESULTS DOC: HCPCS | Performed by: INTERNAL MEDICINE

## 2025-05-15 PROCEDURE — 3017F COLORECTAL CA SCREEN DOC REV: CPT | Performed by: INTERNAL MEDICINE

## 2025-05-15 PROCEDURE — G2211 COMPLEX E/M VISIT ADD ON: HCPCS | Performed by: INTERNAL MEDICINE

## 2025-05-15 PROCEDURE — 1126F AMNT PAIN NOTED NONE PRSNT: CPT | Performed by: INTERNAL MEDICINE

## 2025-05-15 PROCEDURE — G8417 CALC BMI ABV UP PARAM F/U: HCPCS | Performed by: INTERNAL MEDICINE

## 2025-05-15 PROCEDURE — 1090F PRES/ABSN URINE INCON ASSESS: CPT | Performed by: INTERNAL MEDICINE

## 2025-05-15 PROCEDURE — 1036F TOBACCO NON-USER: CPT | Performed by: INTERNAL MEDICINE

## 2025-05-15 PROCEDURE — 3078F DIAST BP <80 MM HG: CPT | Performed by: INTERNAL MEDICINE

## 2025-05-15 PROCEDURE — G8427 DOCREV CUR MEDS BY ELIG CLIN: HCPCS | Performed by: INTERNAL MEDICINE

## 2025-05-15 NOTE — PROGRESS NOTES
12/18/2024 08:02 AM    LABGLOM 54 04/29/2024 02:59 PM    GLUCOSE 108 02/14/2024 09:50 AM    GLUCOSE 102 10/14/2014 09:36 AM     PATHOLOGY:   -12/5/2017 left breast fibroadenoma  -no bone marrow biopsy      ASSESSMENT/PLAN:    Patient Active Problem List   Diagnosis    Obesity (BMI 30.0-34.9)    Pulmonary emboli (HCC)    Polycythemia    Chronic anticoagulation    DVT (deep venous thrombosis) (HCC)    Leukocytosis    Chest pain    Leucocytosis    Polycythemia    ACS (acute coronary syndrome) (HCC)    Personal history of pulmonary embolism    Thrombocytosis    Thrombosis    Polycythemia vera (HCC)    Homocysteinemia    Chronic kidney disease, stage III (moderate) (HCC)    Monocular exotropia    Hypertension    Homocystinuria    Left arm pain    Chest pain, atypical        polycythemia vera  Given she is > 59 y/o AND JAK2+ with hx thrombosis (prior PE/DVT):  high risk  cytoreductive regiments: hydroxyurea daily, Currently on 500 mg daily as she felt a little worse on higher dose (1000), and had thrombocytopenia to 100K (is on Eliquis). -ONGOING  Therapeutic phlebotomy to maintain hct <45-46%-ONGOING S/P phlebotomy in May 2024. Asymptomatic today.   Today CBC and differential currently pending    Possibly thrombocytopenia may be secondary to Hydrea, patient will hold Hydrea for 3 weeks, if there is an improvement with holding Hydrea then likely Hydrea as a culprit for thrombocytopenia.  In addition we will check vitamin B12 and folate levels.  Patient has been on supplementation however I want to ensure is adequate.  If platelet count does not improve while off Hydrea will recommend bone marrow aspiration biopsy  Patient is in agreement with treatment plan.  She will follow-up in 3 weeks    monitor and manage cardiovascular risk factors per PCP.  monitor for new thrombosis, acquired VES and/or disease-related major bleeding  monitor for response, s/s of disease progression Q3-6 mo    Leukocytosis :  -Previously discussed

## 2025-05-15 NOTE — PATIENT INSTRUCTIONS
No follow-ups on file.   Data Unavailable   Orders Placed This Encounter   Procedures    CBC with Auto Differential       Cbcd in 3 weeks    Vitamin B12 and folate ordered on 4/3/25 will need to be done

## 2025-05-16 LAB
ANISOCYTOSIS BLD QL SMEAR: PRESENT
BASOPHILS ABSOLUTE: 0.1 THOU/MM3 (ref 0–0.1)
BASOPHILS NFR BLD AUTO: 1.2 %
DEPRECATED RDW RBC AUTO: 75.3 FL (ref 35–45)
EOSINOPHIL NFR BLD AUTO: 1.5 %
EOSINOPHILS ABSOLUTE: 0.1 THOU/MM3 (ref 0–0.4)
ERYTHROCYTE [DISTWIDTH] IN BLOOD BY AUTOMATED COUNT: 20.5 % (ref 11.5–14.5)
HCT VFR BLD AUTO: 36.5 % (ref 37–47)
HGB BLD-MCNC: 11 GM/DL (ref 12–16)
HYPOCHROMIA BLD QL SMEAR: PRESENT
IMM GRANULOCYTES # BLD AUTO: 0.86 THOU/MM3 (ref 0–0.07)
IMM GRANULOCYTES NFR BLD AUTO: 11 %
LYMPHOCYTES ABSOLUTE: 1.2 THOU/MM3 (ref 1–4.8)
LYMPHOCYTES NFR BLD AUTO: 15.1 %
MCH RBC QN AUTO: 30.7 PG (ref 26–33)
MCHC RBC AUTO-ENTMCNC: 30.1 GM/DL (ref 32.2–35.5)
MCV RBC AUTO: 102 FL (ref 81–99)
MONOCYTES ABSOLUTE: 0.5 THOU/MM3 (ref 0.4–1.3)
MONOCYTES NFR BLD AUTO: 6.6 %
NEUTROPHILS ABSOLUTE: 5 THOU/MM3 (ref 1.8–7.7)
NEUTROPHILS NFR BLD AUTO: 64.6 %
NRBC BLD AUTO-RTO: 0 /100 WBC
PLATELET # BLD AUTO: 72 THOU/MM3 (ref 130–400)
PLATELET BLD QL SMEAR: ABNORMAL
PMV BLD AUTO: 11.6 FL (ref 9.4–12.4)
RBC # BLD AUTO: 3.58 MILL/MM3 (ref 4.2–5.4)
SCAN OF BLOOD SMEAR: NORMAL
VARIANT LYMPHS BLD QL SMEAR: ABNORMAL %
WBC # BLD AUTO: 7.8 THOU/MM3 (ref 4.8–10.8)

## 2025-06-06 ENCOUNTER — HOSPITAL ENCOUNTER (OUTPATIENT)
Dept: INFUSION THERAPY | Age: 70
Discharge: HOME | End: 2025-06-06
Payer: MEDICARE

## 2025-06-06 ENCOUNTER — OFFICE VISIT (OUTPATIENT)
Dept: ONCOLOGY | Age: 70
End: 2025-06-06

## 2025-06-06 VITALS
WEIGHT: 193.2 LBS | SYSTOLIC BLOOD PRESSURE: 141 MMHG | RESPIRATION RATE: 20 BRPM | HEART RATE: 72 BPM | OXYGEN SATURATION: 99 % | DIASTOLIC BLOOD PRESSURE: 65 MMHG | HEIGHT: 65 IN | TEMPERATURE: 98.6 F | BODY MASS INDEX: 32.19 KG/M2

## 2025-06-06 VITALS
DIASTOLIC BLOOD PRESSURE: 65 MMHG | HEART RATE: 72 BPM | OXYGEN SATURATION: 99 % | SYSTOLIC BLOOD PRESSURE: 141 MMHG | RESPIRATION RATE: 20 BRPM | TEMPERATURE: 98.6 F

## 2025-06-06 DIAGNOSIS — Z13.79 ENCOUNTER FOR OTHER SCREENING FOR GENETIC AND CHROMOSOMAL ANOMALIES: ICD-10-CM

## 2025-06-06 DIAGNOSIS — D45 POLYCYTHEMIA VERA (HCC): Primary | ICD-10-CM

## 2025-06-06 DIAGNOSIS — D45 POLYCYTHEMIA VERA (HCC): ICD-10-CM

## 2025-06-06 DIAGNOSIS — D69.6 THROMBOCYTOPENIA: ICD-10-CM

## 2025-06-06 DIAGNOSIS — D75.9 CYTOPENIA: ICD-10-CM

## 2025-06-06 LAB
ANISOCYTOSIS BLD QL SMEAR: PRESENT
BASOPHILS ABSOLUTE: 0.2 THOU/MM3 (ref 0–0.1)
BASOPHILS NFR BLD AUTO: 1.7 %
DEPRECATED RDW RBC AUTO: 66.2 FL (ref 35–45)
EOSINOPHIL NFR BLD AUTO: 1.4 %
EOSINOPHILS ABSOLUTE: 0.1 THOU/MM3 (ref 0–0.4)
ERYTHROCYTE [DISTWIDTH] IN BLOOD BY AUTOMATED COUNT: 18.4 % (ref 11.5–14.5)
HCT VFR BLD AUTO: 38.1 % (ref 37–47)
HGB BLD-MCNC: 11.6 GM/DL (ref 12–16)
IMM GRANULOCYTES # BLD AUTO: 1.16 THOU/MM3 (ref 0–0.07)
IMM GRANULOCYTES NFR BLD AUTO: 13.1 %
LYMPHOCYTES ABSOLUTE: 1.2 THOU/MM3 (ref 1–4.8)
LYMPHOCYTES NFR BLD AUTO: 13.4 %
MCH RBC QN AUTO: 30.4 PG (ref 26–33)
MCHC RBC AUTO-ENTMCNC: 30.4 GM/DL (ref 32.2–35.5)
MCV RBC AUTO: 99.7 FL (ref 81–99)
MONOCYTES ABSOLUTE: 0.6 THOU/MM3 (ref 0.4–1.3)
MONOCYTES NFR BLD AUTO: 7.2 %
NEUTROPHILS ABSOLUTE: 5.6 THOU/MM3 (ref 1.8–7.7)
NEUTROPHILS NFR BLD AUTO: 63.2 %
NRBC BLD AUTO-RTO: 0 /100 WBC
PLATELET # BLD AUTO: 86 THOU/MM3 (ref 130–400)
PLATELET BLD QL SMEAR: ABNORMAL
PMV BLD AUTO: ABNORMAL FL (ref 9.4–12.4)
POIKILOCYTES: ABNORMAL
POLYCHROMASIA BLD QL SMEAR: ABNORMAL
RBC # BLD AUTO: 3.82 MILL/MM3 (ref 4.2–5.4)
SCAN OF BLOOD SMEAR: NORMAL
WBC # BLD AUTO: 8.9 THOU/MM3 (ref 4.8–10.8)

## 2025-06-06 PROCEDURE — 85025 COMPLETE CBC W/AUTO DIFF WBC: CPT

## 2025-06-06 PROCEDURE — 99211 OFF/OP EST MAY X REQ PHY/QHP: CPT

## 2025-06-06 PROCEDURE — 36415 COLL VENOUS BLD VENIPUNCTURE: CPT

## 2025-06-06 NOTE — PATIENT INSTRUCTIONS
Return in about 6 weeks (around 7/18/2025).   Data Unavailable   Orders Placed This Encounter   Procedures    IR BIOPSY BONE MARROW    Protime-INR    Leukemia / lymphoma phenotype    Chromosome analysis, bone marrow    Myelodysplastic Syndrome (MDS), FISH

## 2025-06-06 NOTE — PROGRESS NOTES
[facility] [place] [facility]  [facility]  [address]  [address]  [place] [address]  Dept: [phone]  Loc: [phone]   Hematology/Oncology (Clinic)        06/06/25       Maria De Jesus Dominguez   1955     No ref. provider found   Ja Sarkar MD           Reason:   Polycythemia    HPI: Maria De Jesus Dominguez is a 67 y.o. female with past medical history significant for hypertension, basal cell carcinoma, homocystinemia, monocular exotropia, history of smoking (stopped in 2009) who follows in our office with polycythemia.         Oncology History :  -2010.  Deep vein thrombosis for which she had been treated with Coumadin for 1 year.  This manifested as pain and swelling in her left leg.  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  Followed by Dr. Marshall and treated with hydroxyurea 500 mg p.o. daily.     -8/18/2014 - 8/20/2014.  Admitted with increasing shortness of breath.  She was unable to have a CTA because of abnormal renal function.  She had a VQ scan which was high probability for pulmonary embolus and she was started on heparin and transition to Xarelto.  During hospitalization her renal function improved.    -10/14/2014.  Hemoglobin 14.6 hematocrit 44.6 white count and platelet count were normal.    -1/5/2015.  Hemoglobin 14.0 hematocrit 42.3 with normal white count and platelet count.    -8/2015.  Hydroxyurea discontinued because of skin breakdown in the left lower extremity with darkening of the skin and sores in the left ankle.  Hemoglobin 14.9 and hematocrit 44.3.    10/15/2015.  Began seeing Dr. Judge referred by Dr. Sarkar her primary care physician.  She felt well.  Her skin lesions were improving.  She was continued on Xarelto 20 mg a day without any significant bleeding except for 1 episode of epistaxis which was not prolonged.    1/14/2016.  CBC showed a white count of 13,000 with a hemoglobin of 19.4 and hematocrit of

## 2025-07-01 ENCOUNTER — HOSPITAL ENCOUNTER (OUTPATIENT)
Dept: CT IMAGING | Age: 70
Discharge: HOME | End: 2025-07-01
Payer: MEDICARE

## 2025-07-01 VITALS
SYSTOLIC BLOOD PRESSURE: 111 MMHG | WEIGHT: 191 LBS | BODY MASS INDEX: 31.78 KG/M2 | HEART RATE: 61 BPM | RESPIRATION RATE: 18 BRPM | DIASTOLIC BLOOD PRESSURE: 58 MMHG | OXYGEN SATURATION: 98 % | TEMPERATURE: 97.2 F

## 2025-07-01 DIAGNOSIS — Z13.79 ENCOUNTER FOR OTHER SCREENING FOR GENETIC AND CHROMOSOMAL ANOMALIES: ICD-10-CM

## 2025-07-01 DIAGNOSIS — D45 POLYCYTHEMIA VERA (HCC): ICD-10-CM

## 2025-07-01 DIAGNOSIS — D69.6 THROMBOCYTOPENIA: ICD-10-CM

## 2025-07-01 LAB
BASOPHILS ABSOLUTE: 0.2 THOU/MM3 (ref 0–0.1)
BASOPHILS NFR BLD AUTO: 2.4 %
DEPRECATED RDW RBC AUTO: 64.7 FL (ref 35–45)
EOSINOPHIL NFR BLD AUTO: 2.7 %
EOSINOPHILS ABSOLUTE: 0.2 THOU/MM3 (ref 0–0.4)
ERYTHROCYTE [DISTWIDTH] IN BLOOD BY AUTOMATED COUNT: 18.6 % (ref 11.5–14.5)
HCT VFR BLD AUTO: 42 % (ref 37–47)
HGB BLD-MCNC: 13.1 GM/DL (ref 12–16)
IMM GRANULOCYTES # BLD AUTO: 0.84 THOU/MM3 (ref 0–0.07)
IMM GRANULOCYTES NFR BLD AUTO: 11.3 %
JAK2 QUAL, SOURCE: NORMAL
LYMPHOCYTES ABSOLUTE: 1.2 THOU/MM3 (ref 1–4.8)
LYMPHOCYTES NFR BLD AUTO: 16.6 %
MCH RBC QN AUTO: 30 PG (ref 26–33)
MCHC RBC AUTO-ENTMCNC: 31.2 GM/DL (ref 32.2–35.5)
MCV RBC AUTO: 96.1 FL (ref 81–99)
MONOCYTES ABSOLUTE: 0.5 THOU/MM3 (ref 0.4–1.3)
MONOCYTES NFR BLD AUTO: 7 %
NEUTROPHILS ABSOLUTE: 4.5 THOU/MM3 (ref 1.8–7.7)
NEUTROPHILS NFR BLD AUTO: 60 %
NRBC BLD AUTO-RTO: 0 /100 WBC
PLATELET # BLD AUTO: 74 THOU/MM3 (ref 130–400)
PLATELET BLD QL SMEAR: ABNORMAL
PMV BLD AUTO: 10.3 FL (ref 9.4–12.4)
POLYCHROMASIA BLD QL SMEAR: ABNORMAL
RBC # BLD AUTO: 4.37 MILL/MM3 (ref 4.2–5.4)
SCAN OF BLOOD SMEAR: NORMAL
SPECIMEN SOURCE: NORMAL
VARIANT LYMPHS BLD QL SMEAR: ABNORMAL %
WBC # BLD AUTO: 7.5 THOU/MM3 (ref 4.8–10.8)

## 2025-07-01 PROCEDURE — 88185 FLOWCYTOMETRY/TC ADD-ON: CPT

## 2025-07-01 PROCEDURE — 6360000002 HC RX W HCPCS: Performed by: RADIOLOGY

## 2025-07-01 PROCEDURE — 88237 TISSUE CULTURE BONE MARROW: CPT

## 2025-07-01 PROCEDURE — 77012 CT SCAN FOR NEEDLE BIOPSY: CPT

## 2025-07-01 PROCEDURE — 81455 SO/HL 51/>GSAP DNA/DNA&RNA: CPT

## 2025-07-01 PROCEDURE — 85025 COMPLETE CBC W/AUTO DIFF WBC: CPT

## 2025-07-01 PROCEDURE — 36415 COLL VENOUS BLD VENIPUNCTURE: CPT

## 2025-07-01 PROCEDURE — 88184 FLOWCYTOMETRY/ TC 1 MARKER: CPT

## 2025-07-01 PROCEDURE — 88264 CHROMOSOME ANALYSIS 20-25: CPT

## 2025-07-01 PROCEDURE — 2580000003 HC RX 258: Performed by: RADIOLOGY

## 2025-07-01 PROCEDURE — 81270 JAK2 GENE: CPT

## 2025-07-01 RX ORDER — FENTANYL CITRATE 50 UG/ML
50 INJECTION, SOLUTION INTRAMUSCULAR; INTRAVENOUS ONCE
Status: COMPLETED | OUTPATIENT
Start: 2025-07-01 | End: 2025-07-01

## 2025-07-01 RX ORDER — MIDAZOLAM HYDROCHLORIDE 1 MG/ML
1 INJECTION, SOLUTION INTRAMUSCULAR; INTRAVENOUS ONCE
Status: COMPLETED | OUTPATIENT
Start: 2025-07-01 | End: 2025-07-01

## 2025-07-01 RX ORDER — SODIUM CHLORIDE 450 MG/100ML
INJECTION, SOLUTION INTRAVENOUS CONTINUOUS
Status: DISCONTINUED | OUTPATIENT
Start: 2025-07-01 | End: 2025-07-02 | Stop reason: HOSPADM

## 2025-07-01 RX ADMIN — SODIUM CHLORIDE: 0.45 INJECTION, SOLUTION INTRAVENOUS at 07:27

## 2025-07-01 RX ADMIN — FENTANYL CITRATE 50 MCG: 50 INJECTION INTRAMUSCULAR; INTRAVENOUS at 08:30

## 2025-07-01 RX ADMIN — MIDAZOLAM HYDROCHLORIDE 1 MG: 1 INJECTION, SOLUTION INTRAMUSCULAR; INTRAVENOUS at 08:30

## 2025-07-01 ASSESSMENT — PAIN - FUNCTIONAL ASSESSMENT: PAIN_FUNCTIONAL_ASSESSMENT: NONE - DENIES PAIN

## 2025-07-01 NOTE — H&P
Formulation and discussion of sedation / procedure plans, risks, benefits, side effects and alternatives with patient and/or responsible adult completed.    History and Physical reviewed and unchanged.    Electronically signed by Raymundo Melendez MD on 7/1/25 at 8:23 AM EDT

## 2025-07-01 NOTE — H&P
[facility]  Sedation/Analgesia History & Physical    Pt Name: Maria De Jesus Dominguez  MRN: [id number]  YOB: 1955  Provider Performing Procedure: Raymundo Melendez MD, MD  Primary Care Physician: Ja Sarkar MD    Formulation and discussion of sedation / procedure plans, risks, benefits, side effects and alternatives with patient and/or responsible adult completed.    PRE-PROCEDURE   DNR-CCA/DNR-CC []Yes [x]No  Brief History/Pre-Procedure Diagnosis: thrombocytopenia           MEDICAL HISTORY  []CAD/Valve  []Liver Disease  []Lung Disease []Diabetes  []Hypertension []Renal Disease  []Additional information:       has a past medical history of Anticoagulant long-term use, Basal cell cancer, Chronic kidney disease, stage III (moderate) (HCC), DVT (deep venous thrombosis) (HCC), History of shingles, Homocysteinemia, Hypertension, Polycythemia vera(238.4), Pulmonary emboli (HCC), Right-sided epistaxis, and Unspecified diseases of blood and blood-forming organs.    SURGICAL HISTORY   has a past surgical history that includes skin biopsy; Eye surgery (Left, 03/06/2015); Dental surgery (11/09/2015); other surgical history (12/12/2017); and eye surgery (3-2015).  Additional information:       ALLERGIES   Allergies as of 07/01/2025 - Fully Reviewed 07/01/2025   Allergen Reaction Noted    Pcn [penicillins] Anaphylaxis 05/30/2012    Nitrates, organic  05/08/2018    Keflex [cephalexin] Itching and Rash 11/19/2015     Additional information:       MEDICATIONS   Coumadin Use Last 5 Days [x]No []Yes  Antiplatelet drug therapy use last 5 days  [x]No []Yes  Other anticoagulant use last 5 days  [x]No []Yes    Current Outpatient Medications:     hydroxyurea (HYDREA) 500 MG chemo capsule, TAKE 1 CAPSULE BY MOUTH DAILY, Disp: 90 capsule, Rfl: 1    folic acid (FOLVITE) 1 MG tablet, Take 1 tablet by mouth daily, Disp: 90 tablet, Rfl: 2    Cyanocobalamin (B-12) 500 MCG TABS, Take 500 mcg by mouth daily,

## 2025-07-01 NOTE — OP NOTE
Department of Radiology  Post Procedure Progress Note      Pre-Procedure Diagnosis:  thrombocytopenia     Procedure Performed:  Bone marrow biopsy     Anesthesia: local / versed and fentanyl    Findings: successful    Immediate Complications:  None    Estimated Blood Loss: minimal    SEE DICTATED PROCEDURE NOTE FOR COMPLETE DETAILS.    Raymundo Melendez MD   7/1/2025 8:46 AM

## 2025-07-01 NOTE — PROGRESS NOTES
0815 Patient received in CT scanning for bone marrow biopsy pre-procedure assessment with family at bedside. Monitor applied.   0820 Dr. Melendez here; spoke to patient. This procedure has been fully reviewed with the patient and written informed consent has been obtained. Histology called to CT area for procedure.  0825 Patient assisted to CT table and pre scan started.  Histology arrived to CT.   0830 Procedure started with Dr. Melendez.  0840 Samples collected and given to histologist for further review. Dr Melendez unable to get spicules after 3 attempts of repositioning needle. Core sample obtained.   0845 Procedure completed; patient tolerated well. Post scan obtained.   0847 Bacitracin oint, band aid to site; no bleeding noted.  0850 Patient on cart; comfort ensured.  0852 Report called to OPN and patient taken to OPN via cart with family at bedside. Pt alert and oriented x3; follows commands. Skin pink, warm, and dry. Respirations easy, regular, and nonlabored.

## 2025-07-01 NOTE — PROGRESS NOTES
Patient admitted to room 01, vitals are stable. Patient offered rights and responsibilities.     __m__ Safety:       (Environmental)  [place] to environment  Ensure ID band is correct and in place/ allergy band as needed  Assess for fall risk  Initiate fall precautions as applicable (fall band, side rails, etc.)  Call light within reach  Bed in low position/ wheels locked    __m__ Pain:       Assess pain level and characteristics  Administer analgesics as ordered  Assess effectiveness of pain management and report to MD as needed    __m__ Knowledge Deficit:  Assess baseline knowledge  Provide teaching at level of understanding  Provide teaching via preferred learning method  Evaluate teaching effectiveness    __m__ Hemodynamic/Respiratory Status:       (Pre and Post Procedure Monitoring)  Assess/Monitor vital signs and LOC  Assess Baseline SpO2 prior to any sedation  Obtain weight/height  Assess vital signs/ LOC until patient meets discharge criteria  Monitor procedure site and notify MD of any issues    _

## 2025-07-01 NOTE — DISCHARGE INSTRUCTIONS
POST BIOPSY DISCHARGE INSTRUCTION SHEET    DIET:  As tolerated    ACTIVITY:  Rest at home on sofa, bed or recliner today.  Bathroom privileges only today.  Limit any exertion (pushing or pulling) today.  No lifting for 3 days.  No driving today.  Check biopsy site frequently today.  Resume any blood thinners in 24 hours.  Keep band aid clean & dry - replace as needed, may remove in 48 hours.    RETURN TO NEAREST EMERGENCY ROOM IF YOU HAVE ANY OF THE FOLLOWING:  Sign of bleeding, swelling, drainage from biopsy site or severe pain (slight discomfort to be expected) around biopsy site.  Repeated nausea/vomiting/abdominal pain.  Elevated temperature above 101 degrees.  Shortness of breath.  Chest pain.    Keep scheduled appointment with your physician.  If sedation given, follow post sedation instruction sheet.      _SEDATION/ANALGESIA INFORMATION HOME GOING ADVICE    Review the following information with the patient prior to the procedure.  Sedation/agalgesia is used during short medical procedures under controlled supervision.  The medication will produce a strong relaxation.  You will be able to hear, speak and follow instructions, but you memory and alertness will be decreased.  You will be able to swallow and breathe on your own.  During sedation/analgesia you blood pressure, hear and breathing will be watched closely.  After the procedure, you may not remember what was said or done.    Procedure: Bone Marrow Biopsy. Date: 7/1/2025.   You may have the following effects from the medication.  Drowsiness, dizziness, sleepiness or confusion.  Difficulty remembering or delayed reaction times.  Loss of fine muscle control or difficulty with your balance especially while walking.  Difficulty focusing or blurred vision.  You may not be aware of slight changes in your behavior and/or your reaction time because of the medication used during the procedure.  Therefore you should follow these instructions.  Have someone

## 2025-07-02 ENCOUNTER — LAB (OUTPATIENT)
Dept: LAB | Age: 70
End: 2025-07-02

## 2025-07-02 DIAGNOSIS — D50.9 IRON DEFICIENCY ANEMIA, UNSPECIFIED IRON DEFICIENCY ANEMIA TYPE: ICD-10-CM

## 2025-07-02 DIAGNOSIS — Z79.64 ENCOUNTER FOR MONITORING OF HYDROXYUREA THERAPY: ICD-10-CM

## 2025-07-02 DIAGNOSIS — D45 POLYCYTHEMIA VERA (HCC): ICD-10-CM

## 2025-07-02 DIAGNOSIS — Z51.81 ENCOUNTER FOR MONITORING OF HYDROXYUREA THERAPY: ICD-10-CM

## 2025-07-02 LAB
ANION GAP SERPL CALC-SCNC: 11 MEQ/L (ref 8–16)
ANISOCYTOSIS BLD QL SMEAR: PRESENT
AUTO DIFF PNL BLD: ABNORMAL
BASOPHILS ABSOLUTE: 0.2 THOU/MM3 (ref 0–0.1)
BASOPHILS NFR BLD AUTO: 2 %
BUN SERPL-MCNC: 28 MG/DL (ref 8–23)
CALCIUM SERPL-MCNC: 10.5 MG/DL (ref 8.8–10.2)
CHLORIDE SERPL-SCNC: 104 MEQ/L (ref 98–111)
CO2 SERPL-SCNC: 22 MEQ/L (ref 22–29)
CREAT SERPL-MCNC: 1.2 MG/DL (ref 0.5–0.9)
DACROCYTES: ABNORMAL
DEPRECATED RDW RBC AUTO: 67.4 FL (ref 35–45)
EOSINOPHIL NFR BLD AUTO: 2.6 %
EOSINOPHILS ABSOLUTE: 0.2 THOU/MM3 (ref 0–0.4)
ERYTHROCYTE [DISTWIDTH] IN BLOOD BY AUTOMATED COUNT: 19.1 % (ref 11.5–14.5)
FOLATE SERPL-MCNC: > 20 NG/ML (ref 4.6–34.8)
GFR SERPL CREATININE-BSD FRML MDRD: 48 ML/MIN/1.73M2
GLUCOSE SERPL-MCNC: 77 MG/DL (ref 74–109)
HCT VFR BLD AUTO: 41.8 % (ref 37–47)
HGB BLD-MCNC: 12.9 GM/DL (ref 12–16)
IMM GRANULOCYTES # BLD AUTO: 0.86 THOU/MM3 (ref 0–0.07)
IMM GRANULOCYTES NFR BLD AUTO: 11.4 %
LYMPHOCYTES ABSOLUTE: 1.6 THOU/MM3 (ref 1–4.8)
LYMPHOCYTES NFR BLD AUTO: 20.4 %
MCH RBC QN AUTO: 30.4 PG (ref 26–33)
MCHC RBC AUTO-ENTMCNC: 30.9 GM/DL (ref 32.2–35.5)
MCV RBC AUTO: 98.6 FL (ref 81–99)
MONOCYTES ABSOLUTE: 0.5 THOU/MM3 (ref 0.4–1.3)
MONOCYTES NFR BLD AUTO: 7 %
NEUTROPHILS ABSOLUTE: 4.3 THOU/MM3 (ref 1.8–7.7)
NEUTROPHILS NFR BLD AUTO: 56.6 %
NRBC BLD AUTO-RTO: 1 /100 WBC
PATHOLOGIST REVIEW: ABNORMAL
PLATELET # BLD AUTO: 73 THOU/MM3 (ref 130–400)
PLATELET BLD QL SMEAR: ABNORMAL
PMV BLD AUTO: ABNORMAL FL (ref 9.4–12.4)
POIKILOCYTES: ABNORMAL
POTASSIUM SERPL-SCNC: 4.5 MEQ/L (ref 3.5–5.2)
RBC # BLD AUTO: 4.24 MILL/MM3 (ref 4.2–5.4)
SCAN OF BLOOD SMEAR: NORMAL
SODIUM SERPL-SCNC: 137 MEQ/L (ref 135–145)
VARIANT LYMPHS BLD QL SMEAR: ABNORMAL %
VIT B12 SERPL-MCNC: 1465 PG/ML (ref 232–1245)
WBC # BLD AUTO: 7.6 THOU/MM3 (ref 4.8–10.8)

## 2025-07-03 LAB — LEUKEMIA/LYMPHOMA PHENO BONE MARROW: NORMAL

## 2025-07-07 LAB
JAK2 P.V617F BLD/T QL: DETECTED
SPECIMEN SOURCE: ABNORMAL

## 2025-07-11 LAB
DX PRELIMINARY: NORMAL
GENE XXX MUT ANL BLD/T: NORMAL
PATHOLOGY STUDY: NORMAL
SPECIMEN SOURCE: NORMAL

## 2025-07-13 LAB — CHROMOSOME, BONE MARROW: NORMAL

## 2025-07-18 ENCOUNTER — OFFICE VISIT (OUTPATIENT)
Dept: ONCOLOGY | Age: 70
End: 2025-07-18
Payer: MEDICARE

## 2025-07-18 ENCOUNTER — HOSPITAL ENCOUNTER (OUTPATIENT)
Dept: INFUSION THERAPY | Age: 70
Discharge: HOME | End: 2025-07-18
Payer: MEDICARE

## 2025-07-18 VITALS
OXYGEN SATURATION: 99 % | TEMPERATURE: 97.8 F | HEART RATE: 58 BPM | RESPIRATION RATE: 16 BRPM | SYSTOLIC BLOOD PRESSURE: 117 MMHG | DIASTOLIC BLOOD PRESSURE: 59 MMHG

## 2025-07-18 VITALS
HEIGHT: 65 IN | OXYGEN SATURATION: 99 % | BODY MASS INDEX: 32.42 KG/M2 | HEART RATE: 58 BPM | DIASTOLIC BLOOD PRESSURE: 59 MMHG | TEMPERATURE: 97.8 F | RESPIRATION RATE: 16 BRPM | SYSTOLIC BLOOD PRESSURE: 117 MMHG | WEIGHT: 194.6 LBS

## 2025-07-18 DIAGNOSIS — Z51.81 ENCOUNTER FOR MONITORING OF HYDROXYUREA THERAPY: ICD-10-CM

## 2025-07-18 DIAGNOSIS — D69.6 THROMBOCYTOPENIA: ICD-10-CM

## 2025-07-18 DIAGNOSIS — Z79.64 ENCOUNTER FOR MONITORING OF HYDROXYUREA THERAPY: ICD-10-CM

## 2025-07-18 DIAGNOSIS — D75.9 CYTOPENIA: ICD-10-CM

## 2025-07-18 DIAGNOSIS — Z15.89 JAK-2 GENE MUTATION: ICD-10-CM

## 2025-07-18 DIAGNOSIS — Z13.6 ENCOUNTER FOR SCREENING FOR CARDIOVASCULAR DISORDERS: ICD-10-CM

## 2025-07-18 DIAGNOSIS — D45 POLYCYTHEMIA VERA (HCC): Primary | ICD-10-CM

## 2025-07-18 DIAGNOSIS — Z13.220 ENCOUNTER FOR SCREENING FOR LIPID DISORDER: ICD-10-CM

## 2025-07-18 PROCEDURE — G8427 DOCREV CUR MEDS BY ELIG CLIN: HCPCS | Performed by: INTERNAL MEDICINE

## 2025-07-18 PROCEDURE — 3017F COLORECTAL CA SCREEN DOC REV: CPT | Performed by: INTERNAL MEDICINE

## 2025-07-18 PROCEDURE — 1090F PRES/ABSN URINE INCON ASSESS: CPT | Performed by: INTERNAL MEDICINE

## 2025-07-18 PROCEDURE — 1126F AMNT PAIN NOTED NONE PRSNT: CPT | Performed by: INTERNAL MEDICINE

## 2025-07-18 PROCEDURE — 1159F MED LIST DOCD IN RCRD: CPT | Performed by: INTERNAL MEDICINE

## 2025-07-18 PROCEDURE — 99211 OFF/OP EST MAY X REQ PHY/QHP: CPT

## 2025-07-18 PROCEDURE — G2211 COMPLEX E/M VISIT ADD ON: HCPCS | Performed by: INTERNAL MEDICINE

## 2025-07-18 PROCEDURE — G8400 PT W/DXA NO RESULTS DOC: HCPCS | Performed by: INTERNAL MEDICINE

## 2025-07-18 PROCEDURE — 3078F DIAST BP <80 MM HG: CPT | Performed by: INTERNAL MEDICINE

## 2025-07-18 PROCEDURE — G8417 CALC BMI ABV UP PARAM F/U: HCPCS | Performed by: INTERNAL MEDICINE

## 2025-07-18 PROCEDURE — 1036F TOBACCO NON-USER: CPT | Performed by: INTERNAL MEDICINE

## 2025-07-18 PROCEDURE — 1123F ACP DISCUSS/DSCN MKR DOCD: CPT | Performed by: INTERNAL MEDICINE

## 2025-07-18 PROCEDURE — 99215 OFFICE O/P EST HI 40 MIN: CPT | Performed by: INTERNAL MEDICINE

## 2025-07-18 PROCEDURE — 3074F SYST BP LT 130 MM HG: CPT | Performed by: INTERNAL MEDICINE

## 2025-07-18 NOTE — PROGRESS NOTES
[facility] [place] [facility]  [facility]  [address]  [address]  [place] [address]  Dept: [phone]  Loc: [phone]   Hematology/Oncology (Clinic)        07/18/25       Maria De Jesus Dominguez   1955     No ref. provider found   Ja Sarkar MD           Reason:   Polycythemia    HPI: Maria De Jesus Dominguez is a 67 y.o. female with past medical history significant for hypertension, basal cell carcinoma, homocystinemia, monocular exotropia, history of smoking (stopped in 2009) who follows in our office with polycythemia.         Oncology History :  -2010.  Deep vein thrombosis for which she had been treated with Coumadin for 1 year.  This manifested as pain and swelling in her left leg.  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  Followed by Dr. Marshall and treated with hydroxyurea 500 mg p.o. daily.     -8/18/2014 - 8/20/2014.  Admitted with increasing shortness of breath.  She was unable to have a CTA because of abnormal renal function.  She had a VQ scan which was high probability for pulmonary embolus and she was started on heparin and transition to Xarelto.  During hospitalization her renal function improved.    -10/14/2014.  Hemoglobin 14.6 hematocrit 44.6 white count and platelet count were normal.    -1/5/2015.  Hemoglobin 14.0 hematocrit 42.3 with normal white count and platelet count.    -8/2015.  Hydroxyurea discontinued because of skin breakdown in the left lower extremity with darkening of the skin and sores in the left ankle.  Hemoglobin 14.9 and hematocrit 44.3.    10/15/2015.  Began seeing Dr. Judge referred by Dr. Sarkar her primary care physician.  She felt well.  Her skin lesions were improving.  She was continued on Xarelto 20 mg a day without any significant bleeding except for 1 episode of epistaxis which was not prolonged.    1/14/2016.  CBC showed a white count of 13,000 with a hemoglobin of 19.4 and hematocrit of

## 2025-07-18 NOTE — PATIENT INSTRUCTIONS
Return in about 7 weeks (around 9/4/2025).   Data Unavailable   Orders Placed This Encounter   Procedures    CBC with Auto Differential    POC PANEL BMP W/IOCA    Lipid Panel    Hepatic Function Panel    CBC with Auto Differential          Education with mid/level staff - Ruxolitinib education

## 2025-07-25 ENCOUNTER — LAB (OUTPATIENT)
Dept: LAB | Age: 70
End: 2025-07-25

## 2025-07-25 ENCOUNTER — CLINICAL DOCUMENTATION (OUTPATIENT)
Dept: INFUSION THERAPY | Age: 70
End: 2025-07-25

## 2025-07-25 DIAGNOSIS — D75.9 CYTOPENIA: ICD-10-CM

## 2025-07-25 DIAGNOSIS — D45 POLYCYTHEMIA VERA (HCC): ICD-10-CM

## 2025-07-25 DIAGNOSIS — Z13.220 ENCOUNTER FOR SCREENING FOR LIPID DISORDER: ICD-10-CM

## 2025-07-25 DIAGNOSIS — Z13.6 ENCOUNTER FOR SCREENING FOR CARDIOVASCULAR DISORDERS: ICD-10-CM

## 2025-07-25 DIAGNOSIS — Z15.89 JAK-2 GENE MUTATION: ICD-10-CM

## 2025-07-25 DIAGNOSIS — D69.6 THROMBOCYTOPENIA: ICD-10-CM

## 2025-07-25 DIAGNOSIS — Z51.81 ENCOUNTER FOR MONITORING OF HYDROXYUREA THERAPY: ICD-10-CM

## 2025-07-25 DIAGNOSIS — Z79.64 ENCOUNTER FOR MONITORING OF HYDROXYUREA THERAPY: ICD-10-CM

## 2025-07-25 LAB
ALBUMIN SERPL BCG-MCNC: 3.8 G/DL (ref 3.4–4.9)
ALP SERPL-CCNC: 76 U/L (ref 38–126)
ALT SERPL W/O P-5'-P-CCNC: 12 U/L (ref 10–35)
ANION GAP SERPL CALC-SCNC: 12 MEQ/L (ref 8–16)
ANISOCYTOSIS BLD QL SMEAR: PRESENT
AST SERPL-CCNC: 20 U/L (ref 10–35)
BASO STIPL BLD QL SMEAR: ABNORMAL
BASOPHILS ABSOLUTE: 0.2 THOU/MM3 (ref 0–0.1)
BASOPHILS NFR BLD AUTO: 2.3 %
BILIRUB CONJ SERPL-MCNC: 0.2 MG/DL (ref 0–0.2)
BILIRUB SERPL-MCNC: 0.5 MG/DL (ref 0.3–1.2)
BUN SERPL-MCNC: 26 MG/DL (ref 8–23)
CALCIUM SERPL-MCNC: 11 MG/DL (ref 8.8–10.2)
CHLORIDE SERPL-SCNC: 105 MEQ/L (ref 98–111)
CHOLEST SERPL-MCNC: 174 MG/DL (ref 100–199)
CO2 SERPL-SCNC: 22 MEQ/L (ref 22–29)
CREAT SERPL-MCNC: 1.1 MG/DL (ref 0.5–0.9)
DEPRECATED RDW RBC AUTO: 67.8 FL (ref 35–45)
EOSINOPHIL NFR BLD AUTO: 2 %
EOSINOPHILS ABSOLUTE: 0.2 THOU/MM3 (ref 0–0.4)
ERYTHROCYTE [DISTWIDTH] IN BLOOD BY AUTOMATED COUNT: 19 % (ref 11.5–14.5)
GFR SERPL CREATININE-BSD FRML MDRD: 54 ML/MIN/1.73M2
GLUCOSE SERPL-MCNC: 104 MG/DL (ref 74–109)
HCT VFR BLD AUTO: 39.6 % (ref 37–47)
HDLC SERPL-MCNC: 36 MG/DL
HGB BLD-MCNC: 12.4 GM/DL (ref 12–16)
IMM GRANULOCYTES # BLD AUTO: 0.79 THOU/MM3 (ref 0–0.07)
IMM GRANULOCYTES NFR BLD AUTO: 10.1 %
LDLC SERPL CALC-MCNC: 114 MG/DL
LYMPHOCYTES ABSOLUTE: 1.1 THOU/MM3 (ref 1–4.8)
LYMPHOCYTES NFR BLD AUTO: 14.4 %
MCH RBC QN AUTO: 30.5 PG (ref 26–33)
MCHC RBC AUTO-ENTMCNC: 31.3 GM/DL (ref 32.2–35.5)
MCV RBC AUTO: 97.3 FL (ref 81–99)
MONOCYTES ABSOLUTE: 0.7 THOU/MM3 (ref 0.4–1.3)
MONOCYTES NFR BLD AUTO: 8.9 %
NEUTROPHILS ABSOLUTE: 4.9 THOU/MM3 (ref 1.8–7.7)
NEUTROPHILS NFR BLD AUTO: 62.3 %
NRBC BLD AUTO-RTO: 0 /100 WBC
PLATELET # BLD AUTO: 77 THOU/MM3 (ref 130–400)
PLATELET BLD QL SMEAR: ABNORMAL
PMV BLD AUTO: ABNORMAL FL (ref 9.4–12.4)
POLYCHROMASIA BLD QL SMEAR: ABNORMAL
POTASSIUM SERPL-SCNC: 4.3 MEQ/L (ref 3.5–5.2)
PROT SERPL-MCNC: 7 G/DL (ref 6.4–8.3)
RBC # BLD AUTO: 4.07 MILL/MM3 (ref 4.2–5.4)
SCAN OF BLOOD SMEAR: NORMAL
SODIUM SERPL-SCNC: 139 MEQ/L (ref 135–145)
TRIGL SERPL-MCNC: 120 MG/DL (ref 0–199)
VARIANT LYMPHS BLD QL SMEAR: ABNORMAL %
WBC # BLD AUTO: 7.9 THOU/MM3 (ref 4.8–10.8)

## 2025-07-25 NOTE — PROGRESS NOTES
New validation note:    Diagnosis: Polycythemia vera    Regimen ordered: Ruxoltinib  ruxolitinib phosphate (JAKAFI) 5 MG tablet   [[id number]]  Order Hx  Order Details  Dose: 5 mg Route: Oral Frequency: 2 times daily   Start Date: 07/18/25 End Date: --    Written Date: 07/18/25 Expiration Date: 07/18/26        Associated Diagnoses: Polycythemia vera (HCC) [D45]; Encounter for screening for cardiovascular disorders [Z13.6]      Reference or literature used for validation: Sadi  Polycythemia vera: Oral: Initial dose: 10 mg twice daily (titrate dose based on efficacy and safety)  Dose modification due to insufficient response: If response is insufficient and platelet, hemoglobin, and neutrophil counts are adequate, the dose may be increased in 5 mg twice daily increments to a maximum of 25 mg twice daily. Do not increase dose in the first 4 weeks of treatment and not more frequently than every 2 weeks. Consider dose increases in patients who meet all of the following conditions:  - Inadequate efficacy demonstrated by one or more of the following: Continued need for phlebotomy, WBC >ULN of normal range, platelet count >ULN of normal range, or palpable spleen that is reduced by <25% from baseline.  - Platelet count >=140,000/mm3  - Hemoglobin >=12 g/dL  - ANC >=1,500/mm3       Date literature or guideline last updated N/A     Deviation from literature or guideline used: N/A    Summary of any verbal or telephone information obtained: N/A    Beny Gibson RPH, PharmD, BCPS  Clinical Pharmacy Specialist  7/25/2025 9:17 AM

## 2025-07-26 LAB — CA-I SERPL ISE-SCNC: NORMAL MMOL/L

## 2025-08-07 ENCOUNTER — HOSPITAL ENCOUNTER (OUTPATIENT)
Dept: INFUSION THERAPY | Age: 70
Discharge: HOME | End: 2025-08-07
Payer: MEDICARE

## 2025-08-07 ENCOUNTER — CLINICAL DOCUMENTATION (OUTPATIENT)
Dept: ONCOLOGY | Age: 70
End: 2025-08-07

## 2025-08-07 ENCOUNTER — OFFICE VISIT (OUTPATIENT)
Dept: ONCOLOGY | Age: 70
End: 2025-08-07

## 2025-08-07 ENCOUNTER — OFFICE VISIT (OUTPATIENT)
Dept: FAMILY MEDICINE CLINIC | Age: 70
End: 2025-08-07
Payer: MEDICARE

## 2025-08-07 VITALS
HEART RATE: 50 BPM | TEMPERATURE: 97 F | BODY MASS INDEX: 32.59 KG/M2 | WEIGHT: 195.6 LBS | DIASTOLIC BLOOD PRESSURE: 58 MMHG | RESPIRATION RATE: 20 BRPM | HEIGHT: 65 IN | SYSTOLIC BLOOD PRESSURE: 116 MMHG | OXYGEN SATURATION: 96 %

## 2025-08-07 VITALS
RESPIRATION RATE: 18 BRPM | DIASTOLIC BLOOD PRESSURE: 62 MMHG | HEART RATE: 58 BPM | OXYGEN SATURATION: 97 % | WEIGHT: 194 LBS | TEMPERATURE: 98.6 F | HEIGHT: 65 IN | SYSTOLIC BLOOD PRESSURE: 131 MMHG | BODY MASS INDEX: 32.32 KG/M2

## 2025-08-07 VITALS
HEART RATE: 58 BPM | RESPIRATION RATE: 18 BRPM | OXYGEN SATURATION: 97 % | DIASTOLIC BLOOD PRESSURE: 62 MMHG | SYSTOLIC BLOOD PRESSURE: 131 MMHG | TEMPERATURE: 98.6 F

## 2025-08-07 DIAGNOSIS — D45 POLYCYTHEMIA VERA (HCC): ICD-10-CM

## 2025-08-07 DIAGNOSIS — Z51.81 ENCOUNTER FOR MONITORING OF HYDROXYUREA THERAPY: ICD-10-CM

## 2025-08-07 DIAGNOSIS — I10 ESSENTIAL HYPERTENSION: ICD-10-CM

## 2025-08-07 DIAGNOSIS — D45 POLYCYTHEMIA VERA (HCC): Primary | ICD-10-CM

## 2025-08-07 DIAGNOSIS — Z15.89 JAK-2 GENE MUTATION: ICD-10-CM

## 2025-08-07 DIAGNOSIS — D72.829 LEUKOCYTOSIS, UNSPECIFIED TYPE: ICD-10-CM

## 2025-08-07 DIAGNOSIS — Z79.64 ENCOUNTER FOR MONITORING OF HYDROXYUREA THERAPY: ICD-10-CM

## 2025-08-07 DIAGNOSIS — Z00.00 ANNUAL PHYSICAL EXAM: Primary | ICD-10-CM

## 2025-08-07 DIAGNOSIS — Z13.6 ENCOUNTER FOR SCREENING FOR CARDIOVASCULAR DISORDERS: ICD-10-CM

## 2025-08-07 DIAGNOSIS — Z51.11 ENCOUNTER FOR ANTINEOPLASTIC CHEMOTHERAPY: ICD-10-CM

## 2025-08-07 LAB
ALBUMIN SERPL BCG-MCNC: 4.1 G/DL (ref 3.4–4.9)
ALP SERPL-CCNC: 80 U/L (ref 38–126)
ALT SERPL W/O P-5'-P-CCNC: 19 U/L (ref 10–35)
AST SERPL-CCNC: 26 U/L (ref 10–35)
AUTO DIFF PNL BLD: ABNORMAL
BASOPHILS ABSOLUTE: 0.3 THOU/MM3 (ref 0–0.1)
BASOPHILS NFR BLD AUTO: 3 %
BILIRUB CONJ SERPL-MCNC: < 0.1 MG/DL (ref 0–0.2)
BILIRUB SERPL-MCNC: 0.4 MG/DL (ref 0.3–1.2)
BLASTS: 1 % (ref 0–1)
BUN BLDP-MCNC: 29 MG/DL (ref 8–26)
CHLORIDE BLD-SCNC: 109 MEQ/L (ref 98–109)
CREAT BLD-MCNC: 1.2 MG/DL (ref 0.5–1.2)
DACROCYTES: ABNORMAL
DEPRECATED RDW RBC AUTO: 63.7 FL (ref 35–45)
ELLIPTOCYTES: ABNORMAL
EOSINOPHIL NFR BLD AUTO: 2 %
EOSINOPHILS ABSOLUTE: 0.2 THOU/MM3 (ref 0–0.4)
ERYTHROCYTE [DISTWIDTH] IN BLOOD BY AUTOMATED COUNT: 18 % (ref 11.5–14.5)
GFR SERPL CREATININE-BSD FRML MDRD: 48 ML/MIN/1.73M2
GLUCOSE BLD-MCNC: 87 MG/DL (ref 70–108)
HCT VFR BLD AUTO: 38.3 % (ref 37–47)
HGB BLD-MCNC: 12 GM/DL (ref 12–16)
IMM GRANULOCYTES # BLD AUTO: 1.05 THOU/MM3 (ref 0–0.07)
IMM GRANULOCYTES NFR BLD AUTO: 11.9 %
IONIZED CALCIUM, WHOLE BLOOD: 1.41 MMOL/L (ref 1.12–1.32)
LYMPHOCYTES ABSOLUTE: 2.4 THOU/MM3 (ref 1–4.8)
LYMPHOCYTES NFR BLD AUTO: 27 %
MCH RBC QN AUTO: 30 PG (ref 26–33)
MCHC RBC AUTO-ENTMCNC: 31.3 GM/DL (ref 32.2–35.5)
MCV RBC AUTO: 95.8 FL (ref 81–99)
METAMYELOCYTES: 3 %
MONOCYTES ABSOLUTE: 0.6 THOU/MM3 (ref 0.4–1.3)
MONOCYTES NFR BLD AUTO: 7 %
MYELOCYTES: 4 %
NEUTROPHILS ABSOLUTE: 4.3 THOU/MM3 (ref 1.8–7.7)
NEUTROPHILS NFR BLD AUTO: 49 %
NRBC BLD AUTO-RTO: 0 /100 WBC
PATHOLOGIST REVIEW: ABNORMAL
PLATELET # BLD AUTO: 92 THOU/MM3 (ref 130–400)
PLATELET BLD QL SMEAR: ABNORMAL
PMV BLD AUTO: ABNORMAL FL (ref 9.4–12.4)
POIKILOCYTES: ABNORMAL
POTASSIUM BLD-SCNC: 4.5 MEQ/L (ref 3.5–4.9)
PROMYELOCYTES: 4 %
PROT SERPL-MCNC: 7.3 G/DL (ref 6.4–8.3)
RBC # BLD AUTO: 4 MILL/MM3 (ref 4.2–5.4)
SCAN OF BLOOD SMEAR: NORMAL
SODIUM BLD-SCNC: 141 MEQ/L (ref 138–146)
TOTAL CO2, WHOLE BLOOD: 25 MEQ/L (ref 23–33)
VARIANT LYMPHS BLD QL SMEAR: ABNORMAL %
WBC # BLD AUTO: 8.8 THOU/MM3 (ref 4.8–10.8)

## 2025-08-07 PROCEDURE — 3074F SYST BP LT 130 MM HG: CPT | Performed by: FAMILY MEDICINE

## 2025-08-07 PROCEDURE — 80047 BASIC METABLC PNL IONIZED CA: CPT

## 2025-08-07 PROCEDURE — 1159F MED LIST DOCD IN RCRD: CPT | Performed by: FAMILY MEDICINE

## 2025-08-07 PROCEDURE — 80076 HEPATIC FUNCTION PANEL: CPT

## 2025-08-07 PROCEDURE — 99397 PER PM REEVAL EST PAT 65+ YR: CPT | Performed by: FAMILY MEDICINE

## 2025-08-07 PROCEDURE — 85025 COMPLETE CBC W/AUTO DIFF WBC: CPT

## 2025-08-07 PROCEDURE — 36415 COLL VENOUS BLD VENIPUNCTURE: CPT

## 2025-08-07 PROCEDURE — 99211 OFF/OP EST MAY X REQ PHY/QHP: CPT

## 2025-08-07 PROCEDURE — 3078F DIAST BP <80 MM HG: CPT | Performed by: FAMILY MEDICINE

## 2025-08-07 PROCEDURE — 1160F RVW MEDS BY RX/DR IN RCRD: CPT | Performed by: FAMILY MEDICINE

## 2025-08-07 RX ORDER — ENALAPRIL MALEATE 20 MG/1
TABLET ORAL
Qty: 180 TABLET | Refills: 3 | Status: SHIPPED | OUTPATIENT
Start: 2025-08-07

## 2025-08-07 SDOH — ECONOMIC STABILITY: FOOD INSECURITY: WITHIN THE PAST 12 MONTHS, THE FOOD YOU BOUGHT JUST DIDN'T LAST AND YOU DIDN'T HAVE MONEY TO GET MORE.: NEVER TRUE

## 2025-08-07 SDOH — ECONOMIC STABILITY: FOOD INSECURITY: WITHIN THE PAST 12 MONTHS, YOU WORRIED THAT YOUR FOOD WOULD RUN OUT BEFORE YOU GOT MONEY TO BUY MORE.: NEVER TRUE

## 2025-08-07 ASSESSMENT — ENCOUNTER SYMPTOMS
CHEST TIGHTNESS: 0
EYE PAIN: 0
BACK PAIN: 0
VOMITING: 0
SORE THROAT: 0
CONSTIPATION: 0
COUGH: 0
ABDOMINAL PAIN: 0
DIARRHEA: 0
RHINORRHEA: 0
NAUSEA: 0
WHEEZING: 0
SHORTNESS OF BREATH: 0
BLOOD IN STOOL: 0

## 2025-08-07 ASSESSMENT — PATIENT HEALTH QUESTIONNAIRE - PHQ9
2. FEELING DOWN, DEPRESSED OR HOPELESS: NOT AT ALL
SUM OF ALL RESPONSES TO PHQ9 QUESTIONS 1 & 2: 0
SUM OF ALL RESPONSES TO PHQ QUESTIONS 1-9: 0
1. LITTLE INTEREST OR PLEASURE IN DOING THINGS: NOT AT ALL
2. FEELING DOWN, DEPRESSED OR HOPELESS: NOT AT ALL
1. LITTLE INTEREST OR PLEASURE IN DOING THINGS: NOT AT ALL

## 2025-08-08 SDOH — HEALTH STABILITY: PHYSICAL HEALTH: ON AVERAGE, HOW MANY MINUTES DO YOU ENGAGE IN EXERCISE AT THIS LEVEL?: 10 MIN

## 2025-08-08 SDOH — HEALTH STABILITY: PHYSICAL HEALTH: ON AVERAGE, HOW MANY DAYS PER WEEK DO YOU ENGAGE IN MODERATE TO STRENUOUS EXERCISE (LIKE A BRISK WALK)?: 5 DAYS

## 2025-08-08 ASSESSMENT — PATIENT HEALTH QUESTIONNAIRE - PHQ9
SUM OF ALL RESPONSES TO PHQ QUESTIONS 1-9: 0
1. LITTLE INTEREST OR PLEASURE IN DOING THINGS: NOT AT ALL
SUM OF ALL RESPONSES TO PHQ QUESTIONS 1-9: 0
2. FEELING DOWN, DEPRESSED OR HOPELESS: NOT AT ALL

## 2025-08-08 ASSESSMENT — LIFESTYLE VARIABLES
HOW MANY STANDARD DRINKS CONTAINING ALCOHOL DO YOU HAVE ON A TYPICAL DAY: 98
HOW OFTEN DO YOU HAVE SIX OR MORE DRINKS ON ONE OCCASION: 98
HOW MANY STANDARD DRINKS CONTAINING ALCOHOL DO YOU HAVE ON A TYPICAL DAY: PATIENT DECLINED
HOW OFTEN DO YOU HAVE A DRINK CONTAINING ALCOHOL: 98
HOW OFTEN DO YOU HAVE A DRINK CONTAINING ALCOHOL: PATIENT DECLINED

## 2025-08-19 ENCOUNTER — OFFICE VISIT (OUTPATIENT)
Dept: FAMILY MEDICINE CLINIC | Age: 70
End: 2025-08-19
Payer: MEDICARE

## 2025-08-19 VITALS
HEART RATE: 69 BPM | RESPIRATION RATE: 18 BRPM | SYSTOLIC BLOOD PRESSURE: 126 MMHG | HEIGHT: 64 IN | BODY MASS INDEX: 33.43 KG/M2 | OXYGEN SATURATION: 97 % | DIASTOLIC BLOOD PRESSURE: 66 MMHG | TEMPERATURE: 97.4 F | WEIGHT: 195.8 LBS

## 2025-08-19 DIAGNOSIS — Z00.00 MEDICARE ANNUAL WELLNESS VISIT, SUBSEQUENT: Primary | ICD-10-CM

## 2025-08-19 PROCEDURE — 1159F MED LIST DOCD IN RCRD: CPT | Performed by: FAMILY MEDICINE

## 2025-08-19 PROCEDURE — 3074F SYST BP LT 130 MM HG: CPT | Performed by: FAMILY MEDICINE

## 2025-08-19 PROCEDURE — G0439 PPPS, SUBSEQ VISIT: HCPCS | Performed by: FAMILY MEDICINE

## 2025-08-19 PROCEDURE — 1123F ACP DISCUSS/DSCN MKR DOCD: CPT | Performed by: FAMILY MEDICINE

## 2025-08-19 PROCEDURE — 3078F DIAST BP <80 MM HG: CPT | Performed by: FAMILY MEDICINE

## 2025-08-19 PROCEDURE — 3017F COLORECTAL CA SCREEN DOC REV: CPT | Performed by: FAMILY MEDICINE

## 2025-08-19 PROCEDURE — 1160F RVW MEDS BY RX/DR IN RCRD: CPT | Performed by: FAMILY MEDICINE
